# Patient Record
Sex: FEMALE | Race: WHITE | Employment: OTHER | URBAN - NONMETROPOLITAN AREA
[De-identification: names, ages, dates, MRNs, and addresses within clinical notes are randomized per-mention and may not be internally consistent; named-entity substitution may affect disease eponyms.]

---

## 2017-01-09 RX ORDER — METOPROLOL SUCCINATE 25 MG/1
TABLET, EXTENDED RELEASE ORAL
Qty: 90 TABLET | Refills: 3 | Status: SHIPPED | OUTPATIENT
Start: 2017-01-09

## 2017-01-13 ENCOUNTER — TELEPHONE (OUTPATIENT)
Dept: GASTROENTEROLOGY | Age: 77
End: 2017-01-13

## 2017-01-16 ENCOUNTER — OFFICE VISIT (OUTPATIENT)
Dept: FAMILY MEDICINE CLINIC | Facility: CLINIC | Age: 77
End: 2017-01-16

## 2017-01-16 VITALS
WEIGHT: 166 LBS | HEART RATE: 65 BPM | BODY MASS INDEX: 29.41 KG/M2 | HEIGHT: 63 IN | DIASTOLIC BLOOD PRESSURE: 74 MMHG | TEMPERATURE: 98 F | SYSTOLIC BLOOD PRESSURE: 120 MMHG | OXYGEN SATURATION: 98 %

## 2017-01-16 DIAGNOSIS — R53.83 FATIGUE, UNSPECIFIED TYPE: ICD-10-CM

## 2017-01-16 DIAGNOSIS — E55.9 UNSPECIFIED VITAMIN D DEFICIENCY: ICD-10-CM

## 2017-01-16 DIAGNOSIS — Z00.00 ANNUAL PHYSICAL EXAM: Primary | ICD-10-CM

## 2017-01-16 DIAGNOSIS — E78.2 MIXED HYPERLIPIDEMIA: ICD-10-CM

## 2017-01-16 LAB
BILIRUB BLD-MCNC: NEGATIVE MG/DL
CLARITY, POC: CLEAR
COLOR UR: YELLOW
GLUCOSE UR STRIP-MCNC: NEGATIVE MG/DL
KETONES UR QL: NEGATIVE
LEUKOCYTE EST, POC: NORMAL
NITRITE UR-MCNC: NEGATIVE MG/ML
PH UR: 6 [PH] (ref 5–8)
PROT UR STRIP-MCNC: NEGATIVE MG/DL
RBC # UR STRIP: NORMAL /UL
SP GR UR: 1.01 (ref 1–1.03)
UROBILINOGEN UR QL: NORMAL

## 2017-01-16 PROCEDURE — 81003 URINALYSIS AUTO W/O SCOPE: CPT | Performed by: FAMILY MEDICINE

## 2017-01-16 PROCEDURE — G0439 PPPS, SUBSEQ VISIT: HCPCS | Performed by: FAMILY MEDICINE

## 2017-01-16 NOTE — PATIENT INSTRUCTIONS
Exercising to Stay Healthy  Exercising regularly is important. It has many health benefits, such as:  · Improving your overall fitness, flexibility, and endurance.  · Increasing your bone density.  · Helping with weight control.  · Decreasing your body fat.  · Increasing your muscle strength.  · Reducing stress and tension.  · Improving your overall health.  In order to become healthy and stay healthy, it is recommended that you do moderate-intensity and vigorous-intensity exercise. You can tell that you are exercising at a moderate intensity if you have a higher heart rate and faster breathing, but you are still able to hold a conversation. You can tell that you are exercising at a vigorous intensity if you are breathing much harder and faster and cannot hold a conversation while exercising.  HOW OFTEN SHOULD I EXERCISE?  Choose an activity that you enjoy and set realistic goals. Your health care provider can help you to make an activity plan that works for you. Exercise regularly as directed by your health care provider. This may include:   · Doing resistance training twice each week, such as:    Push-ups.    Sit-ups.    Lifting weights.    Using resistance bands.  · Doing a given intensity of exercise for a given amount of time. Choose from these options:    150 minutes of moderate-intensity exercise every week.    75 minutes of vigorous-intensity exercise every week.    A mix of moderate-intensity and vigorous-intensity exercise every week.  Children, pregnant women, people who are out of shape, people who are overweight, and older adults may need to consult a health care provider for individual recommendations. If you have any sort of medical condition, be sure to consult your health care provider before starting a new exercise program.   WHAT ARE SOME EXERCISE IDEAS?  Some moderate-intensity exercise ideas include:   · Walking at a rate of 1 mile in 15  minutes.  · Biking.  · Hiking.  · Golfing.  · Dancing.  Some vigorous-intensity exercise ideas include:   · Walking at a rate of at least 4.5 miles per hour.  · Jogging or running at a rate of 5 miles per hour.  · Biking at a rate of at least 10 miles per hour.  · Lap swimming.  · Roller-skating or in-line skating.  · Cross-country skiing.  · Vigorous competitive sports, such as football, basketball, and soccer.  · Jumping rope.  · Aerobic dancing.  WHAT ARE SOME EVERYDAY ACTIVITIES THAT CAN HELP ME TO GET EXERCISE?  · Yard work, such as:    Pushing a .    Raking and bagging leaves.  · Washing and waxing your car.  · Pushing a stroller.  · Shoveling snow.  · Gardening.  · Washing windows or floors.  HOW CAN I BE MORE ACTIVE IN MY DAY-TO-DAY ACTIVITIES?  · Use the stairs instead of the elevator.  · Take a walk during your lunch break.  · If you drive, park your car farther away from work or school.  · If you take public transportation, get off one stop early and walk the rest of the way.  · Make all of your phone calls while standing up and walking around.  · Get up, stretch, and walk around every 30 minutes throughout the day.  WHAT GUIDELINES SHOULD I FOLLOW WHILE EXERCISING?  · Do not exercise so much that you hurt yourself, feel dizzy, or get very short of breath.  · Consult your health care provider before starting a new exercise program.  · Wear comfortable clothes and shoes with good support.  · Drink plenty of water while you exercise to prevent dehydration or heat stroke. Body water is lost during exercise and must be replaced.  · Work out until you breathe faster and your heart beats faster.     This information is not intended to replace advice given to you by your health care provider. Make sure you discuss any questions you have with your health care provider.     Document Released: 01/20/2012 Document Revised: 01/08/2016 Document Reviewed: 05/21/2015  [employer] Patient Education  ©2016 [employer]    Fall Prevention in the Home   Falls can cause injuries and can affect people from all age groups. There are many simple things that you can do to make your home safe and to help prevent falls.  WHAT CAN I DO ON THE OUTSIDE OF MY HOME?  · Regularly repair the edges of walkways and driveways and fix any cracks.  · Remove high doorway thresholds.  · Trim any shrubbery on the main path into your home.  · Use bright outdoor lighting.  · Clear walkways of debris and clutter, including tools and rocks.  · Regularly check that handrails are securely fastened and in good repair. Both sides of any steps should have handrails.  · Install guardrails along the edges of any raised decks or porches.  · Have leaves, snow, and ice cleared regularly.  · Use sand or salt on walkways during winter months.  · In the garage, clean up any spills right away, including grease or oil spills.  WHAT CAN I DO IN THE BATHROOM?  · Use night lights.  · Install grab bars by the toilet and in the tub and shower. Do not use towel bars as grab bars.  · Use non-skid mats or decals on the floor of the tub or shower.  · If you need to sit down while you are in the shower, use a plastic, non-slip stool..  · Keep the floor dry. Immediately clean up any water that spills on the floor.  · Remove soap buildup in the tub or shower on a regular basis.  · Attach bath mats securely with double-sided non-slip rug tape.  · Remove throw rugs and other tripping hazards from the floor.  WHAT CAN I DO IN THE BEDROOM?  · Use night lights.  · Make sure that a bedside light is easy to reach.  · Do not use oversized bedding that drapes onto the floor.  · Have a firm chair that has side arms to use for getting dressed.  · Remove throw rugs and other tripping hazards from the floor.  WHAT CAN I DO IN THE KITCHEN?   · Clean up any spills right away.  · Avoid walking on wet floors.  · Place frequently used items in easy-to-reach places.  · If you need to  reach for something above you, use a sturdy step stool that has a grab bar.  · Keep electrical cables out of the way.  · Do not use floor polish or wax that makes floors slippery. If you have to use wax, make sure that it is non-skid floor wax.  · Remove throw rugs and other tripping hazards from the floor.  WHAT CAN I DO IN THE STAIRWAYS?  · Do not leave any items on the stairs.  · Make sure that there are handrails on both sides of the stairs. Fix handrails that are broken or loose. Make sure that handrails are as long as the stairways.  · Check any carpeting to make sure that it is firmly attached to the stairs. Fix any carpet that is loose or worn.  · Avoid having throw rugs at the top or bottom of stairways, or secure the rugs with carpet tape to prevent them from moving.  · Make sure that you have a light switch at the top of the stairs and the bottom of the stairs. If you do not have them, have them installed.  WHAT ARE SOME OTHER FALL PREVENTION TIPS?  · Wear closed-toe shoes that fit well and support your feet. Wear shoes that have rubber soles or low heels.  · When you use a stepladder, make sure that it is completely opened and that the sides are firmly locked. Have someone hold the ladder while you are using it. Do not climb a closed stepladder.  · Add color or contrast paint or tape to grab bars and handrails in your home. Place contrasting color strips on the first and last steps.  · Use mobility aids as needed, such as canes, walkers, scooters, and crutches.  · Turn on lights if it is dark. Replace any light bulbs that burn out.  · Set up furniture so that there are clear paths. Keep the furniture in the same spot.  · Fix any uneven floor surfaces.  · Choose a carpet design that does not hide the edge of steps of a stairway.  · Be aware of any and all pets.  · Review your medicines with your healthcare provider. Some medicines can cause dizziness or changes in blood pressure, which increase your risk  of falling.  Talk with your health care provider about other ways that you can decrease your risk of falls. This may include working with a physical therapist or  to improve your strength, balance, and endurance.     This information is not intended to replace advice given to you by your health care provider. Make sure you discuss any questions you have with your health care provider.     Document Released: 2003 Document Revised: 2016 Document Reviewed: 2016  [employer] Patient Education © [employer]    Medicare Wellness  Personal Prevention Plan of Service     Date of Office Visit:  2017  Encounter Provider:  Bill Schilling MD  Place of Service:  [facility]  Patient Name: Ariana Gutierrez  :  1940    As part of the Medicare Wellness portion of your visit today, we are providing you with this personalized preventive plan of services (PPPS). This plan is based upon recommendations of the [place] Preventive Services Task Force (USPSTF) and the Advisory Committee on Immunization Practices (ACIP).    This lists the preventive care services that should be considered, and provides dates of when you are due. Items listed as completed are up-to-date and do not require any further intervention.    Health Maintenance   Topic Date Due   • TDAP/TD VACCINES (1 - Tdap) 1961   • PNEUMOCOCCAL VACCINES (65+ LOW/MEDIUM RISK) (1 of 2 - PCV13) 2007   • ZOSTER VACCINE  10/24/2016   • COLONOSCOPY  2016   • INFLUENZA VACCINE  2016   • MEDICARE ANNUAL WELLNESS  10/24/2016   • MAMMOGRAM  11/10/2018              Medicare Wellness  Personal Prevention Plan of Service     Date of Office Visit:  2017  Encounter Provider:  Bill Schilling MD  Place of Service:  [facility]  Patient Name: Ariana Gutierrez  :  1940    As part of the Medicare Wellness portion of your  visit today, we are providing you with this personalized preventive plan of services (PPPS). This plan is based upon recommendations of the [place] Preventive Services Task Force (USPSTF) and the Advisory Committee on Immunization Practices (ACIP).    This lists the preventive care services that should be considered, and provides dates of when you are due. Items listed as completed are up-to-date and do not require any further intervention.    Health Maintenance   Topic Date Due   • TDAP/TD VACCINES (1 - Tdap) 02/24/1961   • PNEUMOCOCCAL VACCINES (65+ LOW/MEDIUM RISK) (1 of 2 - PCV13) 02/24/2007   • ZOSTER VACCINE  10/24/2016   • COLONOSCOPY  12/12/2016   • LIPID PANEL  01/16/2017   • INFLUENZA VACCINE  08/01/2016   • MEDICARE ANNUAL WELLNESS  10/24/2016   • MAMMOGRAM  11/10/2018         Patient Self-Management and Personalized Health Advice  The patient has been provided with information about: diet and prevention of cardiac or vascular disease and preventive services including:   · Advanced directives: has an advanced directive - a copy HAS NOT been provided. Have asked the patient to send this to us to add to record.    Visit Diagnoses:  No diagnosis found.    No orders of the defined types were placed in this encounter.      Outpatient Encounter Prescriptions as of 1/16/2017   Medication Sig Dispense Refill   • aspirin 81 MG tablet Take 81 mg by mouth daily.     • benazepril (LOTENSIN) 20 MG tablet      • Calcium Carb-Cholecalciferol (CALCIUM + D3) 600-200 MG-UNIT tablet Take  by mouth Daily.     • gabapentin (NEURONTIN) 300 MG capsule TAKE 1 CAPSULE THREE TIMES DAILY     • hydrochlorothiazide (HYDRODIURIL) 25 MG tablet      • HYDROcodone-acetaminophen (NORCO)  MG per tablet Take 1 tablet by mouth Every 6 (Six) Hours As Needed for moderate pain (4-6). 90 tablet 0   • metoprolol succinate XL (TOPROL-XL) 25 MG 24 hr tablet      • omeprazole (PriLOSEC) 20 MG capsule      • polyethylene glycol (MIRALAX)  powder      • predniSONE (DELTASONE) 20 MG tablet Take 1 tablet by mouth Daily. 7 tablet 0     No facility-administered encounter medications on file as of 1/16/2017.        Reviewed use of high risk medication in the elderly: yes  Reviewed for potential of harmful drug interactions in the elderly: yes    Follow Up:  Return in 6 months (on 7/16/2017).     An After Visit Summary and PPPS with all of these plans were given to the patient.

## 2017-01-16 NOTE — PROGRESS NOTES
QUICK REFERENCE INFORMATION:  The ABCs of the Annual Wellness Visit    Subsequent Medicare Wellness Visit    HEALTH RISK ASSESSMENT    Recent Hospitalizations:  No recent hospitalization(s)..    Health Habits:  Current Diet: Well Balanced Diet  Dental Exam. up to date  Eye Exam. up to date  Exercise: 0 times/week.  Current exercise activities include: yoga    Current Medical Providers:  Patient Care Team:  Bill Schilling MD as PCP - General (Family Medicine)    The [facility] providers who are involved in the care of this patient are listed above. Additional providers and suppliers are listed below:          Smoking Status:  History   Smoking Status   • Never Smoker   Smokeless Tobacco   • Not on file       Alcohol Consumption:  History   Alcohol Use   • Yes     Comment: occ       Depression Screen:   PHQ-9 Depression Screening 1/16/2017   Little interest or pleasure in doing things 0   Feeling down, depressed, or hopeless 0   Trouble falling or staying asleep, or sleeping too much 0   Feeling tired or having little energy 0   Poor appetite or overeating 0   Feeling bad about yourself - or that you are a failure or have let yourself or your family down 0   Trouble concentrating on things, such as reading the newspaper or watching television 0   Moving or speaking so slowly that other people could have noticed. Or the opposite - being so fidgety or restless that you have been moving around a lot more than usual 0   Thoughts that you would be better off dead, or of hurting yourself in some way 0   PHQ-9 Total Score 0   If you checked off any problems, how difficult have these problems made it for you to do your work, take care of things at home, or get along with other people? Not difficult at all       Functional and Cognitive Screening:  Functional & Cognitive Status 1/16/2017   Do you have difficulty preparing food and eating? No   Do you have difficulty bathing yourself? No   Do you have difficulty  "getting dressed? No   Do you have difficulty using the toilet? No   Do you have difficulty moving around from place to place? No   In the past year have you fallen or experienced a near fall? No   Do you need help using the phone?  No   Are you deaf or do you have serious difficulty hearing?  No   Do you need help with transportation? No   Do you need help shopping? No   Do you need help preparing meals?  No   Do you need help with housework?  No   Do you need help with laundry? No   Do you need help taking your medications? No   Do you need help managing money? No   Do you have difficulty concentrating, remembering or making decisions? No   -- The Timed Up and Go (TUG) Test (CDC Version)                  - Testing directions adhered to:                                  1) Patients wears regular footwear and may use walking aid(s) if    needed.                                  2) Patient to sit back in standard arm chair and identify a line 10 feet    away from patient on floor.                                  3) Test time begins at \"Go\" and stops when patient reseated in arm    chair.                    - Instructions read aloud (and demonstrated as applicable) to patient:                                      When I say \"Go,\" I want you to:                                    1) Stand up from the chair.                                  2) Walk to the line on the floor (10 feet away) at your normal pace.                                  3) Turn.                                  4) Walk back to the chair at your normal pace.                                  5) Sit down again.                    - Result: 6                    - Observations during test:Normal gait          Falls Risk Assessment:       Does the patient have evidence of cognitive impairment? No    Recent Lab Results:  CMP:  Lab Results   Component Value Date    BUN 5 11/19/2015    CREATININE 0.77 11/19/2015     11/19/2015    K 4.2 11/19/2015    " CO2 27 11/19/2015    CALCIUM 9.2 11/19/2015    ALBUMIN 2.8 (L) 11/16/2015    BILITOT 0.8 11/16/2015    ALKPHOS 43 11/16/2015    AST 21 11/16/2015    ALT 24 11/16/2015     Lipid Panel:  No results found for: CHLPL, TRIG, HDL, VLDL, LDL  HbA1c:  No results found for: HGBA1C  Urine Microalbumin:  No results found for: MICROALBUR, POCMALB  Visual Acuity:  No exam data present    Age-appropriate Screening Schedule:  Refer to the list below for future screening recommendations based on patient's age, sex and/or medical conditions. Orders for these recommended tests are listed in the plan section. The patient has been provided with a written plan.    Health Maintenance   Topic Date Due   • TDAP/TD VACCINES (1 - Tdap) 02/24/1961   • PNEUMOCOCCAL VACCINES (65+ LOW/MEDIUM RISK) (1 of 2 - PCV13) 02/24/2007   • ZOSTER VACCINE  10/24/2016   • COLONOSCOPY  12/12/2016   • INFLUENZA VACCINE  08/01/2016   • MAMMOGRAM  11/10/2018        Subjective   History of Present Illness    Ariana Gutierrez is a 76 y.o. female who presents for an Subsequent Wellness Visit. In addition, we addressed the following health issues:    The following portions of the patient's history were reviewed and updated as appropriate: allergies, current medications, past family history, past medical history, past surgical history and problem list.    Outpatient Medications Prior to Visit   Medication Sig Dispense Refill   • aspirin 81 MG tablet Take 81 mg by mouth daily.     • benazepril (LOTENSIN) 20 MG tablet      • Calcium Carb-Cholecalciferol (CALCIUM + D3) 600-200 MG-UNIT tablet Take  by mouth Daily.     • gabapentin (NEURONTIN) 300 MG capsule TAKE 1 CAPSULE THREE TIMES DAILY     • hydrochlorothiazide (HYDRODIURIL) 25 MG tablet      • HYDROcodone-acetaminophen (NORCO)  MG per tablet Take 1 tablet by mouth Every 6 (Six) Hours As Needed for moderate pain (4-6). 90 tablet 0   • metoprolol succinate XL (TOPROL-XL) 25 MG 24 hr tablet      • omeprazole  (PriLOSEC) 20 MG capsule      • polyethylene glycol (MIRALAX) powder      • predniSONE (DELTASONE) 20 MG tablet Take 1 tablet by mouth Daily. 7 tablet 0     No facility-administered medications prior to visit.        Patient Active Problem List   Diagnosis   • Abdominal bloating   • Flatulence   • Diverticulosis of intestine   • Hypertension   • Irritable bowel syndrome   • Irritable bowel syndrome with constipation   • Small intestinal bacterial overgrowth   • Spinal stenosis       Identification of Risk Factors:  Risk factors include: weight  and inactivity.    Review of Systems   Constitutional: Negative.    HENT: Negative.    Eyes: Negative.    Respiratory: Negative.    Cardiovascular: Negative.    Gastrointestinal:        Chronic constipation-colonoscopy scheduled in March   Endocrine: Negative.    Genitourinary: Negative.    Musculoskeletal: Positive for back pain.        Spinal stenosis going to [place] to be reevaluated   Neurological: Negative.    Hematological: Negative.    Psychiatric/Behavioral: Negative.        Compared to one year ago, the patient feels his physical health is the same.  Compared to one year ago, the patient feels his mental health is the same.    Objective     Physical Exam   Constitutional: She is oriented to person, place, and time. She appears well-developed and well-nourished.   HENT:   Head: Normocephalic.   Right Ear: External ear normal.   Left Ear: External ear normal.   Mouth/Throat: Oropharynx is clear and moist.   Eyes: EOM are normal. Pupils are equal, round, and reactive to light.   Neck:   Good carotid pulses no neck masses   Cardiovascular: Normal rate and regular rhythm.    Pulmonary/Chest: Effort normal and breath sounds normal.   Abdominal: Soft. Bowel sounds are normal.   Pulsatile mass or organomegaly   Genitourinary:   Genitourinary Comments: Gynecologic exam elsewhere   Neurological: She is alert and oriented to person, place, and time.   Skin: Skin is warm and  "dry.   Psychiatric: She has a normal mood and affect. Her behavior is normal. Judgment and thought content normal.   Vitals reviewed.      Vitals:    01/16/17 0933   BP: 120/74   Pulse: 65   Temp: 98 °F (36.7 °C)   SpO2: 98%   Weight: 166 lb (75.3 kg)   Height: 63\" (160 cm)   PainSc:   4   PainLoc: Back       Body mass index is 29.41 kg/(m^2).  Discussed the patient's BMI with her. The BMI is above average; no BMI management plan is appropriate..    Assessment/Plan   Patient Self-Management and Personalized Health Advice  The patient has been provided with information about: diet and prevention of cardiac or vascular disease and preventive services including:   · Advanced directives: has an advanced directive - a copy HAS NOT been provided. Have asked the patient to send this to us to add to record.    Visit Diagnoses:  No diagnosis found.    No orders of the defined types were placed in this encounter.      Outpatient Encounter Prescriptions as of 1/16/2017   Medication Sig Dispense Refill   • aspirin 81 MG tablet Take 81 mg by mouth daily.     • benazepril (LOTENSIN) 20 MG tablet      • Calcium Carb-Cholecalciferol (CALCIUM + D3) 600-200 MG-UNIT tablet Take  by mouth Daily.     • gabapentin (NEURONTIN) 300 MG capsule TAKE 1 CAPSULE THREE TIMES DAILY     • hydrochlorothiazide (HYDRODIURIL) 25 MG tablet      • HYDROcodone-acetaminophen (NORCO)  MG per tablet Take 1 tablet by mouth Every 6 (Six) Hours As Needed for moderate pain (4-6). 90 tablet 0   • metoprolol succinate XL (TOPROL-XL) 25 MG 24 hr tablet      • omeprazole (PriLOSEC) 20 MG capsule      • polyethylene glycol (MIRALAX) powder      • predniSONE (DELTASONE) 20 MG tablet Take 1 tablet by mouth Daily. 7 tablet 0     No facility-administered encounter medications on file as of 1/16/2017.        Reviewed use of high risk medication in the elderly: yes  Reviewed for potential of harmful drug interactions in the elderly: yes    Follow Up:  Return in 6 " months (on 7/16/2017).     An After Visit Summary and PPPS with all of these plans were given to the patient.

## 2017-01-16 NOTE — MR AVS SNAPSHOT
Ariana Gutierrez   1/16/2017 10:00 AM   Office Visit    Dept Phone:  [phone]   Encounter #:  [id number]    Provider:  Bill Schilling MD   Department:  [facility]                Your Full Care Plan              Your Updated Medication List          This list is accurate as of: 1/16/17 10:38 AM.  Always use your most recent med list.                aspirin 81 MG tablet       benazepril 20 MG tablet   Commonly known as:  LOTENSIN       Calcium + D3 600-200 MG-UNIT tablet       gabapentin 300 MG capsule   Commonly known as:  NEURONTIN       hydrochlorothiazide 25 MG tablet   Commonly known as:  HYDRODIURIL       HYDROcodone-acetaminophen  MG per tablet   Commonly known as:  NORCO   Take 1 tablet by mouth Every 6 (Six) Hours As Needed for moderate pain (4-6).       metoprolol succinate XL 25 MG 24 hr tablet   Commonly known as:  TOPROL-XL       omeprazole 20 MG capsule   Commonly known as:  priLOSEC       polyethylene glycol powder   Commonly known as:  MIRALAX       predniSONE 20 MG tablet   Commonly known as:  DELTASONE   Take 1 tablet by mouth Daily.               We Performed the Following     CBC & Differential     Comprehensive Metabolic Panel     Folate     Lipid Panel     POC Urinalysis Dipstick, Automated     T4, free     TSH     Vitamin B12     Vitamin D 25 Hydroxy       You Were Diagnosed With        Codes Comments    Annual physical exam    -  Primary ICD-10-CM: Z00.00  ICD-9-CM: V70.0     Mixed hyperlipidemia     ICD-10-CM: E78.2  ICD-9-CM: 272.2     Fatigue, unspecified type     ICD-10-CM: R53.83  ICD-9-CM: 780.79     Unspecified vitamin D deficiency     ICD-10-CM: E55.9  ICD-9-CM: 268.9       Instructions        Exercising to Stay Healthy  Exercising regularly is important. It has many health benefits, such as:  · Improving your overall fitness, flexibility, and endurance.  · Increasing your bone density.  · Helping with weight  control.  · Decreasing your body fat.  · Increasing your muscle strength.  · Reducing stress and tension.  · Improving your overall health.  In order to become healthy and stay healthy, it is recommended that you do moderate-intensity and vigorous-intensity exercise. You can tell that you are exercising at a moderate intensity if you have a higher heart rate and faster breathing, but you are still able to hold a conversation. You can tell that you are exercising at a vigorous intensity if you are breathing much harder and faster and cannot hold a conversation while exercising.  HOW OFTEN SHOULD I EXERCISE?  Choose an activity that you enjoy and set realistic goals. Your health care provider can help you to make an activity plan that works for you. Exercise regularly as directed by your health care provider. This may include:   · Doing resistance training twice each week, such as:    Push-ups.    Sit-ups.    Lifting weights.    Using resistance bands.  · Doing a given intensity of exercise for a given amount of time. Choose from these options:    150 minutes of moderate-intensity exercise every week.    75 minutes of vigorous-intensity exercise every week.    A mix of moderate-intensity and vigorous-intensity exercise every week.  Children, pregnant women, people who are out of shape, people who are overweight, and older adults may need to consult a health care provider for individual recommendations. If you have any sort of medical condition, be sure to consult your health care provider before starting a new exercise program.   WHAT ARE SOME EXERCISE IDEAS?  Some moderate-intensity exercise ideas include:   · Walking at a rate of 1 mile in 15 minutes.  · Biking.  · Hiking.  · Golfing.  · Dancing.  Some vigorous-intensity exercise ideas include:   · Walking at a rate of at least 4.5 miles per hour.  · Jogging or running at a rate of 5 miles per hour.  · Biking at a rate of at least 10 miles per hour.  · Lap  swimming.  · Roller-skating or in-line skating.  · Cross-country skiing.  · Vigorous competitive sports, such as football, basketball, and soccer.  · Jumping rope.  · Aerobic dancing.  WHAT ARE SOME EVERYDAY ACTIVITIES THAT CAN HELP ME TO GET EXERCISE?  · Yard work, such as:    Pushing a .    Raking and bagging leaves.  · Washing and waxing your car.  · Pushing a stroller.  · Shoveling snow.  · Gardening.  · Washing windows or floors.  HOW CAN I BE MORE ACTIVE IN MY DAY-TO-DAY ACTIVITIES?  · Use the stairs instead of the elevator.  · Take a walk during your lunch break.  · If you drive, park your car farther away from work or school.  · If you take public transportation, get off one stop early and walk the rest of the way.  · Make all of your phone calls while standing up and walking around.  · Get up, stretch, and walk around every 30 minutes throughout the day.  WHAT GUIDELINES SHOULD I FOLLOW WHILE EXERCISING?  · Do not exercise so much that you hurt yourself, feel dizzy, or get very short of breath.  · Consult your health care provider before starting a new exercise program.  · Wear comfortable clothes and shoes with good support.  · Drink plenty of water while you exercise to prevent dehydration or heat stroke. Body water is lost during exercise and must be replaced.  · Work out until you breathe faster and your heart beats faster.     This information is not intended to replace advice given to you by your health care provider. Make sure you discuss any questions you have with your health care provider.     Document Released: 01/20/2012 Document Revised: 01/08/2016 Document Reviewed: 05/21/2015  Elsevier Interactive Patient Education ©2016 [employer]    Fall Prevention in the Home   Falls can cause injuries and can affect people from all age groups. There are many simple things that you can do to make your home safe and to help prevent falls.  WHAT CAN I DO ON THE OUTSIDE OF MY HOME?  · Regularly  repair the edges of walkways and driveways and fix any cracks.  · Remove high doorway thresholds.  · Trim any shrubbery on the main path into your home.  · Use bright outdoor lighting.  · Clear walkways of debris and clutter, including tools and rocks.  · Regularly check that handrails are securely fastened and in good repair. Both sides of any steps should have handrails.  · Install guardrails along the edges of any raised decks or porches.  · Have leaves, snow, and ice cleared regularly.  · Use sand or salt on walkways during winter months.  · In the garage, clean up any spills right away, including grease or oil spills.  WHAT CAN I DO IN THE BATHROOM?  · Use night lights.  · Install grab bars by the toilet and in the tub and shower. Do not use towel bars as grab bars.  · Use non-skid mats or decals on the floor of the tub or shower.  · If you need to sit down while you are in the shower, use a plastic, non-slip stool..  · Keep the floor dry. Immediately clean up any water that spills on the floor.  · Remove soap buildup in the tub or shower on a regular basis.  · Attach bath mats securely with double-sided non-slip rug tape.  · Remove throw rugs and other tripping hazards from the floor.  WHAT CAN I DO IN THE BEDROOM?  · Use night lights.  · Make sure that a bedside light is easy to reach.  · Do not use oversized bedding that drapes onto the floor.  · Have a firm chair that has side arms to use for getting dressed.  · Remove throw rugs and other tripping hazards from the floor.  WHAT CAN I DO IN THE KITCHEN?   · Clean up any spills right away.  · Avoid walking on wet floors.  · Place frequently used items in easy-to-reach places.  · If you need to reach for something above you, use a sturdy step stool that has a grab bar.  · Keep electrical cables out of the way.  · Do not use floor polish or wax that makes floors slippery. If you have to use wax, make sure that it is non-skid floor wax.  · Remove throw rugs and  other tripping hazards from the floor.  WHAT CAN I DO IN THE STAIRWAYS?  · Do not leave any items on the stairs.  · Make sure that there are handrails on both sides of the stairs. Fix handrails that are broken or loose. Make sure that handrails are as long as the stairways.  · Check any carpeting to make sure that it is firmly attached to the stairs. Fix any carpet that is loose or worn.  · Avoid having throw rugs at the top or bottom of stairways, or secure the rugs with carpet tape to prevent them from moving.  · Make sure that you have a light switch at the top of the stairs and the bottom of the stairs. If you do not have them, have them installed.  WHAT ARE SOME OTHER FALL PREVENTION TIPS?  · Wear closed-toe shoes that fit well and support your feet. Wear shoes that have rubber soles or low heels.  · When you use a stepladder, make sure that it is completely opened and that the sides are firmly locked. Have someone hold the ladder while you are using it. Do not climb a closed stepladder.  · Add color or contrast paint or tape to grab bars and handrails in your home. Place contrasting color strips on the first and last steps.  · Use mobility aids as needed, such as canes, walkers, scooters, and crutches.  · Turn on lights if it is dark. Replace any light bulbs that burn out.  · Set up furniture so that there are clear paths. Keep the furniture in the same spot.  · Fix any uneven floor surfaces.  · Choose a carpet design that does not hide the edge of steps of a stairway.  · Be aware of any and all pets.  · Review your medicines with your healthcare provider. Some medicines can cause dizziness or changes in blood pressure, which increase your risk of falling.  Talk with your health care provider about other ways that you can decrease your risk of falls. This may include working with a physical therapist or  to improve your strength, balance, and endurance.     This information is not intended to replace  advice given to you by your health care provider. Make sure you discuss any questions you have with your health care provider.     Document Released: 2003 Document Revised: 2016 Document Reviewed: 2016  [employer] Interactive Patient Education © [employer]    Medicare Wellness  Personal Prevention Plan of Service     Date of Office Visit:  2017  Encounter Provider:  Bill Schilling MD  Place of Service:  [facility]  Patient Name: Ariana Gutierrez  :  1940    As part of the Medicare Wellness portion of your visit today, we are providing you with this personalized preventive plan of services (PPPS). This plan is based upon recommendations of the [place] Preventive Services Task Force (USPSTF) and the Advisory Committee on Immunization Practices (ACIP).    This lists the preventive care services that should be considered, and provides dates of when you are due. Items listed as completed are up-to-date and do not require any further intervention.    Health Maintenance   Topic Date Due   • TDAP/TD VACCINES (1 - Tdap) 1961   • PNEUMOCOCCAL VACCINES (65+ LOW/MEDIUM RISK) (1 of 2 - PCV13) 2007   • ZOSTER VACCINE  10/24/2016   • COLONOSCOPY  2016   • INFLUENZA VACCINE  2016   • MEDICARE ANNUAL WELLNESS  10/24/2016   • MAMMOGRAM  11/10/2018              Medicare Wellness  Personal Prevention Plan of Service     Date of Office Visit:  2017  Encounter Provider:  Bill Schilling MD  Place of Service:  [facility]  Patient Name: Ariana Gutierrez  :  1940    As part of the Medicare Wellness portion of your visit today, we are providing you with this personalized preventive plan of services (PPPS). This plan is based upon recommendations of the [place] Preventive Services Task Force (USPSTF) and the Advisory Committee on Immunization Practices (ACIP).    This lists  the preventive care services that should be considered, and provides dates of when you are due. Items listed as completed are up-to-date and do not require any further intervention.    Health Maintenance   Topic Date Due   • TDAP/TD VACCINES (1 - Tdap) 02/24/1961   • PNEUMOCOCCAL VACCINES (65+ LOW/MEDIUM RISK) (1 of 2 - PCV13) 02/24/2007   • ZOSTER VACCINE  10/24/2016   • COLONOSCOPY  12/12/2016   • LIPID PANEL  01/16/2017   • INFLUENZA VACCINE  08/01/2016   • MEDICARE ANNUAL WELLNESS  10/24/2016   • MAMMOGRAM  11/10/2018         Patient Self-Management and Personalized Health Advice  The patient has been provided with information about: diet and prevention of cardiac or vascular disease and preventive services including:   · Advanced directives: has an advanced directive - a copy HAS NOT been provided. Have asked the patient to send this to us to add to record.    Visit Diagnoses:  No diagnosis found.    No orders of the defined types were placed in this encounter.      Outpatient Encounter Prescriptions as of 1/16/2017   Medication Sig Dispense Refill   • aspirin 81 MG tablet Take 81 mg by mouth daily.     • benazepril (LOTENSIN) 20 MG tablet      • Calcium Carb-Cholecalciferol (CALCIUM + D3) 600-200 MG-UNIT tablet Take  by mouth Daily.     • gabapentin (NEURONTIN) 300 MG capsule TAKE 1 CAPSULE THREE TIMES DAILY     • hydrochlorothiazide (HYDRODIURIL) 25 MG tablet      • HYDROcodone-acetaminophen (NORCO)  MG per tablet Take 1 tablet by mouth Every 6 (Six) Hours As Needed for moderate pain (4-6). 90 tablet 0   • metoprolol succinate XL (TOPROL-XL) 25 MG 24 hr tablet      • omeprazole (PriLOSEC) 20 MG capsule      • polyethylene glycol (MIRALAX) powder      • predniSONE (DELTASONE) 20 MG tablet Take 1 tablet by mouth Daily. 7 tablet 0     No facility-administered encounter medications on file as of 1/16/2017.        Reviewed use of high risk medication in the elderly: yes  Reviewed for potential of harmful  "drug interactions in the elderly: yes    Follow Up:  Return in 6 months (on 7/16/2017).     An After Visit Summary and PPPS with all of these plans were given to the patient.              Patient Instructions History      Upcoming Appointments     Visit Type Date Time Department    SUBSEQUENT MEDICARE WELLNESS 1/16/2017 10:00 AM [facility] Signup     Our records indicate that you have declined [employer] signup. If you would like to sign up for [employer], please email [email or web address] or call [phone] to obtain an activation code.             Other Info from Your Visit           Allergies     No Known Allergies      Reason for Visit     Annual Exam Medicare Wellness      Vital Signs     Blood Pressure Pulse Temperature Height Weight Last Menstrual Period    120/74 65 98 °F (36.7 °C) 63\" (160 cm) 166 lb (75.3 kg) (LMP Unknown)    Oxygen Saturation Body Mass Index Smoking Status             98% 29.41 kg/m2 Never Smoker         Problems and Diagnoses Noted     Abdominal bloating    Diverticulosis    Flatulence    High blood pressure    Spasmodic colon    Irritable bowel syndrome with constipation    Small intestinal bacterial overgrowth    Narrowing of spinal canal    Annual physical exam    -  Primary    Mixed hyperlipidemia        Tiredness        Vitamin D deficiency          Results     POC Urinalysis Dipstick, Automated      Component Value Standard Range & Units    Color Yellow Yellow, Straw, Dark Yellow, Ronit    Clarity, UA Clear Clear    Glucose, UA Negative Negative, 1000 mg/dL (3+) mg/dL    Bilirubin Negative Negative    Ketones, UA Negative Negative    Specific Gravity  1.015 1.005 - 1.030    Blood, UA Trace Negative    pH, Urine 6.0 5.0 - 8.0    Protein, POC Negative Negative mg/dL    Urobilinogen, UA Normal Normal    Leukocytes Trace Negative    Nitrite, UA Negative Negative                    "

## 2017-01-17 LAB
25(OH)D3+25(OH)D2 SERPL-MCNC: 39.6 NG/ML (ref 30–100)
ALBUMIN SERPL-MCNC: 4.3 G/DL (ref 3.5–5)
ALBUMIN/GLOB SERPL: 1.3 G/DL (ref 1.1–2.5)
ALP SERPL-CCNC: 85 U/L (ref 24–120)
ALT SERPL-CCNC: 38 U/L (ref 0–54)
AST SERPL-CCNC: 31 U/L (ref 7–45)
BASOPHILS # BLD AUTO: 0.04 10*3/MM3 (ref 0–0.2)
BASOPHILS NFR BLD AUTO: 0.4 % (ref 0–2)
BILIRUB SERPL-MCNC: 0.7 MG/DL (ref 0.1–1)
BUN SERPL-MCNC: 25 MG/DL (ref 5–21)
BUN/CREAT SERPL: 21.9 (ref 7–25)
CALCIUM SERPL-MCNC: 10.1 MG/DL (ref 8.4–10.4)
CHLORIDE SERPL-SCNC: 96 MMOL/L (ref 98–110)
CHOLEST SERPL-MCNC: 211 MG/DL (ref 130–200)
CO2 SERPL-SCNC: 31 MMOL/L (ref 24–31)
CREAT SERPL-MCNC: 1.14 MG/DL (ref 0.5–1.4)
EOSINOPHIL # BLD AUTO: 0.12 10*3/MM3 (ref 0–0.7)
EOSINOPHIL NFR BLD AUTO: 1.2 % (ref 0–4)
ERYTHROCYTE [DISTWIDTH] IN BLOOD BY AUTOMATED COUNT: 14.4 % (ref 12–15)
FOLATE SERPL-MCNC: >20 NG/ML
GLOBULIN SER CALC-MCNC: 3.4 GM/DL
GLUCOSE SERPL-MCNC: 93 MG/DL (ref 70–100)
HCT VFR BLD AUTO: 44.8 % (ref 37–47)
HDLC SERPL-MCNC: 55 MG/DL
HGB BLD-MCNC: 13.8 G/DL (ref 12–16)
IMM GRANULOCYTES # BLD: 0.02 10*3/MM3 (ref 0–0.03)
IMM GRANULOCYTES NFR BLD: 0.2 % (ref 0–5)
LDLC SERPL CALC-MCNC: 132 MG/DL (ref 0–99)
LYMPHOCYTES # BLD AUTO: 1.08 10*3/MM3 (ref 0.72–4.86)
LYMPHOCYTES NFR BLD AUTO: 11 % (ref 15–45)
MCH RBC QN AUTO: 29.2 PG (ref 28–32)
MCHC RBC AUTO-ENTMCNC: 30.8 G/DL (ref 33–36)
MCV RBC AUTO: 94.7 FL (ref 82–98)
MONOCYTES # BLD AUTO: 0.58 10*3/MM3 (ref 0.19–1.3)
MONOCYTES NFR BLD AUTO: 5.9 % (ref 4–12)
NEUTROPHILS # BLD AUTO: 8 10*3/MM3 (ref 1.87–8.4)
NEUTROPHILS NFR BLD AUTO: 81.3 % (ref 39–78)
PLATELET # BLD AUTO: 280 10*3/MM3 (ref 130–400)
POTASSIUM SERPL-SCNC: 4.6 MMOL/L (ref 3.5–5.3)
PROT SERPL-MCNC: 7.7 G/DL (ref 6.3–8.7)
RBC # BLD AUTO: 4.73 10*6/MM3 (ref 4.2–5.4)
SODIUM SERPL-SCNC: 141 MMOL/L (ref 135–145)
T4 FREE SERPL-MCNC: 1.02 NG/DL (ref 0.78–2.19)
TRIGL SERPL-MCNC: 119 MG/DL (ref 0–149)
TSH SERPL DL<=0.005 MIU/L-ACNC: 1.78 MIU/ML (ref 0.47–4.68)
VIT B12 SERPL-MCNC: 925 PG/ML (ref 239–931)
VLDLC SERPL CALC-MCNC: 23.8 MG/DL
WBC # BLD AUTO: 9.84 10*3/MM3 (ref 4.8–10.8)

## 2017-01-24 ENCOUNTER — OFFICE VISIT (OUTPATIENT)
Dept: GASTROENTEROLOGY | Age: 77
End: 2017-01-24
Payer: MEDICARE

## 2017-01-24 VITALS
DIASTOLIC BLOOD PRESSURE: 67 MMHG | BODY MASS INDEX: 28.68 KG/M2 | HEIGHT: 64 IN | WEIGHT: 168 LBS | RESPIRATION RATE: 20 BRPM | SYSTOLIC BLOOD PRESSURE: 138 MMHG

## 2017-01-24 DIAGNOSIS — K58.1 IRRITABLE BOWEL SYNDROME WITH CONSTIPATION: Primary | ICD-10-CM

## 2017-01-24 DIAGNOSIS — K21.9 GASTROESOPHAGEAL REFLUX DISEASE, ESOPHAGITIS PRESENCE NOT SPECIFIED: ICD-10-CM

## 2017-01-24 DIAGNOSIS — K63.89 SMALL INTESTINAL BACTERIAL OVERGROWTH: ICD-10-CM

## 2017-01-24 DIAGNOSIS — Z87.19 HISTORY OF DIVERTICULOSIS: ICD-10-CM

## 2017-01-24 PROCEDURE — 99214 OFFICE O/P EST MOD 30 MIN: CPT | Performed by: INTERNAL MEDICINE

## 2017-01-24 PROCEDURE — 3017F COLORECTAL CA SCREEN DOC REV: CPT | Performed by: INTERNAL MEDICINE

## 2017-01-24 PROCEDURE — G8420 CALC BMI NORM PARAMETERS: HCPCS | Performed by: INTERNAL MEDICINE

## 2017-01-24 PROCEDURE — G8400 PT W/DXA NO RESULTS DOC: HCPCS | Performed by: INTERNAL MEDICINE

## 2017-01-24 PROCEDURE — 3014F SCREEN MAMMO DOC REV: CPT | Performed by: INTERNAL MEDICINE

## 2017-01-24 PROCEDURE — 4040F PNEUMOC VAC/ADMIN/RCVD: CPT | Performed by: INTERNAL MEDICINE

## 2017-01-24 PROCEDURE — 1036F TOBACCO NON-USER: CPT | Performed by: INTERNAL MEDICINE

## 2017-01-24 PROCEDURE — 1090F PRES/ABSN URINE INCON ASSESS: CPT | Performed by: INTERNAL MEDICINE

## 2017-01-24 PROCEDURE — 1123F ACP DISCUSS/DSCN MKR DOCD: CPT | Performed by: INTERNAL MEDICINE

## 2017-01-24 PROCEDURE — G8484 FLU IMMUNIZE NO ADMIN: HCPCS | Performed by: INTERNAL MEDICINE

## 2017-01-24 PROCEDURE — G8427 DOCREV CUR MEDS BY ELIG CLIN: HCPCS | Performed by: INTERNAL MEDICINE

## 2017-01-24 ASSESSMENT — ENCOUNTER SYMPTOMS
RESPIRATORY NEGATIVE: 1
COUGH: 0
ALLERGIC/IMMUNOLOGIC NEGATIVE: 1
VOMITING: 0
ABDOMINAL PAIN: 0
CONSTIPATION: 1
RECTAL PAIN: 0
DIARRHEA: 0
NAUSEA: 0
ANAL BLEEDING: 0
BLOOD IN STOOL: 0
ABDOMINAL DISTENTION: 0

## 2017-03-14 ENCOUNTER — OFFICE VISIT (OUTPATIENT)
Dept: FAMILY MEDICINE CLINIC | Facility: CLINIC | Age: 77
End: 2017-03-14

## 2017-03-14 VITALS
WEIGHT: 167 LBS | SYSTOLIC BLOOD PRESSURE: 122 MMHG | DIASTOLIC BLOOD PRESSURE: 70 MMHG | BODY MASS INDEX: 29.59 KG/M2 | HEIGHT: 63 IN

## 2017-03-14 DIAGNOSIS — M54.50 CHRONIC BILATERAL LOW BACK PAIN WITHOUT SCIATICA: Primary | ICD-10-CM

## 2017-03-14 DIAGNOSIS — G89.29 CHRONIC BILATERAL LOW BACK PAIN WITHOUT SCIATICA: Primary | ICD-10-CM

## 2017-03-14 PROCEDURE — 99211 OFF/OP EST MAY X REQ PHY/QHP: CPT | Performed by: FAMILY MEDICINE

## 2017-03-14 RX ORDER — HYDROCODONE BITARTRATE AND ACETAMINOPHEN 10; 325 MG/1; MG/1
1 TABLET ORAL EVERY 6 HOURS PRN
Qty: 90 TABLET | Refills: 0 | Status: SHIPPED | OUTPATIENT
Start: 2017-03-14 | End: 2017-10-11

## 2017-04-21 ENCOUNTER — HOSPITAL ENCOUNTER (OUTPATIENT)
Dept: NUCLEAR MEDICINE | Age: 77
Discharge: HOME | End: 2017-04-21
Payer: MEDICARE

## 2017-04-21 ENCOUNTER — HOSPITAL ENCOUNTER (OUTPATIENT)
Dept: LAB | Age: 77
Discharge: HOME | End: 2017-04-21
Payer: MEDICARE

## 2017-04-21 DIAGNOSIS — M25.561 RIGHT KNEE PAIN, UNSPECIFIED CHRONICITY: ICD-10-CM

## 2017-04-21 LAB
BASOPHILS ABSOLUTE: 0.1 K/UL (ref 0–0.2)
BASOPHILS RELATIVE PERCENT: 0.6 % (ref 0–1)
C-REACTIVE PROTEIN: <0.3 MG/L (ref 0–5)
EOSINOPHILS ABSOLUTE: 0.1 K/UL (ref 0–0.6)
EOSINOPHILS RELATIVE PERCENT: 0.7 % (ref 0–5)
HCT VFR BLD CALC: 39.8 % (ref 37–47)
HEMOGLOBIN: 12.8 G/DL (ref 12–16)
LYMPHOCYTES ABSOLUTE: 2 K/UL (ref 1.1–4.5)
LYMPHOCYTES RELATIVE PERCENT: 18 % (ref 20–40)
MCH RBC QN AUTO: 29.5 PG (ref 27–31)
MCHC RBC AUTO-ENTMCNC: 32.2 G/DL (ref 33–37)
MCV RBC AUTO: 91.7 FL (ref 81–99)
MONOCYTES ABSOLUTE: 0.7 K/UL (ref 0–0.9)
MONOCYTES RELATIVE PERCENT: 6.1 % (ref 0–10)
NEUTROPHILS ABSOLUTE: 8 K/UL (ref 1.5–7.5)
NEUTROPHILS RELATIVE PERCENT: 74 % (ref 50–65)
PDW BLD-RTO: 13.9 % (ref 11.5–14.5)
PLATELET # BLD: 262 K/UL (ref 130–400)
PMV BLD AUTO: 10.9 FL (ref 7.4–10.4)
RBC # BLD: 4.34 M/UL (ref 4.2–5.4)
SEDIMENTATION RATE, ERYTHROCYTE: 48 MM/HR (ref 0–25)
WBC # BLD: 10.8 K/UL (ref 4.8–10.8)

## 2017-04-21 PROCEDURE — 78306 BONE IMAGING WHOLE BODY: CPT

## 2017-04-21 PROCEDURE — 3430000000 HC RX DIAGNOSTIC RADIOPHARMACEUTICAL: Performed by: ORTHOPAEDIC SURGERY

## 2017-04-21 PROCEDURE — 85025 COMPLETE CBC W/AUTO DIFF WBC: CPT

## 2017-04-21 PROCEDURE — 36415 COLL VENOUS BLD VENIPUNCTURE: CPT

## 2017-04-21 PROCEDURE — 86140 C-REACTIVE PROTEIN: CPT

## 2017-04-21 PROCEDURE — A9561 TC99M OXIDRONATE: HCPCS | Performed by: ORTHOPAEDIC SURGERY

## 2017-04-21 PROCEDURE — 85652 RBC SED RATE AUTOMATED: CPT

## 2017-04-21 RX ADMIN — TECHNETIUM TC 99M OXIDRONATE 20 MILLICURIE: 3.15 INJECTION, POWDER, LYOPHILIZED, FOR SOLUTION INTRAVENOUS at 13:25

## 2017-05-22 ENCOUNTER — HOSPITAL ENCOUNTER (OUTPATIENT)
Dept: PREADMISSION TESTING | Age: 77
Discharge: HOME | End: 2017-05-22
Payer: MEDICARE

## 2017-05-22 VITALS — BODY MASS INDEX: 27.9 KG/M2 | WEIGHT: 160 LBS

## 2017-05-22 LAB
ANION GAP SERPL CALCULATED.3IONS-SCNC: 13 MMOL/L (ref 7–19)
APTT: 27.7 SEC (ref 26–36.2)
BUN BLDV-MCNC: 19 MG/DL (ref 8–23)
CALCIUM SERPL-MCNC: 10 MG/DL (ref 8.8–10.2)
CHLORIDE BLD-SCNC: 95 MMOL/L (ref 98–111)
CO2: 29 MMOL/L (ref 22–29)
CREAT SERPL-MCNC: 1 MG/DL (ref 0.5–0.9)
GFR NON-AFRICAN AMERICAN: 54
GLUCOSE BLD-MCNC: 90 MG/DL (ref 74–109)
INR BLD: 1.04 (ref 0.88–1.18)
POTASSIUM SERPL-SCNC: 4 MMOL/L (ref 3.5–5)
PROTHROMBIN TIME: 13.5 SEC (ref 12–14.6)
SODIUM BLD-SCNC: 137 MMOL/L (ref 136–145)

## 2017-05-22 PROCEDURE — 87070 CULTURE OTHR SPECIMN AEROBIC: CPT

## 2017-05-22 PROCEDURE — 93005 ELECTROCARDIOGRAM TRACING: CPT

## 2017-05-22 PROCEDURE — 80048 BASIC METABOLIC PNL TOTAL CA: CPT

## 2017-05-22 PROCEDURE — 85610 PROTHROMBIN TIME: CPT

## 2017-05-22 PROCEDURE — 85730 THROMBOPLASTIN TIME PARTIAL: CPT

## 2017-05-22 RX ORDER — HYDROCODONE BITARTRATE AND ACETAMINOPHEN 10; 325 MG/1; MG/1
1 TABLET ORAL EVERY 6 HOURS PRN
Status: ON HOLD | COMMUNITY
End: 2017-06-05 | Stop reason: HOSPADM

## 2017-05-23 ENCOUNTER — OFFICE VISIT (OUTPATIENT)
Dept: FAMILY MEDICINE CLINIC | Facility: CLINIC | Age: 77
End: 2017-05-23

## 2017-05-23 VITALS
OXYGEN SATURATION: 95 % | TEMPERATURE: 98 F | BODY MASS INDEX: 28.53 KG/M2 | HEIGHT: 63 IN | SYSTOLIC BLOOD PRESSURE: 130 MMHG | WEIGHT: 161 LBS | DIASTOLIC BLOOD PRESSURE: 72 MMHG | HEART RATE: 81 BPM

## 2017-05-23 DIAGNOSIS — T84.50XD INFECTION OF TOTAL JOINT PROSTHESIS, SUBSEQUENT ENCOUNTER: Primary | ICD-10-CM

## 2017-05-23 LAB
EKG P AXIS: 51 DEGREES
EKG P-R INTERVAL: 166 MS
EKG Q-T INTERVAL: 384 MS
EKG QRS DURATION: 80 MS
EKG QTC CALCULATION (BAZETT): 387 MS
EKG T AXIS: 12 DEGREES
MRSA CULTURE ONLY: NORMAL

## 2017-05-23 PROCEDURE — 99213 OFFICE O/P EST LOW 20 MIN: CPT | Performed by: FAMILY MEDICINE

## 2017-05-26 ENCOUNTER — OFFICE VISIT (OUTPATIENT)
Dept: CARDIOLOGY | Facility: CLINIC | Age: 77
End: 2017-05-26

## 2017-05-26 ENCOUNTER — HOSPITAL ENCOUNTER (OUTPATIENT)
Dept: CARDIOLOGY | Facility: HOSPITAL | Age: 77
Discharge: HOME | End: 2017-05-26
Attending: INTERNAL MEDICINE

## 2017-05-26 ENCOUNTER — HOSPITAL ENCOUNTER (OUTPATIENT)
Dept: CARDIOLOGY | Facility: HOSPITAL | Age: 77
Discharge: HOME | End: 2017-05-26
Attending: INTERNAL MEDICINE | Admitting: INTERNAL MEDICINE

## 2017-05-26 VITALS
WEIGHT: 160 LBS | BODY MASS INDEX: 27.46 KG/M2 | SYSTOLIC BLOOD PRESSURE: 131 MMHG | DIASTOLIC BLOOD PRESSURE: 56 MMHG | HEART RATE: 81 BPM

## 2017-05-26 VITALS
SYSTOLIC BLOOD PRESSURE: 110 MMHG | WEIGHT: 188 LBS | DIASTOLIC BLOOD PRESSURE: 70 MMHG | BODY MASS INDEX: 32.1 KG/M2 | HEIGHT: 64 IN

## 2017-05-26 DIAGNOSIS — I10 ESSENTIAL HYPERTENSION: ICD-10-CM

## 2017-05-26 DIAGNOSIS — Z01.810 PREOP CARDIOVASCULAR EXAM: ICD-10-CM

## 2017-05-26 DIAGNOSIS — R94.31 ABNORMAL ECG: ICD-10-CM

## 2017-05-26 DIAGNOSIS — R06.09 DOE (DYSPNEA ON EXERTION): ICD-10-CM

## 2017-05-26 DIAGNOSIS — K21.9 GASTROESOPHAGEAL REFLUX DISEASE WITHOUT ESOPHAGITIS: ICD-10-CM

## 2017-05-26 DIAGNOSIS — Z01.810 PREOP CARDIOVASCULAR EXAM: Primary | ICD-10-CM

## 2017-05-26 LAB
BH CV STRESS BP STAGE 1: NORMAL
BH CV STRESS BP STAGE 2: NORMAL
BH CV STRESS BP STAGE 3: NORMAL
BH CV STRESS DOSE DOBUTAMINE STAGE 1: 10
BH CV STRESS DOSE DOBUTAMINE STAGE 2: 20
BH CV STRESS DOSE DOBUTAMINE STAGE 3: 30
BH CV STRESS DURATION MIN STAGE 1: 3
BH CV STRESS DURATION MIN STAGE 2: 3
BH CV STRESS DURATION MIN STAGE 3: 2
BH CV STRESS DURATION SEC STAGE 1: 0
BH CV STRESS DURATION SEC STAGE 2: 0
BH CV STRESS DURATION SEC STAGE 3: 44
BH CV STRESS ECHO POST STRESS EJECTION FRACTION EF: 65 %
BH CV STRESS HR STAGE 1: 81
BH CV STRESS HR STAGE 2: 115
BH CV STRESS HR STAGE 3: 142
BH CV STRESS PROTOCOL 1: NORMAL
BH CV STRESS RECOVERY BP: NORMAL MMHG
BH CV STRESS RECOVERY HR: 90 BPM
BH CV STRESS STAGE 1: 1
BH CV STRESS STAGE 2: 2
BH CV STRESS STAGE 3: 3
LV EF 2D ECHO EST: 55 %
MAXIMAL PREDICTED HEART RATE: 145 BPM
PERCENT MAX PREDICTED HR: 97.93 %
STRESS BASELINE BP: NORMAL MMHG
STRESS BASELINE HR: 76 BPM
STRESS PERCENT HR: 115 %
STRESS POST EXERCISE DUR MIN: 8 MIN
STRESS POST EXERCISE DUR SEC: 44 SEC
STRESS POST PEAK BP: NORMAL MMHG
STRESS POST PEAK HR: 142 BPM
STRESS TARGET HR: 123 BPM

## 2017-05-26 PROCEDURE — C8928 TTE W OR W/O FOL W/CON,STRES: HCPCS

## 2017-05-26 PROCEDURE — 93306 TTE W/DOPPLER COMPLETE: CPT | Performed by: INTERNAL MEDICINE

## 2017-05-26 PROCEDURE — 25010000003 DOBUTAMINE PER 250 MG: Performed by: INTERNAL MEDICINE

## 2017-05-26 PROCEDURE — 99214 OFFICE O/P EST MOD 30 MIN: CPT | Performed by: INTERNAL MEDICINE

## 2017-05-26 PROCEDURE — 93352 ADMIN ECG CONTRAST AGENT: CPT | Performed by: INTERNAL MEDICINE

## 2017-05-26 PROCEDURE — 93018 CV STRESS TEST I&R ONLY: CPT | Performed by: INTERNAL MEDICINE

## 2017-05-26 PROCEDURE — 93017 CV STRESS TEST TRACING ONLY: CPT

## 2017-05-26 PROCEDURE — 25010000002 PERFLUTREN (DEFINITY) 8.476 MG IN SODIUM CHLORIDE 10 ML INJECTION: Performed by: INTERNAL MEDICINE

## 2017-05-26 PROCEDURE — 93306 TTE W/DOPPLER COMPLETE: CPT

## 2017-05-26 PROCEDURE — 93350 STRESS TTE ONLY: CPT | Performed by: INTERNAL MEDICINE

## 2017-05-26 RX ORDER — DOBUTAMINE HYDROCHLORIDE 100 MG/100ML
10-50 INJECTION INTRAVENOUS
Status: DISCONTINUED | OUTPATIENT
Start: 2017-05-26 | End: 2017-05-27 | Stop reason: HOSPADM

## 2017-05-26 RX ADMIN — SODIUM CHLORIDE 10 ML: 9 INJECTION INTRAMUSCULAR; INTRAVENOUS; SUBCUTANEOUS at 14:12

## 2017-05-26 RX ADMIN — DOBUTAMINE HYDROCHLORIDE 30 MCG/KG/MIN: 100 INJECTION INTRAVENOUS at 14:15

## 2017-05-30 ENCOUNTER — APPOINTMENT (OUTPATIENT)
Dept: GENERAL RADIOLOGY | Age: 77
DRG: 464 | End: 2017-05-30
Attending: ORTHOPAEDIC SURGERY
Payer: MEDICARE

## 2017-05-30 ENCOUNTER — HOSPITAL ENCOUNTER (INPATIENT)
Age: 77
LOS: 6 days | Discharge: HOME HEALTH | DRG: 464 | End: 2017-06-05
Attending: ORTHOPAEDIC SURGERY | Admitting: ORTHOPAEDIC SURGERY
Payer: MEDICARE

## 2017-05-30 ENCOUNTER — DOCUMENTATION (OUTPATIENT)
Dept: CARDIOLOGY | Facility: CLINIC | Age: 77
End: 2017-05-30

## 2017-05-30 ENCOUNTER — APPOINTMENT (OUTPATIENT)
Dept: INTERVENTIONAL RADIOLOGY/VASCULAR | Age: 77
DRG: 464 | End: 2017-05-30
Attending: ORTHOPAEDIC SURGERY
Payer: MEDICARE

## 2017-05-30 ENCOUNTER — ANESTHESIA EVENT (OUTPATIENT)
Dept: OPERATING ROOM | Age: 77
DRG: 464 | End: 2017-05-30
Payer: MEDICARE

## 2017-05-30 ENCOUNTER — ANESTHESIA (OUTPATIENT)
Dept: OPERATING ROOM | Age: 77
DRG: 464 | End: 2017-05-30
Payer: MEDICARE

## 2017-05-30 LAB
ABO/RH: NORMAL
ANTIBODY SCREEN: NORMAL
BASE EXCESS ARTERIAL: -1 (ref -3–3)
BH CV ECHO MEAS - AO MAX PG (FULL): 3.8 MMHG
BH CV ECHO MEAS - AO MAX PG: 10.1 MMHG
BH CV ECHO MEAS - AO MEAN PG (FULL): 2 MMHG
BH CV ECHO MEAS - AO MEAN PG: 5 MMHG
BH CV ECHO MEAS - AO ROOT AREA (BSA CORRECTED): 1.5
BH CV ECHO MEAS - AO ROOT AREA: 6.2 CM^2
BH CV ECHO MEAS - AO ROOT DIAM: 2.8 CM
BH CV ECHO MEAS - AO V2 MAX: 159 CM/SEC
BH CV ECHO MEAS - AO V2 MEAN: 98.4 CM/SEC
BH CV ECHO MEAS - AO V2 VTI: 29.4 CM
BH CV ECHO MEAS - AVA(I,A): 2.4 CM^2
BH CV ECHO MEAS - AVA(I,D): 2.4 CM^2
BH CV ECHO MEAS - AVA(V,A): 2.5 CM^2
BH CV ECHO MEAS - AVA(V,D): 2.5 CM^2
BH CV ECHO MEAS - BSA(HAYCOCK): 2 M^2
BH CV ECHO MEAS - BSA: 1.9 M^2
BH CV ECHO MEAS - BZI_BMI: 32.3 KILOGRAMS/M^2
BH CV ECHO MEAS - BZI_METRIC_HEIGHT: 162.6 CM
BH CV ECHO MEAS - BZI_METRIC_WEIGHT: 85.3 KG
BH CV ECHO MEAS - EDV(CUBED): 27 ML
BH CV ECHO MEAS - EDV(TEICH): 35 ML
BH CV ECHO MEAS - EF(CUBED): 84.8 %
BH CV ECHO MEAS - EF(TEICH): 79.5 %
BH CV ECHO MEAS - ESV(CUBED): 4.1 ML
BH CV ECHO MEAS - ESV(TEICH): 7.2 ML
BH CV ECHO MEAS - FS: 46.7 %
BH CV ECHO MEAS - IVS/LVPW: 1.2
BH CV ECHO MEAS - IVSD: 1.1 CM
BH CV ECHO MEAS - LA DIMENSION: 3.1 CM
BH CV ECHO MEAS - LA/AO: 1.1
BH CV ECHO MEAS - LAT PEAK E' VEL: 7.9 CM/SEC
BH CV ECHO MEAS - LV MASS(C)D: 82.1 GRAMS
BH CV ECHO MEAS - LV MASS(C)DI: 43.1 GRAMS/M^2
BH CV ECHO MEAS - LV MAX PG: 6.4 MMHG
BH CV ECHO MEAS - LV MEAN PG: 3 MMHG
BH CV ECHO MEAS - LV V1 MAX: 126 CM/SEC
BH CV ECHO MEAS - LV V1 MEAN: 72.3 CM/SEC
BH CV ECHO MEAS - LV V1 VTI: 22.8 CM
BH CV ECHO MEAS - LVIDD: 3 CM
BH CV ECHO MEAS - LVIDS: 1.6 CM
BH CV ECHO MEAS - LVOT AREA (M): 3.1 CM^2
BH CV ECHO MEAS - LVOT AREA: 3.1 CM^2
BH CV ECHO MEAS - LVOT DIAM: 2 CM
BH CV ECHO MEAS - LVPWD: 0.9 CM
BH CV ECHO MEAS - MED PEAK E' VEL: 4.57 CM/SEC
BH CV ECHO MEAS - MV A MAX VEL: 120 CM/SEC
BH CV ECHO MEAS - MV DEC TIME: 0.19 SEC
BH CV ECHO MEAS - MV E MAX VEL: 91.9 CM/SEC
BH CV ECHO MEAS - MV E/A: 0.77
BH CV ECHO MEAS - RAP SYSTOLE: 5 MMHG
BH CV ECHO MEAS - RVSP: 31.4 MMHG
BH CV ECHO MEAS - SI(AO): 95 ML/M^2
BH CV ECHO MEAS - SI(CUBED): 12 ML/M^2
BH CV ECHO MEAS - SI(LVOT): 37.6 ML/M^2
BH CV ECHO MEAS - SI(TEICH): 14.6 ML/M^2
BH CV ECHO MEAS - SV(AO): 181 ML
BH CV ECHO MEAS - SV(CUBED): 22.9 ML
BH CV ECHO MEAS - SV(LVOT): 71.6 ML
BH CV ECHO MEAS - SV(TEICH): 27.8 ML
BH CV ECHO MEAS - TR MAX VEL: 257 CM/SEC
CALCIUM IONIZED: 0.95 MMOL/L (ref 1.1–1.3)
CO2: 24 MEQ/L (ref 21–32)
E/E' RATIO: 20.1
GFR NON-AFRICAN AMERICAN: >60
GLUCOSE BLD-MCNC: 239 MG/DL (ref 70–99)
HEMOGLOBIN: 7.1 GM/DL (ref 12–18)
LV EF 2D ECHO EST: 60 %
O2 SAT, ARTERIAL: 100 % (ref 93–100)
PCO2 ARTERIAL: 40 MM HG (ref 35–48)
PERFORMED ON: ABNORMAL
PH ARTERIAL: 7.38 (ref 7.3–7.5)
PO2 ARTERIAL: 291 MM HG (ref 83–108)
POC ANION GAP: 8
POC CHLORIDE: 104 MEQ/L (ref 99–110)
POC CREATININE: 0.9 MG/DL (ref 0.3–1.3)
POC HEMATOCRIT: 21 % (ref 37–52)
POC PATIENT TEMP: 35
POC POTASSIUM: 3.9 MEQ/L (ref 3.5–5.1)
POC SAMPLE TYPE: ABNORMAL
POC SODIUM: 136 MEQ/L (ref 136–145)
TCO2 ARTERIAL: 26 MMOL/L

## 2017-05-30 PROCEDURE — 2720000000 HC MISC SURG SUPPLY STERILE $0-50: Performed by: ORTHOPAEDIC SURGERY

## 2017-05-30 PROCEDURE — C1769 GUIDE WIRE: HCPCS | Performed by: SURGERY

## 2017-05-30 PROCEDURE — 86850 RBC ANTIBODY SCREEN: CPT

## 2017-05-30 PROCEDURE — 041M09S BYPASS RIGHT POPLITEAL ARTERY TO LOWER EXTREMITY VEIN WITH AUTOLOGOUS VENOUS TISSUE, OPEN APPROACH: ICD-10-PCS | Performed by: SURGERY

## 2017-05-30 PROCEDURE — 75710 ARTERY X-RAYS ARM/LEG: CPT | Performed by: SURGERY

## 2017-05-30 PROCEDURE — 2500000003 HC RX 250 WO HCPCS: Performed by: NURSE ANESTHETIST, CERTIFIED REGISTERED

## 2017-05-30 PROCEDURE — 86901 BLOOD TYPING SEROLOGIC RH(D): CPT

## 2017-05-30 PROCEDURE — C1776 JOINT DEVICE (IMPLANTABLE): HCPCS | Performed by: ORTHOPAEDIC SURGERY

## 2017-05-30 PROCEDURE — 82374 ASSAY BLOOD CARBON DIOXIDE: CPT

## 2017-05-30 PROCEDURE — 2720000002 HC MISC SURG SUPPLY STERILE >$500: Performed by: ORTHOPAEDIC SURGERY

## 2017-05-30 PROCEDURE — 82435 ASSAY OF BLOOD CHLORIDE: CPT

## 2017-05-30 PROCEDURE — 84132 ASSAY OF SERUM POTASSIUM: CPT

## 2017-05-30 PROCEDURE — 6370000000 HC RX 637 (ALT 250 FOR IP): Performed by: ORTHOPAEDIC SURGERY

## 2017-05-30 PROCEDURE — 2000000000 HC ICU R&B

## 2017-05-30 PROCEDURE — C1757 CATH, THROMBECTOMY/EMBOLECT: HCPCS | Performed by: SURGERY

## 2017-05-30 PROCEDURE — 0SPC0JZ REMOVAL OF SYNTHETIC SUBSTITUTE FROM RIGHT KNEE JOINT, OPEN APPROACH: ICD-10-PCS | Performed by: ORTHOPAEDIC SURGERY

## 2017-05-30 PROCEDURE — 2580000003 HC RX 258: Performed by: SURGERY

## 2017-05-30 PROCEDURE — 3700000001 HC ADD 15 MINUTES (ANESTHESIA): Performed by: ORTHOPAEDIC SURGERY

## 2017-05-30 PROCEDURE — P9045 ALBUMIN (HUMAN), 5%, 250 ML: HCPCS | Performed by: NURSE ANESTHETIST, CERTIFIED REGISTERED

## 2017-05-30 PROCEDURE — 3700000000 HC ANESTHESIA ATTENDED CARE: Performed by: ORTHOPAEDIC SURGERY

## 2017-05-30 PROCEDURE — 87205 SMEAR GRAM STAIN: CPT

## 2017-05-30 PROCEDURE — 3600000004 HC SURGERY LEVEL 4 BASE: Performed by: ORTHOPAEDIC SURGERY

## 2017-05-30 PROCEDURE — 86900 BLOOD TYPING SEROLOGIC ABO: CPT

## 2017-05-30 PROCEDURE — 3209999900 FLUORO FOR SURGICAL PROCEDURES

## 2017-05-30 PROCEDURE — 2720000001 HC MISC SURG SUPPLY STERILE $51-500: Performed by: ORTHOPAEDIC SURGERY

## 2017-05-30 PROCEDURE — 82800 BLOOD PH: CPT

## 2017-05-30 PROCEDURE — 3600000015 HC SURGERY LEVEL 5 ADDTL 15MIN: Performed by: SURGERY

## 2017-05-30 PROCEDURE — 3700000000 HC ANESTHESIA ATTENDED CARE: Performed by: SURGERY

## 2017-05-30 PROCEDURE — 2720000001 HC MISC SURG SUPPLY STERILE $51-500: Performed by: SURGERY

## 2017-05-30 PROCEDURE — C1713 ANCHOR/SCREW BN/BN,TIS/BN: HCPCS | Performed by: ORTHOPAEDIC SURGERY

## 2017-05-30 PROCEDURE — 82565 ASSAY OF CREATININE: CPT

## 2017-05-30 PROCEDURE — 6360000002 HC RX W HCPCS: Performed by: ORTHOPAEDIC SURGERY

## 2017-05-30 PROCEDURE — 99024 POSTOP FOLLOW-UP VISIT: CPT | Performed by: SURGERY

## 2017-05-30 PROCEDURE — 87077 CULTURE AEROBIC IDENTIFY: CPT

## 2017-05-30 PROCEDURE — 3600000005 HC SURGERY LEVEL 5 BASE: Performed by: SURGERY

## 2017-05-30 PROCEDURE — 2580000003 HC RX 258: Performed by: NURSE ANESTHETIST, CERTIFIED REGISTERED

## 2017-05-30 PROCEDURE — 84295 ASSAY OF SERUM SODIUM: CPT

## 2017-05-30 PROCEDURE — 0SHC08Z INSERTION OF SPACER INTO RIGHT KNEE JOINT, OPEN APPROACH: ICD-10-PCS | Performed by: ORTHOPAEDIC SURGERY

## 2017-05-30 PROCEDURE — 82810 BLOOD GASES O2 SAT ONLY: CPT

## 2017-05-30 PROCEDURE — 36415 COLL VENOUS BLD VENIPUNCTURE: CPT

## 2017-05-30 PROCEDURE — 3600000014 HC SURGERY LEVEL 4 ADDTL 15MIN: Performed by: ORTHOPAEDIC SURGERY

## 2017-05-30 PROCEDURE — 6360000002 HC RX W HCPCS: Performed by: NURSE ANESTHETIST, CERTIFIED REGISTERED

## 2017-05-30 PROCEDURE — 06UY07Z SUPPLEMENT LOWER VEIN WITH AUTOLOGOUS TISSUE SUBSTITUTE, OPEN APPROACH: ICD-10-PCS | Performed by: SURGERY

## 2017-05-30 PROCEDURE — 06BQ0ZZ EXCISION OF LEFT SAPHENOUS VEIN, OPEN APPROACH: ICD-10-PCS | Performed by: SURGERY

## 2017-05-30 PROCEDURE — 2580000003 HC RX 258: Performed by: ORTHOPAEDIC SURGERY

## 2017-05-30 PROCEDURE — C1887 CATHETER, GUIDING: HCPCS | Performed by: SURGERY

## 2017-05-30 PROCEDURE — 87070 CULTURE OTHR SPECIMN AEROBIC: CPT

## 2017-05-30 PROCEDURE — 82948 REAGENT STRIP/BLOOD GLUCOSE: CPT

## 2017-05-30 PROCEDURE — 6360000002 HC RX W HCPCS: Performed by: SURGERY

## 2017-05-30 PROCEDURE — 82330 ASSAY OF CALCIUM: CPT

## 2017-05-30 PROCEDURE — C1729 CATH, DRAINAGE: HCPCS | Performed by: ORTHOPAEDIC SURGERY

## 2017-05-30 PROCEDURE — 85014 HEMATOCRIT: CPT

## 2017-05-30 PROCEDURE — 35556 ART BYP GRFT FEM-POPLITEAL: CPT | Performed by: SURGERY

## 2017-05-30 PROCEDURE — C1894 INTRO/SHEATH, NON-LASER: HCPCS | Performed by: SURGERY

## 2017-05-30 PROCEDURE — 3700000001 HC ADD 15 MINUTES (ANESTHESIA): Performed by: SURGERY

## 2017-05-30 PROCEDURE — 2720000000 HC MISC SURG SUPPLY STERILE $0-50: Performed by: SURGERY

## 2017-05-30 PROCEDURE — 87185 SC STD ENZYME DETCJ PER NZM: CPT

## 2017-05-30 DEVICE — STIMULAN® RAPID CURE PROVIDED STERILE FOR SINGLE PATIENT USE. STIMULAN® RAPID CURE CONTAINS CALCIUM SULFATE POWDER AND MIXING SOLUTION IN PRE-MEASURED QUANTITIES SO THAT WHEN MIXED TOGETHER IN A STERILE MIXING BOWL, THE RESULTANT PASTE IS TO BE DIGITALLY PACKED INTO OPEN BONE VOID/GAP TO SET INSITU OR PLACED INTO THE MOULD PROVIDED, THE MIXTURE SETS TO FORM BEADS. THE BIODEGRADABLE, RADIOPAQUE BEADS ARE RESORBED IN APPROXIMATELY 30 – 60 DAYS WHEN USED IN ACCORDANCE WITH THE DEVICE LABELLING. STIMULAN® RAPID CURE IS MANUFACTURED FROM SYNTHETIC IMPLANT GRADE CALCIUM SULFATE DIHYDRATE(CASO4.2H2O) THAT RESORBS AND IS REPLACED WITH BONE DURING THE HEALING PROCESS. ALSO, AS THE BONE VOID FILLER BEADS ARE BIODEGRADABLE AND BIOCOMPATIBLE, THEY MAY BE USED AT AN INFECTED SITE.
Type: IMPLANTABLE DEVICE | Site: KNEE | Status: FUNCTIONAL
Brand: STIMULAN® RAPID CURE

## 2017-05-30 DEVICE — IMPLANTABLE DEVICE: Type: IMPLANTABLE DEVICE | Site: KNEE | Status: FUNCTIONAL

## 2017-05-30 DEVICE — DISCONTINUED USE 416978 CEMENT PALACOS R SING DOSE 40GR: Type: IMPLANTABLE DEVICE | Site: KNEE | Status: FUNCTIONAL

## 2017-05-30 RX ORDER — TRANEXAMIC ACID 650 1/1
1950 TABLET ORAL ONCE
Status: COMPLETED | OUTPATIENT
Start: 2017-05-30 | End: 2017-05-30

## 2017-05-30 RX ORDER — MORPHINE SULFATE 4 MG/ML
4 INJECTION, SOLUTION INTRAMUSCULAR; INTRAVENOUS EVERY 5 MIN PRN
Status: DISCONTINUED | OUTPATIENT
Start: 2017-05-30 | End: 2017-05-31 | Stop reason: HOSPADM

## 2017-05-30 RX ORDER — ALBUMIN, HUMAN INJ 5% 5 %
SOLUTION INTRAVENOUS PRN
Status: DISCONTINUED | OUTPATIENT
Start: 2017-05-30 | End: 2017-05-31 | Stop reason: SDUPTHER

## 2017-05-30 RX ORDER — DIPHENHYDRAMINE HYDROCHLORIDE 50 MG/ML
12.5 INJECTION INTRAMUSCULAR; INTRAVENOUS
Status: ACTIVE | OUTPATIENT
Start: 2017-05-30 | End: 2017-05-30

## 2017-05-30 RX ORDER — ONDANSETRON 2 MG/ML
INJECTION INTRAMUSCULAR; INTRAVENOUS PRN
Status: DISCONTINUED | OUTPATIENT
Start: 2017-05-30 | End: 2017-05-31 | Stop reason: SDUPTHER

## 2017-05-30 RX ORDER — SODIUM CHLORIDE, SODIUM LACTATE, POTASSIUM CHLORIDE, CALCIUM CHLORIDE 600; 310; 30; 20 MG/100ML; MG/100ML; MG/100ML; MG/100ML
INJECTION, SOLUTION INTRAVENOUS CONTINUOUS
Status: DISCONTINUED | OUTPATIENT
Start: 2017-05-30 | End: 2017-05-31

## 2017-05-30 RX ORDER — DEXAMETHASONE SODIUM PHOSPHATE 10 MG/ML
10 INJECTION INTRAMUSCULAR; INTRAVENOUS ONCE
Status: COMPLETED | OUTPATIENT
Start: 2017-05-31 | End: 2017-05-30

## 2017-05-30 RX ORDER — CELECOXIB 200 MG/1
200 CAPSULE ORAL ONCE
Status: COMPLETED | OUTPATIENT
Start: 2017-05-30 | End: 2017-05-30

## 2017-05-30 RX ORDER — HYDRALAZINE HYDROCHLORIDE 20 MG/ML
5 INJECTION INTRAMUSCULAR; INTRAVENOUS EVERY 10 MIN PRN
Status: DISCONTINUED | OUTPATIENT
Start: 2017-05-30 | End: 2017-05-31 | Stop reason: HOSPADM

## 2017-05-30 RX ORDER — LIDOCAINE HYDROCHLORIDE 10 MG/ML
INJECTION, SOLUTION INFILTRATION; PERINEURAL PRN
Status: DISCONTINUED | OUTPATIENT
Start: 2017-05-30 | End: 2017-05-31 | Stop reason: SDUPTHER

## 2017-05-30 RX ORDER — ROCURONIUM BROMIDE 10 MG/ML
INJECTION, SOLUTION INTRAVENOUS PRN
Status: DISCONTINUED | OUTPATIENT
Start: 2017-05-30 | End: 2017-05-31 | Stop reason: SDUPTHER

## 2017-05-30 RX ORDER — ENALAPRILAT 2.5 MG/2ML
1.25 INJECTION INTRAVENOUS
Status: ACTIVE | OUTPATIENT
Start: 2017-05-30 | End: 2017-05-30

## 2017-05-30 RX ORDER — LABETALOL HYDROCHLORIDE 5 MG/ML
5 INJECTION, SOLUTION INTRAVENOUS EVERY 10 MIN PRN
Status: DISCONTINUED | OUTPATIENT
Start: 2017-05-30 | End: 2017-05-31 | Stop reason: HOSPADM

## 2017-05-30 RX ORDER — PROPOFOL 10 MG/ML
INJECTION, EMULSION INTRAVENOUS PRN
Status: DISCONTINUED | OUTPATIENT
Start: 2017-05-30 | End: 2017-05-31 | Stop reason: SDUPTHER

## 2017-05-30 RX ORDER — SODIUM CHLORIDE 9 MG/ML
INJECTION, SOLUTION INTRAVENOUS CONTINUOUS PRN
Status: DISCONTINUED | OUTPATIENT
Start: 2017-05-30 | End: 2017-05-31 | Stop reason: SDUPTHER

## 2017-05-30 RX ORDER — 0.9 % SODIUM CHLORIDE 0.9 %
250 INTRAVENOUS SOLUTION INTRAVENOUS ONCE
Status: DISCONTINUED | OUTPATIENT
Start: 2017-05-30 | End: 2017-06-05 | Stop reason: HOSPADM

## 2017-05-30 RX ORDER — ACETAMINOPHEN 500 MG
1000 TABLET ORAL ONCE
Status: COMPLETED | OUTPATIENT
Start: 2017-05-30 | End: 2017-05-30

## 2017-05-30 RX ORDER — OXYCODONE HCL 10 MG/1
10 TABLET, FILM COATED, EXTENDED RELEASE ORAL ONCE
Status: COMPLETED | OUTPATIENT
Start: 2017-05-30 | End: 2017-05-30

## 2017-05-30 RX ORDER — MORPHINE SULFATE 4 MG/ML
2 INJECTION, SOLUTION INTRAMUSCULAR; INTRAVENOUS EVERY 5 MIN PRN
Status: DISCONTINUED | OUTPATIENT
Start: 2017-05-30 | End: 2017-05-31 | Stop reason: HOSPADM

## 2017-05-30 RX ORDER — GENTAMICIN SULFATE 40 MG/ML
INJECTION, SOLUTION INTRAMUSCULAR; INTRAVENOUS PRN
Status: DISCONTINUED | OUTPATIENT
Start: 2017-05-30 | End: 2017-05-31 | Stop reason: HOSPADM

## 2017-05-30 RX ORDER — TOBRAMYCIN 1.2 G/30ML
INJECTION, POWDER, LYOPHILIZED, FOR SOLUTION INTRAVENOUS PRN
Status: DISCONTINUED | OUTPATIENT
Start: 2017-05-30 | End: 2017-05-31 | Stop reason: HOSPADM

## 2017-05-30 RX ORDER — EPHEDRINE SULFATE 50 MG/ML
INJECTION, SOLUTION INTRAVENOUS PRN
Status: DISCONTINUED | OUTPATIENT
Start: 2017-05-30 | End: 2017-05-31 | Stop reason: SDUPTHER

## 2017-05-30 RX ORDER — ATRACURIUM BESYLATE 10 MG/ML
INJECTION, SOLUTION INTRAVENOUS PRN
Status: DISCONTINUED | OUTPATIENT
Start: 2017-05-30 | End: 2017-05-31 | Stop reason: SDUPTHER

## 2017-05-30 RX ORDER — MEPERIDINE HYDROCHLORIDE 50 MG/ML
12.5 INJECTION INTRAMUSCULAR; INTRAVENOUS; SUBCUTANEOUS EVERY 5 MIN PRN
Status: DISCONTINUED | OUTPATIENT
Start: 2017-05-30 | End: 2017-05-31 | Stop reason: HOSPADM

## 2017-05-30 RX ORDER — LABETALOL HYDROCHLORIDE 5 MG/ML
INJECTION, SOLUTION INTRAVENOUS PRN
Status: DISCONTINUED | OUTPATIENT
Start: 2017-05-30 | End: 2017-05-31 | Stop reason: SDUPTHER

## 2017-05-30 RX ORDER — HEPARIN SODIUM 1000 [USP'U]/ML
INJECTION, SOLUTION INTRAVENOUS; SUBCUTANEOUS PRN
Status: DISCONTINUED | OUTPATIENT
Start: 2017-05-30 | End: 2017-05-31 | Stop reason: SDUPTHER

## 2017-05-30 RX ORDER — PROMETHAZINE HYDROCHLORIDE 25 MG/ML
6.25 INJECTION, SOLUTION INTRAMUSCULAR; INTRAVENOUS
Status: ACTIVE | OUTPATIENT
Start: 2017-05-30 | End: 2017-05-30

## 2017-05-30 RX ORDER — CEFAZOLIN SODIUM 1 G/3ML
INJECTION, POWDER, FOR SOLUTION INTRAMUSCULAR; INTRAVENOUS PRN
Status: DISCONTINUED | OUTPATIENT
Start: 2017-05-30 | End: 2017-05-31 | Stop reason: HOSPADM

## 2017-05-30 RX ORDER — METOCLOPRAMIDE HYDROCHLORIDE 5 MG/ML
10 INJECTION INTRAMUSCULAR; INTRAVENOUS
Status: ACTIVE | OUTPATIENT
Start: 2017-05-30 | End: 2017-05-30

## 2017-05-30 RX ORDER — FENTANYL CITRATE 50 UG/ML
INJECTION, SOLUTION INTRAMUSCULAR; INTRAVENOUS PRN
Status: DISCONTINUED | OUTPATIENT
Start: 2017-05-30 | End: 2017-05-31 | Stop reason: SDUPTHER

## 2017-05-30 RX ADMIN — FENTANYL CITRATE 100 MCG: 50 INJECTION INTRAMUSCULAR; INTRAVENOUS at 18:48

## 2017-05-30 RX ADMIN — FENTANYL CITRATE 100 MCG: 50 INJECTION INTRAMUSCULAR; INTRAVENOUS at 20:25

## 2017-05-30 RX ADMIN — FENTANYL CITRATE 50 MCG: 50 INJECTION INTRAMUSCULAR; INTRAVENOUS at 16:04

## 2017-05-30 RX ADMIN — EPHEDRINE SULFATE 10 MG: 50 INJECTION, SOLUTION INTRAMUSCULAR; INTRAVENOUS; SUBCUTANEOUS at 19:14

## 2017-05-30 RX ADMIN — LABETALOL HYDROCHLORIDE 2.5 MG: 5 INJECTION, SOLUTION INTRAVENOUS at 17:24

## 2017-05-30 RX ADMIN — DEXAMETHASONE SODIUM PHOSPHATE 10 MG: 10 INJECTION INTRAMUSCULAR; INTRAVENOUS at 16:10

## 2017-05-30 RX ADMIN — EPHEDRINE SULFATE 10 MG: 50 INJECTION, SOLUTION INTRAMUSCULAR; INTRAVENOUS; SUBCUTANEOUS at 19:28

## 2017-05-30 RX ADMIN — FENTANYL CITRATE 100 MCG: 50 INJECTION INTRAMUSCULAR; INTRAVENOUS at 19:57

## 2017-05-30 RX ADMIN — SUGAMMADEX 200 MG: 100 INJECTION, SOLUTION INTRAVENOUS at 19:35

## 2017-05-30 RX ADMIN — EPHEDRINE SULFATE 10 MG: 50 INJECTION, SOLUTION INTRAMUSCULAR; INTRAVENOUS; SUBCUTANEOUS at 21:40

## 2017-05-30 RX ADMIN — LABETALOL HYDROCHLORIDE 5 MG: 5 INJECTION, SOLUTION INTRAVENOUS at 16:57

## 2017-05-30 RX ADMIN — PHENYLEPHRINE HYDROCHLORIDE 100 MCG: 10 INJECTION INTRAVENOUS at 17:50

## 2017-05-30 RX ADMIN — EPHEDRINE SULFATE 5 MG: 50 INJECTION, SOLUTION INTRAMUSCULAR; INTRAVENOUS; SUBCUTANEOUS at 17:59

## 2017-05-30 RX ADMIN — ACETAMINOPHEN 1000 MG: 500 TABLET ORAL at 12:43

## 2017-05-30 RX ADMIN — VANCOMYCIN HYDROCHLORIDE 1300 MG: 1 INJECTION, POWDER, LYOPHILIZED, FOR SOLUTION INTRAVENOUS at 17:13

## 2017-05-30 RX ADMIN — PROPOFOL 100 MG: 10 INJECTION, EMULSION INTRAVENOUS at 20:25

## 2017-05-30 RX ADMIN — LIDOCAINE HYDROCHLORIDE 50 MG: 10 INJECTION, SOLUTION INFILTRATION; PERINEURAL at 20:25

## 2017-05-30 RX ADMIN — OXYCODONE HYDROCHLORIDE 10 MG: 10 TABLET, FILM COATED, EXTENDED RELEASE ORAL at 12:42

## 2017-05-30 RX ADMIN — LIDOCAINE HYDROCHLORIDE 50 MG: 10 INJECTION, SOLUTION INFILTRATION; PERINEURAL at 16:04

## 2017-05-30 RX ADMIN — SODIUM CHLORIDE, SODIUM LACTATE, POTASSIUM CHLORIDE, AND CALCIUM CHLORIDE: 600; 310; 30; 20 INJECTION, SOLUTION INTRAVENOUS at 12:35

## 2017-05-30 RX ADMIN — SODIUM CHLORIDE, SODIUM LACTATE, POTASSIUM CHLORIDE, AND CALCIUM CHLORIDE: 600; 310; 30; 20 INJECTION, SOLUTION INTRAVENOUS at 16:00

## 2017-05-30 RX ADMIN — EPHEDRINE SULFATE 10 MG: 50 INJECTION, SOLUTION INTRAMUSCULAR; INTRAVENOUS; SUBCUTANEOUS at 22:02

## 2017-05-30 RX ADMIN — SODIUM CHLORIDE: 9 INJECTION, SOLUTION INTRAVENOUS at 17:59

## 2017-05-30 RX ADMIN — HEPARIN SODIUM 5000 UNITS: 1000 INJECTION, SOLUTION INTRAVENOUS; SUBCUTANEOUS at 20:30

## 2017-05-30 RX ADMIN — PHENYLEPHRINE HYDROCHLORIDE 200 MCG: 10 INJECTION INTRAVENOUS at 20:47

## 2017-05-30 RX ADMIN — ROCURONIUM BROMIDE 10 MG: 10 INJECTION INTRAVENOUS at 16:37

## 2017-05-30 RX ADMIN — HEPARIN SODIUM 1000 UNITS: 1000 INJECTION, SOLUTION INTRAVENOUS; SUBCUTANEOUS at 21:25

## 2017-05-30 RX ADMIN — FENTANYL CITRATE 50 MCG: 50 INJECTION INTRAMUSCULAR; INTRAVENOUS at 21:33

## 2017-05-30 RX ADMIN — SODIUM CHLORIDE, SODIUM LACTATE, POTASSIUM CHLORIDE, AND CALCIUM CHLORIDE: 600; 310; 30; 20 INJECTION, SOLUTION INTRAVENOUS at 18:42

## 2017-05-30 RX ADMIN — EPHEDRINE SULFATE 5 MG: 50 INJECTION, SOLUTION INTRAMUSCULAR; INTRAVENOUS; SUBCUTANEOUS at 18:20

## 2017-05-30 RX ADMIN — HEPARIN SODIUM 1000 UNITS: 1000 INJECTION, SOLUTION INTRAVENOUS; SUBCUTANEOUS at 22:27

## 2017-05-30 RX ADMIN — PROPOFOL 140 MG: 10 INJECTION, EMULSION INTRAVENOUS at 16:04

## 2017-05-30 RX ADMIN — CELECOXIB 200 MG: 200 CAPSULE ORAL at 12:38

## 2017-05-30 RX ADMIN — ATRACURIUM BESYLATE 30 MG: 100 INJECTION, SOLUTION INTRAVENOUS at 20:30

## 2017-05-30 RX ADMIN — PHENYLEPHRINE HYDROCHLORIDE 100 MCG: 10 INJECTION INTRAVENOUS at 17:54

## 2017-05-30 RX ADMIN — LABETALOL HYDROCHLORIDE 2.5 MG: 5 INJECTION, SOLUTION INTRAVENOUS at 17:08

## 2017-05-30 RX ADMIN — SODIUM CHLORIDE, SODIUM LACTATE, POTASSIUM CHLORIDE, AND CALCIUM CHLORIDE: 600; 310; 30; 20 INJECTION, SOLUTION INTRAVENOUS at 22:46

## 2017-05-30 RX ADMIN — PHENYLEPHRINE HYDROCHLORIDE 100 MCG: 10 INJECTION INTRAVENOUS at 18:31

## 2017-05-30 RX ADMIN — EPHEDRINE SULFATE 30 MG: 50 INJECTION, SOLUTION INTRAMUSCULAR; INTRAVENOUS; SUBCUTANEOUS at 21:45

## 2017-05-30 RX ADMIN — PHENYLEPHRINE HYDROCHLORIDE 200 MCG: 10 INJECTION INTRAVENOUS at 21:49

## 2017-05-30 RX ADMIN — ROCURONIUM BROMIDE 40 MG: 10 INJECTION INTRAVENOUS at 18:30

## 2017-05-30 RX ADMIN — PHENYLEPHRINE HYDROCHLORIDE 100 MCG/MIN: 10 INJECTION INTRAVENOUS at 21:49

## 2017-05-30 RX ADMIN — PHENYLEPHRINE HYDROCHLORIDE 100 MCG: 10 INJECTION INTRAVENOUS at 18:29

## 2017-05-30 RX ADMIN — EPHEDRINE SULFATE 20 MG: 50 INJECTION, SOLUTION INTRAMUSCULAR; INTRAVENOUS; SUBCUTANEOUS at 20:47

## 2017-05-30 RX ADMIN — SODIUM CHLORIDE, SODIUM LACTATE, POTASSIUM CHLORIDE, AND CALCIUM CHLORIDE: 600; 310; 30; 20 INJECTION, SOLUTION INTRAVENOUS at 16:45

## 2017-05-30 RX ADMIN — PHENYLEPHRINE HYDROCHLORIDE 200 MCG: 10 INJECTION INTRAVENOUS at 21:44

## 2017-05-30 RX ADMIN — FENTANYL CITRATE 100 MCG: 50 INJECTION INTRAMUSCULAR; INTRAVENOUS at 16:52

## 2017-05-30 RX ADMIN — ONDANSETRON HYDROCHLORIDE 4 MG: 2 INJECTION, SOLUTION INTRAVENOUS at 19:31

## 2017-05-30 RX ADMIN — ROCURONIUM BROMIDE 40 MG: 10 INJECTION INTRAVENOUS at 16:04

## 2017-05-30 RX ADMIN — ALBUMIN (HUMAN) 250 ML: 2.5 SOLUTION INTRAVENOUS at 17:40

## 2017-05-30 RX ADMIN — PHENYLEPHRINE HYDROCHLORIDE 200 MCG: 10 INJECTION INTRAVENOUS at 21:47

## 2017-05-30 RX ADMIN — FENTANYL CITRATE 50 MCG: 50 INJECTION INTRAMUSCULAR; INTRAVENOUS at 16:42

## 2017-05-30 RX ADMIN — EPHEDRINE SULFATE 10 MG: 50 INJECTION, SOLUTION INTRAMUSCULAR; INTRAVENOUS; SUBCUTANEOUS at 18:59

## 2017-05-30 RX ADMIN — TRANEXAMIC ACID 1950 MG: 650 TABLET ORAL at 12:42

## 2017-05-30 RX ADMIN — FENTANYL CITRATE 50 MCG: 50 INJECTION INTRAMUSCULAR; INTRAVENOUS at 18:14

## 2017-05-30 RX ADMIN — PHENYLEPHRINE HYDROCHLORIDE 100 MCG: 10 INJECTION INTRAVENOUS at 18:22

## 2017-05-30 RX ADMIN — PHENYLEPHRINE HYDROCHLORIDE 100 MCG: 10 INJECTION INTRAVENOUS at 17:58

## 2017-05-30 ASSESSMENT — PAIN SCALES - GENERAL
PAINLEVEL_OUTOF10: 0

## 2017-05-31 VITALS
RESPIRATION RATE: 9 BRPM | DIASTOLIC BLOOD PRESSURE: 53 MMHG | SYSTOLIC BLOOD PRESSURE: 86 MMHG | OXYGEN SATURATION: 100 % | TEMPERATURE: 96.3 F

## 2017-05-31 PROBLEM — T40.2X5A CONSTIPATION DUE TO OPIOID THERAPY: Status: ACTIVE | Noted: 2017-05-31

## 2017-05-31 PROBLEM — K59.03 CONSTIPATION DUE TO OPIOID THERAPY: Status: ACTIVE | Noted: 2017-05-31

## 2017-05-31 PROBLEM — K21.9 GASTROESOPHAGEAL REFLUX DISEASE WITHOUT ESOPHAGITIS: Status: ACTIVE | Noted: 2017-05-31

## 2017-05-31 LAB
ANION GAP SERPL CALCULATED.3IONS-SCNC: 16 MMOL/L (ref 7–19)
ANION GAP SERPL CALCULATED.3IONS-SCNC: 16 MMOL/L (ref 7–19)
BUN BLDV-MCNC: 23 MG/DL (ref 8–23)
BUN BLDV-MCNC: 25 MG/DL (ref 8–23)
CALCIUM SERPL-MCNC: 7.7 MG/DL (ref 8.8–10.2)
CALCIUM SERPL-MCNC: 7.8 MG/DL (ref 8.8–10.2)
CHLORIDE BLD-SCNC: 103 MMOL/L (ref 98–111)
CHLORIDE BLD-SCNC: 103 MMOL/L (ref 98–111)
CO2: 18 MMOL/L (ref 22–29)
CO2: 19 MMOL/L (ref 22–29)
CREAT SERPL-MCNC: 0.9 MG/DL (ref 0.5–0.9)
CREAT SERPL-MCNC: 1.2 MG/DL (ref 0.5–0.9)
FOLATE: 16.7 NG/ML (ref 4.8–37.3)
GFR NON-AFRICAN AMERICAN: 44
GFR NON-AFRICAN AMERICAN: >60
GLUCOSE BLD-MCNC: 205 MG/DL (ref 74–109)
GLUCOSE BLD-MCNC: 243 MG/DL (ref 74–109)
HBA1C MFR BLD: 5.5 %
HCT VFR BLD CALC: 28.4 % (ref 37–47)
HCT VFR BLD CALC: 29.7 % (ref 37–47)
HEMOGLOBIN: 9.4 G/DL (ref 12–16)
HEMOGLOBIN: 9.6 G/DL (ref 12–16)
IRON SATURATION: 64 % (ref 14–50)
IRON: 125 UG/DL (ref 37–145)
MCH RBC QN AUTO: 30.6 PG (ref 27–31)
MCHC RBC AUTO-ENTMCNC: 33.1 G/DL (ref 33–37)
MCV RBC AUTO: 92.5 FL (ref 81–99)
PDW BLD-RTO: 13.6 % (ref 11.5–14.5)
PLATELET # BLD: 220 K/UL (ref 130–400)
PMV BLD AUTO: 10.8 FL (ref 7.4–10.4)
POTASSIUM SERPL-SCNC: 4.1 MMOL/L (ref 3.5–5)
POTASSIUM SERPL-SCNC: 4.4 MMOL/L (ref 3.5–5)
RBC # BLD: 3.07 M/UL (ref 4.2–5.4)
SODIUM BLD-SCNC: 137 MMOL/L (ref 136–145)
SODIUM BLD-SCNC: 138 MMOL/L (ref 136–145)
TOTAL IRON BINDING CAPACITY: 195 UG/DL (ref 250–400)
VITAMIN B-12: 578 PG/ML (ref 211–946)
WBC # BLD: 17.5 K/UL (ref 4.8–10.8)

## 2017-05-31 PROCEDURE — 80048 BASIC METABOLIC PNL TOTAL CA: CPT

## 2017-05-31 PROCEDURE — 85027 COMPLETE CBC AUTOMATED: CPT

## 2017-05-31 PROCEDURE — 83036 HEMOGLOBIN GLYCOSYLATED A1C: CPT

## 2017-05-31 PROCEDURE — B41FYZZ FLUOROSCOPY OF RIGHT LOWER EXTREMITY ARTERIES USING OTHER CONTRAST: ICD-10-PCS | Performed by: ORTHOPAEDIC SURGERY

## 2017-05-31 PROCEDURE — 6360000002 HC RX W HCPCS: Performed by: SURGERY

## 2017-05-31 PROCEDURE — 87070 CULTURE OTHR SPECIMN AEROBIC: CPT

## 2017-05-31 PROCEDURE — 99024 POSTOP FOLLOW-UP VISIT: CPT | Performed by: SURGERY

## 2017-05-31 PROCEDURE — 94664 DEMO&/EVAL PT USE INHALER: CPT

## 2017-05-31 PROCEDURE — 83540 ASSAY OF IRON: CPT

## 2017-05-31 PROCEDURE — 2000000000 HC ICU R&B

## 2017-05-31 PROCEDURE — 6370000000 HC RX 637 (ALT 250 FOR IP): Performed by: FAMILY MEDICINE

## 2017-05-31 PROCEDURE — 83550 IRON BINDING TEST: CPT

## 2017-05-31 PROCEDURE — 6360000002 HC RX W HCPCS: Performed by: INTERNAL MEDICINE

## 2017-05-31 PROCEDURE — 36415 COLL VENOUS BLD VENIPUNCTURE: CPT

## 2017-05-31 PROCEDURE — 6360000002 HC RX W HCPCS: Performed by: FAMILY MEDICINE

## 2017-05-31 PROCEDURE — 2580000003 HC RX 258: Performed by: ORTHOPAEDIC SURGERY

## 2017-05-31 PROCEDURE — 82607 VITAMIN B-12: CPT

## 2017-05-31 PROCEDURE — 85014 HEMATOCRIT: CPT

## 2017-05-31 PROCEDURE — 2580000003 HC RX 258: Performed by: INTERNAL MEDICINE

## 2017-05-31 PROCEDURE — 6360000002 HC RX W HCPCS: Performed by: ORTHOPAEDIC SURGERY

## 2017-05-31 PROCEDURE — P9016 RBC LEUKOCYTES REDUCED: HCPCS

## 2017-05-31 PROCEDURE — 6370000000 HC RX 637 (ALT 250 FOR IP): Performed by: ORTHOPAEDIC SURGERY

## 2017-05-31 PROCEDURE — 2580000003 HC RX 258: Performed by: SURGERY

## 2017-05-31 PROCEDURE — 85018 HEMOGLOBIN: CPT

## 2017-05-31 PROCEDURE — 36430 TRANSFUSION BLD/BLD COMPNT: CPT

## 2017-05-31 PROCEDURE — 82746 ASSAY OF FOLIC ACID SERUM: CPT

## 2017-05-31 RX ORDER — OXYCODONE HYDROCHLORIDE 5 MG/1
20 TABLET ORAL EVERY 4 HOURS PRN
Status: DISCONTINUED | OUTPATIENT
Start: 2017-05-31 | End: 2017-06-01

## 2017-05-31 RX ORDER — POLYETHYLENE GLYCOL 3350 17 G/17G
17 POWDER, FOR SOLUTION ORAL ONCE
Status: DISCONTINUED | OUTPATIENT
Start: 2017-05-31 | End: 2017-05-31

## 2017-05-31 RX ORDER — VANCOMYCIN HYDROCHLORIDE 1 G/200ML
1000 INJECTION, SOLUTION INTRAVENOUS EVERY 24 HOURS
Status: DISCONTINUED | OUTPATIENT
Start: 2017-05-31 | End: 2017-06-02 | Stop reason: DRUGHIGH

## 2017-05-31 RX ORDER — POLYETHYLENE GLYCOL 3350 17 G/17G
17 POWDER, FOR SOLUTION ORAL 2 TIMES DAILY
Status: DISCONTINUED | OUTPATIENT
Start: 2017-05-31 | End: 2017-06-05 | Stop reason: HOSPADM

## 2017-05-31 RX ORDER — MORPHINE SULFATE 4 MG/ML
2 INJECTION, SOLUTION INTRAMUSCULAR; INTRAVENOUS
Status: DISCONTINUED | OUTPATIENT
Start: 2017-05-31 | End: 2017-06-01

## 2017-05-31 RX ORDER — 0.9 % SODIUM CHLORIDE 0.9 %
500 INTRAVENOUS SOLUTION INTRAVENOUS PRN
Status: DISCONTINUED | OUTPATIENT
Start: 2017-05-31 | End: 2017-06-05 | Stop reason: HOSPADM

## 2017-05-31 RX ORDER — ACETAMINOPHEN 325 MG/1
650 TABLET ORAL EVERY 4 HOURS PRN
Status: DISCONTINUED | OUTPATIENT
Start: 2017-05-31 | End: 2017-06-05 | Stop reason: HOSPADM

## 2017-05-31 RX ORDER — OXYCODONE HYDROCHLORIDE 5 MG/1
10 TABLET ORAL EVERY 4 HOURS PRN
Status: DISCONTINUED | OUTPATIENT
Start: 2017-05-31 | End: 2017-06-01

## 2017-05-31 RX ORDER — ACETAMINOPHEN 325 MG/1
650 TABLET ORAL EVERY 4 HOURS PRN
Status: DISCONTINUED | OUTPATIENT
Start: 2017-05-31 | End: 2017-05-31 | Stop reason: SDUPTHER

## 2017-05-31 RX ORDER — LANOLIN ALCOHOL/MO/W.PET/CERES
3 CREAM (GRAM) TOPICAL NIGHTLY
Status: DISCONTINUED | OUTPATIENT
Start: 2017-05-31 | End: 2017-06-05 | Stop reason: HOSPADM

## 2017-05-31 RX ORDER — CLONIDINE HYDROCHLORIDE 0.1 MG/1
0.1 TABLET ORAL
Status: DISCONTINUED | OUTPATIENT
Start: 2017-05-31 | End: 2017-06-05 | Stop reason: HOSPADM

## 2017-05-31 RX ORDER — ASPIRIN 81 MG/1
81 TABLET ORAL DAILY
Status: DISCONTINUED | OUTPATIENT
Start: 2017-05-31 | End: 2017-05-31 | Stop reason: SDUPTHER

## 2017-05-31 RX ORDER — SODIUM CHLORIDE 0.9 % (FLUSH) 0.9 %
10 SYRINGE (ML) INJECTION PRN
Status: DISCONTINUED | OUTPATIENT
Start: 2017-05-31 | End: 2017-06-05 | Stop reason: HOSPADM

## 2017-05-31 RX ORDER — SODIUM CHLORIDE 0.9 % (FLUSH) 0.9 %
10 SYRINGE (ML) INJECTION PRN
Status: DISCONTINUED | OUTPATIENT
Start: 2017-05-31 | End: 2017-06-01

## 2017-05-31 RX ORDER — ASPIRIN 81 MG/1
81 TABLET ORAL 2 TIMES DAILY
Status: DISCONTINUED | OUTPATIENT
Start: 2017-05-31 | End: 2017-05-31 | Stop reason: ALTCHOICE

## 2017-05-31 RX ORDER — TRAMADOL HYDROCHLORIDE 50 MG/1
50 TABLET ORAL EVERY 6 HOURS PRN
Status: DISCONTINUED | OUTPATIENT
Start: 2017-05-31 | End: 2017-06-05 | Stop reason: HOSPADM

## 2017-05-31 RX ORDER — HYDROCODONE BITARTRATE AND ACETAMINOPHEN 5; 325 MG/1; MG/1
1 TABLET ORAL EVERY 4 HOURS PRN
Status: DISCONTINUED | OUTPATIENT
Start: 2017-05-31 | End: 2017-06-01

## 2017-05-31 RX ORDER — HYDROCODONE BITARTRATE AND ACETAMINOPHEN 5; 325 MG/1; MG/1
2 TABLET ORAL EVERY 4 HOURS PRN
Status: DISCONTINUED | OUTPATIENT
Start: 2017-05-31 | End: 2017-06-01

## 2017-05-31 RX ORDER — SODIUM CHLORIDE 0.9 % (FLUSH) 0.9 %
10 SYRINGE (ML) INJECTION EVERY 12 HOURS SCHEDULED
Status: DISCONTINUED | OUTPATIENT
Start: 2017-05-31 | End: 2017-06-01

## 2017-05-31 RX ORDER — PROMETHAZINE HYDROCHLORIDE 25 MG/ML
6.25 INJECTION, SOLUTION INTRAMUSCULAR; INTRAVENOUS EVERY 6 HOURS PRN
Status: DISCONTINUED | OUTPATIENT
Start: 2017-05-31 | End: 2017-06-05 | Stop reason: HOSPADM

## 2017-05-31 RX ORDER — ONDANSETRON 2 MG/ML
4 INJECTION INTRAMUSCULAR; INTRAVENOUS EVERY 6 HOURS PRN
Status: DISCONTINUED | OUTPATIENT
Start: 2017-05-31 | End: 2017-05-31 | Stop reason: SDUPTHER

## 2017-05-31 RX ORDER — PANTOPRAZOLE SODIUM 40 MG/1
40 TABLET, DELAYED RELEASE ORAL
Status: DISCONTINUED | OUTPATIENT
Start: 2017-05-31 | End: 2017-06-05 | Stop reason: HOSPADM

## 2017-05-31 RX ORDER — ONDANSETRON 2 MG/ML
4 INJECTION INTRAMUSCULAR; INTRAVENOUS EVERY 6 HOURS PRN
Status: DISCONTINUED | OUTPATIENT
Start: 2017-05-31 | End: 2017-06-05 | Stop reason: HOSPADM

## 2017-05-31 RX ORDER — MORPHINE SULFATE 4 MG/ML
4 INJECTION, SOLUTION INTRAMUSCULAR; INTRAVENOUS
Status: DISCONTINUED | OUTPATIENT
Start: 2017-05-31 | End: 2017-06-01

## 2017-05-31 RX ORDER — SODIUM CHLORIDE 9 MG/ML
INJECTION, SOLUTION INTRAVENOUS CONTINUOUS
Status: DISCONTINUED | OUTPATIENT
Start: 2017-05-31 | End: 2017-06-05 | Stop reason: HOSPADM

## 2017-05-31 RX ORDER — DOCUSATE SODIUM 100 MG/1
100 CAPSULE, LIQUID FILLED ORAL 2 TIMES DAILY
Status: DISCONTINUED | OUTPATIENT
Start: 2017-05-31 | End: 2017-06-05 | Stop reason: HOSPADM

## 2017-05-31 RX ORDER — LIDOCAINE HYDROCHLORIDE 10 MG/ML
5 INJECTION, SOLUTION EPIDURAL; INFILTRATION; INTRACAUDAL; PERINEURAL ONCE
Status: COMPLETED | OUTPATIENT
Start: 2017-05-31 | End: 2017-06-01

## 2017-05-31 RX ORDER — LUBIPROSTONE 8 UG/1
8 CAPSULE, GELATIN COATED ORAL 2 TIMES DAILY WITH MEALS
Status: DISCONTINUED | OUTPATIENT
Start: 2017-05-31 | End: 2017-06-05 | Stop reason: HOSPADM

## 2017-05-31 RX ORDER — HYDROCODONE BITARTRATE AND ACETAMINOPHEN 10; 325 MG/1; MG/1
1 TABLET ORAL EVERY 6 HOURS PRN
Status: DISCONTINUED | OUTPATIENT
Start: 2017-05-31 | End: 2017-06-01

## 2017-05-31 RX ORDER — DIPHENHYDRAMINE HCL 25 MG
25 TABLET ORAL EVERY 6 HOURS PRN
Status: DISCONTINUED | OUTPATIENT
Start: 2017-05-31 | End: 2017-06-05 | Stop reason: HOSPADM

## 2017-05-31 RX ORDER — CELECOXIB 200 MG/1
200 CAPSULE ORAL DAILY
Status: DISCONTINUED | OUTPATIENT
Start: 2017-05-31 | End: 2017-06-05 | Stop reason: HOSPADM

## 2017-05-31 RX ORDER — SODIUM CHLORIDE 9 MG/ML
INJECTION, SOLUTION INTRAVENOUS CONTINUOUS
Status: DISCONTINUED | OUTPATIENT
Start: 2017-05-31 | End: 2017-06-02

## 2017-05-31 RX ORDER — METOPROLOL SUCCINATE 25 MG/1
25 TABLET, EXTENDED RELEASE ORAL DAILY
Status: DISCONTINUED | OUTPATIENT
Start: 2017-05-31 | End: 2017-06-05 | Stop reason: HOSPADM

## 2017-05-31 RX ADMIN — SODIUM CHLORIDE: 9 INJECTION, SOLUTION INTRAVENOUS at 21:32

## 2017-05-31 RX ADMIN — ENOXAPARIN SODIUM 40 MG: 40 INJECTION SUBCUTANEOUS at 09:42

## 2017-05-31 RX ADMIN — LUBIPROSTONE 8 MCG: 8 CAPSULE, GELATIN COATED ORAL at 17:00

## 2017-05-31 RX ADMIN — CELECOXIB 200 MG: 200 CAPSULE ORAL at 09:41

## 2017-05-31 RX ADMIN — SODIUM CHLORIDE: 9 INJECTION, SOLUTION INTRAVENOUS at 01:35

## 2017-05-31 RX ADMIN — Medication 10 ML: at 11:11

## 2017-05-31 RX ADMIN — HYDROCODONE BITARTRATE AND ACETAMINOPHEN 1 TABLET: 10; 325 TABLET ORAL at 11:04

## 2017-05-31 RX ADMIN — MELATONIN 3 MG ORAL TABLET 3 MG: 3 TABLET ORAL at 20:32

## 2017-05-31 RX ADMIN — VANCOMYCIN HYDROCHLORIDE 1000 MG: 1 INJECTION, SOLUTION INTRAVENOUS at 17:23

## 2017-05-31 RX ADMIN — Medication 2 G: at 03:33

## 2017-05-31 RX ADMIN — SODIUM CHLORIDE: 9 INJECTION, SOLUTION INTRAVENOUS at 05:14

## 2017-05-31 RX ADMIN — Medication 10 ML: at 20:33

## 2017-05-31 RX ADMIN — HYDROMORPHONE HYDROCHLORIDE 0.5 MG: 1 INJECTION, SOLUTION INTRAMUSCULAR; INTRAVENOUS; SUBCUTANEOUS at 21:04

## 2017-05-31 RX ADMIN — POLYETHYLENE GLYCOL 3350 17 G: 17 POWDER, FOR SOLUTION ORAL at 20:32

## 2017-05-31 RX ADMIN — Medication 10 ML: at 20:34

## 2017-05-31 RX ADMIN — ONDANSETRON 4 MG: 2 INJECTION INTRAMUSCULAR; INTRAVENOUS at 01:26

## 2017-05-31 RX ADMIN — SODIUM CHLORIDE: 9 INJECTION, SOLUTION INTRAVENOUS at 14:58

## 2017-05-31 RX ADMIN — HYDROCODONE BITARTRATE AND ACETAMINOPHEN 1 TABLET: 10; 325 TABLET ORAL at 14:57

## 2017-05-31 RX ADMIN — ENOXAPARIN SODIUM 40 MG: 40 INJECTION SUBCUTANEOUS at 20:32

## 2017-05-31 RX ADMIN — DOCUSATE SODIUM 100 MG: 100 CAPSULE, LIQUID FILLED ORAL at 09:42

## 2017-05-31 RX ADMIN — CEFTRIAXONE SODIUM 1 G: 1 INJECTION, POWDER, FOR SOLUTION INTRAMUSCULAR; INTRAVENOUS at 15:36

## 2017-05-31 RX ADMIN — POLYETHYLENE GLYCOL 3350 17 G: 17 POWDER, FOR SOLUTION ORAL at 12:36

## 2017-05-31 RX ADMIN — METHYLNALTREXONE BROMIDE 12 MG: 12 INJECTION, SOLUTION SUBCUTANEOUS at 12:37

## 2017-05-31 RX ADMIN — DOCUSATE SODIUM 100 MG: 100 CAPSULE, LIQUID FILLED ORAL at 20:32

## 2017-05-31 ASSESSMENT — PAIN DESCRIPTION - DESCRIPTORS: DESCRIPTORS: ACHING;DISCOMFORT

## 2017-05-31 ASSESSMENT — PAIN SCALES - GENERAL
PAINLEVEL_OUTOF10: 0
PAINLEVEL_OUTOF10: 6
PAINLEVEL_OUTOF10: 8
PAINLEVEL_OUTOF10: 6
PAINLEVEL_OUTOF10: 6
PAINLEVEL_OUTOF10: 4
PAINLEVEL_OUTOF10: 8

## 2017-05-31 ASSESSMENT — PAIN DESCRIPTION - ORIENTATION: ORIENTATION: RIGHT

## 2017-05-31 ASSESSMENT — PAIN DESCRIPTION - LOCATION: LOCATION: LEG

## 2017-05-31 ASSESSMENT — PAIN DESCRIPTION - PAIN TYPE: TYPE: ACUTE PAIN

## 2017-06-01 ENCOUNTER — APPOINTMENT (OUTPATIENT)
Dept: GENERAL RADIOLOGY | Age: 77
DRG: 464 | End: 2017-06-01
Attending: ORTHOPAEDIC SURGERY
Payer: MEDICARE

## 2017-06-01 PROBLEM — D50.9 IRON DEFICIENCY ANEMIA: Status: ACTIVE | Noted: 2017-06-01

## 2017-06-01 LAB
ANION GAP SERPL CALCULATED.3IONS-SCNC: 11 MMOL/L (ref 7–19)
BLOOD BANK DISPENSE STATUS: NORMAL
BLOOD BANK PRODUCT CODE: NORMAL
BPU ID: NORMAL
BUN BLDV-MCNC: 31 MG/DL (ref 8–23)
CALCIUM SERPL-MCNC: 7.5 MG/DL (ref 8.8–10.2)
CHLORIDE BLD-SCNC: 102 MMOL/L (ref 98–111)
CO2: 23 MMOL/L (ref 22–29)
CREAT SERPL-MCNC: 1 MG/DL (ref 0.5–0.9)
DESCRIPTION BLOOD BANK: NORMAL
GFR NON-AFRICAN AMERICAN: 54
GLUCOSE BLD-MCNC: 128 MG/DL (ref 74–109)
HCT VFR BLD CALC: 20.7 % (ref 37–47)
HEMOGLOBIN: 6.8 G/DL (ref 12–16)
MCH RBC QN AUTO: 31.1 PG (ref 27–31)
MCHC RBC AUTO-ENTMCNC: 32.9 G/DL (ref 33–37)
MCV RBC AUTO: 94.5 FL (ref 81–99)
MRSA CULTURE ONLY: NORMAL
PDW BLD-RTO: 14.6 % (ref 11.5–14.5)
PLATELET # BLD: 146 K/UL (ref 130–400)
PMV BLD AUTO: 10.1 FL (ref 7.4–10.4)
POTASSIUM SERPL-SCNC: 3.7 MMOL/L (ref 3.5–5)
RBC # BLD: 2.19 M/UL (ref 4.2–5.4)
SODIUM BLD-SCNC: 136 MMOL/L (ref 136–145)
WBC # BLD: 17.1 K/UL (ref 4.8–10.8)

## 2017-06-01 PROCEDURE — 6370000000 HC RX 637 (ALT 250 FOR IP): Performed by: ORTHOPAEDIC SURGERY

## 2017-06-01 PROCEDURE — 2580000003 HC RX 258: Performed by: ORTHOPAEDIC SURGERY

## 2017-06-01 PROCEDURE — G8979 MOBILITY GOAL STATUS: HCPCS

## 2017-06-01 PROCEDURE — 2500000003 HC RX 250 WO HCPCS: Performed by: ORTHOPAEDIC SURGERY

## 2017-06-01 PROCEDURE — 76937 US GUIDE VASCULAR ACCESS: CPT

## 2017-06-01 PROCEDURE — 99024 POSTOP FOLLOW-UP VISIT: CPT | Performed by: SURGERY

## 2017-06-01 PROCEDURE — 2580000003 HC RX 258: Performed by: INTERNAL MEDICINE

## 2017-06-01 PROCEDURE — 73562 X-RAY EXAM OF KNEE 3: CPT

## 2017-06-01 PROCEDURE — 36430 TRANSFUSION BLD/BLD COMPNT: CPT

## 2017-06-01 PROCEDURE — 6360000002 HC RX W HCPCS: Performed by: SURGERY

## 2017-06-01 PROCEDURE — 36415 COLL VENOUS BLD VENIPUNCTURE: CPT

## 2017-06-01 PROCEDURE — 6360000002 HC RX W HCPCS: Performed by: ORTHOPAEDIC SURGERY

## 2017-06-01 PROCEDURE — 2580000003 HC RX 258: Performed by: PHYSICIAN ASSISTANT

## 2017-06-01 PROCEDURE — 36569 INSJ PICC 5 YR+ W/O IMAGING: CPT

## 2017-06-01 PROCEDURE — G8978 MOBILITY CURRENT STATUS: HCPCS

## 2017-06-01 PROCEDURE — 6370000000 HC RX 637 (ALT 250 FOR IP): Performed by: FAMILY MEDICINE

## 2017-06-01 PROCEDURE — C1751 CATH, INF, PER/CENT/MIDLINE: HCPCS

## 2017-06-01 PROCEDURE — 80048 BASIC METABOLIC PNL TOTAL CA: CPT

## 2017-06-01 PROCEDURE — 6360000002 HC RX W HCPCS: Performed by: INTERNAL MEDICINE

## 2017-06-01 PROCEDURE — P9016 RBC LEUKOCYTES REDUCED: HCPCS

## 2017-06-01 PROCEDURE — 4A02X4A MEASUREMENT OF CARDIAC ELECTRICAL ACTIVITY, GUIDANCE, EXTERNAL APPROACH: ICD-10-PCS | Performed by: ORTHOPAEDIC SURGERY

## 2017-06-01 PROCEDURE — 97161 PT EVAL LOW COMPLEX 20 MIN: CPT

## 2017-06-01 PROCEDURE — 97530 THERAPEUTIC ACTIVITIES: CPT

## 2017-06-01 PROCEDURE — 6360000002 HC RX W HCPCS: Performed by: FAMILY MEDICINE

## 2017-06-01 PROCEDURE — 02HV33Z INSERTION OF INFUSION DEVICE INTO SUPERIOR VENA CAVA, PERCUTANEOUS APPROACH: ICD-10-PCS | Performed by: ORTHOPAEDIC SURGERY

## 2017-06-01 PROCEDURE — 1210000000 HC MED SURG R&B

## 2017-06-01 PROCEDURE — 85027 COMPLETE CBC AUTOMATED: CPT

## 2017-06-01 RX ORDER — FUROSEMIDE 10 MG/ML
20 INJECTION INTRAMUSCULAR; INTRAVENOUS ONCE
Status: COMPLETED | OUTPATIENT
Start: 2017-06-01 | End: 2017-06-01

## 2017-06-01 RX ORDER — HYDROCODONE BITARTRATE AND ACETAMINOPHEN 10; 325 MG/1; MG/1
1 TABLET ORAL EVERY 6 HOURS PRN
Status: DISCONTINUED | OUTPATIENT
Start: 2017-06-01 | End: 2017-06-05 | Stop reason: HOSPADM

## 2017-06-01 RX ORDER — 0.9 % SODIUM CHLORIDE 0.9 %
250 INTRAVENOUS SOLUTION INTRAVENOUS ONCE
Status: COMPLETED | OUTPATIENT
Start: 2017-06-01 | End: 2017-06-02

## 2017-06-01 RX ORDER — HYDROCODONE BITARTRATE AND ACETAMINOPHEN 10; 325 MG/1; MG/1
2 TABLET ORAL EVERY 6 HOURS PRN
Status: DISCONTINUED | OUTPATIENT
Start: 2017-06-01 | End: 2017-06-05 | Stop reason: HOSPADM

## 2017-06-01 RX ORDER — ASPIRIN 81 MG/1
81 TABLET ORAL ONCE
Status: CANCELLED | OUTPATIENT
Start: 2017-06-01 | End: 2017-06-01

## 2017-06-01 RX ADMIN — PANTOPRAZOLE SODIUM 40 MG: 40 TABLET, DELAYED RELEASE ORAL at 08:12

## 2017-06-01 RX ADMIN — SODIUM CHLORIDE: 9 INJECTION, SOLUTION INTRAVENOUS at 05:59

## 2017-06-01 RX ADMIN — Medication 10 ML: at 08:11

## 2017-06-01 RX ADMIN — METOPROLOL SUCCINATE 25 MG: 25 TABLET, EXTENDED RELEASE ORAL at 20:59

## 2017-06-01 RX ADMIN — MELATONIN 3 MG ORAL TABLET 3 MG: 3 TABLET ORAL at 20:59

## 2017-06-01 RX ADMIN — DOCUSATE SODIUM 100 MG: 100 CAPSULE, LIQUID FILLED ORAL at 20:59

## 2017-06-01 RX ADMIN — HYDROMORPHONE HYDROCHLORIDE 0.5 MG: 1 INJECTION, SOLUTION INTRAMUSCULAR; INTRAVENOUS; SUBCUTANEOUS at 12:27

## 2017-06-01 RX ADMIN — POLYETHYLENE GLYCOL 3350 17 G: 17 POWDER, FOR SOLUTION ORAL at 08:11

## 2017-06-01 RX ADMIN — DOCUSATE SODIUM 100 MG: 100 CAPSULE, LIQUID FILLED ORAL at 08:11

## 2017-06-01 RX ADMIN — HYDROCODONE BITARTRATE AND ACETAMINOPHEN 1 TABLET: 10; 325 TABLET ORAL at 13:56

## 2017-06-01 RX ADMIN — LUBIPROSTONE 8 MCG: 8 CAPSULE, GELATIN COATED ORAL at 08:12

## 2017-06-01 RX ADMIN — HYDROMORPHONE HYDROCHLORIDE 0.5 MG: 1 INJECTION, SOLUTION INTRAMUSCULAR; INTRAVENOUS; SUBCUTANEOUS at 02:06

## 2017-06-01 RX ADMIN — CELECOXIB 200 MG: 200 CAPSULE ORAL at 08:12

## 2017-06-01 RX ADMIN — HYDROCODONE BITARTRATE AND ACETAMINOPHEN 1 TABLET: 10; 325 TABLET ORAL at 20:59

## 2017-06-01 RX ADMIN — VANCOMYCIN HYDROCHLORIDE 1000 MG: 1 INJECTION, SOLUTION INTRAVENOUS at 18:20

## 2017-06-01 RX ADMIN — LUBIPROSTONE 8 MCG: 8 CAPSULE, GELATIN COATED ORAL at 18:19

## 2017-06-01 RX ADMIN — FUROSEMIDE 20 MG: 10 INJECTION, SOLUTION INTRAMUSCULAR; INTRAVENOUS at 14:26

## 2017-06-01 RX ADMIN — CEFTRIAXONE SODIUM 1 G: 1 INJECTION, POWDER, FOR SOLUTION INTRAMUSCULAR; INTRAVENOUS at 14:26

## 2017-06-01 RX ADMIN — METOPROLOL SUCCINATE 25 MG: 25 TABLET, EXTENDED RELEASE ORAL at 02:49

## 2017-06-01 RX ADMIN — POLYETHYLENE GLYCOL 3350 17 G: 17 POWDER, FOR SOLUTION ORAL at 20:58

## 2017-06-01 RX ADMIN — LIDOCAINE HYDROCHLORIDE 5 ML: 10 INJECTION, SOLUTION EPIDURAL; INFILTRATION; INTRACAUDAL; PERINEURAL at 09:16

## 2017-06-01 RX ADMIN — SODIUM CHLORIDE 250 ML: 9 INJECTION, SOLUTION INTRAVENOUS at 14:23

## 2017-06-01 RX ADMIN — ENOXAPARIN SODIUM 30 MG: 30 INJECTION SUBCUTANEOUS at 20:58

## 2017-06-01 RX ADMIN — HYDROMORPHONE HYDROCHLORIDE 0.5 MG: 1 INJECTION, SOLUTION INTRAMUSCULAR; INTRAVENOUS; SUBCUTANEOUS at 06:50

## 2017-06-01 RX ADMIN — HYDROCODONE BITARTRATE AND ACETAMINOPHEN 1 TABLET: 10; 325 TABLET ORAL at 08:12

## 2017-06-01 ASSESSMENT — PAIN SCALES - GENERAL
PAINLEVEL_OUTOF10: 5
PAINLEVEL_OUTOF10: 8
PAINLEVEL_OUTOF10: 7
PAINLEVEL_OUTOF10: 6
PAINLEVEL_OUTOF10: 0
PAINLEVEL_OUTOF10: 0
PAINLEVEL_OUTOF10: 5
PAINLEVEL_OUTOF10: 5
PAINLEVEL_OUTOF10: 0

## 2017-06-02 LAB
HCT VFR BLD CALC: 26.7 % (ref 37–47)
HEMOGLOBIN: 8.8 G/DL (ref 12–16)
MCH RBC QN AUTO: 30.2 PG (ref 27–31)
MCHC RBC AUTO-ENTMCNC: 33 G/DL (ref 33–37)
MCV RBC AUTO: 91.8 FL (ref 81–99)
PDW BLD-RTO: 16.5 % (ref 11.5–14.5)
PLATELET # BLD: 145 K/UL (ref 130–400)
PMV BLD AUTO: 11 FL (ref 9.4–12.3)
RBC # BLD: 2.91 M/UL (ref 4.2–5.4)
VANCOMYCIN TROUGH: 9.4 UG/ML (ref 10–20)
WBC # BLD: 15.3 K/UL (ref 4.8–10.8)

## 2017-06-02 PROCEDURE — 2580000003 HC RX 258: Performed by: INTERNAL MEDICINE

## 2017-06-02 PROCEDURE — 99024 POSTOP FOLLOW-UP VISIT: CPT | Performed by: SURGERY

## 2017-06-02 PROCEDURE — 6370000000 HC RX 637 (ALT 250 FOR IP): Performed by: ORTHOPAEDIC SURGERY

## 2017-06-02 PROCEDURE — 80202 ASSAY OF VANCOMYCIN: CPT

## 2017-06-02 PROCEDURE — 97530 THERAPEUTIC ACTIVITIES: CPT

## 2017-06-02 PROCEDURE — 6360000002 HC RX W HCPCS: Performed by: INTERNAL MEDICINE

## 2017-06-02 PROCEDURE — 97116 GAIT TRAINING THERAPY: CPT

## 2017-06-02 PROCEDURE — 6360000002 HC RX W HCPCS: Performed by: SURGERY

## 2017-06-02 PROCEDURE — 85027 COMPLETE CBC AUTOMATED: CPT

## 2017-06-02 PROCEDURE — 2580000003 HC RX 258: Performed by: ORTHOPAEDIC SURGERY

## 2017-06-02 PROCEDURE — 1210000000 HC MED SURG R&B

## 2017-06-02 PROCEDURE — 36415 COLL VENOUS BLD VENIPUNCTURE: CPT

## 2017-06-02 PROCEDURE — 6360000002 HC RX W HCPCS: Performed by: ORTHOPAEDIC SURGERY

## 2017-06-02 PROCEDURE — 6370000000 HC RX 637 (ALT 250 FOR IP): Performed by: FAMILY MEDICINE

## 2017-06-02 RX ADMIN — HYDROCODONE BITARTRATE AND ACETAMINOPHEN 2 TABLET: 10; 325 TABLET ORAL at 14:05

## 2017-06-02 RX ADMIN — Medication 10 ML: at 20:29

## 2017-06-02 RX ADMIN — DOCUSATE SODIUM 100 MG: 100 CAPSULE, LIQUID FILLED ORAL at 20:29

## 2017-06-02 RX ADMIN — LUBIPROSTONE 8 MCG: 8 CAPSULE, GELATIN COATED ORAL at 17:40

## 2017-06-02 RX ADMIN — POLYETHYLENE GLYCOL 3350 17 G: 17 POWDER, FOR SOLUTION ORAL at 08:10

## 2017-06-02 RX ADMIN — DOCUSATE SODIUM 100 MG: 100 CAPSULE, LIQUID FILLED ORAL at 08:11

## 2017-06-02 RX ADMIN — PANTOPRAZOLE SODIUM 40 MG: 40 TABLET, DELAYED RELEASE ORAL at 08:11

## 2017-06-02 RX ADMIN — HYDROCODONE BITARTRATE AND ACETAMINOPHEN 1 TABLET: 10; 325 TABLET ORAL at 03:50

## 2017-06-02 RX ADMIN — DIPHENHYDRAMINE HCL 25 MG: 25 TABLET ORAL at 03:50

## 2017-06-02 RX ADMIN — SODIUM CHLORIDE: 9 INJECTION, SOLUTION INTRAVENOUS at 03:53

## 2017-06-02 RX ADMIN — ENOXAPARIN SODIUM 30 MG: 30 INJECTION SUBCUTANEOUS at 20:28

## 2017-06-02 RX ADMIN — MELATONIN 3 MG ORAL TABLET 3 MG: 3 TABLET ORAL at 20:28

## 2017-06-02 RX ADMIN — CEFTRIAXONE SODIUM 1 G: 1 INJECTION, POWDER, FOR SOLUTION INTRAMUSCULAR; INTRAVENOUS at 14:09

## 2017-06-02 RX ADMIN — VANCOMYCIN HYDROCHLORIDE 1000 MG: 1 INJECTION, SOLUTION INTRAVENOUS at 17:40

## 2017-06-02 RX ADMIN — METOPROLOL SUCCINATE 25 MG: 25 TABLET, EXTENDED RELEASE ORAL at 20:28

## 2017-06-02 RX ADMIN — HYDROCODONE BITARTRATE AND ACETAMINOPHEN 2 TABLET: 10; 325 TABLET ORAL at 21:44

## 2017-06-02 RX ADMIN — CELECOXIB 200 MG: 200 CAPSULE ORAL at 08:11

## 2017-06-02 RX ADMIN — LUBIPROSTONE 8 MCG: 8 CAPSULE, GELATIN COATED ORAL at 08:12

## 2017-06-02 ASSESSMENT — PAIN SCALES - GENERAL
PAINLEVEL_OUTOF10: 8
PAINLEVEL_OUTOF10: 8
PAINLEVEL_OUTOF10: 0
PAINLEVEL_OUTOF10: 2
PAINLEVEL_OUTOF10: 5
PAINLEVEL_OUTOF10: 0

## 2017-06-03 LAB
HCT VFR BLD CALC: 25.1 % (ref 37–47)
HEMOGLOBIN: 8.2 G/DL (ref 12–16)
MCH RBC QN AUTO: 30.6 PG (ref 27–31)
MCHC RBC AUTO-ENTMCNC: 32.7 G/DL (ref 33–37)
MCV RBC AUTO: 93.7 FL (ref 81–99)
PDW BLD-RTO: 16.4 % (ref 11.5–14.5)
PLATELET # BLD: 140 K/UL (ref 130–400)
PMV BLD AUTO: 10.7 FL (ref 9.4–12.3)
RBC # BLD: 2.68 M/UL (ref 4.2–5.4)
WBC # BLD: 12.1 K/UL (ref 4.8–10.8)

## 2017-06-03 PROCEDURE — 6370000000 HC RX 637 (ALT 250 FOR IP): Performed by: SURGERY

## 2017-06-03 PROCEDURE — 6360000002 HC RX W HCPCS: Performed by: SURGERY

## 2017-06-03 PROCEDURE — 6370000000 HC RX 637 (ALT 250 FOR IP): Performed by: FAMILY MEDICINE

## 2017-06-03 PROCEDURE — 97530 THERAPEUTIC ACTIVITIES: CPT

## 2017-06-03 PROCEDURE — 99024 POSTOP FOLLOW-UP VISIT: CPT | Performed by: SURGERY

## 2017-06-03 PROCEDURE — 6370000000 HC RX 637 (ALT 250 FOR IP): Performed by: ORTHOPAEDIC SURGERY

## 2017-06-03 PROCEDURE — 6360000002 HC RX W HCPCS: Performed by: INTERNAL MEDICINE

## 2017-06-03 PROCEDURE — 6360000002 HC RX W HCPCS: Performed by: ORTHOPAEDIC SURGERY

## 2017-06-03 PROCEDURE — 85027 COMPLETE CBC AUTOMATED: CPT

## 2017-06-03 PROCEDURE — 1210000000 HC MED SURG R&B

## 2017-06-03 PROCEDURE — 2580000003 HC RX 258: Performed by: ORTHOPAEDIC SURGERY

## 2017-06-03 PROCEDURE — 2580000003 HC RX 258: Performed by: INTERNAL MEDICINE

## 2017-06-03 PROCEDURE — 97116 GAIT TRAINING THERAPY: CPT

## 2017-06-03 RX ORDER — ASPIRIN 81 MG/1
81 TABLET ORAL DAILY
Status: DISCONTINUED | OUTPATIENT
Start: 2017-06-03 | End: 2017-06-05 | Stop reason: HOSPADM

## 2017-06-03 RX ORDER — LISINOPRIL 20 MG/1
20 TABLET ORAL DAILY
Status: DISCONTINUED | OUTPATIENT
Start: 2017-06-03 | End: 2017-06-05 | Stop reason: HOSPADM

## 2017-06-03 RX ORDER — HYDROCHLOROTHIAZIDE 12.5 MG/1
25 CAPSULE, GELATIN COATED ORAL DAILY
Status: DISCONTINUED | OUTPATIENT
Start: 2017-06-03 | End: 2017-06-05 | Stop reason: HOSPADM

## 2017-06-03 RX ADMIN — ASPIRIN 81 MG: 81 TABLET, COATED ORAL at 11:40

## 2017-06-03 RX ADMIN — HYDROCODONE BITARTRATE AND ACETAMINOPHEN 2 TABLET: 10; 325 TABLET ORAL at 11:38

## 2017-06-03 RX ADMIN — METOPROLOL SUCCINATE 25 MG: 25 TABLET, EXTENDED RELEASE ORAL at 20:51

## 2017-06-03 RX ADMIN — LUBIPROSTONE 8 MCG: 8 CAPSULE, GELATIN COATED ORAL at 17:36

## 2017-06-03 RX ADMIN — PANTOPRAZOLE SODIUM 40 MG: 40 TABLET, DELAYED RELEASE ORAL at 05:29

## 2017-06-03 RX ADMIN — MELATONIN 3 MG ORAL TABLET 3 MG: 3 TABLET ORAL at 20:51

## 2017-06-03 RX ADMIN — HYDROCODONE BITARTRATE AND ACETAMINOPHEN 2 TABLET: 10; 325 TABLET ORAL at 05:31

## 2017-06-03 RX ADMIN — CEFTRIAXONE SODIUM 1 G: 1 INJECTION, POWDER, FOR SOLUTION INTRAMUSCULAR; INTRAVENOUS at 15:03

## 2017-06-03 RX ADMIN — LISINOPRIL 20 MG: 20 TABLET ORAL at 11:41

## 2017-06-03 RX ADMIN — POLYETHYLENE GLYCOL 3350 17 G: 17 POWDER, FOR SOLUTION ORAL at 20:52

## 2017-06-03 RX ADMIN — DOCUSATE SODIUM 100 MG: 100 CAPSULE, LIQUID FILLED ORAL at 20:51

## 2017-06-03 RX ADMIN — CELECOXIB 200 MG: 200 CAPSULE ORAL at 11:41

## 2017-06-03 RX ADMIN — HYDROCHLOROTHIAZIDE 25 MG: 12.5 CAPSULE ORAL at 11:40

## 2017-06-03 RX ADMIN — VANCOMYCIN HYDROCHLORIDE 1500 MG: 1 INJECTION, POWDER, LYOPHILIZED, FOR SOLUTION INTRAVENOUS at 11:38

## 2017-06-03 RX ADMIN — HYDROCODONE BITARTRATE AND ACETAMINOPHEN 2 TABLET: 10; 325 TABLET ORAL at 17:37

## 2017-06-03 RX ADMIN — POLYETHYLENE GLYCOL 3350 17 G: 17 POWDER, FOR SOLUTION ORAL at 11:39

## 2017-06-03 RX ADMIN — LUBIPROSTONE 8 MCG: 8 CAPSULE, GELATIN COATED ORAL at 11:41

## 2017-06-03 RX ADMIN — Medication 10 ML: at 11:46

## 2017-06-03 RX ADMIN — ENOXAPARIN SODIUM 30 MG: 30 INJECTION SUBCUTANEOUS at 20:52

## 2017-06-03 ASSESSMENT — PAIN SCALES - GENERAL
PAINLEVEL_OUTOF10: 6
PAINLEVEL_OUTOF10: 6
PAINLEVEL_OUTOF10: 7

## 2017-06-04 PROBLEM — I70.90 ATHEROMATOUS PLAQUE: Status: ACTIVE | Noted: 2017-06-04

## 2017-06-04 PROCEDURE — 97116 GAIT TRAINING THERAPY: CPT

## 2017-06-04 PROCEDURE — 2580000003 HC RX 258: Performed by: INTERNAL MEDICINE

## 2017-06-04 PROCEDURE — 6370000000 HC RX 637 (ALT 250 FOR IP): Performed by: FAMILY MEDICINE

## 2017-06-04 PROCEDURE — 97530 THERAPEUTIC ACTIVITIES: CPT

## 2017-06-04 PROCEDURE — 2580000003 HC RX 258: Performed by: ORTHOPAEDIC SURGERY

## 2017-06-04 PROCEDURE — 6360000002 HC RX W HCPCS: Performed by: ORTHOPAEDIC SURGERY

## 2017-06-04 PROCEDURE — 6360000002 HC RX W HCPCS: Performed by: SURGERY

## 2017-06-04 PROCEDURE — 1210000000 HC MED SURG R&B

## 2017-06-04 PROCEDURE — 6370000000 HC RX 637 (ALT 250 FOR IP): Performed by: ORTHOPAEDIC SURGERY

## 2017-06-04 PROCEDURE — 6370000000 HC RX 637 (ALT 250 FOR IP): Performed by: SURGERY

## 2017-06-04 PROCEDURE — 99024 POSTOP FOLLOW-UP VISIT: CPT | Performed by: SURGERY

## 2017-06-04 PROCEDURE — 6360000002 HC RX W HCPCS: Performed by: INTERNAL MEDICINE

## 2017-06-04 RX ADMIN — HYDROCODONE BITARTRATE AND ACETAMINOPHEN 2 TABLET: 10; 325 TABLET ORAL at 03:13

## 2017-06-04 RX ADMIN — HYDROCODONE BITARTRATE AND ACETAMINOPHEN 2 TABLET: 10; 325 TABLET ORAL at 12:17

## 2017-06-04 RX ADMIN — DOCUSATE SODIUM 100 MG: 100 CAPSULE, LIQUID FILLED ORAL at 20:39

## 2017-06-04 RX ADMIN — MELATONIN 3 MG ORAL TABLET 3 MG: 3 TABLET ORAL at 20:39

## 2017-06-04 RX ADMIN — Medication 10 ML: at 20:40

## 2017-06-04 RX ADMIN — HYDROCODONE BITARTRATE AND ACETAMINOPHEN 2 TABLET: 10; 325 TABLET ORAL at 18:26

## 2017-06-04 RX ADMIN — CELECOXIB 200 MG: 200 CAPSULE ORAL at 10:34

## 2017-06-04 RX ADMIN — ASPIRIN 81 MG: 81 TABLET, COATED ORAL at 10:34

## 2017-06-04 RX ADMIN — LISINOPRIL 20 MG: 20 TABLET ORAL at 10:34

## 2017-06-04 RX ADMIN — LUBIPROSTONE 8 MCG: 8 CAPSULE, GELATIN COATED ORAL at 10:35

## 2017-06-04 RX ADMIN — POLYETHYLENE GLYCOL 3350 17 G: 17 POWDER, FOR SOLUTION ORAL at 10:35

## 2017-06-04 RX ADMIN — DOCUSATE SODIUM 100 MG: 100 CAPSULE, LIQUID FILLED ORAL at 10:35

## 2017-06-04 RX ADMIN — HYDROCHLOROTHIAZIDE 25 MG: 12.5 CAPSULE ORAL at 10:34

## 2017-06-04 RX ADMIN — CEFTRIAXONE SODIUM 1 G: 1 INJECTION, POWDER, FOR SOLUTION INTRAMUSCULAR; INTRAVENOUS at 13:33

## 2017-06-04 RX ADMIN — PANTOPRAZOLE SODIUM 40 MG: 40 TABLET, DELAYED RELEASE ORAL at 10:35

## 2017-06-04 RX ADMIN — ENOXAPARIN SODIUM 30 MG: 30 INJECTION SUBCUTANEOUS at 20:39

## 2017-06-04 RX ADMIN — METOPROLOL SUCCINATE 25 MG: 25 TABLET, EXTENDED RELEASE ORAL at 20:40

## 2017-06-04 RX ADMIN — VANCOMYCIN HYDROCHLORIDE 1500 MG: 1 INJECTION, POWDER, LYOPHILIZED, FOR SOLUTION INTRAVENOUS at 10:34

## 2017-06-04 ASSESSMENT — PAIN SCALES - GENERAL
PAINLEVEL_OUTOF10: 5
PAINLEVEL_OUTOF10: 0
PAINLEVEL_OUTOF10: 6
PAINLEVEL_OUTOF10: 3
PAINLEVEL_OUTOF10: 6

## 2017-06-05 VITALS
RESPIRATION RATE: 14 BRPM | DIASTOLIC BLOOD PRESSURE: 71 MMHG | BODY MASS INDEX: 29.08 KG/M2 | OXYGEN SATURATION: 96 % | SYSTOLIC BLOOD PRESSURE: 142 MMHG | HEIGHT: 64 IN | HEART RATE: 94 BPM | WEIGHT: 170.3 LBS | TEMPERATURE: 98 F

## 2017-06-05 LAB
ANION GAP SERPL CALCULATED.3IONS-SCNC: 11 MMOL/L (ref 7–19)
BUN BLDV-MCNC: 21 MG/DL (ref 8–23)
CALCIUM SERPL-MCNC: 8.8 MG/DL (ref 8.8–10.2)
CHLORIDE BLD-SCNC: 102 MMOL/L (ref 98–111)
CHOLESTEROL, TOTAL: 127 MG/DL (ref 160–199)
CO2: 30 MMOL/L (ref 22–29)
CREAT SERPL-MCNC: 0.8 MG/DL (ref 0.5–0.9)
GFR NON-AFRICAN AMERICAN: >60
GLUCOSE BLD-MCNC: 105 MG/DL (ref 74–109)
HCT VFR BLD CALC: 26.7 % (ref 37–47)
HDLC SERPL-MCNC: 40 MG/DL (ref 65–121)
HEMOGLOBIN: 8.5 G/DL (ref 12–16)
LDL CHOLESTEROL CALCULATED: 63 MG/DL
POTASSIUM SERPL-SCNC: 4.3 MMOL/L (ref 3.5–5)
SODIUM BLD-SCNC: 143 MMOL/L (ref 136–145)
TRIGL SERPL-MCNC: 119 MG/DL (ref 150–199)

## 2017-06-05 PROCEDURE — G8988 SELF CARE GOAL STATUS: HCPCS

## 2017-06-05 PROCEDURE — 6370000000 HC RX 637 (ALT 250 FOR IP): Performed by: FAMILY MEDICINE

## 2017-06-05 PROCEDURE — 6370000000 HC RX 637 (ALT 250 FOR IP): Performed by: ORTHOPAEDIC SURGERY

## 2017-06-05 PROCEDURE — 80061 LIPID PANEL: CPT

## 2017-06-05 PROCEDURE — 97166 OT EVAL MOD COMPLEX 45 MIN: CPT

## 2017-06-05 PROCEDURE — 97530 THERAPEUTIC ACTIVITIES: CPT

## 2017-06-05 PROCEDURE — 85014 HEMATOCRIT: CPT

## 2017-06-05 PROCEDURE — 2580000003 HC RX 258: Performed by: ORTHOPAEDIC SURGERY

## 2017-06-05 PROCEDURE — 80048 BASIC METABOLIC PNL TOTAL CA: CPT

## 2017-06-05 PROCEDURE — 97116 GAIT TRAINING THERAPY: CPT

## 2017-06-05 PROCEDURE — 6360000002 HC RX W HCPCS: Performed by: INTERNAL MEDICINE

## 2017-06-05 PROCEDURE — 2580000003 HC RX 258: Performed by: INTERNAL MEDICINE

## 2017-06-05 PROCEDURE — G8987 SELF CARE CURRENT STATUS: HCPCS

## 2017-06-05 PROCEDURE — 85018 HEMOGLOBIN: CPT

## 2017-06-05 PROCEDURE — 97535 SELF CARE MNGMENT TRAINING: CPT

## 2017-06-05 PROCEDURE — 6370000000 HC RX 637 (ALT 250 FOR IP): Performed by: SURGERY

## 2017-06-05 PROCEDURE — 99024 POSTOP FOLLOW-UP VISIT: CPT | Performed by: SURGERY

## 2017-06-05 RX ORDER — OXYCODONE AND ACETAMINOPHEN 10; 325 MG/1; MG/1
1 TABLET ORAL EVERY 6 HOURS PRN
Qty: 50 TABLET | Refills: 0 | Status: SHIPPED | OUTPATIENT
Start: 2017-06-05 | End: 2017-06-12

## 2017-06-05 RX ADMIN — DOCUSATE SODIUM 100 MG: 100 CAPSULE, LIQUID FILLED ORAL at 10:15

## 2017-06-05 RX ADMIN — CELECOXIB 200 MG: 200 CAPSULE ORAL at 10:15

## 2017-06-05 RX ADMIN — PANTOPRAZOLE SODIUM 40 MG: 40 TABLET, DELAYED RELEASE ORAL at 06:31

## 2017-06-05 RX ADMIN — HYDROCODONE BITARTRATE AND ACETAMINOPHEN 2 TABLET: 10; 325 TABLET ORAL at 16:43

## 2017-06-05 RX ADMIN — LISINOPRIL 20 MG: 20 TABLET ORAL at 10:16

## 2017-06-05 RX ADMIN — CEFTRIAXONE SODIUM 2 G: 1 INJECTION, POWDER, FOR SOLUTION INTRAMUSCULAR; INTRAVENOUS at 14:31

## 2017-06-05 RX ADMIN — LUBIPROSTONE 8 MCG: 8 CAPSULE, GELATIN COATED ORAL at 10:16

## 2017-06-05 RX ADMIN — ASPIRIN 81 MG: 81 TABLET, COATED ORAL at 10:15

## 2017-06-05 RX ADMIN — Medication 10 ML: at 10:16

## 2017-06-05 RX ADMIN — HYDROCHLOROTHIAZIDE 25 MG: 12.5 CAPSULE ORAL at 10:15

## 2017-06-05 RX ADMIN — HYDROCODONE BITARTRATE AND ACETAMINOPHEN 2 TABLET: 10; 325 TABLET ORAL at 10:16

## 2017-06-05 RX ADMIN — HYDROCODONE BITARTRATE AND ACETAMINOPHEN 2 TABLET: 10; 325 TABLET ORAL at 01:17

## 2017-06-05 ASSESSMENT — PAIN SCALES - GENERAL
PAINLEVEL_OUTOF10: 6
PAINLEVEL_OUTOF10: 8
PAINLEVEL_OUTOF10: 6

## 2017-06-05 ASSESSMENT — PAIN DESCRIPTION - PAIN TYPE: TYPE: SURGICAL PAIN

## 2017-06-05 ASSESSMENT — PAIN DESCRIPTION - LOCATION
LOCATION: KNEE
LOCATION: KNEE

## 2017-06-05 ASSESSMENT — PAIN DESCRIPTION - ORIENTATION
ORIENTATION: RIGHT
ORIENTATION: RIGHT

## 2017-06-07 RX ORDER — ATORVASTATIN CALCIUM 20 MG/1
20 TABLET, FILM COATED ORAL
Qty: 90 TABLET | Refills: 3 | Status: SHIPPED | OUTPATIENT
Start: 2017-06-07 | End: 2017-09-15

## 2017-06-13 LAB
ANAEROBIC CULTURE: NORMAL
CULTURE SURGICAL: NORMAL
GRAM STAIN RESULT: NORMAL

## 2017-06-14 ENCOUNTER — OFFICE VISIT (OUTPATIENT)
Dept: FAMILY MEDICINE CLINIC | Facility: CLINIC | Age: 77
End: 2017-06-14

## 2017-06-14 VITALS
HEART RATE: 105 BPM | OXYGEN SATURATION: 94 % | TEMPERATURE: 99.3 F | DIASTOLIC BLOOD PRESSURE: 68 MMHG | SYSTOLIC BLOOD PRESSURE: 124 MMHG | BODY MASS INDEX: 27.52 KG/M2 | HEIGHT: 64 IN | WEIGHT: 161.2 LBS

## 2017-06-14 DIAGNOSIS — R00.0 TACHYCARDIA: Primary | ICD-10-CM

## 2017-06-14 LAB
ALBUMIN SERPL-MCNC: 3.5 G/DL (ref 3.5–5)
ALBUMIN/GLOB SERPL: 1.1 G/DL (ref 1.1–2.5)
ALP SERPL-CCNC: 96 U/L (ref 24–120)
ALT SERPL-CCNC: 38 U/L (ref 0–54)
ANAEROBIC CULTURE: ABNORMAL
AST SERPL-CCNC: 30 U/L (ref 7–45)
BASOPHILS # BLD AUTO: 0.08 10*3/MM3 (ref 0–0.2)
BASOPHILS NFR BLD AUTO: 0.7 % (ref 0–2)
BILIRUB SERPL-MCNC: 0.6 MG/DL (ref 0.1–1)
BUN SERPL-MCNC: 25 MG/DL (ref 5–21)
BUN/CREAT SERPL: 26.9 (ref 7–25)
CALCIUM SERPL-MCNC: 9.4 MG/DL (ref 8.4–10.4)
CHLORIDE SERPL-SCNC: 92 MMOL/L (ref 98–110)
CO2 SERPL-SCNC: 28 MMOL/L (ref 24–31)
CREAT SERPL-MCNC: 0.93 MG/DL (ref 0.5–1.4)
CULTURE SURGICAL: ABNORMAL
DIFFERENTIAL COMMENT: NORMAL
EOSINOPHIL # BLD AUTO: 0.27 10*3/MM3 (ref 0–0.7)
EOSINOPHIL NFR BLD AUTO: 2.4 % (ref 0–4)
ERYTHROCYTE [DISTWIDTH] IN BLOOD BY AUTOMATED COUNT: 15.9 % (ref 12–15)
GLOBULIN SER CALC-MCNC: 3.1 GM/DL
GLUCOSE SERPL-MCNC: 103 MG/DL (ref 70–100)
GRAM STAIN RESULT: ABNORMAL
HCT VFR BLD AUTO: 35.3 % (ref 37–47)
HGB BLD-MCNC: 10.9 G/DL (ref 12–16)
IMM GRANULOCYTES # BLD: 0.08 10*3/MM3 (ref 0–0.03)
IMM GRANULOCYTES NFR BLD: 0.7 % (ref 0–5)
LYMPHOCYTES # BLD AUTO: 1.03 10*3/MM3 (ref 0.72–4.86)
LYMPHOCYTES NFR BLD AUTO: 9.2 % (ref 15–45)
MCH RBC QN AUTO: 29.3 PG (ref 28–32)
MCHC RBC AUTO-ENTMCNC: 30.9 G/DL (ref 33–36)
MCV RBC AUTO: 94.9 FL (ref 82–98)
MONOCYTES # BLD AUTO: 0.99 10*3/MM3 (ref 0.19–1.3)
MONOCYTES NFR BLD AUTO: 8.8 % (ref 4–12)
NEUTROPHILS # BLD AUTO: 8.8 10*3/MM3 (ref 1.87–8.4)
NEUTROPHILS NFR BLD AUTO: 78.2 % (ref 39–78)
NRBC BLD AUTO-RTO: 0 /100 WBC (ref 0–0)
ORGANISM: ABNORMAL
ORGANISM: ABNORMAL
PLATELET # BLD AUTO: 357 10*3/MM3 (ref 130–400)
PLATELET BLD QL SMEAR: NORMAL
POTASSIUM SERPL-SCNC: 4.5 MMOL/L (ref 3.5–5.3)
PROT SERPL-MCNC: 6.6 G/DL (ref 6.3–8.7)
RBC # BLD AUTO: 3.72 10*6/MM3 (ref 4.2–5.4)
RBC MORPH BLD: NORMAL
SODIUM SERPL-SCNC: 133 MMOL/L (ref 135–145)
WBC # BLD AUTO: 11.25 10*3/MM3 (ref 4.8–10.8)

## 2017-06-14 PROCEDURE — 93000 ELECTROCARDIOGRAM COMPLETE: CPT | Performed by: FAMILY MEDICINE

## 2017-06-14 PROCEDURE — 99214 OFFICE O/P EST MOD 30 MIN: CPT | Performed by: FAMILY MEDICINE

## 2017-06-14 NOTE — PROGRESS NOTES
"Ariana Gutierrez    1940    Chief Complaint   Patient presents with   • Rapid Heart Rate       Vitals:    06/14/17 1013   BP: 124/68   BP Location: Left arm   Patient Position: Sitting   Cuff Size: Adult   Pulse: 105   Temp: 99.3 °F (37.4 °C)   TempSrc: Oral   SpO2: 94%   Weight: 161 lb 3.2 oz (73.1 kg)   Height: 64\" (162.6 cm)       Shortness of Breath   This is a recurrent problem. The current episode started 1 to 4 weeks ago. The problem occurs intermittently. The problem has been unchanged. Pertinent negatives include no chest pain, ear pain or leg swelling. The symptoms are aggravated by any activity. Risk factors: Recent surgery. She has tried nothing for the symptoms. The treatment provided no relief.       Review of Systems   HENT: Negative for ear pain.    Respiratory: Positive for shortness of breath.    Cardiovascular: Negative for chest pain and leg swelling.   Musculoskeletal:        Recent knee surgery on the right for septic joint, also vein and arterial repair - popliteal on the right       Past Medical History:   Diagnosis Date   • Chest pain    • Chronic back pain    • Diverticulosis of intestine 8/16/2016   • Gastroesophageal reflux disease without esophagitis 5/26/2017   • Hypertension    • Irritable bowel syndrome 11/2/2011   • Low back pain    • Palpitations    • Paresthesia     toes   • Perforated bowel    • Scoliosis    • Spinal stenosis          Current Outpatient Prescriptions:   •  aspirin 81 MG tablet, Take 81 mg by mouth daily., Disp: , Rfl:   •  atorvastatin (LIPITOR) 20 MG tablet, Take 1 tablet by mouth Daily With Dinner., Disp: 90 tablet, Rfl: 3  •  benazepril (LOTENSIN) 20 MG tablet, , Disp: , Rfl:   •  calcium carb-cholecalciferol 600-800 MG-UNIT tablet, Take  by mouth Daily., Disp: , Rfl:   •  gabapentin (NEURONTIN) 300 MG capsule, TAKE 1 CAPSULE THREE TIMES DAILY, Disp: , Rfl:   •  hydrochlorothiazide (HYDRODIURIL) 25 MG tablet, , Disp: , Rfl:   •  HYDROcodone-acetaminophen " (NORCO)  MG per tablet, Take 1 tablet by mouth Every 6 (Six) Hours As Needed for Moderate Pain (4-6)., Disp: 90 tablet, Rfl: 0  •  metoprolol succinate XL (TOPROL-XL) 25 MG 24 hr tablet, , Disp: , Rfl:   •  omeprazole (PriLOSEC) 20 MG capsule, , Disp: , Rfl:   •  polyethylene glycol (MIRALAX) powder, , Disp: , Rfl:     No Known Allergies    Patient Active Problem List   Diagnosis   • Abdominal bloating   • Flatulence   • Diverticulosis of intestine   • Hypertension   • Irritable bowel syndrome   • Irritable bowel syndrome with constipation   • Small intestinal bacterial overgrowth   • Spinal stenosis   • Preop cardiovascular exam right knee explant Dr Liu   • Essential hypertension   • Gastroesophageal reflux disease without esophagitis   • BERGERON (dyspnea on exertion)   • Abnormal ECG       Social History     Social History   • Marital status:      Spouse name: N/A   • Number of children: N/A   • Years of education: N/A     Social History Main Topics   • Smoking status: Never Smoker   • Smokeless tobacco: None   • Alcohol use Yes      Comment: occ   • Drug use: No   • Sexual activity: Defer     Other Topics Concern   • None     Social History Narrative       Past Surgical History:   Procedure Laterality Date   • BACK SURGERY     • BREAST BIOPSY Right 25 yrs ago   • CATARACT EXTRACTION Bilateral    • COLON RESECTION SMALL BOWEL  2016    with colostomy for 6 months, already revised.   • COLON SURGERY     • COLONOSCOPY     • LUMBAR LAMINECTOMY     • RENAL ARTERY STENT Right    • TOTAL KNEE ARTHROPLASTY Right        Physical Exam   Constitutional: She is oriented to person, place, and time. She appears well-developed and well-nourished.   Cardiovascular: Normal rate and regular rhythm.    Pulmonary/Chest: Effort normal and breath sounds normal. No respiratory distress. She has no wheezes.   Neurological: She is alert and oriented to person, place, and time.   Skin: Skin is warm and dry.   Psychiatric: She  "has a normal mood and affect. Her behavior is normal.   Vitals reviewed.      Vitals:    06/14/17 1013   BP: 124/68   BP Location: Left arm   Patient Position: Sitting   Cuff Size: Adult   Pulse: 105   Temp: 99.3 °F (37.4 °C)   TempSrc: Oral   SpO2: 94%   Weight: 161 lb 3.2 oz (73.1 kg)   Height: 64\" (162.6 cm)       Ariana was seen today for rapid heart rate.    Diagnoses and all orders for this visit:    Tachycardia  -     CBC & Differential  -     Comprehensive Metabolic Panel  -     ECG 12 Lead        Problem List Items Addressed This Visit     None      Visit Diagnoses     Tachycardia    -  Primary    Relevant Orders    CBC & Differential    Comprehensive Metabolic Panel    ECG 12 Lead          Plan above lab plus may need to do Holter monitor or lung scan                            Bill Schilling MD  6/14/2017  "

## 2017-06-15 ENCOUNTER — HOSPITAL ENCOUNTER (OUTPATIENT)
Dept: VASCULAR LAB | Age: 77
Discharge: HOME | End: 2017-06-15
Payer: MEDICARE

## 2017-06-15 ENCOUNTER — HOSPITAL ENCOUNTER (OUTPATIENT)
Dept: CT IMAGING | Age: 77
Discharge: HOME | End: 2017-06-15
Payer: MEDICARE

## 2017-06-15 ENCOUNTER — OFFICE VISIT (OUTPATIENT)
Dept: VASCULAR SURGERY | Age: 77
End: 2017-06-15

## 2017-06-15 ENCOUNTER — TELEPHONE (OUTPATIENT)
Dept: VASCULAR SURGERY | Age: 77
End: 2017-06-15

## 2017-06-15 VITALS
HEIGHT: 64 IN | TEMPERATURE: 98.8 F | HEART RATE: 93 BPM | BODY MASS INDEX: 27.31 KG/M2 | WEIGHT: 160 LBS | DIASTOLIC BLOOD PRESSURE: 72 MMHG | SYSTOLIC BLOOD PRESSURE: 116 MMHG

## 2017-06-15 DIAGNOSIS — M79.89 LEG SWELLING: ICD-10-CM

## 2017-06-15 DIAGNOSIS — R06.02 SHORTNESS OF BREATH: ICD-10-CM

## 2017-06-15 DIAGNOSIS — I74.3 POPLITEAL ARTERY THROMBOSIS, RIGHT (HCC): ICD-10-CM

## 2017-06-15 DIAGNOSIS — R06.02 SOB (SHORTNESS OF BREATH): ICD-10-CM

## 2017-06-15 DIAGNOSIS — R06.02 SHORTNESS OF BREATH: Primary | ICD-10-CM

## 2017-06-15 DIAGNOSIS — R00.0 TACHYCARDIA: Primary | ICD-10-CM

## 2017-06-15 DIAGNOSIS — M79.604 LEG PAIN, RIGHT: ICD-10-CM

## 2017-06-15 LAB
BNP SERPL-MCNC: 34.9 PG/ML (ref 0–100)
D DIMER PPP FEU-MCNC: >20 MG/L (FEU) (ref 0–0.5)
GFR NON-AFRICAN AMERICAN: 48
PERFORMED ON: ABNORMAL
POC CREATININE: 1.1 MG/DL (ref 0.3–1.3)
POC SAMPLE TYPE: ABNORMAL

## 2017-06-15 PROCEDURE — 71275 CT ANGIOGRAPHY CHEST: CPT

## 2017-06-15 PROCEDURE — 99024 POSTOP FOLLOW-UP VISIT: CPT | Performed by: NURSE PRACTITIONER

## 2017-06-15 PROCEDURE — 6360000004 HC RX CONTRAST MEDICATION: Performed by: NURSE PRACTITIONER

## 2017-06-15 PROCEDURE — 93971 EXTREMITY STUDY: CPT

## 2017-06-15 PROCEDURE — 82565 ASSAY OF CREATININE: CPT

## 2017-06-15 RX ORDER — NICOTINE 14MG/24HR
PATCH, TRANSDERMAL 24 HOURS TRANSDERMAL DAILY
COMMUNITY

## 2017-06-15 RX ORDER — ATORVASTATIN CALCIUM 20 MG/1
20 TABLET, FILM COATED ORAL DAILY
COMMUNITY
End: 2018-02-13

## 2017-06-15 RX ORDER — OXYCODONE AND ACETAMINOPHEN 10; 325 MG/1; MG/1
1 TABLET ORAL EVERY 6 HOURS PRN
COMMUNITY
End: 2018-02-13

## 2017-06-15 RX ADMIN — IOVERSOL 90 ML: 741 INJECTION INTRA-ARTERIAL; INTRAVENOUS at 10:46

## 2017-06-19 ENCOUNTER — OFFICE VISIT (OUTPATIENT)
Dept: FAMILY MEDICINE CLINIC | Facility: CLINIC | Age: 77
End: 2017-06-19

## 2017-06-19 VITALS
SYSTOLIC BLOOD PRESSURE: 115 MMHG | BODY MASS INDEX: 27.14 KG/M2 | HEIGHT: 64 IN | HEART RATE: 83 BPM | OXYGEN SATURATION: 96 % | TEMPERATURE: 99.4 F | WEIGHT: 159 LBS | DIASTOLIC BLOOD PRESSURE: 69 MMHG

## 2017-06-19 DIAGNOSIS — I82.431 ACUTE DEEP VEIN THROMBOSIS (DVT) OF POPLITEAL VEIN OF RIGHT LOWER EXTREMITY (HCC): ICD-10-CM

## 2017-06-19 DIAGNOSIS — E04.9 GOITER: ICD-10-CM

## 2017-06-19 DIAGNOSIS — K80.20 GALLSTONES: Primary | ICD-10-CM

## 2017-06-19 PROCEDURE — 99213 OFFICE O/P EST LOW 20 MIN: CPT | Performed by: FAMILY MEDICINE

## 2017-06-19 RX ORDER — BENAZEPRIL HYDROCHLORIDE 20 MG/1
20 TABLET ORAL DAILY
Qty: 90 TABLET | Refills: 2 | Status: SHIPPED | OUTPATIENT
Start: 2017-06-19 | End: 2018-01-09 | Stop reason: SDUPTHER

## 2017-06-19 NOTE — PROGRESS NOTES
"Ariana Gutierrez    1940    Chief Complaint   Patient presents with   • Hypertension       Vitals:    06/19/17 1512   BP: 115/69   BP Location: Left arm   Patient Position: Sitting   Cuff Size: Adult   Pulse: 83   Temp: 99.4 °F (37.4 °C)   TempSrc: Oral   SpO2: 96%   Weight: 159 lb (72.1 kg)   Height: 64\" (162.6 cm)       Hypertension   This is a chronic problem. The current episode started more than 1 year ago. The problem is controlled. Pertinent negatives include no chest pain. There are no associated agents to hypertension. Risk factors for coronary artery disease include family history, obesity, sedentary lifestyle and post-menopausal state. Past treatments include ACE inhibitors and beta blockers. The current treatment provides moderate improvement. There are no compliance problems.  Hypertensive end-organ damage includes PVD.       Review of Systems   Cardiovascular: Positive for leg swelling. Negative for chest pain.        DVT) popliteal artery and vein-recent surgery       Past Medical History:   Diagnosis Date   • Chest pain    • Chronic back pain    • Diverticulosis of intestine 8/16/2016   • Gastroesophageal reflux disease without esophagitis 5/26/2017   • Hypertension    • Irritable bowel syndrome 11/2/2011   • Low back pain    • Palpitations    • Paresthesia     toes   • Perforated bowel    • Scoliosis    • Spinal stenosis          Current Outpatient Prescriptions:   •  apixaban (ELIQUIS) 5 MG tablet tablet, Take 1 tablet by mouth 2 (Two) Times a Day., Disp: 60 tablet, Rfl: 2  •  atorvastatin (LIPITOR) 20 MG tablet, Take 1 tablet by mouth Daily With Dinner., Disp: 90 tablet, Rfl: 3  •  benazepril (LOTENSIN) 20 MG tablet, Take 1 tablet by mouth Daily., Disp: 90 tablet, Rfl: 2  •  gabapentin (NEURONTIN) 300 MG capsule, TAKE 1 CAPSULE THREE TIMES DAILY, Disp: , Rfl:   •  hydrochlorothiazide (HYDRODIURIL) 25 MG tablet, , Disp: , Rfl:   •  HYDROcodone-acetaminophen (NORCO)  MG per tablet, Take " 1 tablet by mouth Every 6 (Six) Hours As Needed for Moderate Pain (4-6)., Disp: 90 tablet, Rfl: 0  •  metoprolol succinate XL (TOPROL-XL) 25 MG 24 hr tablet, Take 25 mg by mouth 2 (Two) Times a Day., Disp: , Rfl:   •  omeprazole (PriLOSEC) 20 MG capsule, , Disp: , Rfl:   •  polyethylene glycol (MIRALAX) powder, , Disp: , Rfl:   •  Probiotic Product (PROBIOTIC DAILY PO), Take  by mouth., Disp: , Rfl:   •  aspirin 81 MG tablet, Take 81 mg by mouth daily., Disp: , Rfl:   •  calcium carb-cholecalciferol 600-800 MG-UNIT tablet, Take  by mouth Daily., Disp: , Rfl:     No Known Allergies    Patient Active Problem List   Diagnosis   • Abdominal bloating   • Flatulence   • Diverticulosis of intestine   • Hypertension   • Irritable bowel syndrome   • Irritable bowel syndrome with constipation   • Small intestinal bacterial overgrowth   • Spinal stenosis   • Preop cardiovascular exam right knee explant Dr Liu   • Essential hypertension   • Gastroesophageal reflux disease without esophagitis   • BERGERON (dyspnea on exertion)   • Abnormal ECG       Social History     Social History   • Marital status:      Spouse name: N/A   • Number of children: N/A   • Years of education: N/A     Social History Main Topics   • Smoking status: Never Smoker   • Smokeless tobacco: Never Used   • Alcohol use Yes      Comment: occ   • Drug use: No   • Sexual activity: Defer     Other Topics Concern   • None     Social History Narrative       Past Surgical History:   Procedure Laterality Date   • BACK SURGERY     • BREAST BIOPSY Right 25 yrs ago   • CATARACT EXTRACTION Bilateral    • COLON RESECTION SMALL BOWEL  2016    with colostomy for 6 months, already revised.   • COLON SURGERY     • COLONOSCOPY     • LUMBAR LAMINECTOMY     • RENAL ARTERY STENT Right    • TOTAL KNEE ARTHROPLASTY Right        Physical Exam   Constitutional: She is oriented to person, place, and time. She appears well-developed and well-nourished.   Cardiovascular: Normal  "rate and regular rhythm.    Pulmonary/Chest: Effort normal and breath sounds normal.   Musculoskeletal:   Right leg minimally swelled-negative for Homans sign   Neurological: She is alert and oriented to person, place, and time.   Psychiatric: She has a normal mood and affect. Her behavior is normal.   Vitals reviewed.      Vitals:    06/19/17 1512   BP: 115/69   BP Location: Left arm   Patient Position: Sitting   Cuff Size: Adult   Pulse: 83   Temp: 99.4 °F (37.4 °C)   TempSrc: Oral   SpO2: 96%   Weight: 159 lb (72.1 kg)   Height: 64\" (162.6 cm)       Ariana was seen today for hypertension.    Diagnoses and all orders for this visit:    Gallstones  -     US Abdomen Complete; Future    Goiter  -     US Thyroid; Future    Acute deep vein thrombosis (DVT) of popliteal vein of right lower extremity    Other orders  -     benazepril (LOTENSIN) 20 MG tablet; Take 1 tablet by mouth Daily.        Problem List Items Addressed This Visit     None      Visit Diagnoses     Gallstones    -  Primary    Goiter        Acute deep vein thrombosis (DVT) of popliteal vein of right lower extremity              Plan ultrasound of thyroid and gallbladder-return 1 month-continued care with specialist                        Bill Schilling MD  6/19/2017  "

## 2017-06-22 ENCOUNTER — OFFICE VISIT (OUTPATIENT)
Dept: VASCULAR SURGERY | Age: 77
End: 2017-06-22

## 2017-06-22 VITALS
TEMPERATURE: 98.6 F | RESPIRATION RATE: 18 BRPM | DIASTOLIC BLOOD PRESSURE: 72 MMHG | HEART RATE: 77 BPM | SYSTOLIC BLOOD PRESSURE: 128 MMHG | OXYGEN SATURATION: 96 %

## 2017-06-22 DIAGNOSIS — M25.571 PAIN AND SWELLING OF ANKLE, RIGHT: ICD-10-CM

## 2017-06-22 DIAGNOSIS — I82.441 DEEP VEIN THROMBOSIS (DVT) OF TIBIAL VEIN OF RIGHT LOWER EXTREMITY, UNSPECIFIED CHRONICITY (HCC): ICD-10-CM

## 2017-06-22 DIAGNOSIS — M25.471 PAIN AND SWELLING OF ANKLE, RIGHT: ICD-10-CM

## 2017-06-22 DIAGNOSIS — I73.9 PVD (PERIPHERAL VASCULAR DISEASE) (HCC): Primary | ICD-10-CM

## 2017-06-22 PROCEDURE — 99024 POSTOP FOLLOW-UP VISIT: CPT | Performed by: PHYSICIAN ASSISTANT

## 2017-06-23 ENCOUNTER — HOSPITAL ENCOUNTER (OUTPATIENT)
Dept: ULTRASOUND IMAGING | Age: 77
Discharge: HOME | End: 2017-06-23
Payer: MEDICARE

## 2017-06-23 DIAGNOSIS — K80.21 CALCULUS OF GALLBLADDER WITH BILIARY OBSTRUCTION BUT WITHOUT CHOLECYSTITIS: ICD-10-CM

## 2017-06-23 DIAGNOSIS — E04.1 THYROID NODULE: ICD-10-CM

## 2017-06-23 DIAGNOSIS — I73.9 PVD (PERIPHERAL VASCULAR DISEASE) (HCC): ICD-10-CM

## 2017-06-23 PROCEDURE — 76705 ECHO EXAM OF ABDOMEN: CPT

## 2017-06-23 PROCEDURE — 76536 US EXAM OF HEAD AND NECK: CPT

## 2017-06-29 ENCOUNTER — TELEPHONE (OUTPATIENT)
Dept: FAMILY MEDICINE CLINIC | Facility: CLINIC | Age: 77
End: 2017-06-29

## 2017-06-29 NOTE — TELEPHONE ENCOUNTER
"Patient contacted office and stated that she has not heard from her us of thyroid and us of abdomen that was completed on 6/19/17 and [facility].  MA informed patient that our office has not received the results.  Patient asked MA if she would contact [facility] to see about getting test results.  MA informed patient she would and then call her back.            MA contacted patient and informed patient that MA was not able to speak to anyone in the Ultrasound dept.  However MA was able to leave a message asking them to contact our office and fax results.  Patient stated \"ok\" and thanked MA.  "

## 2017-06-30 DIAGNOSIS — E04.9 GOITER: ICD-10-CM

## 2017-06-30 DIAGNOSIS — K80.20 GALLSTONES: ICD-10-CM

## 2017-07-12 ENCOUNTER — OFFICE VISIT (OUTPATIENT)
Dept: VASCULAR SURGERY | Age: 77
End: 2017-07-12

## 2017-07-12 VITALS
SYSTOLIC BLOOD PRESSURE: 126 MMHG | RESPIRATION RATE: 18 BRPM | OXYGEN SATURATION: 95 % | DIASTOLIC BLOOD PRESSURE: 60 MMHG | HEART RATE: 85 BPM | TEMPERATURE: 98.8 F

## 2017-07-12 DIAGNOSIS — I74.3 POPLITEAL ARTERY THROMBOSIS, RIGHT (HCC): Primary | ICD-10-CM

## 2017-07-12 PROCEDURE — 99024 POSTOP FOLLOW-UP VISIT: CPT | Performed by: NURSE PRACTITIONER

## 2017-07-18 ENCOUNTER — OFFICE VISIT (OUTPATIENT)
Dept: FAMILY MEDICINE CLINIC | Facility: CLINIC | Age: 77
End: 2017-07-18

## 2017-07-18 VITALS
TEMPERATURE: 98.6 F | SYSTOLIC BLOOD PRESSURE: 124 MMHG | OXYGEN SATURATION: 98 % | DIASTOLIC BLOOD PRESSURE: 70 MMHG | BODY MASS INDEX: 26.63 KG/M2 | HEART RATE: 78 BPM | HEIGHT: 64 IN | WEIGHT: 156 LBS

## 2017-07-18 DIAGNOSIS — I10 ESSENTIAL HYPERTENSION: Primary | ICD-10-CM

## 2017-07-18 PROCEDURE — 99213 OFFICE O/P EST LOW 20 MIN: CPT | Performed by: FAMILY MEDICINE

## 2017-07-18 RX ORDER — METOPROLOL SUCCINATE 25 MG/1
25 TABLET, EXTENDED RELEASE ORAL DAILY
COMMUNITY
End: 2018-02-02 | Stop reason: SDUPTHER

## 2017-07-18 RX ORDER — AMOXICILLIN 500 MG/1
1000 CAPSULE ORAL EVERY 8 HOURS
COMMUNITY
Start: 2017-07-12 | End: 2017-08-16

## 2017-07-18 RX ORDER — OXYCODONE AND ACETAMINOPHEN 10; 325 MG/1; MG/1
TABLET ORAL
COMMUNITY
Start: 2017-06-27 | End: 2017-11-21 | Stop reason: ALTCHOICE

## 2017-07-18 NOTE — PROGRESS NOTES
"Ariana Gutierrez    1940    Chief Complaint   Patient presents with   • Follow-up     1 month follow up       Vitals:    07/18/17 1238   BP: 124/70   BP Location: Left arm   Patient Position: Sitting   Cuff Size: Adult   Pulse: 78   Temp: 98.6 °F (37 °C)   TempSrc: Oral   SpO2: 98%   Weight: 156 lb (70.8 kg)   Height: 64\" (162.6 cm)   PainSc:   4       Hypertension   This is a chronic problem. The current episode started more than 1 year ago. The problem is unchanged. The problem is controlled. There are no associated agents to hypertension. Risk factors for coronary artery disease include dyslipidemia, obesity, post-menopausal state and sedentary lifestyle. Past treatments include ACE inhibitors. The current treatment provides moderate improvement. There are no compliance problems.  Hypertensive end-organ damage includes PVD.       Review of Systems   Musculoskeletal:        Infected knee joint improving       Past Medical History:   Diagnosis Date   • Chest pain    • Chronic back pain    • Diverticulosis of intestine 8/16/2016   • Gastroesophageal reflux disease without esophagitis 5/26/2017   • Hypertension    • Irritable bowel syndrome 11/2/2011   • Low back pain    • Palpitations    • Paresthesia     toes   • Perforated bowel    • Scoliosis    • Spinal stenosis          Current Outpatient Prescriptions:   •  amoxicillin (AMOXIL) 500 MG capsule, 1,000 mg Every 8 (Eight) Hours., Disp: , Rfl:   •  apixaban (ELIQUIS) 5 MG tablet tablet, Take 1 tablet by mouth 2 (Two) Times a Day., Disp: 60 tablet, Rfl: 2  •  aspirin 81 MG tablet, Take 81 mg by mouth daily., Disp: , Rfl:   •  atorvastatin (LIPITOR) 20 MG tablet, Take 1 tablet by mouth Daily With Dinner., Disp: 90 tablet, Rfl: 3  •  benazepril (LOTENSIN) 20 MG tablet, Take 1 tablet by mouth Daily., Disp: 90 tablet, Rfl: 2  •  calcium carb-cholecalciferol 600-800 MG-UNIT tablet, Take  by mouth Daily., Disp: , Rfl:   •  Docusate Calcium (STOOL SOFTENER PO), Take  " by mouth Daily., Disp: , Rfl:   •  gabapentin (NEURONTIN) 300 MG capsule, TAKE 1 CAPSULE THREE TIMES DAILY, Disp: , Rfl:   •  hydrochlorothiazide (HYDRODIURIL) 25 MG tablet, , Disp: , Rfl:   •  HYDROcodone-acetaminophen (NORCO)  MG per tablet, Take 1 tablet by mouth Every 6 (Six) Hours As Needed for Moderate Pain (4-6)., Disp: 90 tablet, Rfl: 0  •  metoprolol succinate XL (TOPROL-XL) 25 MG 24 hr tablet, Take 25 mg by mouth Daily., Disp: , Rfl:   •  omeprazole (PriLOSEC) 20 MG capsule, , Disp: , Rfl:   •  oxyCODONE-acetaminophen (PERCOCET)  MG per tablet, , Disp: , Rfl:   •  polyethylene glycol (MIRALAX) powder, , Disp: , Rfl:   •  Probiotic Product (PROBIOTIC DAILY PO), Take  by mouth., Disp: , Rfl:     No Known Allergies    Patient Active Problem List   Diagnosis   • Abdominal bloating   • Flatulence   • Diverticulosis of intestine   • Hypertension   • Irritable bowel syndrome   • Irritable bowel syndrome with constipation   • Small intestinal bacterial overgrowth   • Spinal stenosis   • Preop cardiovascular exam right knee explant Dr Liu   • Essential hypertension   • Gastroesophageal reflux disease without esophagitis   • BERGERON (dyspnea on exertion)   • Abnormal ECG       Social History     Social History   • Marital status:      Spouse name: N/A   • Number of children: N/A   • Years of education: N/A     Social History Main Topics   • Smoking status: Never Smoker   • Smokeless tobacco: Never Used   • Alcohol use Yes      Comment: occ   • Drug use: No   • Sexual activity: Defer     Other Topics Concern   • None     Social History Narrative       Past Surgical History:   Procedure Laterality Date   • BACK SURGERY     • BREAST BIOPSY Right 25 yrs ago   • CATARACT EXTRACTION Bilateral    • COLON RESECTION SMALL BOWEL  2016    with colostomy for 6 months, already revised.   • COLON SURGERY     • COLONOSCOPY     • LUMBAR LAMINECTOMY     • RENAL ARTERY STENT Right    • TOTAL KNEE ARTHROPLASTY Right  "       Physical Exam   Constitutional: She is oriented to person, place, and time. She appears well-developed and well-nourished.   Cardiovascular: Normal rate and regular rhythm.    Pulmonary/Chest: Effort normal.   Musculoskeletal:   1 enbedded stitch removed from left knee   Neurological: She is alert and oriented to person, place, and time.   Psychiatric: She has a normal mood and affect. Her behavior is normal. Judgment and thought content normal.   Vitals reviewed.      Vitals:    07/18/17 1238   BP: 124/70   BP Location: Left arm   Patient Position: Sitting   Cuff Size: Adult   Pulse: 78   Temp: 98.6 °F (37 °C)   TempSrc: Oral   SpO2: 98%   Weight: 156 lb (70.8 kg)   Height: 64\" (162.6 cm)       Ariana was seen today for follow-up.    Diagnoses and all orders for this visit:    Essential hypertension        Problem List Items Addressed This Visit        Cardiovascular and Mediastinum    Essential hypertension - Primary    Relevant Medications    metoprolol succinate XL (TOPROL-XL) 25 MG 24 hr tablet                Plan continue seeing specialist-see me 2 months after they decide about the spacer in her right knee                      Bill Schilling MD  7/18/2017  "

## 2017-07-19 ENCOUNTER — TELEPHONE (OUTPATIENT)
Dept: FAMILY MEDICINE CLINIC | Facility: CLINIC | Age: 77
End: 2017-07-19

## 2017-07-19 DIAGNOSIS — M25.511 ACUTE PAIN OF RIGHT SHOULDER: Primary | ICD-10-CM

## 2017-07-19 NOTE — TELEPHONE ENCOUNTER
Xray of shoulder was ordered by me.  She may require re-visit to evaluate the shoulder. Torres Dinh MD 7/19/2017 1:26 PM

## 2017-07-19 NOTE — TELEPHONE ENCOUNTER
Emerita from [facility] called.  Had scheduled visit with patient today.  Patient was seen in the office yesterday for a follow up.  When she was getting up from exam table felt a pop in her right shoulder.  Today she has severe pain the right arm and shoulder.  She has pain medication and took that last night and today.  Has also used ice and heat and no relief.  She does have a knot on the shoulder.  She is unable to raise arm without being in severe pain.  The nurse did not know if we could order x-rays or MRI?

## 2017-07-19 NOTE — TELEPHONE ENCOUNTER
Spoke with Mr. Gutierrez, advised xray order faxed to hospital and will notify as soon as we have results

## 2017-07-20 ENCOUNTER — TELEPHONE (OUTPATIENT)
Dept: VASCULAR SURGERY | Age: 77
End: 2017-07-20

## 2017-07-20 ENCOUNTER — TELEPHONE (OUTPATIENT)
Dept: FAMILY MEDICINE CLINIC | Facility: CLINIC | Age: 77
End: 2017-07-20

## 2017-07-20 ENCOUNTER — OFFICE VISIT (OUTPATIENT)
Dept: FAMILY MEDICINE CLINIC | Facility: CLINIC | Age: 77
End: 2017-07-20

## 2017-07-20 VITALS
SYSTOLIC BLOOD PRESSURE: 124 MMHG | RESPIRATION RATE: 20 BRPM | HEART RATE: 100 BPM | DIASTOLIC BLOOD PRESSURE: 66 MMHG | WEIGHT: 159.2 LBS | TEMPERATURE: 98.5 F | BODY MASS INDEX: 27.18 KG/M2 | HEIGHT: 64 IN | OXYGEN SATURATION: 95 %

## 2017-07-20 DIAGNOSIS — M25.511 ACUTE PAIN OF RIGHT SHOULDER: Primary | ICD-10-CM

## 2017-07-20 DIAGNOSIS — M00.9 JOINT INFECTION (HCC): ICD-10-CM

## 2017-07-20 DIAGNOSIS — M25.511 ACUTE PAIN OF RIGHT SHOULDER: ICD-10-CM

## 2017-07-20 DIAGNOSIS — Z79.01 CHRONIC ANTICOAGULATION: ICD-10-CM

## 2017-07-20 PROCEDURE — 99214 OFFICE O/P EST MOD 30 MIN: CPT | Performed by: FAMILY MEDICINE

## 2017-07-20 NOTE — TELEPHONE ENCOUNTER
Patient has called regarding results for shoulder x-ray.  States her pain is unbearable making it hard for her to use walker.  She has had a recent knee replacement.

## 2017-07-20 NOTE — PROGRESS NOTES
Chief Complaint   Patient presents with   • Shoulder Pain     Patient hurt Rt shoulder while turning over on an exam table at another office x3 days        History:  Ariana Gutierrez is a 77 y.o. female presents who today for evaluation of the above problems.  PCP currently listed as Bill Schilling MD.   Present with  today.  Permission to speak freely discussed and patient agreed.  Tuesday am in office turned funny felt pain in right shoulder.  Pain is located along the posterior shoulder.  Starts in back medial scapula and radiates over her shoulder.  Xray was negative. She has mulitple issues currently.  Klebsiella infection in knee  05/30/17.  Artery cut accidentally while in there.  Complications from surgery bypass turned into very long recovery.  She ended up with DVT post surgery.  IV ABX x 37 days.  1 week ago PICC out and was add to amoxil 3000 mg daily for the next 2 weeks.  Prominent problem, prominent pain in the right shoulder region.  Pain is not letting up.  She is on pain meds percocet and this is not helping her pain at all. Constantly in pain.  Cannot lift arm, cannot feed self, put up hair, lift arm up.  Pain worse with elevation of arm.  Pain goes up into neck. No injury, no falls, no other issues.  She think sshe turned too fast.  She uses walker to ambulate. Pain throughout right shoulder . We did a diagnostic injection of subacromial bursa and and biceps long head with 3cc of lidocaine into each location.  No benefit.  Cannot do special tests.  Drop arm test is + on right.  She is on percocet already.  Tender throughout lev scap, trap and deltoid, bicep and tricep.  ?Strain.  Originally thought frozen shoulder but the injection provided 0 benefit.  Again dx injection only, no charge.  We tried bicep as speed test was +.  She could not tolerate garcia/jyoti or NEER.  We will send to phys therapy.     Ariana Gutierrez  has a past medical history of Chest pain; Chronic back  pain; Diverticulosis of intestine (8/16/2016); Gastroesophageal reflux disease without esophagitis (5/26/2017); Hypertension; Irritable bowel syndrome (11/2/2011); Low back pain; Palpitations; Paresthesia; Perforated bowel; Scoliosis; and Spinal stenosis.    No Known Allergies  Past Medical History:   Diagnosis Date   • Chest pain    • Chronic back pain    • Diverticulosis of intestine 8/16/2016   • Gastroesophageal reflux disease without esophagitis 5/26/2017   • Hypertension    • Irritable bowel syndrome 11/2/2011   • Low back pain    • Palpitations    • Paresthesia     toes   • Perforated bowel    • Scoliosis    • Spinal stenosis      Past Surgical History:   Procedure Laterality Date   • BACK SURGERY     • BREAST BIOPSY Right 25 yrs ago   • CATARACT EXTRACTION Bilateral    • COLON RESECTION SMALL BOWEL  2016    with colostomy for 6 months, already revised.   • COLON SURGERY     • COLONOSCOPY     • LUMBAR LAMINECTOMY     • RENAL ARTERY STENT Right    • TOTAL KNEE ARTHROPLASTY Right      Family History   Problem Relation Age of Onset   • Breast cancer Mother    • Heart disease Mother    • Coronary artery disease Mother    • Peripheral vascular disease Mother    • No Known Problems Sister    • Heart disease Brother        Current Outpatient Prescriptions on File Prior to Visit   Medication Sig Dispense Refill   • amoxicillin (AMOXIL) 500 MG capsule 1,000 mg Every 8 (Eight) Hours.     • apixaban (ELIQUIS) 5 MG tablet tablet Take 1 tablet by mouth 2 (Two) Times a Day. 60 tablet 2   • aspirin 81 MG tablet Take 81 mg by mouth daily.     • atorvastatin (LIPITOR) 20 MG tablet Take 1 tablet by mouth Daily With Dinner. 90 tablet 3   • benazepril (LOTENSIN) 20 MG tablet Take 1 tablet by mouth Daily. 90 tablet 2   • calcium carb-cholecalciferol 600-800 MG-UNIT tablet Take  by mouth Daily.     • Docusate Calcium (STOOL SOFTENER PO) Take  by mouth Daily.     • gabapentin (NEURONTIN) 300 MG capsule TAKE 1 CAPSULE THREE TIMES  DAILY     • hydrochlorothiazide (HYDRODIURIL) 25 MG tablet      • metoprolol succinate XL (TOPROL-XL) 25 MG 24 hr tablet Take 25 mg by mouth Daily.     • omeprazole (PriLOSEC) 20 MG capsule      • oxyCODONE-acetaminophen (PERCOCET)  MG per tablet      • polyethylene glycol (MIRALAX) powder      • Probiotic Product (PROBIOTIC DAILY PO) Take  by mouth.     • HYDROcodone-acetaminophen (NORCO)  MG per tablet Take 1 tablet by mouth Every 6 (Six) Hours As Needed for Moderate Pain (4-6). 90 tablet 0     No current facility-administered medications on file prior to visit.        Family history, surgical history, past medical history, Allergies and meds reviewed with patient today and updated in [facility] EMR.     ROS:  Review of Systems   Constitutional: Negative for activity change, appetite change, chills, fatigue and fever.   HENT: Negative for congestion, dental problem, ear discharge, ear pain, hearing loss, postnasal drip, rhinorrhea, sneezing, sore throat and tinnitus.    Eyes: Negative for photophobia, pain, discharge, redness, itching and visual disturbance.   Respiratory: Negative for apnea, chest tightness, shortness of breath, wheezing and stridor.    Cardiovascular: Negative for chest pain.   Gastrointestinal: Negative for abdominal pain, blood in stool, diarrhea, nausea and vomiting.   Endocrine: Negative for cold intolerance, heat intolerance, polydipsia, polyphagia and polyuria.   Genitourinary: Negative for difficulty urinating, dysuria, hematuria, urgency, vaginal bleeding and vaginal discharge.   Musculoskeletal: Positive for arthralgias, myalgias and neck pain. Negative for back pain and gait problem.   Skin: Negative for color change and rash.   Allergic/Immunologic: Negative for environmental allergies and food allergies.   Neurological: Negative for dizziness, seizures, facial asymmetry, numbness and headaches.   Hematological: Negative for adenopathy.   Psychiatric/Behavioral: Negative for  "agitation, behavioral problems, confusion, self-injury, sleep disturbance and suicidal ideas.       OBJECTIVE:  Vitals:    07/20/17 1529   BP: 124/66   BP Location: Left arm   Patient Position: Sitting   Cuff Size: Adult   Pulse: 100   Resp: 20   Temp: 98.5 °F (36.9 °C)   TempSrc: Oral   SpO2: 95%   Weight: 159 lb 3.2 oz (72.2 kg)   Height: 64\" (162.6 cm)     Physical Exam   Constitutional: She appears well-developed and well-nourished. No distress.   HENT:   Head: Normocephalic and atraumatic.   Right Ear: External ear normal.   Left Ear: External ear normal.   Nose: Nose normal.   Mouth/Throat: Oropharynx is clear and moist.   Eyes: Conjunctivae and EOM are normal. Pupils are equal, round, and reactive to light.   Neck: Normal range of motion. Neck supple. No thyromegaly present.   Cardiovascular: Regular rhythm, normal heart sounds and intact distal pulses.  Tachycardia present.    Pulmonary/Chest: Effort normal and breath sounds normal. No respiratory distress. She has no rales. She exhibits no tenderness.   Abdominal: Soft. Bowel sounds are normal. There is no tenderness. There is no rebound and no guarding.   Musculoskeletal:        Right shoulder: She exhibits decreased range of motion, tenderness, bony tenderness, pain, spasm and decreased strength.        Left shoulder: She exhibits normal range of motion, no tenderness, no bony tenderness, no deformity, no pain, no spasm and normal pulse.        Right elbow: She exhibits normal range of motion, no swelling and no effusion. No radial head, no medial epicondyle, no lateral epicondyle and no olecranon process tenderness noted.        Right wrist: She exhibits normal range of motion, no tenderness, no crepitus and no deformity.        Cervical back: She exhibits decreased range of motion, tenderness, pain and spasm.   Axial load(SPurling) negative bilaterally.  Tender along deltoid, trapezius and bicep insertion point.  See below for specialized examination.  " We decided to do a diagnostic injection of lidocaine into the subacromial space with 4 cc 1% lidocaine with 23G 1 1/2 inch needle.  Patient sat arm hanign to side o distract from acromion.  Lateral edge of acromion id.  Needle inserted at mid point of acromian and angled upwards under to full length of needle.  Fluid injected as bolus.  Prior to injection skin cleansed with betadine x 3.  Area massaged, arm moved through passive ROM.  No improvement, no benefit.  Speed test was + and thus we decided to inject bicep tendon region. Patient sat with supported elbow at right angle tender area identified, needle inserted perpendicularly to skin at highest part of tenderness and angled parallel to tendon. Diagnostic injection only of lidocaine 0.75ml 2% injected as bolus into tendon sheath.  Prior to injection skin cleansed with betadine x 3.         Lymphadenopathy:     She has no cervical adenopathy.     She has no axillary adenopathy.        Right: No inguinal adenopathy present.        Left: No inguinal adenopathy present.   Neurological: She is alert. She has normal strength and normal reflexes. No sensory deficit. Gait (wide based uses walker. ) abnormal. Coordination normal.   Skin: Skin is warm and dry. No rash noted. She is not diaphoretic.   Psychiatric: She has a normal mood and affect. Her behavior is normal. Judgment and thought content normal.   Nursing note and vitals reviewed.    Upper extremity- Symmetrical posture.  No visible deformity.  Normal sensation along medial and lateral upper extremity proximally and distally.  Tennis provocation testing: negative Tenderness lateral/medial epicondyle absent.  5/5 and strength 5/5 bilateral UE.  Elbow palpated, no tenderness overlying olecranon.  Normal supination, pronation to active/passive ROM and to resisted rotation. Bicep insertion/tricep insertion appear normal without obvious pathology/tenderness. Rotator cuff evaluated and intact.  Open can/empty  can and lift off negative.      Inspection: normal deltoid, symmetrical, normal clavicle, normal SC and AC joint appearance, no wasting.     Palpation:   Tenderness along levator scapulae:  present  Tenderness along Rhomboids:          absent  Tenderness along trapezius:     present  Tenderness along supraspinatus:      present  Tenderness along infraspinatus:        present  Tenderness along teres minor:          present  Tenderness along deltoid:         present  Tightness within pectoralis group:     absent  Tenderness at acromion:                    absent    ROM: Starting with hands at sides arms abducted to 90 degrees with palms facing down (glenohumeral function), arms then rasied to vertical above head with palms facing eachother (scapulothoracic motion for 600, GH and scapthoracic final 300. Both arms behind neck (external rotation/abduction), both arms behind back internal rotation and adduction.      Special shoulder tests:  Supraspinatus: Full Can/Empty Can:  Elbow flexed to 900 patient abducts against resistance, drop arm, full can/empty can.   [x]  Pain  []  Weakness  [] Both    []  Negative        Infraspinatus/teres minor: Elbow flexed to 900 arm adducted, patient forearm rotated into external rotation.    [x]  Pain  []  Weakness  []  Both    []  Negative        Belly Press Test:  Arm flat against stomach, elbow anterior to midaxillary line. Press on abdomen without moving elbow. If cannot keep elbow at same location, supports infraspinatus tear.  [x]  Present []  Absent    Subscapularis: Push off test (Daniels Lift off): [x]  Pain  []  Weakness  []  Both    []  Negative          SLAP TEAR EVALUATION (KRYSTYNA CLARK):   []  Pain  []  Weakness  []  Both    [x]  Negative           Speed Test: Pain present    Impingement:  Painful arc Test (NEER):  With patient sitting upright, passively elevated pronated arm to above the shoulder. Pain along anterolatal shoulder suggests impingement.     []  Pain  []   Weakness  []  Both   [x]  Negative        Internal rotation (Jimenez-Alex): Shoulder flexed to 90, arm adducted and elbow at 90 medially rotate.   [x]  Pain  []  Weakness  []  Both    []  Negative    Drop arm test:  [x] Positive       [] Negative        Negative x-ray of right shoulder 7/19/17.        Assessment/Plan:  Patient presented with a few days of shoulder pain on the right with very vague symptoms of pain in the trapezius as well as bicep area.  She had symptoms consistent with subacromial bursitis as well as biceps tendinitis.  We did diagnostic injections with lidocaine into both regions.  She did not want these initially and are described the risks benefits and ultimately she chose to have them.  She is worried about infection as she has recently had a joint infection.  She is also on Eliquis.  Again we discussed the risks and benefits of the procedure and proceeded with diagnostic injection.  These injections did nothing.  We will refer her to physical therapy.  Tenderness trapezius, levator scapular region, occipital region R>L.  She has pain/tenderness without weakness. Neurologically intact.  We reviewed her risks of infection.  Cleansed skin extra carefully.  She is on amoxil due to recent infection.  She is on eliquis and ASA.  Vitals are stable, mildly tachycardic (likely pain response).  She was very leery of injection.  Again R/B/A to these d/w patient. Option 1 do nothing and refer to phys therapy, option 2 injection.  I will not increase pain meds.  I would not recommend NSAIDS>  Steroids of ?benefit at this time.  No radicular findings.  No falls, no trauma, shifted funny and now has pain.  ?Pain med dependence/tolerance.  F/U with PCP in 1 week if no better.     Orders Placed Today:  Ariana was seen today for shoulder pain.    Diagnoses and all orders for this visit:    Acute pain of right shoulder  -     Ambulatory Referral to Physical Therapy Evaluate and treat    Chronic  anticoagulation    Joint infection            Risks/benefits of current and new medications discussed with the patient and or family today.  The patient/family are aware and accept that if there any side effects they should call or return to clinic as soon as possible.  Appropriate F/U discussed for topics addressed today. All questions were answered to the satisfactory state of patient/family.  Should symptoms fail to improve or worsen they agree to call or return to clinic or to go to the ER. Education handouts were offered on any new Rx if requested.  Discussed the importance of following up with any needed screening tests/labs/specialist appointments and any requested follow-up recommended by me today.  Importance of maintaining follow-up discussed and patient accepts that missed appointments can delay diagnosis and potentially lead to worsening of conditions.    An After Visit Summary was printed and given to the patient at discharge.  Follow-up: Return in about 1 week (around 7/27/2017) for PCP.         Torres Dinh M.D. 7/20/2017     Called patient  7/24/2017.  Went to physical therapy day after OV 7/21/17.  She does not think phys therapy helped her symptoms.  She is supposed to get in to see them again.  After physical therapy, she noted similar symptoms on the left side.  This is not as bad now.  Ice/Heat and topicals.  This is still there, not much different.  Now left knee pain after changing gait.  She wanted to try to see what to do next.  Using percocet as recommended.  She is using this and her pain has been better today than over past week.  She cannot tell if she is getting much better.  Everything hurts. I discussed pain medication can cause worsening pain as this has been present for a long time.  Chronic opiate use.  ?dependence/tolerance issue.  I discussed topical therapy, icyhot/bengay/biofreeze etc (pick one).  Continue w/ phys therapy. F/U with PCP.     Torres Dinh MD 7/24/2017  8:26 AM

## 2017-07-21 ENCOUNTER — HOSPITAL ENCOUNTER (OUTPATIENT)
Dept: PHYSICAL THERAPY | Facility: HOSPITAL | Age: 77
Setting detail: THERAPIES SERIES
Discharge: HOME | End: 2017-07-21

## 2017-07-21 DIAGNOSIS — M25.511 ACUTE PAIN OF RIGHT SHOULDER: Primary | ICD-10-CM

## 2017-07-21 PROCEDURE — 97161 PT EVAL LOW COMPLEX 20 MIN: CPT | Performed by: PHYSICAL THERAPIST

## 2017-07-21 PROCEDURE — 97110 THERAPEUTIC EXERCISES: CPT | Performed by: PHYSICAL THERAPIST

## 2017-07-21 PROCEDURE — G8985 CARRY GOAL STATUS: HCPCS | Performed by: PHYSICAL THERAPIST

## 2017-07-21 PROCEDURE — G8984 CARRY CURRENT STATUS: HCPCS | Performed by: PHYSICAL THERAPIST

## 2017-07-21 NOTE — THERAPY EVALUATION
Outpatient Physical Therapy Ortho Initial Evaluation  [facility]     Patient Name: Ariana Gutierrez  : 1940  MRN: [id number]  Today's Date: 2017      Visit Date: 2017     Subjective Improvement: N/A      Attendance:   Approved:   Medicare guidelines        MD follow up:   TBD        RC date:   17        Patient Active Problem List   Diagnosis   • Abdominal bloating   • Flatulence   • Diverticulosis of intestine   • Hypertension   • Irritable bowel syndrome   • Irritable bowel syndrome with constipation   • Small intestinal bacterial overgrowth   • Spinal stenosis   • Preop cardiovascular exam right knee explant Dr Liu   • Essential hypertension   • Gastroesophageal reflux disease without esophagitis   • BERGERON (dyspnea on exertion)   • Abnormal ECG        Past Medical History:   Diagnosis Date   • Chest pain    • Chronic back pain    • Diverticulosis of intestine 2016   • Gastroesophageal reflux disease without esophagitis 2017   • Hypertension    • Irritable bowel syndrome 2011   • Low back pain    • Palpitations    • Paresthesia     toes   • Perforated bowel    • Scoliosis    • Spinal stenosis         Past Surgical History:   Procedure Laterality Date   • BACK SURGERY     • BREAST BIOPSY Right 25 yrs ago   • CATARACT EXTRACTION Bilateral    • COLON RESECTION SMALL BOWEL  2016    with colostomy for 6 months, already revised.   • COLON SURGERY     • COLONOSCOPY     • LUMBAR LAMINECTOMY     • RENAL ARTERY STENT Right    • TOTAL KNEE ARTHROPLASTY Right      No Known Allergies      Current Outpatient Prescriptions:   •  amoxicillin (AMOXIL) 500 MG capsule, 1,000 mg Every 8 (Eight) Hours., Disp: , Rfl:   •  apixaban (ELIQUIS) 5 MG tablet tablet, Take 1 tablet by mouth 2 (Two) Times a Day., Disp: 60 tablet, Rfl: 2  •  aspirin 81 MG tablet, Take 81 mg by mouth daily., Disp: , Rfl:   •  atorvastatin (LIPITOR) 20 MG tablet, Take 1 tablet by mouth Daily With  Dinner., Disp: 90 tablet, Rfl: 3  •  benazepril (LOTENSIN) 20 MG tablet, Take 1 tablet by mouth Daily., Disp: 90 tablet, Rfl: 2  •  calcium carb-cholecalciferol 600-800 MG-UNIT tablet, Take  by mouth Daily., Disp: , Rfl:   •  Docusate Calcium (STOOL SOFTENER PO), Take  by mouth Daily., Disp: , Rfl:   •  gabapentin (NEURONTIN) 300 MG capsule, TAKE 1 CAPSULE THREE TIMES DAILY, Disp: , Rfl:   •  hydrochlorothiazide (HYDRODIURIL) 25 MG tablet, , Disp: , Rfl:   •  HYDROcodone-acetaminophen (NORCO)  MG per tablet, Take 1 tablet by mouth Every 6 (Six) Hours As Needed for Moderate Pain (4-6)., Disp: 90 tablet, Rfl: 0  •  metoprolol succinate XL (TOPROL-XL) 25 MG 24 hr tablet, Take 25 mg by mouth Daily., Disp: , Rfl:   •  omeprazole (PriLOSEC) 20 MG capsule, , Disp: , Rfl:   •  oxyCODONE-acetaminophen (PERCOCET)  MG per tablet, , Disp: , Rfl:   •  polyethylene glycol (MIRALAX) powder, , Disp: , Rfl:   •  Probiotic Product (PROBIOTIC DAILY PO), Take  by mouth., Disp: , Rfl:       Visit Dx:     ICD-10-CM ICD-9-CM   1. Acute pain of right shoulder M25.511 719.41             Patient History       07/21/17 1100          History    Chief Complaint Pain  -KG      Type of Pain Shoulder pain  -KG      Date Current Problem(s) Began 07/18/17  -KG      Brief Description of Current Complaint Pt presents with c/o R shoulder pain. States on Tuesday 7/18 while at the MD office she was lying on the table, turned over quick and felt a pull in her R shoulder. Until yesterday, states the pain pills haven't done much. States yesterday & today the pain pills have helped some. Can't lift arm, can't brush her teeth, can't feed herself with her R arm. States she can't raise her shoulder due to pain. States she received 2 injections yesterday but can't really tell if they've helped much. States she's recently had some issues with her R knee. Had an infection in her knee, had surgery to remove hardware & is on anti-biotics. States she also  had issues with blood clot in her R leg in May.  -KG      Onset Date- PT 7/21/17  -KG      Patient/Caregiver Goals Relieve pain  -KG      Hand Dominance right-handed  -KG      Occupation/sports/leisure activities Pt enjoys reading, boating/being on the water  -KG      What clinical tests have you had for this problem? X-ray  -KG      Results of Clinical Tests Negative per pt report  -KG      Pain     Pain Location Shoulder  -KG      Pain at Present 8  -KG      Pain at Best 8  -KG      Pain at Worst 10  -KG      Pain Frequency Constant/continuous  -KG      Pain Description Aching;Sharp  -KG      What Performance Factors Make the Current Problem(s) WORSE? Trying to raise arm  -KG      What Performance Factors Make the Current Problem(s) BETTER? Ice  -KG      Pain Comments Sharp pain with movement  -KG      Tolerance Time- Lying Increased pain lying on R side; sleeps in recliner at this time  -KG      Is your sleep disturbed? Yes  -KG      Difficulties at work? N/A  -KG      Difficulties with ADL's? Pt unable to brush hair, brush teeth, or feed self with her R arm as she cannot lift it without pain.  -KG        User Key  (r) = Recorded By, (t) = Taken By, (c) = Cosigned By    Initials Name Provider Type    KG Joann Rodriguez, PT Physical Therapist                PT Ortho       07/21/17 1100    Precautions and Contraindications    Precautions/Limitations no known precautions/limitations  -KG    Subjective Pain    Able to rate subjective pain? yes  -KG    Pre-Treatment Pain Level 8  -KG    Posture/Observations    Posture/Observations Comments Pt shows no signs of acute distress. Ambulates with RW, slight antalgic gait. Pt avoids pusing with RUE when standing up from chair this date. Poor postural awareness in seated position. L shoulder elevated greater than R upon observation.  -KG    Sensation    Sensation WNL? WNL  -KG    Light Touch No apparent deficits  -KG    Special Tests/Palpation    Special Tests/Palpation  Shoulder  -KG    Shoulder Girdle Palpation    Supraspinatus Insertion Right:;Tender  -KG    Long Head of Biceps Right:;Tender  -KG    Teres Minor Right:;Tender  -KG    Greater Tubercule Right:;Tender  -KG    Upper Trap Right:;Tender  -KG    Levator Scapula Right:;Tender  -KG    Rhomboid Right:;Tender  -KG    Shoulder Girdle Palpation? Yes  -KG    Shoulder Impingement/Rotator Cuff Special Tests    Jimenez-Alex Test (RC Lesion vs. Bursitis) --   Unable to move RUE into test position due to pain  -KG    Full Can Test (RC Lesion) --   Unable to move RUE into test position due to pain  -KG    Empty Can Test (RC Lesion) --   Unable to move RUE into test position due to pain  -KG    ROM (Range of Motion)    General ROM upper extremity range of motion deficits identified  -KG    Left Shoulder    Extension AROM Deficit --  -KG    Right Shoulder    Flexion AROM Deficit 20 deg supine  -KG    Flexion PROM Deficit 110 deg  -KG    ABduction AROM Deficit 54 deg sup  -KG    ABduction PROM Deficit 85 deg  -KG    External Rotation PROM Deficit 40 deg  -KG    Internal Rotation PROM Deficit 60 deg  -KG    ROM Impairment Detail Pt very guarded with PROM this date. Pt unable to actively raise arm while standing due to increased pain therefore AROM tested in supine position.  -KG    General UE Assessment    ROM shoulder, left: UE ROM deficit;shoulder, right: UE ROM deficit  -KG    MMT (Manual Muscle Testing)    General MMT Assessment upper extremity strength deficits identified  -KG    General MMT Assessment Detail R shoulder strength not formally tested at this time  -KG    Upper Extremity    Upper Ext Manual Muscle Testing right shoulder strength deficit  -KG      User Key  (r) = Recorded By, (t) = Taken By, (c) = Cosigned By    Initials Name Provider Type    KG Joann Rodriguez, PT Physical Therapist                            Therapy Education       07/21/17 1100          Therapy Education    Given HEP;Symptoms/condition  management;Pain management;Posture/body mechanics  -KG      Program New   Ice education; pendulums 3-way, supine shoulder abd, scap squeeze, UT stretch, elbow flex/ex AROM  -KG      How Provided Verbal;Demonstration;Written  -KG      Provided to Patient  -KG      Level of Understanding Teach back education performed;Verbalized;Demonstrated  -KG        User Key  (r) = Recorded By, (t) = Taken By, (c) = Cosigned By    Initials Name Provider Type    KG Joann Rodriguez, PT Physical Therapist                PT OP Goals       07/21/17 1100       PT Short Term Goals    STG Date to Achieve 08/04/17  -KG     STG 1 Indep in HEP  -KG     STG 1 Progress New  -KG     STG 2 Tolerate 45 min treatment session without increased pain  -KG     STG 2 Progress New  -KG     STG 3 Subjectively report decreased resting to pain to 6/10 or less  -KG     STG 3 Progress New  -KG     STG 4 Demonstrate R shoulder flex PROM to 140 deg or greater  -KG     STG 4 Progress New  -KG     STG 5 Demonstrate R shoulder abd PROM to 130 deg or greater  -KG     STG 5 Progress New  -KG     Long Term Goals    LTG Date to Achieve 08/18/17  -KG     LTG 1 Subjectively report 60% improvement or greater  -KG     LTG 1 Progress New  -KG     LTG 2 QuickDASH score to 70% or less  -KG     LTG 2 Progress New  -KG     LTG 3 Demonstrate R shoulder flex AROM to 90 deg or greater  -KG     LTG 3 Progress New  -KG     LTG 4 Demonstrate R shoulder abd AROM to 90 deg or greater  -KG     LTG 4 Progress New  -KG     LTG 5 Demonstrate R shoulder flex/abd MMT to 4-/5  -KG     LTG 5 Progress New  -KG     LTG 6 Demonstrate R shoulder ER/IR to 4/5 or greater  -KG     LTG 6 Progress New  -KG     LTG 7 Subjectively report decreased TTP at anteriolateral shoulder  -KG     LTG 7 Progress New  -KG     Time Calculation    PT Goal Re-Cert Due Date 08/11/17   Visit 1/1, MD [facility]  -KG       User Key  (r) = Recorded By, (t) = Taken By, (c) = Cosigned By    Initials Name Provider Type    KG  Joann Rodriguez, PT Physical Therapist                PT Assessment/Plan       07/21/17 1100       PT Assessment    Functional Limitations Limitation in home management;Limitations in community activities;Limitations in functional capacity and performance;Performance in leisure activities;Performance in self-care ADL  -KG     Impairments Impaired flexibility;Impaired muscle endurance;Muscle strength;Pain;Posture;Range of motion  -KG     Assessment Comments Pt presents with acute R shoulder pain. Unable to complete rotator cuff/impingement tests as pt unable to raise arm into testing postion. Very TTP at LH bicep tendon, suprapsinatus, & UT/med scap border. Demonstrates decreased shouler ROM & overall strength, limiting performance of ADL's. Very guarded with shoulder PROM, tested supine for AROM. Increased pain with eccentric lowering. Possible RC pathology present. Pt will benefit from skilled PT services to address these limitations for improvements in overall functional mobility.  -KG     Rehab Potential Good  -KG     Patient/caregiver participated in establishment of treatment plan and goals Yes  -KG     Patient would benefit from skilled therapy intervention Yes  -KG     PT Plan    PT Frequency 2x/week  -KG     Predicted Duration of Therapy Intervention (days/wks) 4 weeks  -KG     Planned CPT's? PT EVAL LOW COMPLEXITY: 02613;PT RE-EVAL: 94766;PT THER PROC EA 15 MIN: 26514;PT THER ACT EA 15 MIN: 10531;PT MANUAL THERAPY EA 15 MIN: 02503;PT NEUROMUSC RE-EDUCATION EA 15 MIN: 75010;PT SELF CARE/HOME MGMT/TRAIN EA 15: 23188;PT ELECTRICAL STIM UNATTEND: ;PT ULTRASOUND EA 15 MIN: 04981;PT THER SUPP EA 15 MIN  -KG     Physical Therapy Interventions (Optional Details) home exercise program;joint mobilization;manual therapy techniques;modalities;neuromuscular re-education;patient/family education;postural re-education;ROM (Range of Motion);stretching;strengthening  -KG     PT Plan Comments HEP, UE stretching,  shoulder P/AAROM, progressing to AROM tolerated, gentle strengthening/shoulder isometrics, scap stability, posture ed, IFC/ice prn for pain.   -KG       User Key  (r) = Recorded By, (t) = Taken By, (c) = Cosigned By    Initials Name Provider Type    JESSICA Rodriguez PT Physical Therapist                Modalities       07/21/17 1100          Subjective Pain    Post-Treatment Pain Level 8  -KG      Ice    Patient denies application of Ice Yes  -KG      Patient reports will apply ice at home to involved area Yes   Given ice bag to go  -KG        User Key  (r) = Recorded By, (t) = Taken By, (c) = Cosigned By    Initials Name Provider Type    JESSICA Rodriguez PT Physical Therapist              Exercises       07/21/17 1100          Subjective Comments    Subjective Comments Pt states most of her pain is at her anteriolateral R shoulder at this time. Does have pain at UT as well. States it's too painful to raise her arm at this time.  -KG      Subjective Pain    Able to rate subjective pain? yes  -KG      Pre-Treatment Pain Level 8  -KG      Post-Treatment Pain Level 8  -KG      Aquatics    Aquatics performed? No  -KG      Exercise 1    Exercise Name 1 Scap Squeezes  -KG      Sets 1 1  -KG      Reps 1 10  -KG      Exercise 2    Exercise Name 2 Elbow Flex/Ext AROM  -KG      Sets 2 1  -KG      Reps 2 10  -KG      Exercise 3    Exercise Name 3 Supine Shoulder Abd  -KG      Sets 3 1  -KG      Reps 3 10  -KG      Exercise 4    Exercise Name 4 UT Stretch  -KG      Reps 4 2  -KG      Time (Seconds) 4 20  -KG      Exercise 5    Exercise Name 5 Pendulums: 3-way  -KG      Sets 5 1  -KG      Reps 5 15  -KG        User Key  (r) = Recorded By, (t) = Taken By, (c) = Cosigned By    Initials Name Provider Type    JESSICA Rodriguez PT Physical Therapist           Manual Rx (last 36 hours)      Manual Treatments       07/21/17 1100          Manual Rx 1    Manual Rx 1 Location R Shoulder  -KG      Manual Rx 1 Type PROM flex/ext,  ER/IR at 45 deg  -KG      Manual Rx 1 Duration 8 min  -KG        User Key  (r) = Recorded By, (t) = Taken By, (c) = Cosigned By    Initials Name Provider Type    JESSICA Rodriguez PT Physical Therapist                            Outcome Measures       07/21/17 1100          Quick DASH    Open a tight or new jar. 5  -KG      Do heavy household chores (e.g., wash walls, wash floors) 5  -KG      Carry a shopping bag or briefcase 4  -KG      Wash your back 5  -KG      Use a knife to cut food 5  -KG      Recreational activities in which you take some force or impact through your arm, should or hand (e.g. golf, hammering, tennis, etc.) 5  -KG      During the past week, to what extent has your arm, shoulder, or hand problem interfered with your normal social activites with family, friends, neighbors or groups? 5  -KG      During the past week, were you limited in your work or other regular daily activities as a result of your arm, shoulder or hand problem? 5  -KG      Arm, Shoulder, or hand pain 5  -KG      Tingling (pins and needles) in your arm, shoulder, or hand 1  -KG      During the past week, how much difficulty have you had sleeping because of the pain in your arm, shoulder or hand? 3  -KG      Number of Questions Answered 11  -KG      Quick DASH Score 84.09  -KG      Functional Assessment    Outcome Measure Options Quick DASH  -KG        User Key  (r) = Recorded By, (t) = Taken By, (c) = Cosigned By    Initials Name Provider Type    JESSICA Rodriguez PT Physical Therapist            Time Calculation:   Start Time: 1110  Stop Time: 1200  Time Calculation (min): 50 min  Total Timed Code Minutes- PT: 20 minute(s)     Therapy Charges for Today     Code Description Service Date Service Provider Modifiers Qty    07147785096 HC PT CARRY MOV HAND OBJ CURRENT 7/21/2017 Joann Rodriguez, PT GP, CL 1    58093762997 HC PT CARRY MOV HAND OBJ PROJECTED 7/21/2017 Joann Rodriguez, PT GP, CJ 1    90065554281 HC PT EVAL LOW  COMPLEXITY 2 7/21/2017 Joann Rodriguez, PT GP 1    18750371155 HC PT THER PROC EA 15 MIN 7/21/2017 Joann Rodriguez, PT GP 1          PT G-Codes  PT Professional Judgement Used?: Yes  Outcome Measure Options: Quick DASH  Score: 84%  Functional Limitation: Carrying, moving and handling objects  Carrying, Moving and Handling Objects Current Status (): At least 60 percent but less than 80 percent impaired, limited or restricted  Carrying, Moving and Handling Objects Goal Status (): At least 20 percent but less than 40 percent impaired, limited or restricted         Joann Rodriguez, PT  7/21/2017

## 2017-07-25 ENCOUNTER — APPOINTMENT (OUTPATIENT)
Dept: PHYSICAL THERAPY | Facility: HOSPITAL | Age: 77
End: 2017-07-25

## 2017-08-16 ENCOUNTER — OFFICE VISIT (OUTPATIENT)
Dept: FAMILY MEDICINE CLINIC | Facility: CLINIC | Age: 77
End: 2017-08-16

## 2017-08-16 VITALS
TEMPERATURE: 98.7 F | OXYGEN SATURATION: 96 % | BODY MASS INDEX: 26.8 KG/M2 | SYSTOLIC BLOOD PRESSURE: 134 MMHG | DIASTOLIC BLOOD PRESSURE: 60 MMHG | HEIGHT: 64 IN | WEIGHT: 157 LBS | HEART RATE: 90 BPM

## 2017-08-16 DIAGNOSIS — M19.90 ARTHRITIS: Primary | ICD-10-CM

## 2017-08-16 PROCEDURE — 99213 OFFICE O/P EST LOW 20 MIN: CPT | Performed by: FAMILY MEDICINE

## 2017-08-16 NOTE — PROGRESS NOTES
Subjective   Ariana Gutierrez is a 77 y.o. female.     Arthritis   Presents for follow-up visit. She complains of pain and joint swelling. The symptoms have been stable. Affected locations include the right shoulder, left shoulder, left wrist and left knee. Her pain is at a severity of 2/10. (Has known septic right knee) Compliance with medications is %.       The following portions of the patient's history were reviewed and updated as appropriate: allergies, current medications, past family history, past medical history, past social history, past surgical history and problem list.    Review of Systems   Musculoskeletal: Positive for arthritis and joint swelling.        On Lipitor       Objective   Physical Exam   Constitutional: She is oriented to person, place, and time. She appears well-developed and well-nourished.   Cardiovascular: Normal rate and regular rhythm.    Pulmonary/Chest: Effort normal and breath sounds normal.   Musculoskeletal:   No active warmth at this time   Neurological: She is alert and oriented to person, place, and time.   Skin: Skin is warm and dry.   No petechial rash at this time   Psychiatric: She has a normal mood and affect. Her behavior is normal. Thought content normal.   Vitals reviewed.      Assessment/Plan   Ariana was seen today for consult.    Diagnoses and all orders for this visit:    Arthritis         Plan we will talk to Dr. Zapata about a MADELIN

## 2017-08-22 ENCOUNTER — TELEPHONE (OUTPATIENT)
Dept: FAMILY MEDICINE CLINIC | Facility: CLINIC | Age: 77
End: 2017-08-22

## 2017-08-22 DIAGNOSIS — M25.511 ACUTE PAIN OF RIGHT SHOULDER: Primary | ICD-10-CM

## 2017-08-22 NOTE — TELEPHONE ENCOUNTER
PT CALLED & STATED DR ROSAS'S HAD OK'D PT-SHE HAD LAST SESSION TODAY. PHYSICAL THERAPIST XIOMY STATED PT NEEDS A NEW ORDER TO CONTINUE THERAPY FOR 4-6 WKS LONGER. PT HAS AN APPT ON 08/25.  [facility]X [facility]  [phone].  Need to check with Dr. Liu to see how long he wants physical therapy

## 2017-08-22 NOTE — TELEPHONE ENCOUNTER
SPOKE WITH PT'S SPOUSE & ADVISED PT WOULD NEED TO CONTACT DR GARCIA TO SEE ABOUT PHYSICAL THERAPY. HE VOICED UNDERSTANDING.

## 2017-08-22 NOTE — TELEPHONE ENCOUNTER
Patient contacted [facility]  Spoke with Catrachita.  Dr. Louise's ordered physical therapy for patient for her shoulder..  Patient is being treated by Dr. Liu for her knee and not her shoulder.  We need to fax new order to continue therapy to [facility] send orders for treatment evaluate right shoulder pain to physical therapy

## 2017-09-15 ENCOUNTER — OFFICE VISIT (OUTPATIENT)
Dept: FAMILY MEDICINE CLINIC | Facility: CLINIC | Age: 77
End: 2017-09-15

## 2017-09-15 VITALS
BODY MASS INDEX: 26.26 KG/M2 | TEMPERATURE: 98.3 F | HEART RATE: 76 BPM | OXYGEN SATURATION: 97 % | SYSTOLIC BLOOD PRESSURE: 110 MMHG | HEIGHT: 64 IN | WEIGHT: 153.8 LBS | DIASTOLIC BLOOD PRESSURE: 78 MMHG

## 2017-09-15 DIAGNOSIS — E78.2 MIXED HYPERLIPIDEMIA: Primary | ICD-10-CM

## 2017-09-15 DIAGNOSIS — R53.83 FATIGUE, UNSPECIFIED TYPE: ICD-10-CM

## 2017-09-15 DIAGNOSIS — Z23 NEED FOR INFLUENZA VACCINATION: ICD-10-CM

## 2017-09-15 LAB
ALBUMIN SERPL-MCNC: 4.2 G/DL (ref 3.5–5)
ALBUMIN/GLOB SERPL: 1.3 G/DL (ref 1.1–2.5)
ALP SERPL-CCNC: 96 U/L (ref 24–120)
ALT SERPL-CCNC: 32 U/L (ref 0–54)
AST SERPL-CCNC: 26 U/L (ref 7–45)
BASOPHILS # BLD AUTO: 0.03 10*3/MM3 (ref 0–0.2)
BASOPHILS NFR BLD AUTO: 0.3 % (ref 0–2)
BILIRUB SERPL-MCNC: 0.8 MG/DL (ref 0.1–1)
BUN SERPL-MCNC: 23 MG/DL (ref 5–21)
BUN/CREAT SERPL: 25.3 (ref 7–25)
CALCIUM SERPL-MCNC: 9.6 MG/DL (ref 8.4–10.4)
CHLORIDE SERPL-SCNC: 95 MMOL/L (ref 98–110)
CHOLEST SERPL-MCNC: 174 MG/DL (ref 130–200)
CO2 SERPL-SCNC: 31 MMOL/L (ref 24–31)
CREAT SERPL-MCNC: 0.91 MG/DL (ref 0.5–1.4)
EOSINOPHIL # BLD AUTO: 0.15 10*3/MM3 (ref 0–0.7)
EOSINOPHIL NFR BLD AUTO: 1.5 % (ref 0–4)
ERYTHROCYTE [DISTWIDTH] IN BLOOD BY AUTOMATED COUNT: 15.7 % (ref 12–15)
GLOBULIN SER CALC-MCNC: 3.2 GM/DL
GLUCOSE SERPL-MCNC: 96 MG/DL (ref 70–100)
HCT VFR BLD AUTO: 38.4 % (ref 37–47)
HDLC SERPL-MCNC: 55 MG/DL
HGB BLD-MCNC: 11.8 G/DL (ref 12–16)
IMM GRANULOCYTES # BLD: 0.04 10*3/MM3 (ref 0–0.03)
IMM GRANULOCYTES NFR BLD: 0.4 % (ref 0–5)
LDLC SERPL CALC-MCNC: 105 MG/DL (ref 0–99)
LYMPHOCYTES # BLD AUTO: 1.29 10*3/MM3 (ref 0.72–4.86)
LYMPHOCYTES NFR BLD AUTO: 12.6 % (ref 15–45)
MCH RBC QN AUTO: 27.1 PG (ref 28–32)
MCHC RBC AUTO-ENTMCNC: 30.7 G/DL (ref 33–36)
MCV RBC AUTO: 88.1 FL (ref 82–98)
MONOCYTES # BLD AUTO: 1.03 10*3/MM3 (ref 0.19–1.3)
MONOCYTES NFR BLD AUTO: 10 % (ref 4–12)
NEUTROPHILS # BLD AUTO: 7.71 10*3/MM3 (ref 1.87–8.4)
NEUTROPHILS NFR BLD AUTO: 75.2 % (ref 39–78)
PLATELET # BLD AUTO: 348 10*3/MM3 (ref 130–400)
POTASSIUM SERPL-SCNC: 4.7 MMOL/L (ref 3.5–5.3)
PROT SERPL-MCNC: 7.4 G/DL (ref 6.3–8.7)
RBC # BLD AUTO: 4.36 10*6/MM3 (ref 4.2–5.4)
SODIUM SERPL-SCNC: 136 MMOL/L (ref 135–145)
TRIGL SERPL-MCNC: 69 MG/DL (ref 0–149)
VLDLC SERPL CALC-MCNC: 13.8 MG/DL
WBC # BLD AUTO: 10.25 10*3/MM3 (ref 4.8–10.8)

## 2017-09-15 PROCEDURE — 90471 IMMUNIZATION ADMIN: CPT | Performed by: FAMILY MEDICINE

## 2017-09-15 PROCEDURE — 99213 OFFICE O/P EST LOW 20 MIN: CPT | Performed by: FAMILY MEDICINE

## 2017-09-15 PROCEDURE — 90662 IIV NO PRSV INCREASED AG IM: CPT | Performed by: FAMILY MEDICINE

## 2017-09-15 NOTE — PROGRESS NOTES
Subjective   Ariana Gutierrez is a 77 y.o. female.     Hyperlipidemia   This is a chronic problem. The current episode started more than 1 year ago. The problem is uncontrolled. Recent lipid tests were reviewed and are high. There are no known factors aggravating her hyperlipidemia. Associated symptoms include chest pain. Current antihyperlipidemic treatment includes diet change. There are no compliance problems.  Risk factors for coronary artery disease include obesity, post-menopausal, a sedentary lifestyle and hypertension.       The following portions of the patient's history were reviewed and updated as appropriate: allergies, current medications, past family history, past medical history, past social history, past surgical history and problem list.    Review of Systems   Cardiovascular: Positive for chest pain. Negative for leg swelling.        History of silent heart attack   Musculoskeletal: Positive for arthralgias.       Objective   Physical Exam   Constitutional: She is oriented to person, place, and time. She appears well-developed and well-nourished.   Cardiovascular: Normal rate and regular rhythm.    Pulmonary/Chest: Effort normal and breath sounds normal.   Neurological: She is alert and oriented to person, place, and time.   Skin: Skin is warm.   Psychiatric: She has a normal mood and affect. Her behavior is normal. Judgment and thought content normal.   Nursing note and vitals reviewed.      Assessment/Plan   Airana was seen today for follow-up.    Diagnoses and all orders for this visit:    Mixed hyperlipidemia  -     Comprehensive Metabolic Panel  -     Lipid Panel    Fatigue, unspecified type  -     CBC & Differential       Above-seeing infectious disease specialist next week-may need MADELIN

## 2017-09-18 ENCOUNTER — TELEPHONE (OUTPATIENT)
Dept: FAMILY MEDICINE CLINIC | Facility: CLINIC | Age: 77
End: 2017-09-18

## 2017-09-18 NOTE — TELEPHONE ENCOUNTER
----- Message from Bill Schilling MD sent at 9/18/2017  7:07 AM CDT -----  Lab pretty good except bad  LDL cholesterol 105–with known disease and should be 70 or less– keep trying on fat restricted diet

## 2017-09-21 ENCOUNTER — HOSPITAL ENCOUNTER (OUTPATIENT)
Dept: VASCULAR LAB | Age: 77
Discharge: HOME | End: 2017-09-21
Payer: MEDICARE

## 2017-09-21 DIAGNOSIS — I82.441 DEEP VEIN THROMBOSIS (DVT) OF TIBIAL VEIN OF RIGHT LOWER EXTREMITY, UNSPECIFIED CHRONICITY (HCC): ICD-10-CM

## 2017-09-21 DIAGNOSIS — M25.571 PAIN AND SWELLING OF ANKLE, RIGHT: ICD-10-CM

## 2017-09-21 DIAGNOSIS — M25.471 PAIN AND SWELLING OF ANKLE, RIGHT: ICD-10-CM

## 2017-09-21 LAB
D DIMER: 3.47 UG/ML FEU (ref 0–0.48)
SEDIMENTATION RATE, ERYTHROCYTE: 87 MM/HR (ref 0–25)

## 2017-09-21 PROCEDURE — 93971 EXTREMITY STUDY: CPT

## 2017-09-22 LAB — C-REACTIVE PROTEIN: 1.68 MG/DL (ref 0–0.5)

## 2017-09-27 DIAGNOSIS — I70.213 ATHEROSCLEROSIS OF NATIVE ARTERY OF BOTH LOWER EXTREMITIES WITH INTERMITTENT CLAUDICATION (HCC): Primary | ICD-10-CM

## 2017-10-04 DIAGNOSIS — Z12.31 SCREENING MAMMOGRAM, ENCOUNTER FOR: Primary | ICD-10-CM

## 2017-10-04 RX ORDER — OMEPRAZOLE 20 MG/1
20 CAPSULE, DELAYED RELEASE ORAL DAILY
Qty: 90 CAPSULE | Refills: 2 | Status: SHIPPED | OUTPATIENT
Start: 2017-10-04 | End: 2018-01-15 | Stop reason: SDUPTHER

## 2017-10-04 RX ORDER — HYDROCHLOROTHIAZIDE 25 MG/1
25 TABLET ORAL DAILY
Qty: 90 TABLET | Refills: 2 | Status: SHIPPED | OUTPATIENT
Start: 2017-10-04 | End: 2018-05-07 | Stop reason: SDUPTHER

## 2017-10-05 ENCOUNTER — HOSPITAL ENCOUNTER (OUTPATIENT)
Dept: VASCULAR LAB | Age: 77
Discharge: HOME | DRG: 486 | End: 2017-10-05
Payer: MEDICARE

## 2017-10-05 ENCOUNTER — OFFICE VISIT (OUTPATIENT)
Dept: VASCULAR SURGERY | Age: 77
End: 2017-10-05

## 2017-10-05 VITALS
DIASTOLIC BLOOD PRESSURE: 81 MMHG | SYSTOLIC BLOOD PRESSURE: 121 MMHG | HEART RATE: 83 BPM | TEMPERATURE: 96.9 F | RESPIRATION RATE: 18 BRPM

## 2017-10-05 DIAGNOSIS — M79.89 PAIN AND SWELLING OF LOWER EXTREMITY, LEFT: Primary | ICD-10-CM

## 2017-10-05 DIAGNOSIS — I70.213 ATHEROSCLEROSIS OF NATIVE ARTERY OF BOTH LOWER EXTREMITIES WITH INTERMITTENT CLAUDICATION (HCC): Primary | ICD-10-CM

## 2017-10-05 DIAGNOSIS — I73.9 PVD (PERIPHERAL VASCULAR DISEASE) (HCC): ICD-10-CM

## 2017-10-05 DIAGNOSIS — M79.605 PAIN AND SWELLING OF LOWER EXTREMITY, LEFT: Primary | ICD-10-CM

## 2017-10-05 DIAGNOSIS — Z86.718 PERSONAL HISTORY OF DVT (DEEP VEIN THROMBOSIS): ICD-10-CM

## 2017-10-05 DIAGNOSIS — I74.3 POPLITEAL ARTERY THROMBOSIS, RIGHT (HCC): ICD-10-CM

## 2017-10-05 PROCEDURE — 93922 UPR/L XTREMITY ART 2 LEVELS: CPT

## 2017-10-05 PROCEDURE — 99024 POSTOP FOLLOW-UP VISIT: CPT | Performed by: PHYSICIAN ASSISTANT

## 2017-10-05 PROCEDURE — 93971 EXTREMITY STUDY: CPT

## 2017-10-05 PROCEDURE — 93926 LOWER EXTREMITY STUDY: CPT

## 2017-10-05 NOTE — PROGRESS NOTES
Patient Care Team:  Ava Weiss MD as PCP - [address] [place] [address],Third Floor, DO as Consulting Physician (Gastroenterology)      History and Physical    Ms. Bhanu Christianson is a 77 yo female who presents today for follow up after a right popliteal artery repair following knee surgery. This was performed on 6/5/17. She had a right A'scan today with LINDA's. Current medication include ASA and a statin daily as well as Eliquis 5mg BID. she has a history of a right leg DVT. She reports that she has has left leg swelling and has seen Dr. Evita Olson and had fluid aspirated from her knee. She reports continued pain and swelling in the right knee. Robbi Lazo is a 78 y.o. female with the following history reviewed and recorded in [employer]:  Patient Active Problem List    Diagnosis Date Noted    Personal history of DVT (deep vein thrombosis) 10/18/2017    Atheromatous plaque 06/04/2017    Iron deficiency anemia 06/01/2017    Gastroesophageal reflux disease without esophagitis 05/31/2017    Constipation due to opioid therapy 05/31/2017    Popliteal artery thrombosis, right ([address]     History of diverticulosis 08/16/2016    Small intestinal bacterial overgrowth 12/18/2014    Irritable bowel syndrome with constipation 11/06/2014    Bloating 11/06/2014    Gas pain 11/06/2014     Updating deleted diagnoses      Spinal stenosis 02/17/2014    IBS (irritable bowel syndrome) 11/02/2011    HTN (hypertension) 11/02/2011     Current Outpatient Prescriptions   Medication Sig Dispense Refill    apixaban (ELIQUIS) 5 MG TABS tablet Take 5 mg by mouth 2 times daily      atorvastatin (LIPITOR) 20 MG tablet Take 20 mg by mouth daily      Saccharomyces boulardii (PROBIOTIC) 250 MG CAPS Take by mouth daily      oxyCODONE-acetaminophen (PERCOCET)  MG per tablet Take 1 tablet by mouth every 6 hours as needed for Pain .       metoprolol succinate (TOPROL XL) 25 MG extended release tablet TAKE 1 TABLET BY MOUTH DAILY 90 tablet 3    (Site: Right Arm, Position: Sitting, Cuff Size: Medium Adult) Comment: right  Pulse 83   Temp 96.9 °F (36.1 °C)   Resp 18     Constitutional  well developed, well nourished. No diaphoresis or acute distress. HENT  head normocephalic. Right external ear canal appears normal.  Left external ear canal appears normal.  Septum appears midline. Eyes  conjunctiva normal.  EOMS normal.  No exudate. No icterus. Neck- ROM appears normal, no tracheal deviation. Cardiovascular  Regular rate and rhythm. Heart sounds are normal.  No murmur, rub, or gallop. Carotid pulses are 2+ to palpation bilaterally without bruit. Extremities - Radial and brachial pulses are 2+ to palpation bilaterally. DP and PT pulses are not palpable. No cyanosis, clubbing, or significant edema. No signs atheroembolic event. Incision right popliteal fossa healing well. Pulmonary  effort appears normal.  No respiratory distress. Lungs - Breath sounds normal. No wheezes or rales. GI - Abdomen  soft, non tender, bowel sounds X 4 quadrants. No guarding or rebound tenderness. No distension or palpable mass. Genitourinary  deferred. Musculoskeletal  swelling and tenderness left knee. Neurologic  alert and oriented X 3. Physiologic. Skin  warm, dry, and intact. No rash, erythema, or pallor. Psychiatric  mood, affect, and behavior appear normal.  Judgment and thought processes appear normal.    Risk factors for atherosclerosis of all vascular beds have been reviewed with the patient including:  Family history, tobacco abuse in all forms, elevated cholesterol, hyperlipidemia, and diabetes. Lower extremity arterial study:     Right LINDA 1.01, Left LINDA 1.02. Individual films reviewed: Yes. Test results were reviewed with the patient. Assessment    1. Pain and swelling of lower extremity, left    2. Personal history of DVT (deep vein thrombosis)    3. Popliteal artery thrombosis, right ([address]    4.  PVD (peripheral

## 2017-10-06 ENCOUNTER — ANESTHESIA EVENT (OUTPATIENT)
Dept: OPERATING ROOM | Age: 77
DRG: 486 | End: 2017-10-06
Payer: MEDICARE

## 2017-10-06 ENCOUNTER — HOSPITAL ENCOUNTER (INPATIENT)
Age: 77
LOS: 2 days | Discharge: HOME HEALTH | DRG: 486 | End: 2017-10-08
Attending: ORTHOPAEDIC SURGERY | Admitting: ORTHOPAEDIC SURGERY
Payer: MEDICARE

## 2017-10-06 ENCOUNTER — ANESTHESIA (OUTPATIENT)
Dept: OPERATING ROOM | Age: 77
DRG: 486 | End: 2017-10-06
Payer: MEDICARE

## 2017-10-06 VITALS
SYSTOLIC BLOOD PRESSURE: 115 MMHG | RESPIRATION RATE: 1 BRPM | TEMPERATURE: 96.3 F | DIASTOLIC BLOOD PRESSURE: 96 MMHG | OXYGEN SATURATION: 99 %

## 2017-10-06 PROCEDURE — 6360000002 HC RX W HCPCS: Performed by: NURSE ANESTHETIST, CERTIFIED REGISTERED

## 2017-10-06 PROCEDURE — G8979 MOBILITY GOAL STATUS: HCPCS

## 2017-10-06 PROCEDURE — 94664 DEMO&/EVAL PT USE INHALER: CPT

## 2017-10-06 PROCEDURE — 0SBD0ZZ EXCISION OF LEFT KNEE JOINT, OPEN APPROACH: ICD-10-PCS | Performed by: ORTHOPAEDIC SURGERY

## 2017-10-06 PROCEDURE — 3600000004 HC SURGERY LEVEL 4 BASE: Performed by: ORTHOPAEDIC SURGERY

## 2017-10-06 PROCEDURE — 6370000000 HC RX 637 (ALT 250 FOR IP): Performed by: ORTHOPAEDIC SURGERY

## 2017-10-06 PROCEDURE — C1729 CATH, DRAINAGE: HCPCS | Performed by: ORTHOPAEDIC SURGERY

## 2017-10-06 PROCEDURE — 2580000003 HC RX 258: Performed by: ORTHOPAEDIC SURGERY

## 2017-10-06 PROCEDURE — 3700000001 HC ADD 15 MINUTES (ANESTHESIA): Performed by: ORTHOPAEDIC SURGERY

## 2017-10-06 PROCEDURE — G8978 MOBILITY CURRENT STATUS: HCPCS

## 2017-10-06 PROCEDURE — 2580000003 HC RX 258: Performed by: ANESTHESIOLOGY

## 2017-10-06 PROCEDURE — 2500000003 HC RX 250 WO HCPCS: Performed by: ORTHOPAEDIC SURGERY

## 2017-10-06 PROCEDURE — 7100000001 HC PACU RECOVERY - ADDTL 15 MIN: Performed by: ORTHOPAEDIC SURGERY

## 2017-10-06 PROCEDURE — 93005 ELECTROCARDIOGRAM TRACING: CPT

## 2017-10-06 PROCEDURE — 1210000000 HC MED SURG R&B

## 2017-10-06 PROCEDURE — 3E0U029 INTRODUCTION OF OTHER ANTI-INFECTIVE INTO JOINTS, OPEN APPROACH: ICD-10-PCS | Performed by: ORTHOPAEDIC SURGERY

## 2017-10-06 PROCEDURE — 97161 PT EVAL LOW COMPLEX 20 MIN: CPT

## 2017-10-06 PROCEDURE — 2500000003 HC RX 250 WO HCPCS: Performed by: NURSE ANESTHETIST, CERTIFIED REGISTERED

## 2017-10-06 PROCEDURE — 7100000000 HC PACU RECOVERY - FIRST 15 MIN: Performed by: ORTHOPAEDIC SURGERY

## 2017-10-06 PROCEDURE — 6360000002 HC RX W HCPCS: Performed by: ORTHOPAEDIC SURGERY

## 2017-10-06 PROCEDURE — 97116 GAIT TRAINING THERAPY: CPT

## 2017-10-06 PROCEDURE — 2720000000 HC MISC SURG SUPPLY STERILE $0-50: Performed by: ORTHOPAEDIC SURGERY

## 2017-10-06 PROCEDURE — 87102 FUNGUS ISOLATION CULTURE: CPT

## 2017-10-06 PROCEDURE — 2720000003 HC MISC SUTURE/STAPLES/RELOADS/ETC: Performed by: ORTHOPAEDIC SURGERY

## 2017-10-06 PROCEDURE — 3600000014 HC SURGERY LEVEL 4 ADDTL 15MIN: Performed by: ORTHOPAEDIC SURGERY

## 2017-10-06 PROCEDURE — 2700000000 HC OXYGEN THERAPY PER DAY

## 2017-10-06 PROCEDURE — 2720000001 HC MISC SURG SUPPLY STERILE $51-500: Performed by: ORTHOPAEDIC SURGERY

## 2017-10-06 PROCEDURE — 0S9C3ZX DRAINAGE OF RIGHT KNEE JOINT, PERCUTANEOUS APPROACH, DIAGNOSTIC: ICD-10-PCS | Performed by: ORTHOPAEDIC SURGERY

## 2017-10-06 PROCEDURE — 87205 SMEAR GRAM STAIN: CPT

## 2017-10-06 PROCEDURE — 87070 CULTURE OTHR SPECIMN AEROBIC: CPT

## 2017-10-06 PROCEDURE — 3700000000 HC ANESTHESIA ATTENDED CARE: Performed by: ORTHOPAEDIC SURGERY

## 2017-10-06 RX ORDER — FENTANYL CITRATE 50 UG/ML
25 INJECTION, SOLUTION INTRAMUSCULAR; INTRAVENOUS
Status: DISCONTINUED | OUTPATIENT
Start: 2017-10-06 | End: 2017-10-06 | Stop reason: HOSPADM

## 2017-10-06 RX ORDER — DEXAMETHASONE SODIUM PHOSPHATE 10 MG/ML
INJECTION INTRAMUSCULAR; INTRAVENOUS PRN
Status: DISCONTINUED | OUTPATIENT
Start: 2017-10-06 | End: 2017-10-06 | Stop reason: SDUPTHER

## 2017-10-06 RX ORDER — SODIUM CHLORIDE 0.9 % (FLUSH) 0.9 %
10 SYRINGE (ML) INJECTION EVERY 12 HOURS SCHEDULED
Status: DISCONTINUED | OUTPATIENT
Start: 2017-10-06 | End: 2017-10-08 | Stop reason: HOSPADM

## 2017-10-06 RX ORDER — PROMETHAZINE HYDROCHLORIDE 25 MG/ML
6.25 INJECTION, SOLUTION INTRAMUSCULAR; INTRAVENOUS
Status: DISCONTINUED | OUTPATIENT
Start: 2017-10-06 | End: 2017-10-06 | Stop reason: HOSPADM

## 2017-10-06 RX ORDER — LIDOCAINE HYDROCHLORIDE 10 MG/ML
1 INJECTION, SOLUTION EPIDURAL; INFILTRATION; INTRACAUDAL; PERINEURAL ONCE
Status: DISCONTINUED | OUTPATIENT
Start: 2017-10-06 | End: 2017-10-06 | Stop reason: HOSPADM

## 2017-10-06 RX ORDER — FENTANYL CITRATE 50 UG/ML
INJECTION, SOLUTION INTRAMUSCULAR; INTRAVENOUS PRN
Status: DISCONTINUED | OUTPATIENT
Start: 2017-10-06 | End: 2017-10-06 | Stop reason: SDUPTHER

## 2017-10-06 RX ORDER — METOPROLOL SUCCINATE 25 MG/1
25 TABLET, EXTENDED RELEASE ORAL NIGHTLY
Status: DISCONTINUED | OUTPATIENT
Start: 2017-10-06 | End: 2017-10-08 | Stop reason: HOSPADM

## 2017-10-06 RX ORDER — SODIUM CHLORIDE, SODIUM LACTATE, POTASSIUM CHLORIDE, CALCIUM CHLORIDE 600; 310; 30; 20 MG/100ML; MG/100ML; MG/100ML; MG/100ML
INJECTION, SOLUTION INTRAVENOUS CONTINUOUS
Status: DISCONTINUED | OUTPATIENT
Start: 2017-10-06 | End: 2017-10-06

## 2017-10-06 RX ORDER — LABETALOL HYDROCHLORIDE 5 MG/ML
5 INJECTION, SOLUTION INTRAVENOUS EVERY 10 MIN PRN
Status: DISCONTINUED | OUTPATIENT
Start: 2017-10-06 | End: 2017-10-06 | Stop reason: HOSPADM

## 2017-10-06 RX ORDER — SODIUM CHLORIDE 9 MG/ML
INJECTION, SOLUTION INTRAVENOUS CONTINUOUS
Status: DISCONTINUED | OUTPATIENT
Start: 2017-10-06 | End: 2017-10-08 | Stop reason: HOSPADM

## 2017-10-06 RX ORDER — HYDROMORPHONE HYDROCHLORIDE 2 MG/1
8 TABLET ORAL EVERY 4 HOURS PRN
Status: DISCONTINUED | OUTPATIENT
Start: 2017-10-06 | End: 2017-10-08 | Stop reason: HOSPADM

## 2017-10-06 RX ORDER — GABAPENTIN 300 MG/1
300 CAPSULE ORAL 3 TIMES DAILY
Status: DISCONTINUED | OUTPATIENT
Start: 2017-10-06 | End: 2017-10-08 | Stop reason: HOSPADM

## 2017-10-06 RX ORDER — ACETAMINOPHEN 325 MG/1
650 TABLET ORAL EVERY 4 HOURS PRN
Status: DISCONTINUED | OUTPATIENT
Start: 2017-10-06 | End: 2017-10-08 | Stop reason: HOSPADM

## 2017-10-06 RX ORDER — LIDOCAINE HYDROCHLORIDE 10 MG/ML
1 INJECTION, SOLUTION EPIDURAL; INFILTRATION; INTRACAUDAL; PERINEURAL
Status: DISCONTINUED | OUTPATIENT
Start: 2017-10-06 | End: 2017-10-06 | Stop reason: HOSPADM

## 2017-10-06 RX ORDER — OMEPRAZOLE 20 MG/1
20 CAPSULE, DELAYED RELEASE ORAL EVERY MORNING
Status: DISCONTINUED | OUTPATIENT
Start: 2017-10-07 | End: 2017-10-06 | Stop reason: CLARIF

## 2017-10-06 RX ORDER — SODIUM CHLORIDE 0.9 % (FLUSH) 0.9 %
10 SYRINGE (ML) INJECTION EVERY 12 HOURS SCHEDULED
Status: DISCONTINUED | OUTPATIENT
Start: 2017-10-06 | End: 2017-10-06 | Stop reason: HOSPADM

## 2017-10-06 RX ORDER — SODIUM CHLORIDE 0.9 % (FLUSH) 0.9 %
10 SYRINGE (ML) INJECTION PRN
Status: DISCONTINUED | OUTPATIENT
Start: 2017-10-06 | End: 2017-10-08 | Stop reason: HOSPADM

## 2017-10-06 RX ORDER — PROPOFOL 10 MG/ML
INJECTION, EMULSION INTRAVENOUS PRN
Status: DISCONTINUED | OUTPATIENT
Start: 2017-10-06 | End: 2017-10-06 | Stop reason: SDUPTHER

## 2017-10-06 RX ORDER — DIPHENHYDRAMINE HYDROCHLORIDE 50 MG/ML
12.5 INJECTION INTRAMUSCULAR; INTRAVENOUS
Status: DISCONTINUED | OUTPATIENT
Start: 2017-10-06 | End: 2017-10-06 | Stop reason: HOSPADM

## 2017-10-06 RX ORDER — LIDOCAINE HYDROCHLORIDE 10 MG/ML
INJECTION, SOLUTION EPIDURAL; INFILTRATION; INTRACAUDAL; PERINEURAL PRN
Status: DISCONTINUED | OUTPATIENT
Start: 2017-10-06 | End: 2017-10-06 | Stop reason: SDUPTHER

## 2017-10-06 RX ORDER — MORPHINE SULFATE 4 MG/ML
2 INJECTION, SOLUTION INTRAMUSCULAR; INTRAVENOUS EVERY 5 MIN PRN
Status: DISCONTINUED | OUTPATIENT
Start: 2017-10-06 | End: 2017-10-06 | Stop reason: HOSPADM

## 2017-10-06 RX ORDER — LANOLIN ALCOHOL/MO/W.PET/CERES
3 CREAM (GRAM) TOPICAL NIGHTLY
Status: DISCONTINUED | OUTPATIENT
Start: 2017-10-06 | End: 2017-10-08 | Stop reason: HOSPADM

## 2017-10-06 RX ORDER — SODIUM CHLORIDE 9 MG/ML
INJECTION, SOLUTION INTRAVENOUS CONTINUOUS
Status: DISCONTINUED | OUTPATIENT
Start: 2017-10-06 | End: 2017-10-06

## 2017-10-06 RX ORDER — MORPHINE SULFATE 4 MG/ML
4 INJECTION, SOLUTION INTRAMUSCULAR; INTRAVENOUS
Status: DISCONTINUED | OUTPATIENT
Start: 2017-10-06 | End: 2017-10-08 | Stop reason: HOSPADM

## 2017-10-06 RX ORDER — ROCURONIUM BROMIDE 10 MG/ML
INJECTION, SOLUTION INTRAVENOUS PRN
Status: DISCONTINUED | OUTPATIENT
Start: 2017-10-06 | End: 2017-10-06 | Stop reason: SDUPTHER

## 2017-10-06 RX ORDER — EPHEDRINE SULFATE 50 MG/ML
INJECTION, SOLUTION INTRAVENOUS PRN
Status: DISCONTINUED | OUTPATIENT
Start: 2017-10-06 | End: 2017-10-06 | Stop reason: SDUPTHER

## 2017-10-06 RX ORDER — DOCUSATE SODIUM 100 MG/1
100 CAPSULE, LIQUID FILLED ORAL 2 TIMES DAILY
Status: DISCONTINUED | OUTPATIENT
Start: 2017-10-06 | End: 2017-10-08 | Stop reason: HOSPADM

## 2017-10-06 RX ORDER — ONDANSETRON 2 MG/ML
INJECTION INTRAMUSCULAR; INTRAVENOUS PRN
Status: DISCONTINUED | OUTPATIENT
Start: 2017-10-06 | End: 2017-10-06 | Stop reason: SDUPTHER

## 2017-10-06 RX ORDER — SODIUM CHLORIDE 0.9 % (FLUSH) 0.9 %
10 SYRINGE (ML) INJECTION PRN
Status: DISCONTINUED | OUTPATIENT
Start: 2017-10-06 | End: 2017-10-06 | Stop reason: HOSPADM

## 2017-10-06 RX ORDER — ONDANSETRON 2 MG/ML
4 INJECTION INTRAMUSCULAR; INTRAVENOUS EVERY 6 HOURS PRN
Status: DISCONTINUED | OUTPATIENT
Start: 2017-10-06 | End: 2017-10-08 | Stop reason: HOSPADM

## 2017-10-06 RX ORDER — HYDROMORPHONE HYDROCHLORIDE 2 MG/1
4 TABLET ORAL EVERY 4 HOURS PRN
Status: DISCONTINUED | OUTPATIENT
Start: 2017-10-06 | End: 2017-10-08 | Stop reason: HOSPADM

## 2017-10-06 RX ORDER — MEPERIDINE HYDROCHLORIDE 50 MG/ML
12.5 INJECTION INTRAMUSCULAR; INTRAVENOUS; SUBCUTANEOUS EVERY 5 MIN PRN
Status: DISCONTINUED | OUTPATIENT
Start: 2017-10-06 | End: 2017-10-06 | Stop reason: HOSPADM

## 2017-10-06 RX ORDER — HYDRALAZINE HYDROCHLORIDE 20 MG/ML
5 INJECTION INTRAMUSCULAR; INTRAVENOUS EVERY 10 MIN PRN
Status: DISCONTINUED | OUTPATIENT
Start: 2017-10-06 | End: 2017-10-06 | Stop reason: HOSPADM

## 2017-10-06 RX ORDER — 0.9 % SODIUM CHLORIDE 0.9 %
500 INTRAVENOUS SOLUTION INTRAVENOUS PRN
Status: DISCONTINUED | OUTPATIENT
Start: 2017-10-06 | End: 2017-10-08 | Stop reason: HOSPADM

## 2017-10-06 RX ORDER — MORPHINE SULFATE 4 MG/ML
4 INJECTION, SOLUTION INTRAMUSCULAR; INTRAVENOUS EVERY 5 MIN PRN
Status: DISCONTINUED | OUTPATIENT
Start: 2017-10-06 | End: 2017-10-06 | Stop reason: HOSPADM

## 2017-10-06 RX ORDER — MORPHINE SULFATE 4 MG/ML
2 INJECTION, SOLUTION INTRAMUSCULAR; INTRAVENOUS
Status: DISCONTINUED | OUTPATIENT
Start: 2017-10-06 | End: 2017-10-08 | Stop reason: HOSPADM

## 2017-10-06 RX ORDER — ASPIRIN 81 MG/1
81 TABLET ORAL 2 TIMES DAILY
Status: DISCONTINUED | OUTPATIENT
Start: 2017-10-06 | End: 2017-10-06

## 2017-10-06 RX ORDER — METOCLOPRAMIDE HYDROCHLORIDE 5 MG/ML
10 INJECTION INTRAMUSCULAR; INTRAVENOUS
Status: DISCONTINUED | OUTPATIENT
Start: 2017-10-06 | End: 2017-10-06 | Stop reason: HOSPADM

## 2017-10-06 RX ORDER — ATORVASTATIN CALCIUM 20 MG/1
20 TABLET, FILM COATED ORAL DAILY
Status: DISCONTINUED | OUTPATIENT
Start: 2017-10-06 | End: 2017-10-08 | Stop reason: HOSPADM

## 2017-10-06 RX ORDER — MIDAZOLAM HYDROCHLORIDE 1 MG/ML
2 INJECTION INTRAMUSCULAR; INTRAVENOUS
Status: DISCONTINUED | OUTPATIENT
Start: 2017-10-06 | End: 2017-10-06 | Stop reason: HOSPADM

## 2017-10-06 RX ORDER — FENTANYL CITRATE 50 UG/ML
50 INJECTION, SOLUTION INTRAMUSCULAR; INTRAVENOUS
Status: DISCONTINUED | OUTPATIENT
Start: 2017-10-06 | End: 2017-10-06 | Stop reason: HOSPADM

## 2017-10-06 RX ORDER — MIDAZOLAM HYDROCHLORIDE 1 MG/ML
INJECTION INTRAMUSCULAR; INTRAVENOUS PRN
Status: DISCONTINUED | OUTPATIENT
Start: 2017-10-06 | End: 2017-10-06 | Stop reason: SDUPTHER

## 2017-10-06 RX ORDER — PANTOPRAZOLE SODIUM 40 MG/1
40 TABLET, DELAYED RELEASE ORAL
Status: DISCONTINUED | OUTPATIENT
Start: 2017-10-07 | End: 2017-10-08 | Stop reason: HOSPADM

## 2017-10-06 RX ORDER — ENALAPRILAT 2.5 MG/2ML
1.25 INJECTION INTRAVENOUS
Status: DISCONTINUED | OUTPATIENT
Start: 2017-10-06 | End: 2017-10-06 | Stop reason: HOSPADM

## 2017-10-06 RX ORDER — CEFAZOLIN SODIUM 1 G/3ML
INJECTION, POWDER, FOR SOLUTION INTRAMUSCULAR; INTRAVENOUS PRN
Status: DISCONTINUED | OUTPATIENT
Start: 2017-10-06 | End: 2017-10-06 | Stop reason: SDUPTHER

## 2017-10-06 RX ADMIN — Medication 10 ML: at 21:54

## 2017-10-06 RX ADMIN — DOCUSATE SODIUM 100 MG: 100 CAPSULE, LIQUID FILLED ORAL at 14:34

## 2017-10-06 RX ADMIN — PROPOFOL 150 MG: 10 INJECTION, EMULSION INTRAVENOUS at 11:52

## 2017-10-06 RX ADMIN — HYDROMORPHONE HYDROCHLORIDE 1 MG: 1 INJECTION, SOLUTION INTRAMUSCULAR; INTRAVENOUS; SUBCUTANEOUS at 12:36

## 2017-10-06 RX ADMIN — GABAPENTIN 300 MG: 300 CAPSULE ORAL at 20:35

## 2017-10-06 RX ADMIN — METOPROLOL SUCCINATE 25 MG: 25 TABLET, EXTENDED RELEASE ORAL at 20:35

## 2017-10-06 RX ADMIN — MORPHINE SULFATE 4 MG: 4 INJECTION, SOLUTION INTRAMUSCULAR; INTRAVENOUS at 23:26

## 2017-10-06 RX ADMIN — MIDAZOLAM HYDROCHLORIDE 2 MG: 1 INJECTION, SOLUTION INTRAMUSCULAR; INTRAVENOUS at 11:46

## 2017-10-06 RX ADMIN — FENTANYL CITRATE 50 MCG: 50 INJECTION INTRAMUSCULAR; INTRAVENOUS at 12:07

## 2017-10-06 RX ADMIN — ONDANSETRON HYDROCHLORIDE 4 MG: 2 INJECTION, SOLUTION INTRAVENOUS at 12:27

## 2017-10-06 RX ADMIN — DEXAMETHASONE SODIUM PHOSPHATE 10 MG: 10 INJECTION INTRAMUSCULAR; INTRAVENOUS at 12:02

## 2017-10-06 RX ADMIN — SODIUM CHLORIDE: 9 INJECTION, SOLUTION INTRAVENOUS at 14:42

## 2017-10-06 RX ADMIN — FENTANYL CITRATE 50 MCG: 50 INJECTION INTRAMUSCULAR; INTRAVENOUS at 12:13

## 2017-10-06 RX ADMIN — SODIUM CHLORIDE, SODIUM LACTATE, POTASSIUM CHLORIDE, AND CALCIUM CHLORIDE: 600; 310; 30; 20 INJECTION, SOLUTION INTRAVENOUS at 10:08

## 2017-10-06 RX ADMIN — LIDOCAINE HYDROCHLORIDE 50 MG: 10 INJECTION, SOLUTION EPIDURAL; INFILTRATION; INTRACAUDAL; PERINEURAL at 11:52

## 2017-10-06 RX ADMIN — HYDROMORPHONE HYDROCHLORIDE 8 MG: 2 TABLET ORAL at 21:53

## 2017-10-06 RX ADMIN — FENTANYL CITRATE 50 MCG: 50 INJECTION INTRAMUSCULAR; INTRAVENOUS at 12:15

## 2017-10-06 RX ADMIN — SUGAMMADEX 150 MG: 100 INJECTION, SOLUTION INTRAVENOUS at 12:27

## 2017-10-06 RX ADMIN — FENTANYL CITRATE 50 MCG: 50 INJECTION INTRAMUSCULAR; INTRAVENOUS at 11:52

## 2017-10-06 RX ADMIN — MORPHINE SULFATE 2 MG: 4 INJECTION, SOLUTION INTRAMUSCULAR; INTRAVENOUS at 14:34

## 2017-10-06 RX ADMIN — PROPOFOL 50 MG: 10 INJECTION, EMULSION INTRAVENOUS at 12:16

## 2017-10-06 RX ADMIN — HYDROMORPHONE HYDROCHLORIDE 8 MG: 2 TABLET ORAL at 17:13

## 2017-10-06 RX ADMIN — EPHEDRINE SULFATE 15 MG: 50 INJECTION, SOLUTION INTRAMUSCULAR; INTRAVENOUS; SUBCUTANEOUS at 11:55

## 2017-10-06 RX ADMIN — ROCURONIUM BROMIDE 30 MG: 10 INJECTION INTRAVENOUS at 11:52

## 2017-10-06 RX ADMIN — ASPIRIN 81 MG: 81 TABLET ORAL at 14:34

## 2017-10-06 RX ADMIN — CEFAZOLIN 2000 MG: 1 INJECTION, POWDER, FOR SOLUTION INTRAVENOUS at 12:12

## 2017-10-06 RX ADMIN — SODIUM CHLORIDE, SODIUM LACTATE, POTASSIUM CHLORIDE, AND CALCIUM CHLORIDE: 600; 310; 30; 20 INJECTION, SOLUTION INTRAVENOUS at 12:16

## 2017-10-06 RX ADMIN — GABAPENTIN 300 MG: 300 CAPSULE ORAL at 14:34

## 2017-10-06 RX ADMIN — ASPIRIN 325 MG: 325 TABLET, DELAYED RELEASE ORAL at 20:35

## 2017-10-06 RX ADMIN — CEFAZOLIN SODIUM 1 G: 1 INJECTION, SOLUTION INTRAVENOUS at 20:35

## 2017-10-06 RX ADMIN — SODIUM CHLORIDE: 9 INJECTION, SOLUTION INTRAVENOUS at 23:21

## 2017-10-06 RX ADMIN — DOCUSATE SODIUM 100 MG: 100 CAPSULE, LIQUID FILLED ORAL at 20:34

## 2017-10-06 RX ADMIN — HYDROMORPHONE HYDROCHLORIDE 0.5 MG: 1 INJECTION, SOLUTION INTRAMUSCULAR; INTRAVENOUS; SUBCUTANEOUS at 12:54

## 2017-10-06 RX ADMIN — MELATONIN 3 MG ORAL TABLET 3 MG: 3 TABLET ORAL at 20:35

## 2017-10-06 ASSESSMENT — PAIN SCALES - GENERAL
PAINLEVEL_OUTOF10: 6
PAINLEVEL_OUTOF10: 10
PAINLEVEL_OUTOF10: 8
PAINLEVEL_OUTOF10: 8
PAINLEVEL_OUTOF10: 6
PAINLEVEL_OUTOF10: 6

## 2017-10-06 ASSESSMENT — PAIN - FUNCTIONAL ASSESSMENT: PAIN_FUNCTIONAL_ASSESSMENT: 0-10

## 2017-10-06 ASSESSMENT — PAIN DESCRIPTION - PAIN TYPE: TYPE: ACUTE PAIN

## 2017-10-06 ASSESSMENT — PAIN DESCRIPTION - ORIENTATION: ORIENTATION: LEFT

## 2017-10-06 ASSESSMENT — PAIN DESCRIPTION - LOCATION: LOCATION: KNEE

## 2017-10-06 NOTE — PROGRESS NOTES
Physical Therapy    Facility/Department: [facility] ONCOLOGY UNIT  Initial Assessment    NAME: Valdemar Real  : 1940  MRN: [id number]    Date of Service: 10/6/2017    Patient Diagnosis(es): There were no encounter diagnoses. has a past medical history of Chronic back pain; Constipation; GERD (gastroesophageal reflux disease); Hernia, hiatal; Hypertension; Irritable bowel syndrome; Kidney stones; Neuropathy ([address] Schatzki's ring; Scoliosis; and Scoliosis. has a past surgical history that includes back surgery; eye surgery; joint replacement; Colonoscopy (2011); Upper gastrointestinal endoscopy (? ); ovarian cyst removal; Kidney stone surgery; Breast surgery; other surgical history (2015); colostomy; Arteriogram (Right, 2017); REMOVE HARDWARE KNEE (Right, 2017); Arteriogram (Right, 2017); and incis/drain thigh/knee abscess,deep (Left, 10/6/2017).     Restrictions  Restrictions/Precautions  Restrictions/Precautions: Fall Risk, Weight Bearing  Lower Extremity Weight Bearing Restrictions  Left Lower Extremity Weight Bearing: Weight Bearing As Tolerated  Vision/Hearing  Vision: Within Functional Limits  Hearing: Within functional limits     Subjective  General  Chart Reviewed: Yes  Family / Caregiver Present: Yes (SPOUSE)  Diagnosis: L KNEE I&D  Subjective  Subjective: Pt HURTING BUT WANTS TO TRY WALKING  Pain Screening  Patient Currently in Pain: Yes  Pain Assessment  Pain Assessment: 0-10  Pain Level: 10  Pain Type: Acute pain  Pain Location: Knee  Pain Orientation: Left  Vital Signs  Patient Currently in Pain: Yes  Oxygen Therapy  O2 Device: Nasal cannula  O2 Flow Rate (L/min): 2 L/min  Patient Observation  Observations: REMOVED O2 FOR GT       Orientation  Orientation  Overall Orientation Status: Within Normal Limits    Social/Functional History  Social/Functional History  Lives With: Spouse  Type of Home: House  Home Layout: One level  Home Access: Stairs to enter without rails  Home Equipment: Rolling walker  ADL Assistance: Independent  Ambulation Assistance: Independent  Transfer Assistance: Independent  Objective     Observation/Palpation  Observation: ACE WRAP L KNEE, MAGUE DRAIN    AROM RLE (degrees)  RLE AROM: WFL  AROM LLE (degrees)  LLE General AROM: Pt POSITIONS L KNEE MOSTLY EXTENDED DUE TO PAIN  Strength RLE  Comment: GROSSLY 5/5  Strength LLE  Comment: GROSSLY +3/5        Bed mobility  Supine to Sit: Contact guard assistance  Sit to Supine: Contact guard assistance  Transfers  Sit to Stand: Contact guard assistance  Bed to Chair: Contact guard assistance  Ambulation  Ambulation?: Yes  WB Status: WBAT  Ambulation 1  Device: Rolling Walker  Assistance: Contact guard assistance  Quality of Gait: STEP-TO PATTERN, SUFFICIENT WEIGHTBEARING BUT GUARDED  Distance: 10'     Balance  Sitting - Dynamic: Good  Standing - Dynamic: Fair;+        Assessment   Body structures, Functions, Activity limitations: Decreased functional mobility ; Decreased balance;Decreased ROM; Decreased strength  Assessment: Pt BEARING WEIGHT THROUGH LLE BUT PAINFUL. Pt MOTIVATED TO KEEP MOVING. REQUIRES PT FOLLOW UP: Yes  Activity Tolerance  Activity Tolerance: Patient Tolerated treatment well;Patient limited by pain     Discharge Recommendations:  Continue to assess pending progress      Plan   Plan  Times per week: AT LEAST 7  Current Treatment Recommendations: Strengthening, ROM, Balance Training, Functional Mobility Training, Gait Training, Transfer Training, Safety Education & Training, Patient/Caregiver Education & Training    G-Code  PT G-Codes  Functional Assessment Tool Used: BED TO CHAIR  Functional Limitation: Mobility: Walking and moving around  Mobility: Walking and Moving Around Current Status ():  At least 20 percent but less than 40 percent impaired, limited or restricted  Mobility: Walking and Moving Around Goal Status (): 0 percent impaired, limited or restricted  OutComes Score

## 2017-10-06 NOTE — PROGRESS NOTES
Physical Therapy    Erasmo Hernandez  [id number]       10/06/17 1610   Restrictions/Precautions   Restrictions/Precautions Fall Risk;Weight Bearing   Lower Extremity Weight Bearing Restrictions   Left Lower Extremity Weight Bearing Weight Bearing As Tolerated   General   Diagnosis L KNEE I&D   Subjective   Subjective Pt will need BR soon, but wants to go back to bed first   Transfers   Sit to Stand Contact guard assistance   Bed to Chair Contact guard assistance   Ambulation 1   Device Rolling [address] 53 guard assistance   Quality of Gait STEP-TO PATTERN, SUFFICIENT WEIGHTBEARING BUT GUARDED   Distance 25'   Balance   Sitting - Dynamic Good   Standing - Dynamic Fair;+   Short term goals   Time Frame for Short term goals 14 DAYS   Short term goal 1 BED MOB INDEPENDENT   Short term goal 2 TRANSFERS MOD IND   Short term goal 3 ' RW MOD IND   Conditions Requiring Skilled Therapeutic Intervention   Body structures, Functions, Activity limitations Decreased functional mobility ; Decreased balance;Decreased ROM; Decreased strength   Assessment Pt BEARING WEIGHT THROUGH LLE BUT PAINFUL. Pt MOTIVATED TO KEEP MOVING. Discharge Recommendations Continue to assess pending progress   Activity Tolerance   Activity Tolerance Patient Tolerated treatment well;Patient limited by pain   Plan   Times per week AT LEAST 7   Current Treatment Recommendations Strengthening;ROM;Balance Training;Functional Mobility Training;Gait Training;Transfer Training; Safety Education & Training;Patient/Caregiver Education & Training     Electronically signed by Maris Chiu PT on 10/6/2017 at 4:12 PM

## 2017-10-06 NOTE — CONSULTS
CONSULTATION NOTE :ID       Patient - Lan Sandoval,  Age - 78 y.o.    - 1940      Room Number - 1411/068-19   N -  [id number]   Acct # - [de-identified]  Date of Admission -  10/6/2017  9:31 AM  Patient's PCP: Leta Cat MD     Requesting Physician: Humberto Bernal [facility],*    REASON FOR CONSULTATION      suspected infection post op right total knee replacement            HISTORY OF PRESENT ILLNESS       This is a very pleasant 78 y.o. female who was admitted to the hospital with a chief complaints of  Left knee pain. 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 As an outpatient she had been followed by  [facility] and had aspirates of that knee revealed Klebsiella species via a Diathrix from Dr. Nazanin Granda office. She was brought to surgery and underwent removal of the implants (upper about 12years old) and placement of antibiotic articulate and spacer. She had a vascular injury at that time and Dr. Kaylie Ratliff subsequently preformed a right lower extremity arteriogram, greater saphenous vein harvest from the left calf and the right popliteal artery bypass with reverse greater saphenous vein interposition. General repair of the right popliteal vein with a greater saphenous vein patch as well. Cultures from that admission were positive for staph lulgdenesis as well as Propionibacterium acnes. No Klebsiella was isolated. She was discharged on IV cefazolin and had remained on oral Keflex as an outpatient. And stopped that therapy 2 weeks ago    As an outpatient she has had quite a bit of problems with his migratory pain in shoulders as well as left thumb and left knee. She had a injection in her left knee that actually improved her symptoms for short amount time in her left knee pain worsened. She saw  [facility] in the office on .  The knee was aspirated at that time and was sent for Eliquis  and on aspirin      PHYSICAL EXAM:     /72  Pulse 75  Temp 96.8 °F (36 °C) (Temporal)   Resp 20  Ht 5' 4\" (1.626 m)  Wt 155 lb (70.3 kg)  SpO2 95%  BMI 26.61 kg/m2 Temp (24hrs), Av.7 °F (35.9 °C), Min:91.2 °F (32.9 °C), Max:98.2 °F (36.8 °C)    General:  Awake, alert, not in distress. HEENT: pink conjunctiva, anicteric sclera  LN: no enlarged cervical, supraclavicular LAD  Chest:  Lungs without crackles, ronchi or wheezing  Cardiovascular:  RRR ,S1S2  Abdomen:  Soft, non tender to palpation, BS positive  Extremities: Right lower extremity incision about her knee is clean dry and intact and healed. Left knee has postoperative dressing in place with Ace wrap over it. There is a drain noted with serosanguineous fluid. Skin:  Warm and dry. CNS: Moves all extremities, speech clear, follows commands  PSYCH: alert, pleasant and cooperative. Somewhat anxious which is not unusual  IV access:  Peripheral IV antecubital right, condition patent and no redness         LABS:     Results for Ras JOEL (MRN [id number]) as of 10/6/2017 17:45   Ref. Range 2017 09:49 2017 10:34   CRP Latest Ref Range: 0.00 - 0.50 mg/dL <0.3 1.68 (H)     Results for [facility], RAFAEL JOEL (MRN [id number]) as of 10/6/2017 17:45   Ref.  Range 2017 09:49 2017 10:34   Sed Rate Latest Ref Range: 0 - 25 mm/Hr 48 (H) 87 (H)     >>>>>>>Lab flowsheet from [facility] in the blue chart          ASSESSMENT AND PLAN     Patient Active Problem List   Diagnosis    IBS (irritable bowel syndrome)    HTN (hypertension)    Spinal stenosis    Irritable bowel syndrome with constipation    Bloating    Gas pain    Small intestinal bacterial overgrowth    History of diverticulosis    Popliteal artery thrombosis, right (HCC)    Gastroesophageal reflux disease without esophagitis    Constipation due to opioid therapy    Iron deficiency anemia    Atheromatous plaque     65-year-old female who I saw in May of last year after a complicated right total knee explant with associated vascular injury. The patient's outpatient Diathrix was positive for Klebsiella however her inpatient cultures were positive for Staphylococcus lugdenesis and Propionibacterium species. She did well as an outpatient from the standpoint of her right knee surgery on IV cefazolin and then oral Keflex. She's been off that for about 2 weeks. She has had variable issues including migratory arthralgias involving her shoulders bilaterally, left thumb, and left knee. More consistently her left knee has been quite painful. Was aspirated per Dr. Mckenzie Franco on October 2. Per information in the blue chart the fluid was positive for CPPD crystals. Her white count was around 12,000 with 85% neutrophils. Per handwritten note the culture was negative as of October 4. She was taken to surgery today for I&D of that left knee (there is no prosthesis in this knee). She also underwent aspiration of the right knee. Continue cefazolin    Monitor surgical cultures    Try to get updated cultures are final results from culture sent from Dr. Sexton Rank office October 2.       Thank you Karly Malhotra,Rob for allowing me to participate in this patient's care  Electronically signed by Stefania Blevins MD on 10/6/2017 at 5:31 PM

## 2017-10-06 NOTE — ANESTHESIA PRE PROCEDURE
Department of Anesthesiology  Preprocedure Note       Name:  Faustina Davison   Age:  78 y.o.  :  1940                                          MRN:  [id number]         Date:  10/6/2017      Surgeon: Ramona Capellan):  Lester Ortiz MD    Procedure: Procedure(s):  KNEE INCISION AND DRAINAGE    Medications prior to admission:   Prior to Admission medications    Medication Sig Start Date End Date Taking? Authorizing Provider   apixaban (ELIQUIS) 5 MG TABS tablet Take 5 mg by mouth 2 times daily    Historical Provider, MD   atorvastatin (LIPITOR) 20 MG tablet Take 20 mg by mouth daily    Historical Provider, MD   Saccharomyces boulardii (PROBIOTIC) 250 MG CAPS Take by mouth daily    Historical Provider, MD   CefTRIAXone Sodium (ROCEPHIN IV) Infuse 2 g intravenously daily 2 grams at home daily    Historical Provider, MD   oxyCODONE-acetaminophen (PERCOCET)  MG per tablet Take 1 tablet by mouth every 6 hours as needed for Pain . Historical Provider, MD   metoprolol succinate (TOPROL XL) 25 MG extended release tablet TAKE 1 TABLET BY MOUTH DAILY 17   [place] Adame   melatonin 3 MG TABS tablet Take 3 mg by mouth daily    Historical Provider, MD   omeprazole (PRILOSEC) 20 MG capsule TAKE 1 CAPSULE EVERY DAY 16   [place] Adame   hydrochlorothiazide (HYDRODIURIL) 25 MG tablet TAKE 1 TABLET EVERY DAY 16   [place] Adame   benazepril (LOTENSIN) 20 MG tablet Take 1 tablet by mouth daily 16   Lori Suarez MD   gabapentin (NEURONTIN) 300 MG capsule TAKE 1 CAPSULE THREE TIMES DAILY 16   Lori Suarez MD   polyethylene glycol (MIRALAX) powder 1 scoop in 8 oz fluids bid 11/30/15   Hannah Luz MD   aspirin 81 MG tablet Take 81 mg by mouth daily. Historical Provider, MD   Calcium Carb-Cholecalciferol (CALCIUM + D3) 600-200 MG-UNIT TABS Take  by mouth Daily.     Historical Provider, MD       Current medications: 5/30/2017    KNEE HARDWARE REMOVAL AND INSERTION OF ANTIBIOTIC BEAD  performed by Saba Boland MD at [address] [place] [address]       Social History:    Social History   Substance Use Topics    Smoking status: Never Smoker    Smokeless tobacco: Never Used    Alcohol use No                                Counseling given: Not Answered      Vital Signs (Current): There were no vitals filed for this visit. BP Readings from Last 3 Encounters:   10/05/17 121/81   07/12/17 126/60   06/22/17 128/72       NPO Status:                                                                                 BMI:   Wt Readings from Last 3 Encounters:   06/15/17 160 lb (72.6 kg)   05/31/17 170 lb 4.8 oz (77.2 kg)   05/22/17 160 lb (72.6 kg)     There is no height or weight on file to calculate BMI.    CBC:   Lab Results   Component Value Date    WBC 12.1 06/03/2017    RBC 2.68 06/03/2017    HGB 8.5 06/05/2017    HCT 26.7 06/05/2017    HCT 44.0 11/02/2011    MCV 93.7 06/03/2017    RDW 16.4 06/03/2017     06/03/2017     11/02/2011       CMP:   Lab Results   Component Value Date     06/05/2017     11/02/2011    K 4.3 06/05/2017    K 4.0 11/02/2011     06/05/2017     11/02/2011    CO2 30 06/05/2017    BUN 21 06/05/2017    CREATININE 1.1 06/15/2017    CREATININE 0.8 06/05/2017    CREATININE 1.0 11/02/2011    LABGLOM 48 06/15/2017    GLUCOSE 105 06/05/2017    PROT 7.3 08/09/2012    CALCIUM 8.8 06/05/2017    ALKPHOS 65 08/09/2012    ALKPHOS 58 11/02/2011    AST 19 08/09/2012    ALT 18 08/09/2012       POC Tests: No results for input(s): POCGLU, POCNA, POCK, POCCL, POCBUN, POCHEMO, POCHCT in the last 72 hours.     Coags:   Lab Results   Component Value Date    PROTIME 13.5 05/22/2017    INR 1.04 05/22/2017    APTT 27.7 05/22/2017       HCG (If Applicable): No results found for: PREGTESTUR, PREGSERUM, HCG,

## 2017-10-07 LAB
ANION GAP SERPL CALCULATED.3IONS-SCNC: 10 MMOL/L (ref 7–19)
BUN BLDV-MCNC: 17 MG/DL (ref 8–23)
CALCIUM SERPL-MCNC: 9 MG/DL (ref 8.8–10.2)
CHLORIDE BLD-SCNC: 100 MMOL/L (ref 98–111)
CO2: 26 MMOL/L (ref 22–29)
CREAT SERPL-MCNC: 0.8 MG/DL (ref 0.5–0.9)
GFR NON-AFRICAN AMERICAN: >60
GLUCOSE BLD-MCNC: 125 MG/DL (ref 74–109)
HCT VFR BLD CALC: 30.3 % (ref 37–47)
HEMOGLOBIN: 9.4 G/DL (ref 12–16)
POTASSIUM SERPL-SCNC: 4.1 MMOL/L (ref 3.5–5)
SODIUM BLD-SCNC: 136 MMOL/L (ref 136–145)

## 2017-10-07 PROCEDURE — 2580000003 HC RX 258: Performed by: ORTHOPAEDIC SURGERY

## 2017-10-07 PROCEDURE — 97116 GAIT TRAINING THERAPY: CPT

## 2017-10-07 PROCEDURE — 36415 COLL VENOUS BLD VENIPUNCTURE: CPT

## 2017-10-07 PROCEDURE — 6370000000 HC RX 637 (ALT 250 FOR IP): Performed by: ORTHOPAEDIC SURGERY

## 2017-10-07 PROCEDURE — 6360000002 HC RX W HCPCS: Performed by: ORTHOPAEDIC SURGERY

## 2017-10-07 PROCEDURE — 97110 THERAPEUTIC EXERCISES: CPT

## 2017-10-07 PROCEDURE — 1210000000 HC MED SURG R&B

## 2017-10-07 PROCEDURE — 85018 HEMOGLOBIN: CPT

## 2017-10-07 PROCEDURE — 2700000000 HC OXYGEN THERAPY PER DAY

## 2017-10-07 PROCEDURE — 80048 BASIC METABOLIC PNL TOTAL CA: CPT

## 2017-10-07 PROCEDURE — 85014 HEMATOCRIT: CPT

## 2017-10-07 RX ORDER — LISINOPRIL 20 MG/1
20 TABLET ORAL DAILY
Status: DISCONTINUED | OUTPATIENT
Start: 2017-10-07 | End: 2017-10-08 | Stop reason: HOSPADM

## 2017-10-07 RX ORDER — POLYETHYLENE GLYCOL 3350 17 G/17G
17 POWDER, FOR SOLUTION ORAL DAILY
Status: DISCONTINUED | OUTPATIENT
Start: 2017-10-07 | End: 2017-10-08 | Stop reason: HOSPADM

## 2017-10-07 RX ORDER — BENAZEPRIL HYDROCHLORIDE 10 MG/1
20 TABLET ORAL DAILY
Status: DISCONTINUED | OUTPATIENT
Start: 2017-10-07 | End: 2017-10-07 | Stop reason: CLARIF

## 2017-10-07 RX ORDER — CEPHALEXIN 500 MG/1
1000 CAPSULE ORAL EVERY 8 HOURS SCHEDULED
Status: DISCONTINUED | OUTPATIENT
Start: 2017-10-07 | End: 2017-10-08 | Stop reason: HOSPADM

## 2017-10-07 RX ORDER — HYDROCHLOROTHIAZIDE 25 MG/1
25 TABLET ORAL DAILY
Status: DISCONTINUED | OUTPATIENT
Start: 2017-10-07 | End: 2017-10-08 | Stop reason: HOSPADM

## 2017-10-07 RX ADMIN — MORPHINE SULFATE 4 MG: 4 INJECTION, SOLUTION INTRAMUSCULAR; INTRAVENOUS at 18:24

## 2017-10-07 RX ADMIN — MORPHINE SULFATE 2 MG: 4 INJECTION, SOLUTION INTRAMUSCULAR; INTRAVENOUS at 04:32

## 2017-10-07 RX ADMIN — SODIUM CHLORIDE: 9 INJECTION, SOLUTION INTRAVENOUS at 08:44

## 2017-10-07 RX ADMIN — PANTOPRAZOLE SODIUM 40 MG: 40 TABLET, DELAYED RELEASE ORAL at 06:40

## 2017-10-07 RX ADMIN — GABAPENTIN 300 MG: 300 CAPSULE ORAL at 08:43

## 2017-10-07 RX ADMIN — HYDROMORPHONE HYDROCHLORIDE 8 MG: 2 TABLET ORAL at 20:58

## 2017-10-07 RX ADMIN — ASPIRIN 325 MG: 325 TABLET, DELAYED RELEASE ORAL at 20:58

## 2017-10-07 RX ADMIN — CEFAZOLIN SODIUM 1 G: 1 INJECTION, SOLUTION INTRAVENOUS at 04:28

## 2017-10-07 RX ADMIN — LISINOPRIL 20 MG: 20 TABLET ORAL at 15:07

## 2017-10-07 RX ADMIN — MORPHINE SULFATE 4 MG: 4 INJECTION, SOLUTION INTRAMUSCULAR; INTRAVENOUS at 11:13

## 2017-10-07 RX ADMIN — HYDROMORPHONE HYDROCHLORIDE 8 MG: 2 TABLET ORAL at 08:49

## 2017-10-07 RX ADMIN — CEFAZOLIN SODIUM 1 G: 1 INJECTION, SOLUTION INTRAVENOUS at 17:50

## 2017-10-07 RX ADMIN — POLYETHYLENE GLYCOL 3350 17 G: 17 POWDER, FOR SOLUTION ORAL at 15:07

## 2017-10-07 RX ADMIN — HYDROMORPHONE HYDROCHLORIDE 8 MG: 2 TABLET ORAL at 02:00

## 2017-10-07 RX ADMIN — GABAPENTIN 300 MG: 300 CAPSULE ORAL at 13:55

## 2017-10-07 RX ADMIN — Medication 10 ML: at 08:43

## 2017-10-07 RX ADMIN — ASPIRIN 325 MG: 325 TABLET, DELAYED RELEASE ORAL at 08:43

## 2017-10-07 RX ADMIN — METOPROLOL SUCCINATE 25 MG: 25 TABLET, EXTENDED RELEASE ORAL at 20:58

## 2017-10-07 RX ADMIN — DOCUSATE SODIUM 100 MG: 100 CAPSULE, LIQUID FILLED ORAL at 08:42

## 2017-10-07 RX ADMIN — GABAPENTIN 300 MG: 300 CAPSULE ORAL at 20:58

## 2017-10-07 RX ADMIN — HYDROMORPHONE HYDROCHLORIDE 8 MG: 2 TABLET ORAL at 14:01

## 2017-10-07 RX ADMIN — DOCUSATE SODIUM 100 MG: 100 CAPSULE, LIQUID FILLED ORAL at 20:58

## 2017-10-07 RX ADMIN — MELATONIN 3 MG ORAL TABLET 3 MG: 3 TABLET ORAL at 20:58

## 2017-10-07 ASSESSMENT — PAIN DESCRIPTION - LOCATION: LOCATION: KNEE

## 2017-10-07 ASSESSMENT — ENCOUNTER SYMPTOMS
SHORTNESS OF BREATH: 0
DOUBLE VISION: 0
DIARRHEA: 0
VOMITING: 0
BLURRED VISION: 0
NAUSEA: 0
COUGH: 0

## 2017-10-07 ASSESSMENT — PAIN SCALES - GENERAL
PAINLEVEL_OUTOF10: 7
PAINLEVEL_OUTOF10: 6
PAINLEVEL_OUTOF10: 5
PAINLEVEL_OUTOF10: 5
PAINLEVEL_OUTOF10: 6
PAINLEVEL_OUTOF10: 6
PAINLEVEL_OUTOF10: 8
PAINLEVEL_OUTOF10: 5
PAINLEVEL_OUTOF10: 8
PAINLEVEL_OUTOF10: 6
PAINLEVEL_OUTOF10: 5

## 2017-10-07 ASSESSMENT — PAIN DESCRIPTION - ORIENTATION: ORIENTATION: LEFT

## 2017-10-07 NOTE — PROGRESS NOTES
Tele called to report pt's HR up in 140's. Call placed to hospitalist, waiting for response. Pt showing no s/s of acute distress at this time. Will continue to monitor.  Electronically signed by Vito Guadarrama RN on 10/7/2017 at 12:07 PM

## 2017-10-07 NOTE — PLAN OF CARE
Problem: Falls - Risk of  Goal: Absence of falls  Outcome: Ongoing      Problem: Pain:  Goal: Pain level will decrease  Pain level will decrease   Outcome: Ongoing    Goal: Control of acute pain  Control of acute pain   Outcome: Ongoing    Goal: Control of chronic pain  Control of chronic pain   Outcome: Ongoing

## 2017-10-07 NOTE — PROGRESS NOTES
Physical Therapy  Name: Aura Lerma  MRN:  [id number]  Date of service:  10/7/2017         10/07/17 1452   Restrictions/Precautions   Restrictions/Precautions Fall Risk;Weight Bearing   Lower Extremity Weight Bearing Restrictions   Left Lower Extremity Weight Bearing Weight Bearing As Tolerated   General   Family / Caregiver Present Yes   Subjective   Subjective Pt relays that her leg is very sensitive; has had multile att at starting IV this afternoon with a couple of infiltrations and declines therapy at this time   General Comment   Comments pt encouraged to perform AROM in bed to best of her ability this afternoon and pt receptive to this; pt just recently up to BR; ck back tomorrow       Electronically signed by Jace Noble PTA on 10/7/2017 at 2:53 PM

## 2017-10-07 NOTE — PROGRESS NOTES
Physical Therapy  Name: Thanh Lerenr  MRN:  [id number]  Date of service:  10/7/2017         10/07/17 0930   Restrictions/Precautions   Restrictions/Precautions Fall Risk;Weight Bearing   Lower Extremity Weight Bearing Restrictions   Left Lower Extremity Weight Bearing Weight Bearing As Tolerated   General   Chart Reviewed Yes   Family / Caregiver Present Yes   Subjective   Subjective Pt agrees to try to move around. Cont to report discomfort with wbing   General Comment   Comments in bed before and after session, declines up to chair   Pain Screening   Patient Currently in Pain Yes   Pain Assessment   Pain Assessment Faces   Pain Level 6  (with initial wbing though improves with distance)   Pain Location Knee   Pain Orientation Left   Pain Intervention(s) Cold applied;Repositioned; Ambulation/Increased activity   Bed Mobility   Supine to Sit Modified independent   Sit to Supine Modified independent   Comment use of bedrails for bed mob   Transfers   Sit to Stand Stand by assistance   Stand to sit Stand by assistance   Ambulation   Ambulation?  Yes   WB Status WBAT   Ambulation 1   Surface level tile   Device Rolling Walker   Assistance Contact guard assistance   Quality of Gait antalgic especially initially, cues for proper step sequence   Distance 80'   Exercises   Quad Sets 10   Heelslides 10 AA and very little ROM   Hip Abduction 10 AA   Knee Short Arc Quad 10 AA with discomfort   Ankle Pumps 10   Comments ex performed longsitting in bed for L LE except AP B   Short term goals   Time Frame for Short term goals 14 DAYS   Short term goal 1 BED MOB INDEPENDENT   Short term goal 2 TRANSFERS MOD IND   Short term goal 3 ' RW MOD IND   Conditions Requiring Skilled Therapeutic Intervention   Assessment pt reluctant to WB on L LE due to discomfort; inc distance and seemed to improve with time; cues required to properly sequence to protect sore L LE   Other Comments   Comments btb with SCDs on and active ice to post

## 2017-10-07 NOTE — CONSULTS
HARDWARE REMOVAL AND INSERTION OF ANTIBIOTIC BEAD  performed by Wily Coleman MD at [address]  ? [facility]        Medications Prior to Admission:       Prior to Admission medications    Medication Sig Start Date End Date Taking? Authorizing Provider   atorvastatin (LIPITOR) 20 MG tablet Take 20 mg by mouth daily   Yes Historical Provider, MD   Saccharomyces boulardii (PROBIOTIC) 250 MG CAPS Take by mouth daily   Yes Historical Provider, MD   CefTRIAXone Sodium (ROCEPHIN IV) Infuse 2 g intravenously daily 2 grams at home daily   Yes Historical Provider, MD   oxyCODONE-acetaminophen (PERCOCET)  MG per tablet Take 1 tablet by mouth every 6 hours as needed for Pain . Yes Historical Provider, MD   melatonin 3 MG TABS tablet Take 3 mg by mouth daily   Yes Historical Provider, MD   hydrochlorothiazide (HYDRODIURIL) 25 MG tablet TAKE 1 TABLET EVERY DAY 7/20/16  Yes [place]   benazepril (LOTENSIN) 20 MG tablet Take 1 tablet by mouth daily 5/26/16  Yes Chapin Bucio MD   Calcium Carb-Cholecalciferol (CALCIUM + D3) 600-200 MG-UNIT TABS Take  by mouth Daily. Yes Historical Provider, MD   apixaban (ELIQUIS) 5 MG TABS tablet Take 5 mg by mouth 2 times daily    Historical Provider, MD   metoprolol succinate (TOPROL XL) 25 MG extended release tablet TAKE 1 TABLET BY MOUTH DAILY 1/9/17   [place]   omeprazole (PRILOSEC) 20 MG capsule TAKE 1 CAPSULE EVERY DAY 8/12/16   [place] Colon   gabapentin (NEURONTIN) 300 MG capsule TAKE 1 CAPSULE THREE TIMES DAILY 5/23/16   Chapin Bucio MD   polyethylene glycol (MIRALAX) powder 1 scoop in 8 oz fluids bid 11/30/15   Hannah Luz MD   aspirin 81 MG tablet Take 81 mg by mouth daily. Historical Provider, MD        Allergies:       Review of patient's allergies indicates no known allergies.     Social History:     Tobacco:    reports that she has never smoked. She has never used smokeless tobacco.  Alcohol:      reports that she does not drink alcohol. Drug Use:  reports that she does not use drugs. Family History:     Family History   Problem Relation Age of Onset    Cancer Mother     Colon Polyps Neg Hx     Liver Cancer Neg Hx     Liver Disease Neg Hx     Stomach Cancer Neg Hx     Rectal Cancer Neg Hx     Esophageal Cancer Neg Hx     Colon Cancer Neg Hx        Review of Systems:     Review of Systems   Constitutional: Negative for chills and fever. HENT: Negative for congestion. Eyes: Negative for blurred vision and double vision. Respiratory: Negative for cough and shortness of breath. Cardiovascular: Negative for chest pain and leg swelling. Gastrointestinal: Negative for diarrhea, nausea and vomiting. Genitourinary: Negative for dysuria and urgency. Musculoskeletal: Positive for joint pain and myalgias. Skin: Negative for itching and rash. Neurological: Negative for dizziness, focal weakness and headaches. Psychiatric/Behavioral: Negative for depression. The patient is not nervous/anxious. Code Status:  Full Code    Physical Exam:     Vitals:  /72   Pulse 77   Temp 97.8 °F (36.6 °C) (Temporal)   Resp 20   Ht 5' 4\" (1.626 m)   Wt 155 lb (70.3 kg)   SpO2 93%   BMI 26.61 kg/m²   Temp (24hrs), Av.2 °F (36.2 °C), Min:96.7 °F (35.9 °C), Max:97.8 °F (36.6 °C)    Physical Exam   Constitutional: She is oriented to person, place, and time. She appears well-developed and well-nourished. No distress. HENT:   Head: Normocephalic and atraumatic. Mouth/Throat: Oropharynx is clear and moist.   Eyes: EOM are normal. Pupils are equal, round, and reactive to light. No scleral icterus. Neck: Normal range of motion. Neck supple. No tracheal deviation present. No thyromegaly present. Cardiovascular: Normal rate, regular rhythm and intact distal pulses.     Pulmonary/Chest: Effort normal and breath sounds

## 2017-10-08 VITALS
WEIGHT: 155 LBS | OXYGEN SATURATION: 95 % | DIASTOLIC BLOOD PRESSURE: 59 MMHG | HEIGHT: 64 IN | BODY MASS INDEX: 26.46 KG/M2 | SYSTOLIC BLOOD PRESSURE: 90 MMHG | TEMPERATURE: 97.5 F | HEART RATE: 93 BPM | RESPIRATION RATE: 18 BRPM

## 2017-10-08 LAB
ANION GAP SERPL CALCULATED.3IONS-SCNC: 11 MMOL/L (ref 7–19)
BUN BLDV-MCNC: 16 MG/DL (ref 8–23)
CALCIUM SERPL-MCNC: 9.3 MG/DL (ref 8.8–10.2)
CHLORIDE BLD-SCNC: 97 MMOL/L (ref 98–111)
CO2: 29 MMOL/L (ref 22–29)
CREAT SERPL-MCNC: 0.9 MG/DL (ref 0.5–0.9)
EKG P AXIS: 45 DEGREES
EKG P-R INTERVAL: 150 MS
EKG Q-T INTERVAL: 350 MS
EKG QRS DURATION: 72 MS
EKG QTC CALCULATION (BAZETT): 383 MS
EKG T AXIS: 13 DEGREES
GFR NON-AFRICAN AMERICAN: >60
GLUCOSE BLD-MCNC: 106 MG/DL (ref 74–109)
HCT VFR BLD CALC: 32.4 % (ref 37–47)
HEMOGLOBIN: 9.8 G/DL (ref 12–16)
POTASSIUM SERPL-SCNC: 5.1 MMOL/L (ref 3.5–5)
SODIUM BLD-SCNC: 137 MMOL/L (ref 136–145)

## 2017-10-08 PROCEDURE — 97116 GAIT TRAINING THERAPY: CPT

## 2017-10-08 PROCEDURE — 36415 COLL VENOUS BLD VENIPUNCTURE: CPT

## 2017-10-08 PROCEDURE — 6370000000 HC RX 637 (ALT 250 FOR IP): Performed by: INTERNAL MEDICINE

## 2017-10-08 PROCEDURE — 80048 BASIC METABOLIC PNL TOTAL CA: CPT

## 2017-10-08 PROCEDURE — 6370000000 HC RX 637 (ALT 250 FOR IP): Performed by: ORTHOPAEDIC SURGERY

## 2017-10-08 PROCEDURE — 85014 HEMATOCRIT: CPT

## 2017-10-08 PROCEDURE — 97110 THERAPEUTIC EXERCISES: CPT

## 2017-10-08 PROCEDURE — 85018 HEMOGLOBIN: CPT

## 2017-10-08 RX ORDER — CEPHALEXIN 500 MG/1
500 CAPSULE ORAL 4 TIMES DAILY
Qty: 60 CAPSULE | Refills: 0 | Status: SHIPPED | OUTPATIENT
Start: 2017-10-08 | End: 2018-02-13

## 2017-10-08 RX ADMIN — CEPHALEXIN 1000 MG: 500 CAPSULE ORAL at 03:15

## 2017-10-08 RX ADMIN — PANTOPRAZOLE SODIUM 40 MG: 40 TABLET, DELAYED RELEASE ORAL at 05:54

## 2017-10-08 RX ADMIN — DOCUSATE SODIUM 100 MG: 100 CAPSULE, LIQUID FILLED ORAL at 10:13

## 2017-10-08 RX ADMIN — POLYETHYLENE GLYCOL 3350 17 G: 17 POWDER, FOR SOLUTION ORAL at 10:12

## 2017-10-08 RX ADMIN — GABAPENTIN 300 MG: 300 CAPSULE ORAL at 10:13

## 2017-10-08 RX ADMIN — HYDROCHLOROTHIAZIDE 25 MG: 25 TABLET ORAL at 10:13

## 2017-10-08 RX ADMIN — CEPHALEXIN 1000 MG: 500 CAPSULE ORAL at 13:10

## 2017-10-08 RX ADMIN — ASPIRIN 325 MG: 325 TABLET, DELAYED RELEASE ORAL at 13:10

## 2017-10-08 RX ADMIN — HYDROMORPHONE HYDROCHLORIDE 8 MG: 2 TABLET ORAL at 05:57

## 2017-10-08 ASSESSMENT — PAIN SCALES - GENERAL
PAINLEVEL_OUTOF10: 6
PAINLEVEL_OUTOF10: 6

## 2017-10-08 NOTE — PROGRESS NOTES
Infectious Diseases Progress Note    Patient:  Ricky Madrigal  YOB: 1940  MRN: [id number]   Admit date: 10/6/2017   Admitting Physician: [facility],*  Primary Care Physician: Williams Eagle MD    Chief Complaint/Interval History:  She is without new symptoms. Spoke with Dr. Kecia Burch yesterday. We discussed her case. Both of us lean toward her left knee symptoms and findings being consistent with degenerative arthritis and pseudogout. Discussed with . [facility] yesterday as well. He feels symptoms likely related to pseudogout/degenerative arthritis as well. She has not had systemic symptoms such as fever to suggest infection. She's not had other localizing symptoms of infection. Operative cultures remain no growth to date.     In/Out    Intake/Output Summary (Last 24 hours) at 10/08/17 1236  Last data filed at 10/08/17 0805   Gross per 24 hour   Intake             1970 ml   Output               65 ml   Net             1905 ml     Allergies: No Known Allergies  Current Meds:   hydrochlorothiazide (HYDRODIURIL) tablet 25 mg Daily   polyethylene glycol (GLYCOLAX) packet 17 g Daily   lisinopril (PRINIVIL;ZESTRIL) tablet 20 mg Daily   cephALEXin (KEFLEX) capsule 1,000 mg 3 times per day   ceFAZolin (ANCEF) 1 g in dextrose 5 % 50 mL IVPB (premix) Q8H   atorvastatin (LIPITOR) tablet 20 mg Daily   gabapentin (NEURONTIN) capsule 300 mg TID   melatonin tablet 3 mg Nightly   metoprolol succinate (TOPROL XL) extended release tablet 25 mg Nightly   0.9 % sodium chloride infusion Continuous   0.9 % sodium chloride bolus PRN   sodium chloride flush 0.9 % injection 10 mL 2 times per day   sodium chloride flush 0.9 % injection 10 mL PRN   acetaminophen (TYLENOL) tablet 650 mg Q4H PRN   morphine injection 2 mg Q2H PRN   Or    morphine injection 4 mg Q2H PRN   docusate sodium (COLACE) capsule 100 mg BID   ondansetron (ZOFRAN) injection 4 mg Q6H PRN   HYDROmorphone (DILAUDID) tablet 4 mg Q4H PRN   Or HYDROmorphone (DILAUDID) tablet 8 mg Q4H PRN   pantoprazole (PROTONIX) tablet 40 mg QAM AC   aspirin EC tablet 325 mg BID     ROS no fever, chills, night sweats pulmonary, urinary symptoms. Vital Signs:  BP (!) 104/58   Pulse 96   Temp 97.3 °F (36.3 °C) (Temporal)   Resp 20   Ht 5' 4\" (1.626 m)   Wt 155 lb (70.3 kg)   SpO2 95%   BMI 26.61 kg/m²     Physical Exam   Right knee without erythema, swelling, or warmth. Left knee without cellulitis. Drain in place with a small amount of serosanguineous drainage. Line/IV site: No erythema, warmth, induration, or tenderness. Lab Results:  CBC:   Recent Labs      10/07/17   0226  10/08/17   0239   HGB  9.4*  9.8*     BMP:  Recent Labs      10/07/17   0226  10/08/17   0239   NA  136  137   K  4.1  5.1*   CL  100  97*   CO2  26  29   BUN  17  16   CREATININE  0.8  0.9   GLUCOSE  125*  106     Culture Results:  Operative cultures left knee-no growth    Radiology: None    Additional Studies Reviewed:  None    Impression:  1. History right knee infection-no symptomatic or physical exam evidence of recurrence  2. She had left knee pain and swelling-cultures no growth, crystal exam showed findings consistent with pseudogout, she has significant degenerative arthritis on x-ray. Suspect degenerative arthritis and pseudogout as a cause for her symptoms. Recommendations:  Discussed with Dr. Houston Rees and [facility]  All feel comfortable with discharge home  Would suggest Keflex 500 mg by mouth 4 times a day for 7 more days to be conservative just until we know cultures are negative. This would offer some additional protection in case cultures change.   Sure he has follow-up arranged with Dr. Houston Rees October 20  She will keep that appointment  She will call for earlier appointment if new or worsening problems in the interim  Discussed with nursing who is present with me when I saw patient today    Ana Maria Lee MD

## 2017-10-08 NOTE — PROGRESS NOTES
Reviewed discharge instructions and medications with pt and . Pt denied any questions/concerns at this time and verbalized understanding.

## 2017-10-08 NOTE — DISCHARGE SUMMARY
[facility] [address]  Dr. Bernard Garcia  Discharge Summary    The Jeremy Gutierrez is a 78 y.o. female underwent total knee exploration/I&D/Bactisure procedure without complication. Jeremy Gutierrez was admitted to the floor following her   recovery in the PACU. Discharge Diagnosis   Left    Knee Replacement    Current Inpatient Medications    Current Facility-Administered Medications: hydrochlorothiazide (HYDRODIURIL) tablet 25 mg, 25 mg, Oral, Daily  polyethylene glycol (GLYCOLAX) packet 17 g, 17 g, Oral, Daily  lisinopril (PRINIVIL;ZESTRIL) tablet 20 mg, 20 mg, Oral, Daily  cephALEXin (KEFLEX) capsule 1,000 mg, 1,000 mg, Oral, 3 times per day  ceFAZolin (ANCEF) 1 g in dextrose 5 % 50 mL IVPB (premix), 1 g, Intravenous, Q8H  atorvastatin (LIPITOR) tablet 20 mg, 20 mg, Oral, Daily  gabapentin (NEURONTIN) capsule 300 mg, 300 mg, Oral, TID  melatonin tablet 3 mg, 3 mg, Oral, Nightly  metoprolol succinate (TOPROL XL) extended release tablet 25 mg, 25 mg, Oral, Nightly  0.9 % sodium chloride infusion, , Intravenous, Continuous  0.9 % sodium chloride bolus, 500 mL, Intravenous, PRN  sodium chloride flush 0.9 % injection 10 mL, 10 mL, Intravenous, 2 times per day  sodium chloride flush 0.9 % injection 10 mL, 10 mL, Intravenous, PRN  acetaminophen (TYLENOL) tablet 650 mg, 650 mg, Oral, Q4H PRN  morphine injection 2 mg, 2 mg, Intravenous, Q2H PRN **OR** morphine injection 4 mg, 4 mg, Intravenous, Q2H PRN  docusate sodium (COLACE) capsule 100 mg, 100 mg, Oral, BID  ondansetron (ZOFRAN) injection 4 mg, 4 mg, Intravenous, Q6H PRN  HYDROmorphone (DILAUDID) tablet 4 mg, 4 mg, Oral, Q4H PRN **OR** HYDROmorphone (DILAUDID) tablet 8 mg, 8 mg, Oral, Q4H PRN  pantoprazole (PROTONIX) tablet 40 mg, 40 mg, Oral, QAM AC  aspirin EC tablet 325 mg, 325 mg, Oral, BID    Post-operatively the patients diet was advanced as tolerated and their incision was checked on POD #1.   The incision was clean, dry and intact

## 2017-10-09 ENCOUNTER — TRANSITIONAL CARE MANAGEMENT TELEPHONE ENCOUNTER (OUTPATIENT)
Dept: FAMILY MEDICINE CLINIC | Facility: CLINIC | Age: 77
End: 2017-10-09

## 2017-10-09 RX ORDER — DULOXETIN HYDROCHLORIDE 30 MG/1
30 CAPSULE, DELAYED RELEASE ORAL DAILY
COMMUNITY
Start: 2017-09-06 | End: 2017-12-11

## 2017-10-09 RX ORDER — CEPHALEXIN 500 MG/1
500 CAPSULE ORAL 3 TIMES DAILY
COMMUNITY
Start: 2017-09-11 | End: 2017-12-11

## 2017-10-10 LAB
ANAEROBIC CULTURE: NORMAL
CULTURE SURGICAL: NORMAL
GRAM STAIN RESULT: NORMAL

## 2017-10-11 ENCOUNTER — OFFICE VISIT (OUTPATIENT)
Dept: FAMILY MEDICINE CLINIC | Facility: CLINIC | Age: 77
End: 2017-10-11

## 2017-10-11 VITALS
OXYGEN SATURATION: 95 % | TEMPERATURE: 98.5 F | WEIGHT: 151 LBS | SYSTOLIC BLOOD PRESSURE: 102 MMHG | HEIGHT: 64 IN | DIASTOLIC BLOOD PRESSURE: 58 MMHG | BODY MASS INDEX: 25.78 KG/M2 | HEART RATE: 75 BPM

## 2017-10-11 DIAGNOSIS — M25.562 ACUTE PAIN OF LEFT KNEE: Primary | ICD-10-CM

## 2017-10-11 PROBLEM — M11.20 PSEUDOGOUT: Status: ACTIVE | Noted: 2017-10-11

## 2017-10-11 PROCEDURE — 99496 TRANSJ CARE MGMT HIGH F2F 7D: CPT | Performed by: FAMILY MEDICINE

## 2017-10-11 RX ORDER — CEPHALEXIN 500 MG/1
500 CAPSULE ORAL
COMMUNITY
Start: 2017-10-08 | End: 2017-12-11

## 2017-10-11 NOTE — PROGRESS NOTES
Transitional Care Follow Up Visit  Subjective     Arianachaparro Gutierrez is a 77 y.o. female who presents for a transitional care management visit.    Within 48 business hours after discharge our office contacted her via telephone to coordinate her care and needs.      I reviewed and discussed the details of that call along with the discharge summary, hospital problems, inpatient lab results, inpatient diagnostic studies, and consultation reports with Ariana.     Current outpatient and discharge medications have been reconciled for the patient.    Date of TCM Phone Call 10/9/2017   [facility]   Date of Admission 10/5/2017   Date of Discharge 10/8/2017   Discharge Disposition [facility]     Risk for Readmission (LACE) No Data Recorded    Knee Pain    The incident occurred more than 1 week ago. Injury mechanism: Arthroscopy left knee for possible infection.      Course During Hospital Stay:  Patient had left knee washed out because of the possibility of infection.  Cultures are negative to date but pseudogout crystals were found.     The following portions of the patient's history were reviewed and updated as appropriate: allergies, current medications, past family history, past medical history, past social history, past surgical history and problem list.    Review of Systems   Musculoskeletal:        Had recent infection right knee-improved; left knee aspirate suspicious for infection-precipitated exploration       Objective   Physical Exam   Constitutional: She is oriented to person, place, and time. She appears well-developed and well-nourished.   Cardiovascular: Normal rate and regular rhythm.    Pulmonary/Chest: Effort normal and breath sounds normal.   Musculoskeletal: Normal range of motion. She exhibits no edema.   No calf tenderness or edema   Neurological: She is alert and oriented to person, place, and time.   Skin: Skin is warm and dry.   Psychiatric: She has a normal mood and affect. Her  behavior is normal. Judgment and thought content normal.   Nursing note and vitals reviewed.      Assessment/Plan   Problems Addressed this Visit     None      Visit Diagnoses     Acute pain of left knee    -  Primary                 Plan-continue postop care-left knee

## 2017-10-18 ENCOUNTER — TELEPHONE (OUTPATIENT)
Dept: VASCULAR SURGERY | Age: 77
End: 2017-10-18

## 2017-10-18 PROBLEM — Z86.718 PERSONAL HISTORY OF DVT (DEEP VEIN THROMBOSIS): Status: ACTIVE | Noted: 2017-10-18

## 2017-11-07 LAB
FUNGUS (MYCOLOGY) CULTURE: NORMAL
KOH PREP: NORMAL

## 2017-11-14 ENCOUNTER — APPOINTMENT (OUTPATIENT)
Dept: MAMMOGRAPHY | Facility: HOSPITAL | Age: 77
End: 2017-11-14
Attending: OBSTETRICS & GYNECOLOGY

## 2017-11-17 ENCOUNTER — HOSPITAL ENCOUNTER (OUTPATIENT)
Dept: MAMMOGRAPHY | Facility: HOSPITAL | Age: 77
Discharge: HOME | End: 2017-11-17
Attending: OBSTETRICS & GYNECOLOGY | Admitting: OBSTETRICS & GYNECOLOGY

## 2017-11-17 PROCEDURE — 77063 BREAST TOMOSYNTHESIS BI: CPT

## 2017-11-17 PROCEDURE — G0202 SCR MAMMO BI INCL CAD: HCPCS

## 2017-11-21 ENCOUNTER — OFFICE VISIT (OUTPATIENT)
Dept: CARDIOLOGY | Facility: CLINIC | Age: 77
End: 2017-11-21

## 2017-11-21 VITALS
DIASTOLIC BLOOD PRESSURE: 70 MMHG | WEIGHT: 168 LBS | HEART RATE: 72 BPM | SYSTOLIC BLOOD PRESSURE: 110 MMHG | HEIGHT: 64 IN | OXYGEN SATURATION: 98 % | BODY MASS INDEX: 28.68 KG/M2

## 2017-11-21 VITALS
SYSTOLIC BLOOD PRESSURE: 110 MMHG | BODY MASS INDEX: 25.78 KG/M2 | DIASTOLIC BLOOD PRESSURE: 60 MMHG | HEART RATE: 98 BPM | WEIGHT: 151 LBS | OXYGEN SATURATION: 99 % | HEIGHT: 64 IN

## 2017-11-21 DIAGNOSIS — K21.9 GASTROESOPHAGEAL REFLUX DISEASE WITHOUT ESOPHAGITIS: ICD-10-CM

## 2017-11-21 DIAGNOSIS — Z01.810 PREOP CARDIOVASCULAR EXAM: ICD-10-CM

## 2017-11-21 DIAGNOSIS — R06.09 DYSPNEA ON EXERTION: ICD-10-CM

## 2017-11-21 DIAGNOSIS — R79.82 ELEVATED C-REACTIVE PROTEIN (CRP): ICD-10-CM

## 2017-11-21 DIAGNOSIS — M11.20 PSEUDOGOUT: ICD-10-CM

## 2017-11-21 DIAGNOSIS — R06.09 DOE (DYSPNEA ON EXERTION): ICD-10-CM

## 2017-11-21 DIAGNOSIS — R09.89 SUSPECTED ENDOCARDITIS: ICD-10-CM

## 2017-11-21 DIAGNOSIS — I10 ESSENTIAL HYPERTENSION: Primary | ICD-10-CM

## 2017-11-21 PROBLEM — R94.31 ABNORMAL ECG: Status: RESOLVED | Noted: 2017-05-26 | Resolved: 2017-11-21

## 2017-11-21 PROCEDURE — 99214 OFFICE O/P EST MOD 30 MIN: CPT | Performed by: INTERNAL MEDICINE

## 2017-11-21 PROCEDURE — 93000 ELECTROCARDIOGRAM COMPLETE: CPT | Performed by: INTERNAL MEDICINE

## 2017-11-21 RX ORDER — HYDROCODONE BITARTRATE AND ACETAMINOPHEN 10; 300 MG/1; MG/1
1 TABLET ORAL EVERY 6 HOURS PRN
COMMUNITY
End: 2017-12-11 | Stop reason: SDUPTHER

## 2017-11-21 RX ORDER — ASPIRIN 325 MG
325 TABLET ORAL DAILY
COMMUNITY
End: 2019-09-17

## 2017-11-21 NOTE — PROGRESS NOTES
Ariana Gutierrez  [id number]  1940  77 y.o.  female    Referring Provider: Bill Schilling MD    Reason for Follow-up Visit:  Routine follow up.  Recent knee surgery  Was referred back for possible MADELIN  Subjective    Overall feeling well   No chest pain   Mild shortness of breath   Mild exertional shortness of breath on exertion relieved with rest  No significant cough or wheezing  Going on for several months  No palpitations  No associated chest pain  No significant pedal edema  No fever or chills  No significant expectoration  No hemoptysis  No presyncope or syncope       Compliant with medications    History of present illness:  Ariana Gutierrez is a 77 y.o. yo female with history of hypertension who presents today for   Chief Complaint   Patient presents with   • Hypertension     6 MO F/U - S/P TEMPORARY KNEE REPLACEMENT/INFECTION (DR. GARCIA)   .    History  Past Medical History:   Diagnosis Date   • Chest pain    • Chronic back pain    • Deep vein thrombosis     S/P KNEE SURGERY 10/2017   • Diverticulosis of intestine 8/16/2016   • Gastroesophageal reflux disease without esophagitis 5/26/2017   • Hypertension    • Irritable bowel syndrome 11/2/2011   • Low back pain    • Palpitations    • Paresthesia     toes   • Perforated bowel    • Scoliosis    • Spinal stenosis    ,   Past Surgical History:   Procedure Laterality Date   • BACK SURGERY     • BREAST BIOPSY Right 25 yrs ago   • CATARACT EXTRACTION Bilateral    • COLON RESECTION SMALL BOWEL  2016    with colostomy for 6 months, already revised.   • COLON SURGERY     • COLONOSCOPY     • LUMBAR LAMINECTOMY     • RENAL ARTERY STENT Right    • TOTAL KNEE ARTHROPLASTY Right    • TOTAL KNEE ARTHROPLASTY     • VASCULAR SURGERY      10/2017 - DR GARCIA   ,   Family History   Problem Relation Age of Onset   • Breast cancer Mother    • Heart disease Mother    • Coronary artery disease Mother    • Peripheral vascular disease Mother    • No Known Problems  Sister    • Heart disease Brother    • No Known Problems Maternal Grandmother    • No Known Problems Maternal Grandfather    • No Known Problems Paternal Grandmother    • No Known Problems Paternal Grandfather    ,   Social History   Substance Use Topics   • Smoking status: Never Smoker   • Smokeless tobacco: Never Used   • Alcohol use Yes      Comment: occ   ,     Medications  Current Outpatient Prescriptions   Medication Sig Dispense Refill   • aspirin 325 MG tablet Take 325 mg by mouth Daily.     • benazepril (LOTENSIN) 20 MG tablet Take 1 tablet by mouth Daily. 90 tablet 2   • Docusate Calcium (STOOL SOFTENER PO) Take  by mouth Daily.     • gabapentin (NEURONTIN) 300 MG capsule TAKE 1 CAPSULE THREE TIMES DAILY     • hydrochlorothiazide (HYDRODIURIL) 25 MG tablet Take 1 tablet by mouth Daily. 90 tablet 2   • Hydrocodone-Acetaminophen (XODOL )  MG per tablet Take 1 tablet by mouth Every 6 (Six) Hours As Needed for Moderate Pain .     • metoprolol succinate XL (TOPROL-XL) 25 MG 24 hr tablet Take 25 mg by mouth Daily.     • omeprazole (priLOSEC) 20 MG capsule Take 1 capsule by mouth Daily. 90 capsule 2   • polyethylene glycol (MIRALAX) powder      • Probiotic Product (PROBIOTIC DAILY PO) Take  by mouth.     • apixaban (ELIQUIS) 5 MG tablet tablet Take 5 mg by mouth 2 (Two) Times a Day.     • cephalexin (KEFLEX) 500 MG capsule Take 500 mg by mouth 3 (Three) Times a Day.     • cephalexin (KEFLEX) 500 MG capsule Take 500 mg by mouth.     • DULoxetine (CYMBALTA) 30 MG capsule Take 30 mg by mouth Daily.       No current facility-administered medications for this visit.        Allergies:  Review of patient's allergies indicates no known allergies.    Review of Systems  Review of Systems   Constitution: Negative.   HENT: Negative.    Eyes: Negative.    Cardiovascular: Positive for dyspnea on exertion and paroxysmal nocturnal dyspnea. Negative for chest pain, claudication, cyanosis, irregular heartbeat, leg  "swelling, near-syncope, orthopnea, palpitations and syncope.   Respiratory: Negative.    Endocrine: Negative.    Hematologic/Lymphatic: Negative.    Skin: Negative.    Musculoskeletal: Positive for joint pain.   Gastrointestinal: Negative for anorexia.   Genitourinary: Negative.    Neurological: Negative.    Psychiatric/Behavioral: Negative.        Objective     Physical Exam:  /60  Pulse 98  Ht 64\" (162.6 cm)  Wt 151 lb (68.5 kg)  LMP  (LMP Unknown)  SpO2 99%  BMI 25.92 kg/m2  Physical Exam   Constitutional: She appears well-developed.   HENT:   Head: Normocephalic.   Neck: Normal carotid pulses and no JVD present. No tracheal tenderness present. Carotid bruit is not present. No tracheal deviation and no edema present.   Cardiovascular: Regular rhythm, normal heart sounds and normal pulses.    Pulmonary/Chest: Effort normal. No stridor.   Abdominal: Soft.   Neurological: She is alert. She has normal strength. No cranial nerve deficit or sensory deficit.   Skin: Skin is warm.   Psychiatric: She has a normal mood and affect. Her speech is normal and behavior is normal.       Results Review:       ECG 12 Lead  Date/Time: 11/21/2017 10:01 AM  Performed by: JESU PÉREZ  Authorized by: JESU PÉREZ   Comparison: compared with previous ECG from 6/14/2017  Similar to previous ECG  Rhythm: sinus rhythm  Rate: normal  Conduction: conduction normal  ST Segments: ST segments normal  T Waves: T waves normal  QRS axis: normal  Clinical impression: normal ECG            Assessment/Plan   Patient Active Problem List   Diagnosis   • Flatulence   • Diverticulosis of intestine   • Hypertension   • Irritable bowel syndrome   • Irritable bowel syndrome with constipation   • Small intestinal bacterial overgrowth   • Spinal stenosis   • Essential hypertension   • Gastroesophageal reflux disease without esophagitis   • BERGERON (dyspnea on exertion)   • Pseudogout   • Elevated C-reactive protein (CRP)       No palpitations. No " significant pedal edema. Compliant with medications and diet. Latest labs and medications reviewed.  EKG inferior infarct pattern Sinus rhythm 5/22/17      Plan:    Will discuss with Dr Holcomb the need for MADELIN  Check limited echocardiogram   Low salt cardiac diet.    Relevant printed educational materials given pertinent to above diagnoses    Close follow up with you as scheduled.  Intensive factor modifications.  See order list.    Counseled regarding disease appropriate diet, fluid, caffeine, stimulants and sodium intake as well as importance of compliance to diet, exercise and regular follow up.  Avoid NSAIDS and COX2 inhibitors. Use Acetaminophen PRN.  Keep August appointment     Return in about 2 weeks (around 12/5/2017).

## 2017-12-01 ENCOUNTER — HOSPITAL ENCOUNTER (OUTPATIENT)
Dept: CARDIOLOGY | Facility: HOSPITAL | Age: 77
Discharge: HOME | End: 2017-12-01
Attending: INTERNAL MEDICINE | Admitting: INTERNAL MEDICINE

## 2017-12-01 VITALS
DIASTOLIC BLOOD PRESSURE: 60 MMHG | HEIGHT: 64 IN | BODY MASS INDEX: 25.78 KG/M2 | SYSTOLIC BLOOD PRESSURE: 110 MMHG | WEIGHT: 151 LBS

## 2017-12-01 DIAGNOSIS — R09.89 SUSPECTED ENDOCARDITIS: ICD-10-CM

## 2017-12-01 DIAGNOSIS — R06.09 DYSPNEA ON EXERTION: ICD-10-CM

## 2017-12-01 PROCEDURE — 93325 DOPPLER ECHO COLOR FLOW MAPG: CPT

## 2017-12-01 PROCEDURE — 93321 DOPPLER ECHO F-UP/LMTD STD: CPT | Performed by: INTERNAL MEDICINE

## 2017-12-01 PROCEDURE — 93325 DOPPLER ECHO COLOR FLOW MAPG: CPT | Performed by: INTERNAL MEDICINE

## 2017-12-01 PROCEDURE — 93308 TTE F-UP OR LMTD: CPT | Performed by: INTERNAL MEDICINE

## 2017-12-01 PROCEDURE — 93308 TTE F-UP OR LMTD: CPT

## 2017-12-01 PROCEDURE — 93321 DOPPLER ECHO F-UP/LMTD STD: CPT

## 2017-12-02 LAB
BH CV ECHO MEAS - AO MAX PG (FULL): -0.65 MMHG
BH CV ECHO MEAS - AO MAX PG: 7 MMHG
BH CV ECHO MEAS - AO MEAN PG (FULL): 0 MMHG
BH CV ECHO MEAS - AO MEAN PG: 3 MMHG
BH CV ECHO MEAS - AO ROOT AREA (BSA CORRECTED): 1.6
BH CV ECHO MEAS - AO ROOT AREA: 5.7 CM^2
BH CV ECHO MEAS - AO ROOT DIAM: 2.7 CM
BH CV ECHO MEAS - AO V2 MAX: 132 CM/SEC
BH CV ECHO MEAS - AO V2 MEAN: 81 CM/SEC
BH CV ECHO MEAS - AO V2 VTI: 27.9 CM
BH CV ECHO MEAS - AVA(I,A): 3.7 CM^2
BH CV ECHO MEAS - AVA(I,D): 3.7 CM^2
BH CV ECHO MEAS - AVA(V,A): 3.3 CM^2
BH CV ECHO MEAS - AVA(V,D): 3.3 CM^2
BH CV ECHO MEAS - BSA(HAYCOCK): 1.8 M^2
BH CV ECHO MEAS - BSA: 1.7 M^2
BH CV ECHO MEAS - BZI_BMI: 25.9 KILOGRAMS/M^2
BH CV ECHO MEAS - BZI_METRIC_HEIGHT: 162.6 CM
BH CV ECHO MEAS - BZI_METRIC_WEIGHT: 68.5 KG
BH CV ECHO MEAS - CONTRAST EF 4CH: 67.4 ML/M^2
BH CV ECHO MEAS - EDV(CUBED): 95.4 ML
BH CV ECHO MEAS - EDV(MOD-SP4): 81 ML
BH CV ECHO MEAS - EDV(TEICH): 95.9 ML
BH CV ECHO MEAS - EF(CUBED): 78 %
BH CV ECHO MEAS - EF(MOD-SP4): 67.4 %
BH CV ECHO MEAS - EF(TEICH): 70.2 %
BH CV ECHO MEAS - ESV(CUBED): 21 ML
BH CV ECHO MEAS - ESV(MOD-SP4): 26.4 ML
BH CV ECHO MEAS - ESV(TEICH): 28.5 ML
BH CV ECHO MEAS - FS: 39.6 %
BH CV ECHO MEAS - IVS/LVPW: 0.97
BH CV ECHO MEAS - IVSD: 0.99 CM
BH CV ECHO MEAS - LA DIMENSION: 2.5 CM
BH CV ECHO MEAS - LA/AO: 0.93
BH CV ECHO MEAS - LAT PEAK E' VEL: 7.6 CM/SEC
BH CV ECHO MEAS - LV DIASTOLIC VOL/BSA (35-75): 46.7 ML/M^2
BH CV ECHO MEAS - LV MASS(C)D: 157.7 GRAMS
BH CV ECHO MEAS - LV MASS(C)DI: 90.8 GRAMS/M^2
BH CV ECHO MEAS - LV MAX PG: 7.6 MMHG
BH CV ECHO MEAS - LV MEAN PG: 3 MMHG
BH CV ECHO MEAS - LV SYSTOLIC VOL/BSA (12-30): 15.2 ML/M^2
BH CV ECHO MEAS - LV V1 MAX: 138 CM/SEC
BH CV ECHO MEAS - LV V1 MEAN: 73 CM/SEC
BH CV ECHO MEAS - LV V1 VTI: 33.3 CM
BH CV ECHO MEAS - LVIDD: 4.6 CM
BH CV ECHO MEAS - LVIDS: 2.8 CM
BH CV ECHO MEAS - LVLD AP4: 7.2 CM
BH CV ECHO MEAS - LVLS AP4: 5.5 CM
BH CV ECHO MEAS - LVOT AREA (M): 3.1 CM^2
BH CV ECHO MEAS - LVOT AREA: 3.1 CM^2
BH CV ECHO MEAS - LVOT DIAM: 2 CM
BH CV ECHO MEAS - LVPWD: 1 CM
BH CV ECHO MEAS - MED PEAK E' VEL: 4.68 CM/SEC
BH CV ECHO MEAS - MV A MAX VEL: 96 CM/SEC
BH CV ECHO MEAS - MV DEC TIME: 0.31 SEC
BH CV ECHO MEAS - MV E MAX VEL: 76.6 CM/SEC
BH CV ECHO MEAS - MV E/A: 0.8
BH CV ECHO MEAS - RAP SYSTOLE: 5 MMHG
BH CV ECHO MEAS - RVDD: 3.9 CM
BH CV ECHO MEAS - RVSP: 29.6 MMHG
BH CV ECHO MEAS - SI(AO): 92 ML/M^2
BH CV ECHO MEAS - SI(CUBED): 42.9 ML/M^2
BH CV ECHO MEAS - SI(LVOT): 60.3 ML/M^2
BH CV ECHO MEAS - SI(MOD-SP4): 31.5 ML/M^2
BH CV ECHO MEAS - SI(TEICH): 38.8 ML/M^2
BH CV ECHO MEAS - SV(AO): 159.7 ML
BH CV ECHO MEAS - SV(CUBED): 74.4 ML
BH CV ECHO MEAS - SV(LVOT): 104.6 ML
BH CV ECHO MEAS - SV(MOD-SP4): 54.6 ML
BH CV ECHO MEAS - SV(TEICH): 67.3 ML
BH CV ECHO MEAS - TR MAX VEL: 248 CM/SEC
E/E' RATIO: 16.4
LEFT ATRIUM VOLUME INDEX: 23.2 ML/M2
LEFT ATRIUM VOLUME: 40.4 CM3
LV EF 2D ECHO EST: 65 %

## 2017-12-11 ENCOUNTER — OFFICE VISIT (OUTPATIENT)
Dept: FAMILY MEDICINE CLINIC | Facility: CLINIC | Age: 77
End: 2017-12-11

## 2017-12-11 VITALS
DIASTOLIC BLOOD PRESSURE: 68 MMHG | SYSTOLIC BLOOD PRESSURE: 120 MMHG | HEART RATE: 86 BPM | TEMPERATURE: 98 F | WEIGHT: 149.2 LBS | OXYGEN SATURATION: 95 % | BODY MASS INDEX: 25.47 KG/M2 | HEIGHT: 64 IN

## 2017-12-11 DIAGNOSIS — R10.30 LOWER ABDOMINAL PAIN: Primary | ICD-10-CM

## 2017-12-11 DIAGNOSIS — Z87.39 HISTORY OF OSTEOMYELITIS: ICD-10-CM

## 2017-12-11 DIAGNOSIS — M54.9 BACK PAIN, UNSPECIFIED BACK LOCATION, UNSPECIFIED BACK PAIN LATERALITY, UNSPECIFIED CHRONICITY: ICD-10-CM

## 2017-12-11 PROCEDURE — 99214 OFFICE O/P EST MOD 30 MIN: CPT | Performed by: FAMILY MEDICINE

## 2017-12-11 RX ORDER — HYDROCODONE BITARTRATE AND ACETAMINOPHEN 10; 325 MG/1; MG/1
1 TABLET ORAL EVERY 6 HOURS PRN
Qty: 90 TABLET | Refills: 0 | Status: SHIPPED | OUTPATIENT
Start: 2017-12-11 | End: 2018-03-14 | Stop reason: SDUPTHER

## 2017-12-11 RX ORDER — GABAPENTIN 300 MG/1
300 CAPSULE ORAL 3 TIMES DAILY
Qty: 90 CAPSULE | Refills: 3 | Status: SHIPPED | OUTPATIENT
Start: 2017-12-11 | End: 2018-04-16 | Stop reason: DRUGHIGH

## 2017-12-11 RX ORDER — HYDROCODONE BITARTRATE AND ACETAMINOPHEN 10; 300 MG/1; MG/1
1 TABLET ORAL EVERY 6 HOURS PRN
Qty: 90 TABLET | Refills: 0 | Status: SHIPPED | OUTPATIENT
Start: 2017-12-11 | End: 2017-12-11

## 2017-12-11 NOTE — PROGRESS NOTES
Subjective   Ariana Gutierrez is a 77 y.o. female.     Abdominal Pain   This is a recurrent problem. The current episode started more than 1 month ago. The onset quality is gradual. The problem occurs daily. The problem has been unchanged. The pain is located in the generalized abdominal region. The pain is at a severity of 3/10. The pain is moderate. The quality of the pain is colicky. The abdominal pain radiates to the RLQ. Associated symptoms include constipation and diarrhea. Nothing (Took antibiotics for 6 months for osteomyelitis-has not had antibiotics for 2 months) aggravates the pain. The pain is relieved by nothing. She has tried nothing for the symptoms. Her past medical history is significant for abdominal surgery.       The following portions of the patient's history were reviewed and updated as appropriate: allergies, current medications, past family history, past medical history, past social history, past surgical history and problem list.    Review of Systems   Gastrointestinal: Positive for abdominal distention, abdominal pain, constipation and diarrhea.   Musculoskeletal: Positive for back pain.        History of osteomyelitis of right knee- history of scoliosis of spine with increasing pain       Objective   Physical Exam   Constitutional: She is oriented to person, place, and time. She appears well-developed and well-nourished.   Cardiovascular: Normal rate and regular rhythm.    Abdominal: Soft. Bowel sounds are normal. There is tenderness.   Musculoskeletal:   Scoliosis of the thoracolumbar spine with increasing pain on the right side-rule out fracture or osteomyelitis   Neurological: She is alert and oriented to person, place, and time.   Psychiatric: She has a normal mood and affect. Her behavior is normal. Judgment and thought content normal.   Nursing note and vitals reviewed.      Assessment/Plan   Ariana was seen today for consult.    Diagnoses and all orders for this visit:    Lower  abdominal pain    Other orders  -     Hydrocodone-Acetaminophen (XODOL )  MG per tablet; Take 1 tablet by mouth Every 6 (Six) Hours As Needed for Moderate Pain .  -     gabapentin (NEURONTIN) 300 MG capsule; Take 1 capsule by mouth 3 (Three) Times a Day.         Plan-has elevated to C-reactive protein, sedimentation rate, d-dimer-all unexplained

## 2017-12-12 LAB
ALBUMIN SERPL-MCNC: 4.2 G/DL (ref 3.5–5)
ALBUMIN/GLOB SERPL: 1.2 G/DL (ref 1.1–2.5)
ALP SERPL-CCNC: 80 U/L (ref 24–120)
ALT SERPL-CCNC: 48 U/L (ref 0–54)
AST SERPL-CCNC: 33 U/L (ref 7–45)
BASOPHILS # BLD AUTO: 0.03 10*3/MM3 (ref 0–0.2)
BASOPHILS NFR BLD AUTO: 0.5 % (ref 0–2)
BILIRUB SERPL-MCNC: 0.4 MG/DL (ref 0.1–1)
BUN SERPL-MCNC: 23 MG/DL (ref 5–21)
BUN/CREAT SERPL: 22.8 (ref 7–25)
CALCIUM SERPL-MCNC: 10 MG/DL (ref 8.4–10.4)
CHLORIDE SERPL-SCNC: 95 MMOL/L (ref 98–110)
CO2 SERPL-SCNC: 31 MMOL/L (ref 24–31)
CREAT SERPL-MCNC: 1.01 MG/DL (ref 0.5–1.4)
EOSINOPHIL # BLD AUTO: 0.16 10*3/MM3 (ref 0–0.7)
EOSINOPHIL NFR BLD AUTO: 2.5 % (ref 0–4)
ERYTHROCYTE [DISTWIDTH] IN BLOOD BY AUTOMATED COUNT: 16.4 % (ref 12–15)
GFR SERPLBLD CREATININE-BSD FMLA CKD-EPI: 53 ML/MIN/1.73
GFR SERPLBLD CREATININE-BSD FMLA CKD-EPI: 65 ML/MIN/1.73
GLOBULIN SER CALC-MCNC: 3.4 GM/DL
GLUCOSE SERPL-MCNC: 72 MG/DL (ref 70–100)
HCT VFR BLD AUTO: 40.2 % (ref 37–47)
HGB BLD-MCNC: 11.9 G/DL (ref 12–16)
IMM GRANULOCYTES # BLD: 0.03 10*3/MM3 (ref 0–0.03)
IMM GRANULOCYTES NFR BLD: 0.5 % (ref 0–5)
LYMPHOCYTES # BLD AUTO: 0.92 10*3/MM3 (ref 0.72–4.86)
LYMPHOCYTES NFR BLD AUTO: 14.6 % (ref 15–45)
MCH RBC QN AUTO: 26 PG (ref 28–32)
MCHC RBC AUTO-ENTMCNC: 29.6 G/DL (ref 33–36)
MCV RBC AUTO: 87.8 FL (ref 82–98)
MONOCYTES # BLD AUTO: 0.65 10*3/MM3 (ref 0.19–1.3)
MONOCYTES NFR BLD AUTO: 10.3 % (ref 4–12)
NEUTROPHILS # BLD AUTO: 4.5 10*3/MM3 (ref 1.87–8.4)
NEUTROPHILS NFR BLD AUTO: 71.6 % (ref 39–78)
NRBC BLD AUTO-RTO: 0 /100 WBC (ref 0–0)
PLATELET # BLD AUTO: 331 10*3/MM3 (ref 130–400)
POTASSIUM SERPL-SCNC: 4.5 MMOL/L (ref 3.5–5.3)
PROT SERPL-MCNC: 7.6 G/DL (ref 6.3–8.7)
RBC # BLD AUTO: 4.58 10*6/MM3 (ref 4.2–5.4)
SODIUM SERPL-SCNC: 138 MMOL/L (ref 135–145)
WBC # BLD AUTO: 6.29 10*3/MM3 (ref 4.8–10.8)

## 2017-12-14 ENCOUNTER — OFFICE VISIT (OUTPATIENT)
Dept: CARDIOLOGY | Facility: CLINIC | Age: 77
End: 2017-12-14

## 2017-12-14 VITALS
OXYGEN SATURATION: 96 % | WEIGHT: 148 LBS | HEART RATE: 74 BPM | BODY MASS INDEX: 25.27 KG/M2 | HEIGHT: 64 IN | DIASTOLIC BLOOD PRESSURE: 62 MMHG | SYSTOLIC BLOOD PRESSURE: 118 MMHG

## 2017-12-14 DIAGNOSIS — R06.09 DOE (DYSPNEA ON EXERTION): ICD-10-CM

## 2017-12-14 DIAGNOSIS — K57.90 DIVERTICULOSIS OF INTESTINE WITHOUT BLEEDING, UNSPECIFIED INTESTINAL TRACT LOCATION: ICD-10-CM

## 2017-12-14 DIAGNOSIS — I10 ESSENTIAL HYPERTENSION: Primary | ICD-10-CM

## 2017-12-14 PROCEDURE — 99214 OFFICE O/P EST MOD 30 MIN: CPT | Performed by: INTERNAL MEDICINE

## 2017-12-14 NOTE — PROGRESS NOTES
Ariana Gutierrez  [id number]  1940  77 y.o.  female    Referring Provider: Bill Schilling MD    Reason for Follow-up Visit:  Here for follow up after cardiac testing.    Routine follow up.  Recent knee surgery  Was referred back for possible transesophageal echo      Subjective  Similar symptoms as during last visit   Overall feeling well   No chest pain   Mild shortness of breath   Mild exertional shortness of breath on exertion relieved with rest  No significant cough or wheezing  Going on for several months  No palpitations  No associated chest pain  No significant pedal edema  No fever or chills  No significant expectoration  No hemoptysis  No presyncope or syncope       Compliant with medications    History of present illness:  Ariana Gutierrez is a 77 y.o. yo female with history of hypertension who presents today for   Chief Complaint   Patient presents with   • Hypertension     2 WK FU-RESULTS   .    History  Past Medical History:   Diagnosis Date   • Chest pain    • Chronic back pain    • Deep vein thrombosis     S/P KNEE SURGERY 10/2017   • Diverticulosis of intestine 8/16/2016   • Gastroesophageal reflux disease without esophagitis 5/26/2017   • Hypertension    • Irritable bowel syndrome 11/2/2011   • Low back pain    • Palpitations    • Paresthesia     toes   • Perforated bowel    • Scoliosis    • Spinal stenosis    ,   Past Surgical History:   Procedure Laterality Date   • BACK SURGERY     • BREAST BIOPSY Right 25 yrs ago   • CATARACT EXTRACTION Bilateral    • COLON RESECTION SMALL BOWEL  2016    with colostomy for 6 months, already revised.   • COLON SURGERY     • COLONOSCOPY     • LUMBAR LAMINECTOMY     • RENAL ARTERY STENT Right    • TOTAL KNEE ARTHROPLASTY Right    • TOTAL KNEE ARTHROPLASTY     • VASCULAR SURGERY      10/2017 - DR GARCIA   ,   Family History   Problem Relation Age of Onset   • Breast cancer Mother    • Heart disease Mother    • Coronary artery disease Mother    •  Peripheral vascular disease Mother    • No Known Problems Sister    • Heart disease Brother    • No Known Problems Maternal Grandmother    • No Known Problems Maternal Grandfather    • No Known Problems Paternal Grandmother    • No Known Problems Paternal Grandfather    ,   Social History   Substance Use Topics   • Smoking status: Never Smoker   • Smokeless tobacco: Never Used   • Alcohol use Yes      Comment: occ   ,     Medications  Current Outpatient Prescriptions   Medication Sig Dispense Refill   • aspirin 325 MG tablet Take 325 mg by mouth Daily.     • benazepril (LOTENSIN) 20 MG tablet Take 1 tablet by mouth Daily. 90 tablet 2   • Docusate Calcium (STOOL SOFTENER PO) Take  by mouth Daily.     • gabapentin (NEURONTIN) 300 MG capsule Take 1 capsule by mouth 3 (Three) Times a Day. 90 capsule 3   • hydrochlorothiazide (HYDRODIURIL) 25 MG tablet Take 1 tablet by mouth Daily. 90 tablet 2   • HYDROcodone-acetaminophen (NORCO)  MG per tablet Take 1 tablet by mouth Every 6 (Six) Hours As Needed for Moderate Pain . 90 tablet 0   • metoprolol succinate XL (TOPROL-XL) 25 MG 24 hr tablet Take 25 mg by mouth Daily.     • omeprazole (priLOSEC) 20 MG capsule Take 1 capsule by mouth Daily. 90 capsule 2   • polyethylene glycol (MIRALAX) powder      • Probiotic Product (PROBIOTIC DAILY PO) Take  by mouth.       No current facility-administered medications for this visit.        Allergies:  Review of patient's allergies indicates no known allergies.    Review of Systems  Review of Systems   Constitution: Negative.   HENT: Negative.    Eyes: Negative.    Cardiovascular: Positive for dyspnea on exertion and paroxysmal nocturnal dyspnea. Negative for chest pain, claudication, cyanosis, irregular heartbeat, leg swelling, near-syncope, orthopnea, palpitations and syncope.   Respiratory: Negative.    Endocrine: Negative.    Hematologic/Lymphatic: Negative.    Skin: Negative.    Musculoskeletal: Positive for joint pain.  "  Gastrointestinal: Negative for anorexia.   Genitourinary: Negative.    Neurological: Negative.    Psychiatric/Behavioral: Negative.        Objective     Physical Exam:  /62  Pulse 74  Ht 162.6 cm (64.02\")  Wt 67.1 kg (148 lb)  LMP  (LMP Unknown)  SpO2 96%  BMI 25.39 kg/m2  Physical Exam   Constitutional: She appears well-developed.   HENT:   Head: Normocephalic.   Neck: Normal carotid pulses and no JVD present. No tracheal tenderness present. Carotid bruit is not present. No tracheal deviation and no edema present.   Cardiovascular: Regular rhythm, normal heart sounds and normal pulses.    Pulmonary/Chest: Effort normal. No stridor.   Abdominal: Soft.   Neurological: She is alert. She has normal strength. No cranial nerve deficit or sensory deficit.   Skin: Skin is warm.   Psychiatric: She has a normal mood and affect. Her speech is normal and behavior is normal.       Results Review:     Procedures    Assessment/Plan   Patient Active Problem List   Diagnosis   • Flatulence   • Diverticulosis of intestine   • Hypertension   • Irritable bowel syndrome   • Irritable bowel syndrome with constipation   • Small intestinal bacterial overgrowth   • Spinal stenosis   • Essential hypertension   • Gastroesophageal reflux disease without esophagitis   • BERGERON (dyspnea on exertion)   • Pseudogout   • Elevated C-reactive protein (CRP)     Results for orders placed during the hospital encounter of 12/01/17   Adult Transthoracic Echo Limited W/ Cont if Necessary Per Protocol    Narrative · Left ventricular systolic function is normal. Estimated EF = 65%.  · Left ventricular diastolic dysfunction (grade I) consistent with   impaired relaxation.  · No evidence of pulmonary hypertension is present.          No palpitations. No significant pedal edema. Compliant with medications and diet. Latest labs and medications reviewed.  EKG inferior infarct pattern Sinus rhythm 5/22/17      Plan:    She want to wait for " transesophageal echo  For suspected endocarditis  Close follow up with you as scheduled.  Intensive factor modifications.  Keep LDL below 100 mg/dl. Monitor liver and renal functions.    Monitor CBC, CMP and Lipid Panel by primary  Monitor for any signs of bleeding including red or dark stools. Fall precautions.   Patient is asked to monitor BP at home or work, several times per month and return with written values at next office visit.   See order list.    Counseled regarding disease appropriate diet, fluid, caffeine, stimulants and sodium intake as well as importance of compliance to diet, exercise and regular follow up.  Avoid NSAIDS and COX2 inhibitors. Use Acetaminophen PRN.  Keep August appointment   Low salt/ HTN/ Heart healthy carbohydrate restricted cardiac diet.      Relevant printed educational materials given pertinent to above diagnoses    Gave a copy of my notes for the patient to review and follow specific advise and relevant findings if any, prognosis, along with my current and futrure plan       Return in about 6 months (around 6/14/2018).   Per her wishes

## 2017-12-18 ENCOUNTER — HOSPITAL ENCOUNTER (OUTPATIENT)
Dept: NUCLEAR MEDICINE | Facility: HOSPITAL | Age: 77
Discharge: HOME | End: 2017-12-18

## 2017-12-18 ENCOUNTER — HOSPITAL ENCOUNTER (OUTPATIENT)
Dept: CT IMAGING | Facility: HOSPITAL | Age: 77
Discharge: HOME | End: 2017-12-18

## 2017-12-18 DIAGNOSIS — R10.30 LOWER ABDOMINAL PAIN: ICD-10-CM

## 2017-12-18 DIAGNOSIS — M54.9 BACK PAIN, UNSPECIFIED BACK LOCATION, UNSPECIFIED BACK PAIN LATERALITY, UNSPECIFIED CHRONICITY: ICD-10-CM

## 2017-12-18 DIAGNOSIS — Z87.39 HISTORY OF OSTEOMYELITIS: ICD-10-CM

## 2017-12-18 PROCEDURE — 0 IOHEXOL 300 MG/ML SOLUTION: Performed by: FAMILY MEDICINE

## 2017-12-18 PROCEDURE — 0 TECHNETIUM OXIDRONATE KIT: Performed by: FAMILY MEDICINE

## 2017-12-18 PROCEDURE — A9561 TC99M OXIDRONATE: HCPCS | Performed by: FAMILY MEDICINE

## 2017-12-18 PROCEDURE — 0 IOPAMIDOL 61 % SOLUTION: Performed by: FAMILY MEDICINE

## 2017-12-18 PROCEDURE — 78306 BONE IMAGING WHOLE BODY: CPT

## 2017-12-18 PROCEDURE — 82565 ASSAY OF CREATININE: CPT

## 2017-12-18 PROCEDURE — 74178 CT ABD&PLV WO CNTR FLWD CNTR: CPT

## 2017-12-18 RX ADMIN — IOHEXOL 50 ML: 300 INJECTION, SOLUTION INTRAVENOUS at 08:45

## 2017-12-18 RX ADMIN — TECHNETIUM TC 99M OXIDRONATE 1 DOSE: 3.15 INJECTION, POWDER, LYOPHILIZED, FOR SOLUTION INTRAVENOUS at 09:15

## 2017-12-18 RX ADMIN — IOPAMIDOL 100 ML: 612 INJECTION, SOLUTION INTRAVENOUS at 08:45

## 2017-12-19 ENCOUNTER — TELEPHONE (OUTPATIENT)
Dept: CARDIOLOGY | Facility: CLINIC | Age: 77
End: 2017-12-19

## 2017-12-19 LAB — CREAT BLDA-MCNC: 1 MG/DL (ref 0.6–1.3)

## 2017-12-19 NOTE — TELEPHONE ENCOUNTER
PT CALLED SAID SHE TALKED TO HER MEDICAL DR AND IS READY FOR YOU TO ORDER HER MADELIN.    PLEASE ADVISE

## 2017-12-27 ENCOUNTER — TELEPHONE (OUTPATIENT)
Dept: CARDIOLOGY | Facility: CLINIC | Age: 77
End: 2017-12-27

## 2017-12-27 DIAGNOSIS — I38 ENDOCARDITIS, UNSPECIFIED CHRONICITY, UNSPECIFIED ENDOCARDITIS TYPE: Primary | ICD-10-CM

## 2017-12-27 NOTE — TELEPHONE ENCOUNTER
Pt called again today asking about her MADELIN. She doesn't understand why some one is not calling her back. I advised that the note had been routed to , and she should receive a call in a few days.

## 2018-01-04 ENCOUNTER — OFFICE VISIT (OUTPATIENT)
Dept: FAMILY MEDICINE CLINIC | Facility: CLINIC | Age: 78
End: 2018-01-04

## 2018-01-04 VITALS
WEIGHT: 144.8 LBS | SYSTOLIC BLOOD PRESSURE: 112 MMHG | HEART RATE: 103 BPM | HEIGHT: 64 IN | TEMPERATURE: 98.1 F | BODY MASS INDEX: 24.72 KG/M2 | OXYGEN SATURATION: 93 % | DIASTOLIC BLOOD PRESSURE: 58 MMHG

## 2018-01-04 DIAGNOSIS — J06.9 ACUTE URI: Primary | ICD-10-CM

## 2018-01-04 PROCEDURE — 96372 THER/PROPH/DIAG INJ SC/IM: CPT | Performed by: FAMILY MEDICINE

## 2018-01-04 PROCEDURE — 99214 OFFICE O/P EST MOD 30 MIN: CPT | Performed by: FAMILY MEDICINE

## 2018-01-04 RX ORDER — CEFTRIAXONE SODIUM 250 MG/1
500 INJECTION, POWDER, FOR SOLUTION INTRAMUSCULAR; INTRAVENOUS ONCE
Status: COMPLETED | OUTPATIENT
Start: 2018-01-04 | End: 2018-01-04

## 2018-01-04 RX ORDER — ALBUTEROL SULFATE 90 UG/1
2 AEROSOL, METERED RESPIRATORY (INHALATION) EVERY 4 HOURS PRN
Qty: 6.7 G | Refills: 2 | Status: SHIPPED | OUTPATIENT
Start: 2018-01-04 | End: 2018-03-14

## 2018-01-04 RX ORDER — LEVOFLOXACIN 500 MG/1
500 TABLET, FILM COATED ORAL DAILY
Qty: 10 TABLET | Refills: 0 | Status: SHIPPED | OUTPATIENT
Start: 2018-01-04 | End: 2018-01-11 | Stop reason: SDUPTHER

## 2018-01-04 RX ADMIN — CEFTRIAXONE SODIUM 500 MG: 250 INJECTION, POWDER, FOR SOLUTION INTRAMUSCULAR; INTRAVENOUS at 10:49

## 2018-01-04 NOTE — PROGRESS NOTES
Subjective   Ariana Gutierrez is a 77 y.o. female.     URI    This is a new problem. The current episode started in the past 7 days. The problem has been gradually worsening. The maximum temperature recorded prior to her arrival was 101 - 101.9 F. The fever has been present for 1 to 2 days. Associated symptoms include congestion, coughing, sinus pain, sneezing and a sore throat. She has tried acetaminophen and increased fluids for the symptoms. The treatment provided no relief.       The following portions of the patient's history were reviewed and updated as appropriate: allergies, current medications, past family history, past medical history, past social history, past surgical history and problem list.    Review of Systems   HENT: Positive for congestion, sinus pain, sneezing and sore throat.    Respiratory: Positive for cough.        Objective   Physical Exam   Constitutional: She is oriented to person, place, and time. She appears well-developed and well-nourished.   HENT:   Right Ear: External ear normal.   Left Ear: External ear normal.   Mouth/Throat: Oropharynx is clear and moist.   Eyes: Conjunctivae are normal.   Cardiovascular: Normal rate and regular rhythm.    Pulmonary/Chest: Effort normal. She has wheezes. She has rales.   Lymphadenopathy:     She has no cervical adenopathy.   Neurological: She is alert and oriented to person, place, and time.   Psychiatric: She has a normal mood and affect. Her behavior is normal. Judgment and thought content normal.   Nursing note and vitals reviewed.      Assessment/Plan   Ariana was seen today for uri.    Diagnoses and all orders for this visit:    Acute URI  -     cefTRIAXone (ROCEPHIN) injection 500 mg; Inject 500 mg into the shoulder, thigh, or buttocks 1 (One) Time.    Other orders  -     levoFLOXacin (LEVAQUIN) 500 MG tablet; Take 1 tablet by mouth Daily.  -     albuterol (PROVENTIL HFA;VENTOLIN HFA) 108 (90 Base) MCG/ACT inhaler; Inhale 2 puffs Every 4  (Four) Hours As Needed for Wheezing.           Plan-above-advised if not better in 48 hours to be evaluated in the emergency room at [place]-put off MADELIN-see me one week

## 2018-01-09 ENCOUNTER — APPOINTMENT (OUTPATIENT)
Dept: CARDIOLOGY | Facility: HOSPITAL | Age: 78
End: 2018-01-09
Attending: INTERNAL MEDICINE

## 2018-01-10 RX ORDER — BENAZEPRIL HYDROCHLORIDE 20 MG/1
TABLET ORAL
Qty: 90 TABLET | Refills: 2 | Status: SHIPPED | OUTPATIENT
Start: 2018-01-10 | End: 2018-08-15 | Stop reason: SDUPTHER

## 2018-01-11 ENCOUNTER — OFFICE VISIT (OUTPATIENT)
Dept: FAMILY MEDICINE CLINIC | Facility: CLINIC | Age: 78
End: 2018-01-11

## 2018-01-11 VITALS
HEIGHT: 64 IN | SYSTOLIC BLOOD PRESSURE: 110 MMHG | WEIGHT: 142.2 LBS | OXYGEN SATURATION: 95 % | DIASTOLIC BLOOD PRESSURE: 60 MMHG | HEART RATE: 112 BPM | BODY MASS INDEX: 24.28 KG/M2 | TEMPERATURE: 98.4 F

## 2018-01-11 DIAGNOSIS — J40 BRONCHITIS: ICD-10-CM

## 2018-01-11 DIAGNOSIS — R05.9 COUGH: Primary | ICD-10-CM

## 2018-01-11 PROCEDURE — 99213 OFFICE O/P EST LOW 20 MIN: CPT | Performed by: FAMILY MEDICINE

## 2018-01-11 RX ORDER — LEVOFLOXACIN 500 MG/1
500 TABLET, FILM COATED ORAL DAILY
Qty: 5 TABLET | Refills: 0 | Status: SHIPPED | OUTPATIENT
Start: 2018-01-11 | End: 2018-03-14

## 2018-01-15 DIAGNOSIS — R05.9 COUGH: ICD-10-CM

## 2018-01-15 RX ORDER — OMEPRAZOLE 20 MG/1
20 CAPSULE, DELAYED RELEASE ORAL DAILY
Qty: 90 CAPSULE | Refills: 2 | Status: SHIPPED | OUTPATIENT
Start: 2018-01-15 | End: 2018-09-04 | Stop reason: SDUPTHER

## 2018-01-15 NOTE — TELEPHONE ENCOUNTER
PT WAS ADVISED CXR SHOWED NO PNEUMONIA AND OK TO GO AHEAD WITH MADELIN. PT VOICED UNDERSTANDING.

## 2018-01-15 NOTE — TELEPHONE ENCOUNTER
NEEDS REFILL OMEPRAZOLE DR 20MG (1) QD # 90  [facility]        ALSO INQUIRING CXR RESULTS-STATES SHE IS STILL HAVING RESPIRATORY ISSUES CONGESTION/COUGH-BLOODY MUCOUS WHEN BLOWS NOSE.     SCHEDULED FOR MADELIN IN Christmas  TOMORROW-OK TO HAVE DONE OR DOES SHE NEED TO CANCEL? SHE IS STILL TAKING AN ANTIBIOTIC & MUCINEX.      [facility]     PLEASE ADVISE chest x-ray showed no pneumonia-okay to go with the MADELIN

## 2018-01-16 ENCOUNTER — HOSPITAL ENCOUNTER (OUTPATIENT)
Dept: CARDIOLOGY | Facility: HOSPITAL | Age: 78
Setting detail: HOSPITAL OUTPATIENT SURGERY
Discharge: HOME | End: 2018-01-16
Attending: INTERNAL MEDICINE | Admitting: INTERNAL MEDICINE

## 2018-01-16 ENCOUNTER — ANESTHESIA (OUTPATIENT)
Dept: CARDIOLOGY | Facility: HOSPITAL | Age: 78
End: 2018-01-16

## 2018-01-16 ENCOUNTER — ANESTHESIA EVENT (OUTPATIENT)
Dept: CARDIOLOGY | Facility: HOSPITAL | Age: 78
End: 2018-01-16

## 2018-01-16 VITALS — DIASTOLIC BLOOD PRESSURE: 50 MMHG | SYSTOLIC BLOOD PRESSURE: 90 MMHG

## 2018-01-16 VITALS
OXYGEN SATURATION: 99 % | DIASTOLIC BLOOD PRESSURE: 66 MMHG | BODY MASS INDEX: 26.28 KG/M2 | HEIGHT: 62 IN | RESPIRATION RATE: 18 BRPM | WEIGHT: 142.8 LBS | TEMPERATURE: 97.6 F | SYSTOLIC BLOOD PRESSURE: 100 MMHG | HEART RATE: 87 BPM

## 2018-01-16 DIAGNOSIS — I38 ENDOCARDITIS, UNSPECIFIED CHRONICITY, UNSPECIFIED ENDOCARDITIS TYPE: ICD-10-CM

## 2018-01-16 LAB
BH CV ECHO MEAS - BSA(HAYCOCK): 1.7 M^2
BH CV ECHO MEAS - BSA: 1.7 M^2
BH CV ECHO MEAS - BZI_BMI: 26 KILOGRAMS/M^2
BH CV ECHO MEAS - BZI_METRIC_HEIGHT: 157.5 CM
BH CV ECHO MEAS - BZI_METRIC_WEIGHT: 64.4 KG
LV EF 2D ECHO EST: 55 %

## 2018-01-16 PROCEDURE — 93320 DOPPLER ECHO COMPLETE: CPT

## 2018-01-16 PROCEDURE — 93325 DOPPLER ECHO COLOR FLOW MAPG: CPT | Performed by: INTERNAL MEDICINE

## 2018-01-16 PROCEDURE — 93320 DOPPLER ECHO COMPLETE: CPT | Performed by: INTERNAL MEDICINE

## 2018-01-16 PROCEDURE — 93325 DOPPLER ECHO COLOR FLOW MAPG: CPT

## 2018-01-16 PROCEDURE — 93312 ECHO TRANSESOPHAGEAL: CPT | Performed by: INTERNAL MEDICINE

## 2018-01-16 PROCEDURE — 25010000002 PROPOFOL 10 MG/ML EMULSION: Performed by: NURSE ANESTHETIST, CERTIFIED REGISTERED

## 2018-01-16 PROCEDURE — 93312 ECHO TRANSESOPHAGEAL: CPT

## 2018-01-16 RX ORDER — PROPOFOL 10 MG/ML
VIAL (ML) INTRAVENOUS AS NEEDED
Status: DISCONTINUED | OUTPATIENT
Start: 2018-01-16 | End: 2018-01-16 | Stop reason: SURG

## 2018-01-16 RX ORDER — SODIUM CHLORIDE 9 MG/ML
20 INJECTION, SOLUTION INTRAVENOUS CONTINUOUS
Status: DISCONTINUED | OUTPATIENT
Start: 2018-01-16 | End: 2018-01-17 | Stop reason: HOSPADM

## 2018-01-16 RX ORDER — SODIUM CHLORIDE 0.9 % (FLUSH) 0.9 %
1-10 SYRINGE (ML) INJECTION AS NEEDED
Status: DISCONTINUED | OUTPATIENT
Start: 2018-01-16 | End: 2018-01-17 | Stop reason: HOSPADM

## 2018-01-16 RX ORDER — LIDOCAINE HYDROCHLORIDE 20 MG/ML
INJECTION, SOLUTION INFILTRATION; PERINEURAL AS NEEDED
Status: DISCONTINUED | OUTPATIENT
Start: 2018-01-16 | End: 2018-01-16 | Stop reason: SURG

## 2018-01-16 RX ADMIN — LIDOCAINE HYDROCHLORIDE 30 MG: 20 INJECTION, SOLUTION INFILTRATION; PERINEURAL at 12:26

## 2018-01-16 RX ADMIN — PROPOFOL 40 MG: 10 INJECTION, EMULSION INTRAVENOUS at 12:35

## 2018-01-16 RX ADMIN — PROPOFOL 40 MG: 10 INJECTION, EMULSION INTRAVENOUS at 12:33

## 2018-01-16 RX ADMIN — SODIUM CHLORIDE 20 ML/HR: 9 INJECTION, SOLUTION INTRAVENOUS at 11:47

## 2018-01-16 RX ADMIN — PROPOFOL 40 MG: 10 INJECTION, EMULSION INTRAVENOUS at 12:31

## 2018-01-16 RX ADMIN — PROPOFOL 40 MG: 10 INJECTION, EMULSION INTRAVENOUS at 12:29

## 2018-01-16 RX ADMIN — PROPOFOL 40 MG: 10 INJECTION, EMULSION INTRAVENOUS at 12:34

## 2018-01-16 NOTE — ANESTHESIA POSTPROCEDURE EVALUATION
Patient: Ariana Gutierrez    Procedure Summary     Date Anesthesia Start Anesthesia Stop Room / Location    01/16/18 1225  [facility] CLOSE OBSERVATION UNIT       Procedure Diagnosis Scheduled Providers Provider    ADULT TRANSESOPHAGEAL ECHO (MADELIN) W/ CONT IF NECESSARY PER PROTOCOL Endocarditis, unspecified chronicity, unspecified endocarditis type  (Endocarditis) MD Mir Covarrubias CRNA          Anesthesia Type: general  Last vitals  BP   99/57 (01/16/18 1250)   Temp   97.6 °F (36.4 °C) (01/16/18 1134)   Pulse   81 (01/16/18 1250)   Resp   17 (01/16/18 1250)     SpO2   99 % (01/16/18 1250)     Post Anesthesia Care and Evaluation    Patient location during evaluation: PHASE II  Patient participation: complete - patient cannot participate  Level of consciousness: awake  Pain score: 0  Pain management: adequate  Airway patency: patent  Anesthetic complications: No anesthetic complications  PONV Status: none  Cardiovascular status: acceptable  Respiratory status: acceptable  Hydration status: acceptable  No anesthesia care post op

## 2018-01-16 NOTE — ANESTHESIA PREPROCEDURE EVALUATION
Anesthesia Evaluation     Patient summary reviewed   no history of anesthetic complications:  NPO Solid Status: > 8 hours  NPO Liquid Status: > 8 hours     Airway   Mallampati: II  TM distance: >3 FB  Neck ROM: full  no difficulty expected  Dental - normal exam     Pulmonary - normal exam   (+) recent URI (Will finish antibiotic course tomorrow, CXR per primary MD showed no PNA. Last fever >2  weeks),   (-) COPD, asthma, sleep apnea, not a smoker    ROS comment: CXR: chronic changes of interstitial fibrosis  Cardiovascular - normal exam    (+) hypertension, BERGERON,     ROS comment: Presenting for MADELIN to rule out endocarditis    Neuro/Psych  (-) seizures, TIA, CVA  GI/Hepatic/Renal/Endo    (+)  GERD,   (-) liver disease, no renal disease, diabetes    Musculoskeletal (-) negative ROS    Abdominal    Substance History      OB/GYN          Other                                              Anesthesia Plan    ASA 2     general     intravenous induction   Anesthetic plan and risks discussed with patient.

## 2018-01-19 ENCOUNTER — TELEPHONE (OUTPATIENT)
Dept: FAMILY MEDICINE CLINIC | Facility: CLINIC | Age: 78
End: 2018-01-19

## 2018-01-19 NOTE — TELEPHONE ENCOUNTER
PT WAS ADVISED OK TO  REPORT. ADVISED MD FELT LIKE PT NEEDED TO SEE SPINE MD AGAIN-STATED SHE WOULD THINK ABOUT IT AND GET BACK TO US.

## 2018-01-19 NOTE — TELEPHONE ENCOUNTER
PT HAD MADELIN COMPLETED-REQUESTING A COPY.  OK TO RELEASE?      Yes okay to release-I think it is time for a visit to the spine clinic at [place] for her back if she okays

## 2018-01-25 ENCOUNTER — DOCUMENTATION (OUTPATIENT)
Dept: PHYSICAL THERAPY | Facility: HOSPITAL | Age: 78
End: 2018-01-25

## 2018-02-02 RX ORDER — METOPROLOL SUCCINATE 25 MG/1
25 TABLET, EXTENDED RELEASE ORAL DAILY
Qty: 90 TABLET | Refills: 3 | Status: SHIPPED | OUTPATIENT
Start: 2018-02-02 | End: 2018-11-15 | Stop reason: SDUPTHER

## 2018-02-13 ENCOUNTER — OFFICE VISIT (OUTPATIENT)
Dept: GASTROENTEROLOGY | Age: 78
End: 2018-02-13
Payer: MEDICARE

## 2018-02-13 VITALS
DIASTOLIC BLOOD PRESSURE: 60 MMHG | WEIGHT: 145.8 LBS | HEIGHT: 63 IN | OXYGEN SATURATION: 96 % | SYSTOLIC BLOOD PRESSURE: 110 MMHG | HEART RATE: 85 BPM | BODY MASS INDEX: 25.83 KG/M2

## 2018-02-13 DIAGNOSIS — K63.89 SMALL INTESTINAL BACTERIAL OVERGROWTH: ICD-10-CM

## 2018-02-13 DIAGNOSIS — K58.1 IRRITABLE BOWEL SYNDROME WITH CONSTIPATION: ICD-10-CM

## 2018-02-13 DIAGNOSIS — R14.0 BLOATING: Primary | ICD-10-CM

## 2018-02-13 DIAGNOSIS — R14.1 GAS PAIN: ICD-10-CM

## 2018-02-13 PROCEDURE — G8427 DOCREV CUR MEDS BY ELIG CLIN: HCPCS | Performed by: INTERNAL MEDICINE

## 2018-02-13 PROCEDURE — 1123F ACP DISCUSS/DSCN MKR DOCD: CPT | Performed by: INTERNAL MEDICINE

## 2018-02-13 PROCEDURE — 1090F PRES/ABSN URINE INCON ASSESS: CPT | Performed by: INTERNAL MEDICINE

## 2018-02-13 PROCEDURE — G8400 PT W/DXA NO RESULTS DOC: HCPCS | Performed by: INTERNAL MEDICINE

## 2018-02-13 PROCEDURE — G8419 CALC BMI OUT NRM PARAM NOF/U: HCPCS | Performed by: INTERNAL MEDICINE

## 2018-02-13 PROCEDURE — 1036F TOBACCO NON-USER: CPT | Performed by: INTERNAL MEDICINE

## 2018-02-13 PROCEDURE — 4040F PNEUMOC VAC/ADMIN/RCVD: CPT | Performed by: INTERNAL MEDICINE

## 2018-02-13 PROCEDURE — G8484 FLU IMMUNIZE NO ADMIN: HCPCS | Performed by: INTERNAL MEDICINE

## 2018-02-13 PROCEDURE — 3017F COLORECTAL CA SCREEN DOC REV: CPT | Performed by: INTERNAL MEDICINE

## 2018-02-13 PROCEDURE — G8598 ASA/ANTIPLAT THER USED: HCPCS | Performed by: INTERNAL MEDICINE

## 2018-02-13 PROCEDURE — 99213 OFFICE O/P EST LOW 20 MIN: CPT | Performed by: INTERNAL MEDICINE

## 2018-02-13 RX ORDER — HYDROCODONE BITARTRATE AND ACETAMINOPHEN 10; 325 MG/1; MG/1
TABLET ORAL
COMMUNITY
Start: 2017-12-11

## 2018-02-13 RX ORDER — DOCUSATE CALCIUM 240 MG
1 CAPSULE ORAL
COMMUNITY
End: 2021-02-24

## 2018-02-13 RX ORDER — ASPIRIN/CALCIUM/MAG/ALUMINUM 325 MG
TABLET ORAL
COMMUNITY
End: 2021-02-24

## 2018-02-13 ASSESSMENT — ENCOUNTER SYMPTOMS
ANAL BLEEDING: 0
RESPIRATORY NEGATIVE: 1
CONSTIPATION: 1
ABDOMINAL DISTENTION: 0
SHORTNESS OF BREATH: 0
ABDOMINAL PAIN: 1
BLOOD IN STOOL: 0
COUGH: 0
RECTAL PAIN: 0
NAUSEA: 0
VOMITING: 0
DIARRHEA: 0
TROUBLE SWALLOWING: 0

## 2018-02-13 NOTE — PROGRESS NOTES
Plan:      Plan colonoscopy    Continue meds she is taking for constipation as this seems to be working. Further recs as needed pending evaluation.     She does not want an EGD

## 2018-03-06 ENCOUNTER — ANESTHESIA (OUTPATIENT)
Dept: ENDOSCOPY | Age: 78
End: 2018-03-06
Payer: MEDICARE

## 2018-03-06 ENCOUNTER — HOSPITAL ENCOUNTER (OUTPATIENT)
Age: 78
Setting detail: OUTPATIENT SURGERY
Discharge: HOME | End: 2018-03-06
Attending: INTERNAL MEDICINE | Admitting: INTERNAL MEDICINE
Payer: MEDICARE

## 2018-03-06 ENCOUNTER — ANESTHESIA EVENT (OUTPATIENT)
Dept: ENDOSCOPY | Age: 78
End: 2018-03-06
Payer: MEDICARE

## 2018-03-06 VITALS
HEIGHT: 63 IN | DIASTOLIC BLOOD PRESSURE: 55 MMHG | TEMPERATURE: 98.2 F | HEART RATE: 68 BPM | BODY MASS INDEX: 24.8 KG/M2 | OXYGEN SATURATION: 98 % | RESPIRATION RATE: 18 BRPM | WEIGHT: 140 LBS | SYSTOLIC BLOOD PRESSURE: 103 MMHG

## 2018-03-06 VITALS
DIASTOLIC BLOOD PRESSURE: 63 MMHG | OXYGEN SATURATION: 100 % | RESPIRATION RATE: 12 BRPM | SYSTOLIC BLOOD PRESSURE: 118 MMHG

## 2018-03-06 LAB
C DIFFICILE TOXIN, EIA: NORMAL
LACTOFERRIN, FECAL: POSITIVE
OCCULT BLOOD DIAGNOSTIC: NORMAL

## 2018-03-06 PROCEDURE — 88305 TISSUE EXAM BY PATHOLOGIST: CPT

## 2018-03-06 PROCEDURE — 87329 GIARDIA AG IA: CPT

## 2018-03-06 PROCEDURE — 87045 FECES CULTURE AEROBIC BACT: CPT

## 2018-03-06 PROCEDURE — 2500000003 HC RX 250 WO HCPCS: Performed by: INTERNAL MEDICINE

## 2018-03-06 PROCEDURE — 87328 CRYPTOSPORIDIUM AG IA: CPT

## 2018-03-06 PROCEDURE — 2580000003 HC RX 258: Performed by: INTERNAL MEDICINE

## 2018-03-06 PROCEDURE — 45380 COLONOSCOPY AND BIOPSY: CPT | Performed by: INTERNAL MEDICINE

## 2018-03-06 PROCEDURE — 87046 STOOL CULTR AEROBIC BACT EA: CPT

## 2018-03-06 PROCEDURE — 87335 E COLI 0157 AG IA: CPT

## 2018-03-06 PROCEDURE — 3700000000 HC ANESTHESIA ATTENDED CARE: Performed by: INTERNAL MEDICINE

## 2018-03-06 PROCEDURE — 7100000011 HC PHASE II RECOVERY - ADDTL 15 MIN: Performed by: INTERNAL MEDICINE

## 2018-03-06 PROCEDURE — 3700000001 HC ADD 15 MINUTES (ANESTHESIA): Performed by: INTERNAL MEDICINE

## 2018-03-06 PROCEDURE — 82272 OCCULT BLD FECES 1-3 TESTS: CPT

## 2018-03-06 PROCEDURE — 87324 CLOSTRIDIUM AG IA: CPT

## 2018-03-06 PROCEDURE — 6360000002 HC RX W HCPCS: Performed by: NURSE ANESTHETIST, CERTIFIED REGISTERED

## 2018-03-06 PROCEDURE — 2500000003 HC RX 250 WO HCPCS: Performed by: NURSE ANESTHETIST, CERTIFIED REGISTERED

## 2018-03-06 PROCEDURE — 7100000010 HC PHASE II RECOVERY - FIRST 15 MIN: Performed by: INTERNAL MEDICINE

## 2018-03-06 PROCEDURE — 83630 LACTOFERRIN FECAL (QUAL): CPT

## 2018-03-06 PROCEDURE — 3609010300 HC COLONOSCOPY W/BIOPSY SINGLE/MULTIPLE: Performed by: INTERNAL MEDICINE

## 2018-03-06 RX ORDER — LIDOCAINE HYDROCHLORIDE 10 MG/ML
INJECTION, SOLUTION EPIDURAL; INFILTRATION; INTRACAUDAL; PERINEURAL PRN
Status: DISCONTINUED | OUTPATIENT
Start: 2018-03-06 | End: 2018-03-06 | Stop reason: SDUPTHER

## 2018-03-06 RX ORDER — PROPOFOL 10 MG/ML
INJECTION, EMULSION INTRAVENOUS PRN
Status: DISCONTINUED | OUTPATIENT
Start: 2018-03-06 | End: 2018-03-06 | Stop reason: SDUPTHER

## 2018-03-06 RX ORDER — SODIUM CHLORIDE, SODIUM LACTATE, POTASSIUM CHLORIDE, CALCIUM CHLORIDE 600; 310; 30; 20 MG/100ML; MG/100ML; MG/100ML; MG/100ML
INJECTION, SOLUTION INTRAVENOUS CONTINUOUS
Status: DISCONTINUED | OUTPATIENT
Start: 2018-03-06 | End: 2018-03-06 | Stop reason: HOSPADM

## 2018-03-06 RX ORDER — LIDOCAINE HYDROCHLORIDE 10 MG/ML
1 INJECTION, SOLUTION EPIDURAL; INFILTRATION; INTRACAUDAL; PERINEURAL ONCE
Status: COMPLETED | OUTPATIENT
Start: 2018-03-06 | End: 2018-03-06

## 2018-03-06 RX ADMIN — LIDOCAINE HYDROCHLORIDE 1 ML: 10 INJECTION, SOLUTION EPIDURAL; INFILTRATION; INTRACAUDAL; PERINEURAL at 10:56

## 2018-03-06 RX ADMIN — LIDOCAINE HYDROCHLORIDE 5 ML: 10 INJECTION, SOLUTION EPIDURAL; INFILTRATION; INTRACAUDAL; PERINEURAL at 11:48

## 2018-03-06 RX ADMIN — SODIUM CHLORIDE, POTASSIUM CHLORIDE, SODIUM LACTATE AND CALCIUM CHLORIDE: 600; 310; 30; 20 INJECTION, SOLUTION INTRAVENOUS at 10:56

## 2018-03-06 RX ADMIN — PROPOFOL 160 MG: 10 INJECTION, EMULSION INTRAVENOUS at 11:48

## 2018-03-06 ASSESSMENT — PAIN - FUNCTIONAL ASSESSMENT
PAIN_FUNCTIONAL_ASSESSMENT: 0-10
PAIN_FUNCTIONAL_ASSESSMENT: 0-10

## 2018-03-06 ASSESSMENT — PAIN SCALES - GENERAL: PAINLEVEL_OUTOF10: 0

## 2018-03-06 NOTE — H&P
calcium (SURFAK) 240 MG capsule Take 1 tablet by mouth    Historical Provider, MD   polyethylene glycol (MIRALAX) powder 1 scoop in 8 oz fluids bid 11/30/15   Sanjeev Marr MD       Allergies:   has No Known Allergies. Vital Signs:   Vitals:    03/06/18 1024   BP: (!) 113/59   Pulse: 71   Resp: 20   Temp: 98.2 °F (36.8 °C)   SpO2: 97%       ROS:  Cardiac:  [x]WNL  []Comments:  Pulmonary:  [x]WNL   []Comments:  Neuro/Mental Status:  [x]WNL  []Comments:  Abdominal:                   Active Hospital Problems    Diagnosis Date Noted    Constipation due to opioid therapy [K59.03, Eugena Canard 05/31/2017    History of diverticulosis [Z87.19] 08/16/2016    Bloating [R14.0] 11/06/2014    IBS (irritable bowel syndrome) [K58.9] 11/02/2011        All other pertinent GI symptoms negative. Physical Exam:  Cardiac:  [x]WNL  []Comments:  Pulmonary:  [x]WNL   []Comments:  Neuro/Mental Status:  [x]WNL  []Comments:  Abdominal:  [x]WNL    []Comments:  Other:   []WNL  []Comments:    Informed Consent:  The risks and benefits of the procedure have been discussed with either the patient or if they cannot consent, their representative. Assessment:  Patient examined and appropriate for planned sedation and procedure. Plan:  Proceed with planned sedation and procedure as above.     Drake Ross DO  11:41 AM

## 2018-03-06 NOTE — OP NOTE
years. If any abnormal pathology is found, we will treat.     Maria Esther Ross DO  03/06/18  11:59 AM

## 2018-03-06 NOTE — ANESTHESIA PRE PROCEDURE
Joan Carr MD at [address] [facility]  ? [facility]       Social History:    Social History   Substance Use Topics    Smoking status: Never Smoker    Smokeless tobacco: Never Used    Alcohol use No                                Counseling given: Not Answered      Vital Signs (Current):   Vitals:    03/06/18 1024   BP: (!) 113/59   Pulse: 71   Resp: 20   Temp: 98.2 °F (36.8 °C)   TempSrc: Temporal   SpO2: 97%   Weight: 140 lb (63.5 kg)   Height: 5' 3\" (1.6 m)                                              BP Readings from Last 3 Encounters:   03/06/18 (!) 113/59   02/13/18 110/60   10/08/17 (!) 90/59       NPO Status: Time of last liquid consumption: 0600                        Time of last solid consumption: 1700                        Date of last liquid consumption: 03/06/18                        Date of last solid food consumption: 03/04/18    BMI:   Wt Readings from Last 3 Encounters:   03/06/18 140 lb (63.5 kg)   02/13/18 145 lb 12.8 oz (66.1 kg)   10/06/17 155 lb (70.3 kg)     Body mass index is 24.8 kg/m². CBC:   Lab Results   Component Value Date    WBC 12.1 06/03/2017    RBC 2.68 06/03/2017    HGB 9.8 10/08/2017    HCT 32.4 10/08/2017    HCT 44.0 11/02/2011    MCV 93.7 06/03/2017    RDW 16.4 06/03/2017     06/03/2017     11/02/2011       CMP:   Lab Results   Component Value Date     10/08/2017     11/02/2011    K 5.1 10/08/2017    K 4.0 11/02/2011    CL 97 10/08/2017     11/02/2011    CO2 29 10/08/2017    BUN 16 10/08/2017    CREATININE 0.9 10/08/2017    CREATININE 1.0 11/02/2011    LABGLOM >60 10/08/2017    GLUCOSE 106 10/08/2017    PROT 7.3 08/09/2012    CALCIUM 9.3 10/08/2017    ALKPHOS 65 08/09/2012    ALKPHOS 58 11/02/2011    AST 19 08/09/2012    ALT 18 08/09/2012       POC Tests: No results for input(s): POCGLU, POCNA, POCK, POCCL, POCBUN, POCHEMO, POCHCT in the last 72 hours.     Coags:   Lab Results   Component Value Date    PROTIME 13.5 05/22/2017    INR 1.04 05/22/2017    APTT 27.7 05/22/2017       HCG (If Applicable): No results found for: PREGTESTUR, PREGSERUM, HCG, HCGQUANT     ABGs:   Lab Results   Component Value Date    PHART 7.377 05/30/2017    PO2ART 291 05/30/2017    VXU4GEU 40 05/30/2017    BEART -1 05/30/2017    V5XWYBIW 100 05/30/2017        Type & Screen (If Applicable):  No results found for: LABABO, [address]    Anesthesia Evaluation  Patient summary reviewed and Nursing notes reviewed no history of anesthetic complications:   Airway: Mallampati: II  TM distance: >3 FB   Neck ROM: full  Mouth opening: > = 3 FB Dental: normal exam         Pulmonary:Negative Pulmonary ROS and normal exam                               Cardiovascular:    (+) hypertension:,          Beta Blocker:  Dose within 24 Hrs         Neuro/Psych:   Negative Neuro/Psych ROS              GI/Hepatic/Renal:   (+) hiatal hernia, GERD: well controlled, bowel prep,           Endo/Other: Negative Endo/Other ROS             Pt had no PAT visit       Abdominal:           Vascular:   + DVT, . Anesthesia Plan      general     ASA 2       Induction: intravenous. Anesthetic plan and risks discussed with patient.                       Lizet Cisneros CRNA   3/6/2018

## 2018-03-06 NOTE — ANESTHESIA POSTPROCEDURE EVALUATION
Department of Anesthesiology  Postprocedure Note    Patient: Kami Harrison  MRN: [id number]  YOB: 1940  Date of evaluation: 3/6/2018  Time:  11:59 AM     Procedure Summary     Date:  03/06/18 Room / Location:  [facility] ENDO 11 / [facility]    Anesthesia Start:  1146 Anesthesia Stop:      Procedure:  COLONOSCOPY DIAGNOSTIC OR SCREENING (N/A Abdomen) Diagnosis:  (IBS W/CONSTIPATION - BLOATING - GAS)    Surgeon:  Robin Casiano DO Responsible Provider:  Khadra Rodriguez CRNA    Anesthesia Type:  general ASA Status:  2          Anesthesia Type: general    Liu Phase I:      Liu Phase II:      Last vitals: Reviewed and per EMR flowsheets.        Anesthesia Post Evaluation    Patient location during evaluation: bedside  Patient participation: complete - patient participated  Level of consciousness: sleepy but conscious  Pain score: 0  Airway patency: patent  Nausea & Vomiting: no nausea and no vomiting  Complications: no  Cardiovascular status: hemodynamically stable and blood pressure returned to baseline  Respiratory status: acceptable and nasal cannula  Hydration status: stable

## 2018-03-07 LAB
CRYPTOSPORIDIUM ANTIGEN STOOL: NORMAL
GIARDIA ANTIGEN STOOL: NORMAL

## 2018-03-09 LAB
CULTURE, STOOL: NORMAL
E COLI SHIGA TOXIN ASSAY: NORMAL

## 2018-03-12 ENCOUNTER — TELEPHONE (OUTPATIENT)
Dept: GASTROENTEROLOGY | Age: 78
End: 2018-03-12

## 2018-03-13 NOTE — TELEPHONE ENCOUNTER
3-13-18 The patient called the office back today, I gave her the message from [facility], the patient is asking for Hussain to mail her a copy of her Op note to her home address, she likes to keep up with these things in her own personal file.  René garcia

## 2018-03-14 ENCOUNTER — OFFICE VISIT (OUTPATIENT)
Dept: FAMILY MEDICINE CLINIC | Facility: CLINIC | Age: 78
End: 2018-03-14

## 2018-03-14 VITALS
HEART RATE: 66 BPM | TEMPERATURE: 97.7 F | SYSTOLIC BLOOD PRESSURE: 110 MMHG | WEIGHT: 143.6 LBS | DIASTOLIC BLOOD PRESSURE: 68 MMHG | BODY MASS INDEX: 26.43 KG/M2 | OXYGEN SATURATION: 98 % | HEIGHT: 62 IN

## 2018-03-14 DIAGNOSIS — Z79.899 ENCOUNTER FOR LONG-TERM (CURRENT) USE OF MEDICATIONS: ICD-10-CM

## 2018-03-14 DIAGNOSIS — G89.29 CHRONIC BILATERAL LOW BACK PAIN WITH RIGHT-SIDED SCIATICA: ICD-10-CM

## 2018-03-14 DIAGNOSIS — R53.83 FATIGUE, UNSPECIFIED TYPE: ICD-10-CM

## 2018-03-14 DIAGNOSIS — M54.41 CHRONIC BILATERAL LOW BACK PAIN WITH RIGHT-SIDED SCIATICA: ICD-10-CM

## 2018-03-14 DIAGNOSIS — M65.9 SYNOVITIS: Primary | ICD-10-CM

## 2018-03-14 PROBLEM — K59.03 CONSTIPATION DUE TO OPIOID THERAPY: Status: RESOLVED | Noted: 2017-05-31 | Resolved: 2018-03-14

## 2018-03-14 PROBLEM — I74.3 POPLITEAL ARTERY THROMBOSIS, RIGHT (HCC): Status: ACTIVE | Noted: 2018-03-14

## 2018-03-14 PROBLEM — Z86.718 PERSONAL HISTORY OF DVT (DEEP VEIN THROMBOSIS): Status: RESOLVED | Noted: 2017-10-18 | Resolved: 2018-03-14

## 2018-03-14 PROBLEM — D50.9 IRON DEFICIENCY ANEMIA: Status: RESOLVED | Noted: 2017-06-01 | Resolved: 2018-03-14

## 2018-03-14 PROBLEM — R79.82 ELEVATED C-REACTIVE PROTEIN (CRP): Status: RESOLVED | Noted: 2017-11-21 | Resolved: 2018-03-14

## 2018-03-14 PROBLEM — D50.9 IRON DEFICIENCY ANEMIA: Status: ACTIVE | Noted: 2017-06-01

## 2018-03-14 PROBLEM — K21.9 GASTROESOPHAGEAL REFLUX DISEASE WITHOUT ESOPHAGITIS: Status: RESOLVED | Noted: 2017-05-26 | Resolved: 2018-03-14

## 2018-03-14 PROBLEM — I70.90 ATHEROMATOUS PLAQUE: Status: RESOLVED | Noted: 2017-06-04 | Resolved: 2018-03-14

## 2018-03-14 PROBLEM — T40.2X5A CONSTIPATION DUE TO OPIOID THERAPY: Status: RESOLVED | Noted: 2017-05-31 | Resolved: 2018-03-14

## 2018-03-14 PROBLEM — K59.03 CONSTIPATION DUE TO OPIOID THERAPY: Status: ACTIVE | Noted: 2017-05-31

## 2018-03-14 PROBLEM — Z86.718 PERSONAL HISTORY OF DVT (DEEP VEIN THROMBOSIS): Status: ACTIVE | Noted: 2017-10-18

## 2018-03-14 PROBLEM — M11.20 PSEUDOGOUT: Status: RESOLVED | Noted: 2017-10-11 | Resolved: 2018-03-14

## 2018-03-14 PROBLEM — I70.90 ATHEROMATOUS PLAQUE: Status: ACTIVE | Noted: 2017-06-04

## 2018-03-14 PROBLEM — T40.2X5A CONSTIPATION DUE TO OPIOID THERAPY: Status: ACTIVE | Noted: 2017-05-31

## 2018-03-14 PROBLEM — I74.3 POPLITEAL ARTERY THROMBOSIS, RIGHT (HCC): Status: RESOLVED | Noted: 2018-03-14 | Resolved: 2018-03-14

## 2018-03-14 PROBLEM — I10 ESSENTIAL HYPERTENSION: Status: RESOLVED | Noted: 2017-05-26 | Resolved: 2018-03-14

## 2018-03-14 PROBLEM — R06.09 DOE (DYSPNEA ON EXERTION): Status: RESOLVED | Noted: 2017-05-26 | Resolved: 2018-03-14

## 2018-03-14 LAB
BASOPHILS # BLD AUTO: 0.06 10*3/MM3 (ref 0–0.2)
BASOPHILS NFR BLD AUTO: 0.7 % (ref 0–2)
BUN SERPL-MCNC: 24 MG/DL (ref 5–21)
BUN/CREAT SERPL: 22.9 (ref 7–25)
CALCIUM SERPL-MCNC: 10.2 MG/DL (ref 8.4–10.4)
CHLORIDE SERPL-SCNC: 94 MMOL/L (ref 98–110)
CO2 SERPL-SCNC: 33 MMOL/L (ref 24–31)
CREAT SERPL-MCNC: 1.05 MG/DL (ref 0.5–1.4)
CRP SERPL-MCNC: <0.5 MG/DL (ref 0–0.99)
D DIMER PPP FEU-MCNC: 2.32 MG/L (FEU) (ref 0–0.5)
EOSINOPHIL # BLD AUTO: 0.21 10*3/MM3 (ref 0–0.7)
EOSINOPHIL NFR BLD AUTO: 2.5 % (ref 0–4)
ERYTHROCYTE [DISTWIDTH] IN BLOOD BY AUTOMATED COUNT: 16.6 % (ref 12–15)
ERYTHROCYTE [SEDIMENTATION RATE] IN BLOOD BY WESTERGREN METHOD: 49 MM/HR (ref 0–20)
GFR SERPLBLD CREATININE-BSD FMLA CKD-EPI: 51 ML/MIN/1.73
GFR SERPLBLD CREATININE-BSD FMLA CKD-EPI: 62 ML/MIN/1.73
GLUCOSE SERPL-MCNC: 98 MG/DL (ref 70–100)
HCT VFR BLD AUTO: 40.8 % (ref 37–47)
HGB BLD-MCNC: 12.3 G/DL (ref 12–16)
IMM GRANULOCYTES # BLD: 0.03 10*3/MM3 (ref 0–0.03)
IMM GRANULOCYTES NFR BLD: 0.4 % (ref 0–5)
LYMPHOCYTES # BLD AUTO: 1.39 10*3/MM3 (ref 0.72–4.86)
LYMPHOCYTES NFR BLD AUTO: 16.4 % (ref 15–45)
MCH RBC QN AUTO: 26.1 PG (ref 28–32)
MCHC RBC AUTO-ENTMCNC: 30.1 G/DL (ref 33–36)
MCV RBC AUTO: 86.6 FL (ref 82–98)
MONOCYTES # BLD AUTO: 0.71 10*3/MM3 (ref 0.19–1.3)
MONOCYTES NFR BLD AUTO: 8.4 % (ref 4–12)
NEUTROPHILS # BLD AUTO: 6.1 10*3/MM3 (ref 1.87–8.4)
NEUTROPHILS NFR BLD AUTO: 71.6 % (ref 39–78)
NRBC BLD AUTO-RTO: 0 /100 WBC (ref 0–0)
PLATELET # BLD AUTO: 351 10*3/MM3 (ref 130–400)
POTASSIUM SERPL-SCNC: 5.2 MMOL/L (ref 3.5–5.3)
RBC # BLD AUTO: 4.71 10*6/MM3 (ref 4.2–5.4)
SODIUM SERPL-SCNC: 139 MMOL/L (ref 135–145)
WBC # BLD AUTO: 8.5 10*3/MM3 (ref 4.8–10.8)

## 2018-03-14 PROCEDURE — 99214 OFFICE O/P EST MOD 30 MIN: CPT | Performed by: FAMILY MEDICINE

## 2018-03-14 RX ORDER — HYDROCODONE BITARTRATE AND ACETAMINOPHEN 10; 325 MG/1; MG/1
1 TABLET ORAL EVERY 6 HOURS PRN
Qty: 90 TABLET | Refills: 0 | Status: SHIPPED | OUTPATIENT
Start: 2018-03-14 | End: 2018-06-05 | Stop reason: SDUPTHER

## 2018-03-14 NOTE — PROGRESS NOTES
Subjective   Ariana Gutierrez is a 78 y.o. female.     This patient has severe degenerative scoliosis with a right-sided radiculopathy.  She has intractable pain and is going to be evaluated for neurosurgical intervention.  She had infection complications following a right knee replacement and lab is ordered today to check on his activity.      Back Pain   This is a chronic problem. The current episode started more than 1 year ago. The problem occurs daily. The problem has been gradually worsening since onset. The pain is present in the sacro-iliac. The pain radiates to the right foot, right thigh and right knee. The pain is at a severity of 3/10. The pain is moderate. The pain is the same all the time. The symptoms are aggravated by position. Stiffness is present all day. Associated symptoms include abdominal pain. Risk factors include history of osteoporosis, menopause and sedentary lifestyle. She has tried analgesics for the symptoms. The treatment provided mild relief.       The following portions of the patient's history were reviewed and updated as appropriate: allergies, current medications, past family history, past medical history, past social history, past surgical history and problem list.    Review of Systems   Cardiovascular:        MADELIN showed no vegetation or valvular heart disease   Gastrointestinal: Positive for abdominal pain.        Recent CT of abdomen and pelvis, colonoscopy, unrevealing or normal for age   Musculoskeletal: Positive for back pain.       Objective   Physical Exam   Constitutional: She is oriented to person, place, and time. She appears well-developed and well-nourished.   Musculoskeletal:   Severe degenerative scoliosis   Neurological: She is alert and oriented to person, place, and time.   Skin: Skin is warm and dry. Capillary refill takes less than 2 seconds.   Psychiatric: She has a normal mood and affect. Her behavior is normal. Thought content normal.   Nursing note and  vitals reviewed.      Assessment/Plan   Ariana was seen today for consult.    Diagnoses and all orders for this visit:    Synovitis  -     CBC & Differential  -     Basic Metabolic Panel  -     D-dimer, Quantitative  -     Sedimentation rate, automated  -     C-reactive protein    Fatigue, unspecified type  -     CBC & Differential    Encounter for long-term (current) use of medications  -     ToxASSURE Select 13 (MW) - Urine, Clean Catch    Chronic bilateral low back pain with right-sided sciatica    Other orders  -     HYDROcodone-acetaminophen (NORCO)  MG per tablet; Take 1 tablet by mouth Every 6 (Six) Hours As Needed for Moderate Pain .           Plan-lab-consulting with infectious disease specialist-if okay will get another opinion about surgery

## 2018-03-17 LAB — DRUGS UR: NORMAL

## 2018-04-03 ENCOUNTER — TELEPHONE (OUTPATIENT)
Dept: FAMILY MEDICINE CLINIC | Facility: CLINIC | Age: 78
End: 2018-04-03

## 2018-04-03 NOTE — TELEPHONE ENCOUNTER
PT WAS ADVISED MD SPOKE WITH NE'S NURSE SANTA AND PROVIDED FAX NUMBER TO SEND RECORDS [phone].    RECORDS WERE FAXED TO DR OLIVIA'S OFFICE VIA [employer].

## 2018-04-03 NOTE — TELEPHONE ENCOUNTER
NOTIFIED PT-RECORDS WERE FAXED AND [place] OFFICE WOULD BE CONTACTING PT WITH APPT INFORMATION.  SANTA PROVIDED DATE OF APPT FOR 04.10.18-WOULD PROVIDE PT WITH TIME.    VOICED UNDERSTANDING

## 2018-04-16 RX ORDER — GABAPENTIN 600 MG/1
600 TABLET ORAL 3 TIMES DAILY
Qty: 270 TABLET | Refills: 2 | Status: SHIPPED | OUTPATIENT
Start: 2018-04-16 | End: 2018-10-15 | Stop reason: SDUPTHER

## 2018-04-16 NOTE — TELEPHONE ENCOUNTER
PATIENT IS  MG TID.  AFTER APPT IN [place] ADVISED TO INCREASE  MG TID   90 DAY SUPPLY    TAWANNA 3/14/18

## 2018-04-26 ENCOUNTER — HOSPITAL ENCOUNTER (OUTPATIENT)
Dept: NON INVASIVE DIAGNOSTICS | Age: 78
Discharge: HOME | End: 2018-04-26
Payer: MEDICARE

## 2018-04-26 ENCOUNTER — OFFICE VISIT (OUTPATIENT)
Dept: VASCULAR SURGERY | Age: 78
End: 2018-04-26
Payer: MEDICARE

## 2018-04-26 VITALS
HEIGHT: 63 IN | WEIGHT: 140 LBS | SYSTOLIC BLOOD PRESSURE: 114 MMHG | HEART RATE: 73 BPM | BODY MASS INDEX: 24.8 KG/M2 | RESPIRATION RATE: 18 BRPM | DIASTOLIC BLOOD PRESSURE: 72 MMHG

## 2018-04-26 DIAGNOSIS — I74.3 POPLITEAL ARTERY THROMBOSIS, RIGHT (HCC): ICD-10-CM

## 2018-04-26 DIAGNOSIS — I73.9 PVD (PERIPHERAL VASCULAR DISEASE) (HCC): Primary | ICD-10-CM

## 2018-04-26 DIAGNOSIS — I73.9 PVD (PERIPHERAL VASCULAR DISEASE) (HCC): ICD-10-CM

## 2018-04-26 PROCEDURE — 4040F PNEUMOC VAC/ADMIN/RCVD: CPT | Performed by: PHYSICIAN ASSISTANT

## 2018-04-26 PROCEDURE — 93926 LOWER EXTREMITY STUDY: CPT

## 2018-04-26 PROCEDURE — 1090F PRES/ABSN URINE INCON ASSESS: CPT | Performed by: PHYSICIAN ASSISTANT

## 2018-04-26 PROCEDURE — 93922 UPR/L XTREMITY ART 2 LEVELS: CPT

## 2018-04-26 PROCEDURE — 1036F TOBACCO NON-USER: CPT | Performed by: PHYSICIAN ASSISTANT

## 2018-04-26 PROCEDURE — 1123F ACP DISCUSS/DSCN MKR DOCD: CPT | Performed by: PHYSICIAN ASSISTANT

## 2018-04-26 PROCEDURE — G8400 PT W/DXA NO RESULTS DOC: HCPCS | Performed by: PHYSICIAN ASSISTANT

## 2018-04-26 PROCEDURE — 99213 OFFICE O/P EST LOW 20 MIN: CPT | Performed by: PHYSICIAN ASSISTANT

## 2018-04-26 PROCEDURE — G8420 CALC BMI NORM PARAMETERS: HCPCS | Performed by: PHYSICIAN ASSISTANT

## 2018-04-26 PROCEDURE — G8598 ASA/ANTIPLAT THER USED: HCPCS | Performed by: PHYSICIAN ASSISTANT

## 2018-04-26 PROCEDURE — G8427 DOCREV CUR MEDS BY ELIG CLIN: HCPCS | Performed by: PHYSICIAN ASSISTANT

## 2018-04-26 RX ORDER — GABAPENTIN 800 MG/1
800 TABLET ORAL
COMMUNITY
Start: 2018-04-16

## 2018-05-07 RX ORDER — HYDROCHLOROTHIAZIDE 25 MG/1
TABLET ORAL
Qty: 90 TABLET | Refills: 0 | Status: SHIPPED | OUTPATIENT
Start: 2018-05-07 | End: 2018-07-10 | Stop reason: SDUPTHER

## 2018-06-05 ENCOUNTER — OFFICE VISIT (OUTPATIENT)
Dept: FAMILY MEDICINE CLINIC | Facility: CLINIC | Age: 78
End: 2018-06-05

## 2018-06-05 VITALS
HEART RATE: 76 BPM | OXYGEN SATURATION: 97 % | BODY MASS INDEX: 27.38 KG/M2 | SYSTOLIC BLOOD PRESSURE: 108 MMHG | TEMPERATURE: 98.9 F | HEIGHT: 62 IN | WEIGHT: 148.8 LBS | DIASTOLIC BLOOD PRESSURE: 62 MMHG

## 2018-06-05 DIAGNOSIS — R52 PAIN: Primary | ICD-10-CM

## 2018-06-05 PROBLEM — R07.9 CHEST PAIN: Status: ACTIVE | Noted: 2018-06-05

## 2018-06-05 PROBLEM — R00.2 PALPITATIONS: Status: ACTIVE | Noted: 2018-06-05

## 2018-06-05 PROBLEM — M41.9 SCOLIOSIS: Status: ACTIVE | Noted: 2018-06-05

## 2018-06-05 PROBLEM — G89.29 CHRONIC BACK PAIN: Status: ACTIVE | Noted: 2018-06-05

## 2018-06-05 PROBLEM — R20.2 PARESTHESIA: Status: ACTIVE | Noted: 2018-06-05

## 2018-06-05 PROBLEM — I82.409 DEEP VEIN THROMBOSIS (HCC): Status: ACTIVE | Noted: 2018-06-05

## 2018-06-05 PROBLEM — M54.9 CHRONIC BACK PAIN: Status: ACTIVE | Noted: 2018-06-05

## 2018-06-05 PROBLEM — K63.1 PERFORATED BOWEL: Status: ACTIVE | Noted: 2018-06-05

## 2018-06-05 PROBLEM — A49.9 BACTERIAL INFECTION: Status: ACTIVE | Noted: 2018-06-05

## 2018-06-05 PROBLEM — M54.50 LOW BACK PAIN: Status: ACTIVE | Noted: 2018-06-05

## 2018-06-05 PROCEDURE — 99214 OFFICE O/P EST MOD 30 MIN: CPT | Performed by: FAMILY MEDICINE

## 2018-06-05 RX ORDER — HYDROCODONE BITARTRATE AND ACETAMINOPHEN 10; 325 MG/1; MG/1
1 TABLET ORAL EVERY 6 HOURS PRN
Qty: 90 TABLET | Refills: 0 | Status: SHIPPED | OUTPATIENT
Start: 2018-06-05 | End: 2018-07-17 | Stop reason: SDUPTHER

## 2018-06-05 NOTE — PROGRESS NOTES
Subjective   Ariana Gutierrez is a 78 y.o. female.     Pain   This is a chronic problem. The current episode started more than 1 year ago. The problem occurs constantly. The problem has been gradually worsening. Associated symptoms include arthralgias, fatigue and neck pain. The symptoms are aggravated by exertion. Treatments tried: Pain management. The treatment provided no relief.       The following portions of the patient's history were reviewed and updated as appropriate: allergies, current medications, past family history, past medical history, past social history, past surgical history and problem list.    Review of Systems   Constitutional: Positive for fatigue.   Musculoskeletal: Positive for arthralgias, back pain and neck pain.        Severe scoliosis, right knee and foot pain-sees pain management and orthopedic specialist       Objective   Physical Exam   Constitutional: She is oriented to person, place, and time. She appears well-developed and well-nourished.   Neurological: She is alert and oriented to person, place, and time.   Psychiatric: She has a normal mood and affect. Her behavior is normal. Judgment and thought content normal.   Nursing note and vitals reviewed.      Assessment/Plan   Ariana was seen today for med refill.    Diagnoses and all orders for this visit:    Pain    Other orders  -     HYDROcodone-acetaminophen (NORCO)  MG per tablet; Take 1 tablet by mouth Every 6 (Six) Hours As Needed for Moderate Pain .         30 minutes spent evaluating other doctors reports and counseling the patient about what to do

## 2018-06-20 ENCOUNTER — OFFICE VISIT (OUTPATIENT)
Dept: FAMILY MEDICINE CLINIC | Facility: CLINIC | Age: 78
End: 2018-06-20

## 2018-06-20 VITALS
TEMPERATURE: 97.9 F | HEART RATE: 58 BPM | BODY MASS INDEX: 27.23 KG/M2 | DIASTOLIC BLOOD PRESSURE: 66 MMHG | SYSTOLIC BLOOD PRESSURE: 110 MMHG | OXYGEN SATURATION: 98 % | WEIGHT: 148 LBS | HEIGHT: 62 IN

## 2018-06-20 DIAGNOSIS — J01.00 ACUTE NON-RECURRENT MAXILLARY SINUSITIS: Primary | ICD-10-CM

## 2018-06-20 PROCEDURE — 99213 OFFICE O/P EST LOW 20 MIN: CPT | Performed by: FAMILY MEDICINE

## 2018-06-20 PROCEDURE — 96372 THER/PROPH/DIAG INJ SC/IM: CPT | Performed by: FAMILY MEDICINE

## 2018-06-20 RX ORDER — METHYLPREDNISOLONE ACETATE 40 MG/ML
40 INJECTION, SUSPENSION INTRA-ARTICULAR; INTRALESIONAL; INTRAMUSCULAR; SOFT TISSUE ONCE
Status: COMPLETED | OUTPATIENT
Start: 2018-06-20 | End: 2018-06-20

## 2018-06-20 RX ORDER — PREDNISONE 10 MG/1
TABLET ORAL
Qty: 21 TABLET | Refills: 0 | Status: SHIPPED | OUTPATIENT
Start: 2018-06-20 | End: 2018-07-09

## 2018-06-20 RX ORDER — AZITHROMYCIN 250 MG/1
TABLET, FILM COATED ORAL
Qty: 6 TABLET | Refills: 0 | Status: SHIPPED | OUTPATIENT
Start: 2018-06-20 | End: 2018-07-09

## 2018-06-20 RX ADMIN — METHYLPREDNISOLONE ACETATE 40 MG: 40 INJECTION, SUSPENSION INTRA-ARTICULAR; INTRALESIONAL; INTRAMUSCULAR; SOFT TISSUE at 13:44

## 2018-06-20 NOTE — PROGRESS NOTES
Subjective   Ariana Gutierrez is a 78 y.o. female.     Earache    There is pain in the right ear. This is a new problem. The current episode started in the past 7 days. The problem occurs constantly. The problem has been unchanged. Associated symptoms include coughing. She has tried nothing for the symptoms. Her past medical history is significant for hearing loss.       The following portions of the patient's history were reviewed and updated as appropriate: allergies, current medications, past family history, past medical history, past social history, past surgical history and problem list.    Review of Systems   HENT: Positive for ear pain, facial swelling and postnasal drip.    Respiratory: Positive for cough and wheezing.        Objective   Physical Exam   Constitutional: She is oriented to person, place, and time.   HENT:   Left Ear: External ear normal.   Mouth/Throat: Oropharynx is clear and moist.   Eustachian tube dysfunction   Cardiovascular: Normal rate and regular rhythm.    Pulmonary/Chest: Effort normal. She has wheezes.   Lymphadenopathy:     She has no cervical adenopathy.   Neurological: She is alert and oriented to person, place, and time.   Psychiatric: She has a normal mood and affect. Her behavior is normal. Judgment and thought content normal.   Nursing note and vitals reviewed.      Assessment/Plan   Ariana was seen today for earache and sinusitis.    Diagnoses and all orders for this visit:    Acute non-recurrent maxillary sinusitis  -     methylPREDNISolone acetate (DEPO-medrol) injection 40 mg; Inject 1 mL into the shoulder, thigh, or buttocks 1 (One) Time.    Other orders  -     azithromycin (ZITHROMAX Z-ANTONETTE) 250 MG tablet; Take 2 tablets the first day, then 1 tablet daily for 4 days.-Start today  -     PredniSONE (DELTASONE) 10 MG (21) tablet pack; Use as directed on package--start Thursday morning           Plan above

## 2018-07-09 ENCOUNTER — OFFICE VISIT (OUTPATIENT)
Dept: FAMILY MEDICINE CLINIC | Facility: CLINIC | Age: 78
End: 2018-07-09

## 2018-07-09 VITALS
DIASTOLIC BLOOD PRESSURE: 62 MMHG | HEIGHT: 62 IN | TEMPERATURE: 97.8 F | OXYGEN SATURATION: 97 % | WEIGHT: 146.2 LBS | HEART RATE: 72 BPM | SYSTOLIC BLOOD PRESSURE: 118 MMHG | BODY MASS INDEX: 26.91 KG/M2

## 2018-07-09 DIAGNOSIS — R52 PAIN: Primary | ICD-10-CM

## 2018-07-09 DIAGNOSIS — T14.8XXA BLISTER: ICD-10-CM

## 2018-07-09 PROCEDURE — 99213 OFFICE O/P EST LOW 20 MIN: CPT | Performed by: FAMILY MEDICINE

## 2018-07-09 PROCEDURE — 10060 I&D ABSCESS SIMPLE/SINGLE: CPT | Performed by: FAMILY MEDICINE

## 2018-07-09 RX ORDER — AMOXICILLIN 500 MG/1
500 CAPSULE ORAL 3 TIMES DAILY
Qty: 21 CAPSULE | Refills: 0 | Status: SHIPPED | OUTPATIENT
Start: 2018-07-09 | End: 2018-07-17

## 2018-07-09 NOTE — PROGRESS NOTES
Procedure   Incision & Drainage  Date/Time: 7/9/2018 3:13 PM  Performed by: DAVID GREWAL  Authorized by: DAVID GREWAL   Indications for incision and drainage: Blister right metatarsal head.  Body area: lower extremity  Location details: right foot    Anesthesia:  Anesthetic total: 0 mL    Sedation:  Patient sedated: no  Complexity: simple  Drainage: serous  Drainage amount: scant  Wound treatment: wound left open  Patient tolerance: Patient tolerated the procedure well with no immediate complications  Comments: The patient had a simple blister right metatarsal head-serous fluid was oozing-debrided and Band-Aids were placed.

## 2018-07-09 NOTE — PROGRESS NOTES
Subjective   Ariana Gutierrez is a 78 y.o. female.     Lower Extremity Issue   This is a recurrent problem. The current episode started 1 to 4 weeks ago. The problem occurs daily. The problem has been waxing and waning. Associated symptoms comments: Right lower extremity edema. The symptoms are aggravated by walking. She has tried nothing for the symptoms.       The following portions of the patient's history were reviewed and updated as appropriate: allergies, current medications, past family history, past medical history, past social history, past surgical history and problem list.    Review of Systems   Musculoskeletal:        Remote knee surgery on the right with compromise venous return on the right       Objective   Physical Exam   Constitutional: She is oriented to person, place, and time. She appears well-developed and well-nourished.   Musculoskeletal: She exhibits deformity. She exhibits no edema.   tenderness or Homans sign-presently no edema but 2 cm blister over the right metatarsal head   Neurological: She is alert and oriented to person, place, and time.   Skin: Skin is warm and dry.   Psychiatric: She has a normal mood and affect. Her behavior is normal. Judgment and thought content normal.   Nursing note and vitals reviewed.      Assessment/Plan   Ariana was seen today for foot swelling.    Diagnoses and all orders for this visit:    Pain    Other orders  -     amoxicillin (AMOXIL) 500 MG capsule; Take 1 capsule by mouth 3 (Three) Times a Day for 7 days.       Plan-advised support hose continue using inserts in shoes-go slow on revision of right knee

## 2018-07-10 RX ORDER — HYDROCHLOROTHIAZIDE 25 MG/1
TABLET ORAL
Qty: 90 TABLET | Refills: 0 | Status: SHIPPED | OUTPATIENT
Start: 2018-07-10 | End: 2018-09-10 | Stop reason: SDUPTHER

## 2018-07-17 ENCOUNTER — OFFICE VISIT (OUTPATIENT)
Dept: CARDIOLOGY | Facility: CLINIC | Age: 78
End: 2018-07-17

## 2018-07-17 VITALS
SYSTOLIC BLOOD PRESSURE: 140 MMHG | BODY MASS INDEX: 27.05 KG/M2 | OXYGEN SATURATION: 97 % | HEART RATE: 82 BPM | DIASTOLIC BLOOD PRESSURE: 62 MMHG | HEIGHT: 62 IN | WEIGHT: 147 LBS

## 2018-07-17 DIAGNOSIS — K21.9 GASTROESOPHAGEAL REFLUX DISEASE WITHOUT ESOPHAGITIS: ICD-10-CM

## 2018-07-17 DIAGNOSIS — M41.9 SCOLIOSIS OF LUMBAR SPINE, UNSPECIFIED SCOLIOSIS TYPE: ICD-10-CM

## 2018-07-17 DIAGNOSIS — I10 ESSENTIAL HYPERTENSION: ICD-10-CM

## 2018-07-17 DIAGNOSIS — M54.40 ACUTE RIGHT-SIDED LOW BACK PAIN WITH SCIATICA, SCIATICA LATERALITY UNSPECIFIED: ICD-10-CM

## 2018-07-17 DIAGNOSIS — R00.2 PALPITATIONS: Primary | ICD-10-CM

## 2018-07-17 DIAGNOSIS — R20.2 PARESTHESIA: ICD-10-CM

## 2018-07-17 PROBLEM — R07.9 CHEST PAIN: Status: RESOLVED | Noted: 2018-06-05 | Resolved: 2018-07-17

## 2018-07-17 PROCEDURE — 93000 ELECTROCARDIOGRAM COMPLETE: CPT | Performed by: INTERNAL MEDICINE

## 2018-07-17 PROCEDURE — 99214 OFFICE O/P EST MOD 30 MIN: CPT | Performed by: INTERNAL MEDICINE

## 2018-07-17 RX ORDER — HYDROCODONE BITARTRATE AND ACETAMINOPHEN 10; 325 MG/1; MG/1
1 TABLET ORAL EVERY 6 HOURS PRN
Qty: 90 TABLET | Refills: 0 | Status: SHIPPED | OUTPATIENT
Start: 2018-07-17 | End: 2018-08-21 | Stop reason: SDUPTHER

## 2018-07-17 NOTE — PROGRESS NOTES
Ariana Gutierrez  [id number]  1940  78 y.o.  female    Referring Provider: Bill Schilling MD    Reason for Follow-up Visit:  Here for follow up after cardiac testing.    Routine follow up.  Chronic low back pain    Had MADELIN in 1/18 no clots or vegetation    Subjective    Overall feeling well   No chest pain or shortness of breath   No palpitations  No significant pedal edema  Compliant with medications and dietary advice  Good effort tolerance  No presyncope or syncope  Compliant with medications  Chronic low back pain    Arthritic pain in small joints   Since starting beta blocker therapy no further palpitations    History of present illness:  Ariana Gutierrez is a 78 y.o. yo female with history of hypertension who presents today for   Chief Complaint   Patient presents with   • Hypertension     6 mo f/u   .    History  Past Medical History:   Diagnosis Date   • Bacterial infection    • Chest pain    • Chronic back pain    • Deep vein thrombosis (CMS/HCC)     S/P KNEE SURGERY 10/2017   • Diverticulosis of intestine 8/16/2016   • Gastroesophageal reflux disease without esophagitis 5/26/2017   • Hypertension    • Irritable bowel syndrome 11/2/2011   • Low back pain    • Palpitations    • Paresthesia     toes   • Perforated bowel (CMS/HCC)    • Scoliosis    • Spinal stenosis    ,   Past Surgical History:   Procedure Laterality Date   • BACK SURGERY     • BREAST BIOPSY Right 25 yrs ago   • CATARACT EXTRACTION Bilateral    • COLON RESECTION SMALL BOWEL  2016    with colostomy for 6 months, already revised.   • COLON SURGERY     • COLONOSCOPY     • LUMBAR LAMINECTOMY     • RENAL ARTERY STENT Right    • TOTAL KNEE ARTHROPLASTY Right    • TOTAL KNEE ARTHROPLASTY     • VASCULAR SURGERY      10/2017 - DR GARCIA   ,   Family History   Problem Relation Age of Onset   • Breast cancer Mother    • Heart disease Mother    • Coronary artery disease Mother    • Peripheral vascular disease Mother    • No Known Problems  Sister    • Heart disease Brother    • No Known Problems Maternal Grandmother    • No Known Problems Maternal Grandfather    • No Known Problems Paternal Grandmother    • No Known Problems Paternal Grandfather    ,   Social History   Substance Use Topics   • Smoking status: Never Smoker   • Smokeless tobacco: Never Used   • Alcohol use Yes      Comment: occ   ,     Medications  Current Outpatient Prescriptions   Medication Sig Dispense Refill   • aspirin 325 MG tablet Take 325 mg by mouth Daily.     • benazepril (LOTENSIN) 20 MG tablet TAKE 1 TABLET EVERY DAY 90 tablet 2   • Docusate Calcium (STOOL SOFTENER PO) Take 1 tablet by mouth Daily.     • gabapentin (NEURONTIN) 600 MG tablet Take 1 tablet by mouth 3 (Three) Times a Day. 270 tablet 2   • hydrochlorothiazide (HYDRODIURIL) 25 MG tablet TAKE 1 TABLET EVERY DAY 90 tablet 0   • HYDROcodone-acetaminophen (NORCO)  MG per tablet Take 1 tablet by mouth Every 6 (Six) Hours As Needed for Moderate Pain . 90 tablet 0   • metoprolol succinate XL (TOPROL-XL) 25 MG 24 hr tablet Take 1 tablet by mouth Daily. 90 tablet 3   • Multiple Vitamins-Minerals (PRESERVISION AREDS 2 PO) Take  by mouth Daily.     • omeprazole (priLOSEC) 20 MG capsule Take 1 capsule by mouth Daily. 90 capsule 2   • polyethylene glycol (MIRALAX) powder Take 17 g by mouth Daily.     • Probiotic Product (PROBIOTIC DAILY PO) Take 1 tablet by mouth Daily.       No current facility-administered medications for this visit.        Allergies:  Patient has no known allergies.    Review of Systems  Review of Systems   Constitution: Negative.   HENT: Negative.    Eyes: Negative.    Cardiovascular: Negative for chest pain, claudication, cyanosis, dyspnea on exertion, irregular heartbeat, leg swelling, near-syncope, orthopnea, palpitations, paroxysmal nocturnal dyspnea and syncope.   Respiratory: Negative.    Endocrine: Negative.    Hematologic/Lymphatic: Negative.    Skin: Negative.    Musculoskeletal: Positive  "for arthritis and joint pain.   Gastrointestinal: Positive for flatus. Negative for anorexia.   Genitourinary: Negative.    Neurological: Negative.    Psychiatric/Behavioral: Negative.        Objective     Physical Exam:  /62   Pulse 82   Ht 157.5 cm (62\")   Wt 66.7 kg (147 lb)   LMP  (LMP Unknown)   SpO2 97%   BMI 26.89 kg/m²   Physical Exam   Constitutional: She appears well-developed.   HENT:   Head: Normocephalic.   Neck: Normal carotid pulses and no JVD present. No tracheal tenderness present. Carotid bruit is not present. No tracheal deviation and no edema present.   Cardiovascular: Regular rhythm, normal heart sounds and normal pulses.    Pulmonary/Chest: Effort normal. No stridor.   Abdominal: Soft.   Neurological: She is alert. She has normal strength. No cranial nerve deficit or sensory deficit.   Skin: Skin is warm.   Psychiatric: She has a normal mood and affect. Her speech is normal and behavior is normal.       Results Review:       ECG 12 Lead  Date/Time: 7/17/2018 9:36 AM  Performed by: JESU PÉREZ  Authorized by: JESU PÉREZ   Comparison: compared with previous ECG from 11/21/2017  Similar to previous ECG  Rhythm: sinus rhythm  Rate: normal  Conduction: conduction normal  ST Segments: ST segments normal  T Waves: T waves normal  QRS axis: normal  Other: no other findings  Clinical impression: normal ECG            Assessment/Plan   Patient Active Problem List   Diagnosis   • Essential hypertension   • Gastroesophageal reflux disease without esophagitis   • Scoliosis   • Low back pain   • Perforated bowel (CMS/HCC)   • Paresthesia   • Palpitations   • Chronic back pain   • Deep vein thrombosis (CMS/HCC)   • Bacterial infection     Results for orders placed during the hospital encounter of 01/16/18   Adult Transesophageal Echo (MADELIN) W/ Cont if Necessary Per Protocol    Narrative · Left ventricular systolic function is normal. Estimated EF = 55%.  · No vegetations, clots or intracardiac " "shunts          No palpitations. No significant pedal edema. Compliant with medications and diet. Latest labs and medications reviewed.  EKG inferior infarct pattern Sinus rhythm 5/22/17      Plan:    Low salt/ HTN/ Heart healthy carbohydrate restricted cardiac diet as applicable to this patient's current diagnoses.   This handout has relevant information regarding shopping for food, preparing meals, what to eat at restaurants, tracking of food intake, information regarding sodium intake and salt content, how to read food labels, knowing what to eat, tips reagarding physical activity, calorie count and calorie expenditure. What foods to avoid. Information regarding alcoholic drinks along with \"good\" and \"bad\" fats.  Reiterated prior recommendations     The patient is advised to reduce or avoid caffeine or other cardiac stimulants.     The patient was advised that NSAID-type medications have three  very important potential side effects: cardiovascular complications, gastrointestinal irritation including hemorrhage and renal injuries.  Do not use anti-inflammatories such as Motrin/ibuprofen, Alleve/naprosyn, Mobic and like medications Use Tylenol instead.The patient expresses understanding of these issues and questions were answered.   Monitor for any signs of bleeding including red or dark stools. Fall precautions.       Monitor for any signs of bleeding including red or dark stools. Fall precautions.   Patient is asked to monitor BP at home or work, several times per month and return with written values at next office visit.     Advised staying uptodate with immunizations per established standard guidelines.    Reviewed available prior notes, consults, prior visits, laboratory findings, radiology And cardiology relevant reports. Updated chart as applicable. I have reviewed the patient's medical history in detail and updated the computerized patient record as relevant.    Updated patient regarding any new or relevant " abnormalities on review of records or any new findings on physical exam. Mentioned to patient about purpose of visit and desirable health short and long term goals and objectives.    Offered to give patient  a copy of my notes and relevant tests/ prior ECG etc for the patient to review and follow specific advise and relevant findings if any, prognosis, along with my current and future plans.      Questions were encouraged, asked and answered to the patient's  understanding and satisfaction. Questions if any regarding current medications and side effects, need for refills and importance of compliance to medications stressed.    Reviewed available prior notes, consults, prior visits, laboratory findings, radiology And cardiology relevant reports. Updated chart as applicable. I have reviewed the patient's medical history in detail and updated the computerized patient record as relevant.    Updated patient regarding any new or relevant abnormalities on review of records or any new findings on physical exam. Mentioned to patient about purpose of visit and desirable health short and long term goals and objectives.     No additional cardiac testing required at this point in time.     Monitor CBC, CMP and Lipid Panel by primary      Return in about 1 year (around 7/17/2019).

## 2018-07-24 ENCOUNTER — OFFICE VISIT (OUTPATIENT)
Dept: FAMILY MEDICINE CLINIC | Facility: CLINIC | Age: 78
End: 2018-07-24

## 2018-07-24 VITALS
BODY MASS INDEX: 26.87 KG/M2 | DIASTOLIC BLOOD PRESSURE: 66 MMHG | WEIGHT: 146 LBS | SYSTOLIC BLOOD PRESSURE: 120 MMHG | TEMPERATURE: 98.3 F | HEART RATE: 71 BPM | HEIGHT: 62 IN | OXYGEN SATURATION: 98 %

## 2018-07-24 DIAGNOSIS — I10 ESSENTIAL HYPERTENSION: ICD-10-CM

## 2018-07-24 DIAGNOSIS — M00.9 JOINT INFECTION (HCC): ICD-10-CM

## 2018-07-24 DIAGNOSIS — I82.431 ACUTE DEEP VEIN THROMBOSIS (DVT) OF POPLITEAL VEIN OF RIGHT LOWER EXTREMITY (HCC): ICD-10-CM

## 2018-07-24 DIAGNOSIS — R53.83 FATIGUE, UNSPECIFIED TYPE: Primary | ICD-10-CM

## 2018-07-24 DIAGNOSIS — Z00.00 ANNUAL PHYSICAL EXAM: ICD-10-CM

## 2018-07-24 DIAGNOSIS — E78.2 MIXED HYPERLIPIDEMIA: ICD-10-CM

## 2018-07-24 DIAGNOSIS — E55.9 VITAMIN D DEFICIENCY: ICD-10-CM

## 2018-07-24 LAB
BILIRUB BLD-MCNC: NEGATIVE MG/DL
CLARITY, POC: CLEAR
COLOR UR: YELLOW
GLUCOSE UR STRIP-MCNC: NEGATIVE MG/DL
KETONES UR QL: NEGATIVE
LEUKOCYTE EST, POC: ABNORMAL
NITRITE UR-MCNC: NEGATIVE MG/ML
PH UR: 7 [PH] (ref 5–8)
PROT UR STRIP-MCNC: NEGATIVE MG/DL
RBC # UR STRIP: NEGATIVE /UL
SP GR UR: 1.02 (ref 1–1.03)
UROBILINOGEN UR QL: NORMAL

## 2018-07-24 PROCEDURE — G0439 PPPS, SUBSEQ VISIT: HCPCS | Performed by: FAMILY MEDICINE

## 2018-07-24 PROCEDURE — 81003 URINALYSIS AUTO W/O SCOPE: CPT | Performed by: FAMILY MEDICINE

## 2018-07-24 PROCEDURE — 99213 OFFICE O/P EST LOW 20 MIN: CPT | Performed by: FAMILY MEDICINE

## 2018-07-24 NOTE — PROGRESS NOTES
QUICK REFERENCE INFORMATION:  The ABCs of the Annual Wellness Visit    Subsequent Medicare Wellness Visit    HEALTH RISK ASSESSMENT    1940    Recent Hospitalizations:  No hospitalization(s) within the last year..        Current Medical Providers:  Patient Care Team:  Bill Schilling MD as PCP - General (Family Medicine)  Bill Schilling MD as PCP - Claims Attributed  Hawk Guerin MD as Cardiologist (Cardiology)  Janee Hall MD as Consulting Physician (Infectious Diseases)        Smoking Status:  History   Smoking Status   • Never Smoker   Smokeless Tobacco   • Never Used       Alcohol Consumption:  History   Alcohol Use   • Yes     Comment: occ       Depression Screen:   PHQ-2/PHQ-9 Depression Screening 7/24/2018   Little interest or pleasure in doing things 0   Feeling down, depressed, or hopeless 0   Trouble falling or staying asleep, or sleeping too much -   Feeling tired or having little energy -   Poor appetite or overeating -   Feeling bad about yourself - or that you are a failure or have let yourself or your family down -   Trouble concentrating on things, such as reading the newspaper or watching television -   Moving or speaking so slowly that other people could have noticed. Or the opposite - being so fidgety or restless that you have been moving around a lot more than usual -   Thoughts that you would be better off dead, or of hurting yourself in some way -   Total Score 0   If you checked off any problems, how difficult have these problems made it for you to do your work, take care of things at home, or get along with other people? -       Health Habits and Functional and Cognitive Screening:  Functional & Cognitive Status 7/24/2018   Do you have difficulty preparing food and eating? No   Do you have difficulty bathing yourself, getting dressed or grooming yourself? No   Do you have difficulty using the toilet? No   Do you have difficulty moving around from place to place?  No   Do you have trouble with steps or getting out of a bed or a chair? No   In the past year have you fallen or experienced a near fall? No   Current Diet Well Balanced Diet   Dental Exam Up to date   Eye Exam Up to date   Exercise (times per week) 2 times per week   Current Exercise Activities Include Swimming   Do you need help using the phone?  No   Are you deaf or do you have serious difficulty hearing?  No   Do you need help with transportation? No   Do you need help shopping? No   Do you need help preparing meals?  No   Do you need help with housework?  No   Do you need help with laundry? No   Do you need help taking your medications? No   Do you need help managing money? No   Do you ever drive or ride in a car without wearing a seat belt? No   Have you felt unusual stress, anger or loneliness in the last month? No   Who do you live with? Spouse   If you need help, do you have trouble finding someone available to you? No   Have you been bothered in the last four weeks by sexual problems? No   Do you have difficulty concentrating, remembering or making decisions? No           Does the patient have evidence of cognitive impairment? Yes    Aspirin use counseling: Taking ASA appropriately as indicated      Recent Lab Results:  CMP:  Lab Results   Component Value Date    GLU 98 03/14/2018    BUN 24 (H) 03/14/2018    CREATININE 1.05 03/14/2018    EGFRIFNONA 51 (L) 03/14/2018    EGFRIFAFRI 62 03/14/2018    BCR 22.9 03/14/2018     03/14/2018    K 5.2 03/14/2018    CO2 33.0 (H) 03/14/2018    CALCIUM 10.2 03/14/2018    PROTENTOTREF 7.6 12/11/2017    ALBUMIN 4.20 12/11/2017    LABGLOBREF 3.4 12/11/2017    LABIL2 1.2 12/11/2017    BILITOT 0.4 12/11/2017    ALKPHOS 80 12/11/2017    AST 33 12/11/2017    ALT 48 12/11/2017     Lipid Panel:  Lab Results   Component Value Date    TRIG 69 09/15/2017    HDL 55 09/15/2017    VLDL 13.8 09/15/2017     HbA1c:       Visual Acuity:  No exam data present    Age-appropriate  Screening Schedule:  Refer to the list below for future screening recommendations based on patient's age, sex and/or medical conditions. Orders for these recommended tests are listed in the plan section. The patient has been provided with a written plan.    Health Maintenance   Topic Date Due   • INFLUENZA VACCINE  08/01/2018   • LIPID PANEL  09/15/2018   • MAMMOGRAM  11/17/2019   • COLONOSCOPY  03/06/2028   • PNEUMOCOCCAL VACCINES (65+ LOW/MEDIUM RISK)  Completed   • TDAP/TD VACCINES  Excluded   • ZOSTER VACCINE  Excluded        Subjective   History of Present Illness    Ariana Gutierrez is a 78 y.o. female who presents for an Subsequent Wellness Visit.    The following portions of the patient's history were reviewed and updated as appropriate: allergies, current medications, past family history, past medical history, past social history, past surgical history and problem list.    Outpatient Medications Prior to Visit   Medication Sig Dispense Refill   • aspirin 325 MG tablet Take 325 mg by mouth Daily.     • benazepril (LOTENSIN) 20 MG tablet TAKE 1 TABLET EVERY DAY 90 tablet 2   • Docusate Calcium (STOOL SOFTENER PO) Take 1 tablet by mouth Daily.     • gabapentin (NEURONTIN) 600 MG tablet Take 1 tablet by mouth 3 (Three) Times a Day. 270 tablet 2   • hydrochlorothiazide (HYDRODIURIL) 25 MG tablet TAKE 1 TABLET EVERY DAY 90 tablet 0   • HYDROcodone-acetaminophen (NORCO)  MG per tablet Take 1 tablet by mouth Every 6 (Six) Hours As Needed for Moderate Pain . 90 tablet 0   • metoprolol succinate XL (TOPROL-XL) 25 MG 24 hr tablet Take 1 tablet by mouth Daily. 90 tablet 3   • Multiple Vitamins-Minerals (PRESERVISION AREDS 2 PO) Take  by mouth Daily.     • omeprazole (priLOSEC) 20 MG capsule Take 1 capsule by mouth Daily. 90 capsule 2   • polyethylene glycol (MIRALAX) powder Take 17 g by mouth Daily.     • Probiotic Product (PROBIOTIC DAILY PO) Take 1 tablet by mouth Daily.       No facility-administered  medications prior to visit.        Patient Active Problem List   Diagnosis   • Essential hypertension   • Gastroesophageal reflux disease without esophagitis   • Scoliosis   • Low back pain   • Perforated bowel (CMS/HCC)   • Paresthesia   • Palpitations   • Chronic back pain   • Deep vein thrombosis (CMS/HCC)   • Bacterial infection   • Joint infection (CMS/HCC)       Advance Care Planning:  has NO advance directive - information provided to the patient today    Identification of Risk Factors:  Risk factors include: weight , unhealthy diet, cardiovascular risk, inactivity and increased fall risk.    Review of Systems   Respiratory: Negative for chest tightness.    Cardiovascular: Negative for chest pain and leg swelling.   Musculoskeletal: Positive for arthralgias, back pain and gait problem.        Chronic inflammatory process right knee-multiple cultures negative   Neurological:        Severe scoliosis of lumbar spine   All other systems reviewed and are negative.      Compared to one year ago, the patient feels her physical health is better.  Compared to one year ago, the patient feels her mental health is better.    Objective     Physical Exam   Constitutional: She is oriented to person, place, and time. She appears well-developed and well-nourished.   HENT:   Right Ear: External ear normal.   Left Ear: External ear normal.   Mouth/Throat: Oropharynx is clear and moist.   Eyes: Pupils are equal, round, and reactive to light. EOM are normal.   Neck: No thyromegaly present.   Good carotid pulses   Cardiovascular: Normal rate and regular rhythm.    Pulmonary/Chest: Effort normal and breath sounds normal.   Abdominal: Soft. Bowel sounds are normal. She exhibits no mass.   Genitourinary:   Genitourinary Comments: Gynecologic exam by another provider   Lymphadenopathy:     She has no cervical adenopathy.   Neurological: She is alert and oriented to person, place, and time.   Skin: Skin is warm and dry. Capillary  "refill takes less than 2 seconds.   Psychiatric: She has a normal mood and affect. Her behavior is normal. Judgment and thought content normal.   Nursing note and vitals reviewed.      Vitals:    07/24/18 0909   BP: 120/66   BP Location: Left arm   Patient Position: Sitting   Cuff Size: Adult   Pulse: 71   Temp: 98.3 °F (36.8 °C)   TempSrc: Oral   SpO2: 98%   Weight: 66.2 kg (146 lb)   Height: 157.5 cm (62.01\")   PainSc: 0-No pain       Patient's Body mass index is 26.7 kg/m². BMI is within normal parameters. No follow-up required.      Assessment/Plan   Patient Self-Management and Personalized Health Advice  The patient has been provided with information about: diet, exercise, weight management and fall prevention and preventive services including:   · Colorectal cancer screening, fecal occult blood test, Fall Risk plan of care done.    Visit Diagnoses:    ICD-10-CM ICD-9-CM   1. Fatigue, unspecified type R53.83 780.79   2. Joint infection (CMS/[facility]) M00.9 711.99   3. Acute deep vein thrombosis (DVT) of popliteal vein of right lower extremity (CMS/[facility]) I82.431 453.41   4. Mixed hyperlipidemia E78.2 272.2   5. Essential hypertension I10 401.9   6. Vitamin D deficiency E55.9 268.9       No orders of the defined types were placed in this encounter.      Outpatient Encounter Prescriptions as of 7/24/2018   Medication Sig Dispense Refill   • aspirin 325 MG tablet Take 325 mg by mouth Daily.     • benazepril (LOTENSIN) 20 MG tablet TAKE 1 TABLET EVERY DAY 90 tablet 2   • Docusate Calcium (STOOL SOFTENER PO) Take 1 tablet by mouth Daily.     • gabapentin (NEURONTIN) 600 MG tablet Take 1 tablet by mouth 3 (Three) Times a Day. 270 tablet 2   • hydrochlorothiazide (HYDRODIURIL) 25 MG tablet TAKE 1 TABLET EVERY DAY 90 tablet 0   • HYDROcodone-acetaminophen (NORCO)  MG per tablet Take 1 tablet by mouth Every 6 (Six) Hours As Needed for Moderate Pain . 90 tablet 0   • metoprolol succinate XL (TOPROL-XL) 25 MG 24 hr tablet " Take 1 tablet by mouth Daily. 90 tablet 3   • Multiple Vitamins-Minerals (PRESERVISION AREDS 2 PO) Take  by mouth Daily.     • omeprazole (priLOSEC) 20 MG capsule Take 1 capsule by mouth Daily. 90 capsule 2   • polyethylene glycol (MIRALAX) powder Take 17 g by mouth Daily.     • Probiotic Product (PROBIOTIC DAILY PO) Take 1 tablet by mouth Daily.       No facility-administered encounter medications on file as of 7/24/2018.        Reviewed use of high risk medication in the elderly: yes  Reviewed for potential of harmful drug interactions in the elderly: yes    Follow Up:  No Follow-up on file.     An After Visit Summary and PPPS with all of these plans were given to the patient.       plan-above-continue seeing multiple specialists for multiple problems

## 2018-07-24 NOTE — PATIENT INSTRUCTIONS
Fall Prevention in the Home  Falls can cause injuries and can affect people from all age groups. There are many simple things that you can do to make your home safe and to help prevent falls.  What can I do on the outside of my home?  · Regularly repair the edges of walkways and driveways and fix any cracks.  · Remove high doorway thresholds.  · Trim any shrubbery on the main path into your home.  · Use bright outdoor lighting.  · Clear walkways of debris and clutter, including tools and rocks.  · Regularly check that handrails are securely fastened and in good repair. Both sides of any steps should have handrails.  · Install guardrails along the edges of any raised decks or porches.  · Have leaves, snow, and ice cleared regularly.  · Use sand or salt on walkways during winter months.  · In the garage, clean up any spills right away, including grease or oil spills.  What can I do in the bathroom?  · Use night lights.  · Install grab bars by the toilet and in the tub and shower. Do not use towel bars as grab bars.  · Use non-skid mats or decals on the floor of the tub or shower.  · If you need to sit down while you are in the shower, use a plastic, non-slip stool.  · Keep the floor dry. Immediately clean up any water that spills on the floor.  · Remove soap buildup in the tub or shower on a regular basis.  · Attach bath mats securely with double-sided non-slip rug tape.  · Remove throw rugs and other tripping hazards from the floor.  What can I do in the bedroom?  · Use night lights.  · Make sure that a bedside light is easy to reach.  · Do not use oversized bedding that drapes onto the floor.  · Have a firm chair that has side arms to use for getting dressed.  · Remove throw rugs and other tripping hazards from the floor.  What can I do in the kitchen?  · Clean up any spills right away.  · Avoid walking on wet floors.  · Place frequently used items in easy-to-reach places.  · If you need to reach for something  above you, use a sturdy step stool that has a grab bar.  · Keep electrical cables out of the way.  · Do not use floor polish or wax that makes floors slippery. If you have to use wax, make sure that it is non-skid floor wax.  · Remove throw rugs and other tripping hazards from the floor.  What can I do in the stairways?  · Do not leave any items on the stairs.  · Make sure that there are handrails on both sides of the stairs. Fix handrails that are broken or loose. Make sure that handrails are as long as the stairways.  · Check any carpeting to make sure that it is firmly attached to the stairs. Fix any carpet that is loose or worn.  · Avoid having throw rugs at the top or bottom of stairways, or secure the rugs with carpet tape to prevent them from moving.  · Make sure that you have a light switch at the top of the stairs and the bottom of the stairs. If you do not have them, have them installed.  What are some other fall prevention tips?  · Wear closed-toe shoes that fit well and support your feet. Wear shoes that have rubber soles or low heels.  · When you use a stepladder, make sure that it is completely opened and that the sides are firmly locked. Have someone hold the ladder while you are using it. Do not climb a closed stepladder.  · Add color or contrast paint or tape to grab bars and handrails in your home. Place contrasting color strips on the first and last steps.  · Use mobility aids as needed, such as canes, walkers, scooters, and crutches.  · Turn on lights if it is dark. Replace any light bulbs that burn out.  · Set up furniture so that there are clear paths. Keep the furniture in the same spot.  · Fix any uneven floor surfaces.  · Choose a carpet design that does not hide the edge of steps of a stairway.  · Be aware of any and all pets.  · Review your medicines with your healthcare provider. Some medicines can cause dizziness or changes in blood pressure, which increase your risk of falling.  Talk  "with your health care provider about other ways that you can decrease your risk of falls. This may include working with a physical therapist or  to improve your strength, balance, and endurance.  This information is not intended to replace advice given to you by your health care provider. Make sure you discuss any questions you have with your health care provider.  Document Released: 12/08/2003 Document Revised: 05/16/2017 Document Reviewed: 01/22/2016  [employer] Interactive Patient Education © 2018 [employer]    Fat and Cholesterol Restricted Diet  Getting too much fat and cholesterol in your diet may cause health problems. Following this diet helps keep your fat and cholesterol at normal levels. This can keep you from getting sick.  What types of fat should I choose?  · Choose monosaturated and polyunsaturated fats. These are found in foods such as olive oil, canola oil, flaxseeds, walnuts, almonds, and seeds.  · Eat more omega-3 fats. Good choices include salmon, mackerel, sardines, tuna, flaxseed oil, and ground flaxseeds.  · Limit saturated fats. These are in animal products such as meats, butter, and cream. They can also be in plant products such as palm oil, palm kernel oil, and coconut oil.  · Avoid foods with partially hydrogenated oils in them. These contain trans fats. Examples of foods that have trans fats are stick margarine, some tub margarines, cookies, crackers, and other baked goods.  What general guidelines do I need to follow?  · Check food labels. Look for the words \"trans fat\" and \"saturated fat.\"  · When preparing a meal:  ? Fill half of your plate with vegetables and green salads.  ? Fill one fourth of your plate with whole grains. Look for the word \"whole\" as the first word in the ingredient list.  ? Fill one fourth of your plate with lean protein foods.  · Eat more foods that have fiber, like apples, carrots, beans, peas, and barley.  · Eat more home-cooked foods. Eat less at " restaurants and buffets.  · Limit or avoid alcohol.  · Limit foods high in starch and sugar.  · Limit fried foods.  · Cook foods without frying them. Baking, boiling, grilling, and broiling are all great options.  · Lose weight if you are overweight. Losing even a small amount of weight can help your overall health. It can also help prevent diseases such as diabetes and heart disease.  What foods can I eat?  Grains  Whole grains, such as whole wheat or whole grain breads, crackers, cereals, and pasta. Unsweetened oatmeal, bulgur, barley, quinoa, or brown rice. Corn or whole wheat flour tortillas.  Vegetables  Fresh or frozen vegetables (raw, steamed, roasted, or grilled). Green salads.  Fruits  All fresh, canned (in natural juice), or frozen fruits.  Meat and Other Protein Products  Ground beef (85% or leaner), grass-fed beef, or beef trimmed of fat. Skinless chicken or turkey. Ground chicken or turkey. Pork trimmed of fat. All fish and seafood. Eggs. Dried beans, peas, or lentils. Unsalted nuts or seeds. Unsalted canned or dry beans.  Dairy  Low-fat dairy products, such as skim or 1% milk, 2% or reduced-fat cheeses, low-fat ricotta or cottage cheese, or plain low-fat yogurt.  Fats and Oils  Tub margarines without trans fats. Light or reduced-fat mayonnaise and salad dressings. Avocado. Olive, canola, sesame, or safflower oils. Natural peanut or almond butter (choose ones without added sugar and oil).  The items listed above may not be a complete list of recommended foods or beverages. Contact your dietitian for more options.  What foods are not recommended?  Grains  White bread. White pasta. White rice. Cornbread. Bagels, pastries, and croissants. Crackers that contain trans fat.  Vegetables  White potatoes. Corn. Creamed or fried vegetables. Vegetables in a cheese sauce.  Fruits  Dried fruits. Canned fruit in light or heavy syrup. Fruit juice.  Meat and Other Protein Products  Fatty cuts of meat. Ribs, chicken  wings, rich, sausage, bologna, salami, chitterlings, fatback, hot dogs, bratwurst, and packaged luncheon meats. Liver and organ meats.  Dairy  Whole or 2% milk, cream, half-and-half, and cream cheese. Whole milk cheeses. Whole-fat or sweetened yogurt. Full-fat cheeses. Nondairy creamers and whipped toppings. Processed cheese, cheese spreads, or cheese curds.  Sweets and Desserts  Corn syrup, sugars, honey, and molasses. Candy. Jam and jelly. Syrup. Sweetened cereals. Cookies, pies, cakes, donuts, muffins, and ice cream.  Fats and Oils  Butter, stick margarine, lard, shortening, ghee, or rich fat. Coconut, palm kernel, or palm oils.  Beverages  Alcohol. Sweetened drinks (such as sodas, lemonade, and fruit drinks or punches).  The items listed above may not be a complete list of foods and beverages to avoid. Contact your dietitian for more information.  This information is not intended to replace advice given to you by your health care provider. Make sure you discuss any questions you have with your health care provider.  Document Released: 2013 Document Revised: 2017 Document Reviewed: 2015  [employer] Interactive Patient Education © 2018 [employer]    Medicare Wellness  Personal Prevention Plan of Service     Date of Office Visit:  2018  Encounter Provider:  Bill Schilling MD  Place of Service:  [facility]  Patient Name: Ariana Gutierrez  :  1940    As part of the Medicare Wellness portion of your visit today, we are providing you with this personalized preventive plan of services (PPPS). This plan is based upon recommendations of the [place] Preventive Services Task Force (USPSTF) and the Advisory Committee on Immunization Practices (ACIP).    This lists the preventive care services that should be considered, and provides dates of when you are due. Items listed as completed are up-to-date and do not require any further  intervention.    Health Maintenance   Topic Date Due   • INFLUENZA VACCINE  08/01/2018   • LIPID PANEL  09/15/2018   • MEDICARE ANNUAL WELLNESS  07/24/2019   • MAMMOGRAM  11/17/2019   • COLONOSCOPY  03/06/2028   • PNEUMOCOCCAL VACCINES (65+ LOW/MEDIUM RISK)  Completed   • TDAP/TD VACCINES  Excluded   • ZOSTER VACCINE  Excluded       Orders Placed This Encounter   Procedures   • TSH   • T4, free   • Comprehensive Metabolic Panel   • Lipid Panel   • Vitamin D 25 Hydroxy   • Sedimentation rate, automated   • C-reactive protein   • POC Urinalysis Dipstick, Automated   • CBC & Differential     Order Specific Question:   Manual Differential     Answer:   No       Return in 6 months (on 1/24/2019).

## 2018-07-25 LAB
25(OH)D3+25(OH)D2 SERPL-MCNC: 43.5 NG/ML (ref 30–100)
ALBUMIN SERPL-MCNC: 4.1 G/DL (ref 3.5–5)
ALBUMIN/GLOB SERPL: 1.3 G/DL (ref 1.1–2.5)
ALP SERPL-CCNC: 81 U/L (ref 24–120)
ALT SERPL-CCNC: 31 U/L (ref 0–54)
AST SERPL-CCNC: 32 U/L (ref 7–45)
BASOPHILS # BLD AUTO: 0.08 10*3/MM3 (ref 0–0.2)
BASOPHILS NFR BLD AUTO: 1 % (ref 0–2)
BILIRUB SERPL-MCNC: 0.2 MG/DL (ref 0.1–1)
BUN SERPL-MCNC: 21 MG/DL (ref 5–21)
BUN/CREAT SERPL: 21.2 (ref 7–25)
CALCIUM SERPL-MCNC: 9.8 MG/DL (ref 8.4–10.4)
CHLORIDE SERPL-SCNC: 98 MMOL/L (ref 98–110)
CHOLEST SERPL-MCNC: 198 MG/DL (ref 130–200)
CO2 SERPL-SCNC: 28 MMOL/L (ref 24–31)
CREAT SERPL-MCNC: 0.99 MG/DL (ref 0.5–1.4)
CRP SERPL-MCNC: <0.5 MG/DL (ref 0–0.99)
EOSINOPHIL # BLD AUTO: 0.18 10*3/MM3 (ref 0–0.7)
EOSINOPHIL NFR BLD AUTO: 2.3 % (ref 0–4)
ERYTHROCYTE [DISTWIDTH] IN BLOOD BY AUTOMATED COUNT: 15.9 % (ref 12–15)
ERYTHROCYTE [SEDIMENTATION RATE] IN BLOOD BY WESTERGREN METHOD: 28 MM/HR (ref 0–20)
GLOBULIN SER CALC-MCNC: 3.1 GM/DL
GLUCOSE SERPL-MCNC: 87 MG/DL (ref 70–100)
HCT VFR BLD AUTO: 42.3 % (ref 37–47)
HDLC SERPL-MCNC: 51 MG/DL
HGB BLD-MCNC: 12.7 G/DL (ref 12–16)
IMM GRANULOCYTES # BLD: 0.04 10*3/MM3 (ref 0–0.03)
IMM GRANULOCYTES NFR BLD: 0.5 % (ref 0–5)
LDLC SERPL CALC-MCNC: 131 MG/DL (ref 0–99)
LYMPHOCYTES # BLD AUTO: 1.25 10*3/MM3 (ref 0.72–4.86)
LYMPHOCYTES NFR BLD AUTO: 16.2 % (ref 15–45)
MCH RBC QN AUTO: 27.7 PG (ref 28–32)
MCHC RBC AUTO-ENTMCNC: 30 G/DL (ref 33–36)
MCV RBC AUTO: 92.4 FL (ref 82–98)
MONOCYTES # BLD AUTO: 0.58 10*3/MM3 (ref 0.19–1.3)
MONOCYTES NFR BLD AUTO: 7.5 % (ref 4–12)
NEUTROPHILS # BLD AUTO: 5.59 10*3/MM3 (ref 1.87–8.4)
NEUTROPHILS NFR BLD AUTO: 72.5 % (ref 39–78)
NRBC BLD AUTO-RTO: 0 /100 WBC (ref 0–0)
PLATELET # BLD AUTO: 305 10*3/MM3 (ref 130–400)
POTASSIUM SERPL-SCNC: 4.6 MMOL/L (ref 3.5–5.3)
PROT SERPL-MCNC: 7.2 G/DL (ref 6.3–8.7)
RBC # BLD AUTO: 4.58 10*6/MM3 (ref 4.2–5.4)
SODIUM SERPL-SCNC: 140 MMOL/L (ref 135–145)
T4 FREE SERPL-MCNC: 0.89 NG/DL (ref 0.78–2.19)
TRIGL SERPL-MCNC: 82 MG/DL (ref 0–149)
TSH SERPL DL<=0.005 MIU/L-ACNC: 3.03 MIU/ML (ref 0.47–4.68)
VLDLC SERPL CALC-MCNC: 16.4 MG/DL
WBC # BLD AUTO: 7.72 10*3/MM3 (ref 4.8–10.8)

## 2018-08-15 RX ORDER — BENAZEPRIL HYDROCHLORIDE 20 MG/1
TABLET ORAL
Qty: 90 TABLET | Refills: 2 | Status: SHIPPED | OUTPATIENT
Start: 2018-08-15 | End: 2019-04-11 | Stop reason: SDUPTHER

## 2018-08-21 RX ORDER — HYDROCODONE BITARTRATE AND ACETAMINOPHEN 10; 325 MG/1; MG/1
1 TABLET ORAL EVERY 6 HOURS PRN
Qty: 90 TABLET | Refills: 0 | Status: SHIPPED | OUTPATIENT
Start: 2018-08-21 | End: 2018-10-15 | Stop reason: SDUPTHER

## 2018-08-21 NOTE — TELEPHONE ENCOUNTER
MED REFILL     NORCO 10/325MG    (1) Q 6HRS      TAWANNA 06.05.18      [facility] [place]

## 2018-09-05 RX ORDER — OMEPRAZOLE 20 MG/1
CAPSULE, DELAYED RELEASE ORAL
Qty: 90 CAPSULE | Refills: 2 | Status: SHIPPED | OUTPATIENT
Start: 2018-09-05 | End: 2019-04-11 | Stop reason: SDUPTHER

## 2018-09-11 RX ORDER — HYDROCHLOROTHIAZIDE 25 MG/1
TABLET ORAL
Qty: 90 TABLET | Refills: 3 | Status: SHIPPED | OUTPATIENT
Start: 2018-09-11 | End: 2019-08-05 | Stop reason: ALTCHOICE

## 2018-10-15 ENCOUNTER — OFFICE VISIT (OUTPATIENT)
Dept: FAMILY MEDICINE CLINIC | Facility: CLINIC | Age: 78
End: 2018-10-15

## 2018-10-15 VITALS
BODY MASS INDEX: 28.26 KG/M2 | DIASTOLIC BLOOD PRESSURE: 75 MMHG | WEIGHT: 153.6 LBS | OXYGEN SATURATION: 94 % | HEART RATE: 75 BPM | TEMPERATURE: 98.4 F | HEIGHT: 62 IN | SYSTOLIC BLOOD PRESSURE: 129 MMHG

## 2018-10-15 DIAGNOSIS — Z23 NEED FOR IMMUNIZATION AGAINST INFLUENZA: ICD-10-CM

## 2018-10-15 DIAGNOSIS — J01.00 ACUTE NON-RECURRENT MAXILLARY SINUSITIS: Primary | ICD-10-CM

## 2018-10-15 DIAGNOSIS — Z23 NEED FOR PROPHYLACTIC VACCINATION AND INOCULATION AGAINST CHOLERA ALONE: ICD-10-CM

## 2018-10-15 PROCEDURE — 99213 OFFICE O/P EST LOW 20 MIN: CPT | Performed by: FAMILY MEDICINE

## 2018-10-15 PROCEDURE — G0008 ADMIN INFLUENZA VIRUS VAC: HCPCS | Performed by: FAMILY MEDICINE

## 2018-10-15 PROCEDURE — 90662 IIV NO PRSV INCREASED AG IM: CPT | Performed by: FAMILY MEDICINE

## 2018-10-15 RX ORDER — HYDROCODONE BITARTRATE AND ACETAMINOPHEN 10; 325 MG/1; MG/1
1 TABLET ORAL EVERY 6 HOURS PRN
Qty: 90 TABLET | Refills: 0 | Status: SHIPPED | OUTPATIENT
Start: 2018-10-15 | End: 2018-12-10 | Stop reason: SDUPTHER

## 2018-10-15 RX ORDER — AZITHROMYCIN 250 MG/1
TABLET, FILM COATED ORAL
Qty: 6 TABLET | Refills: 0 | Status: SHIPPED | OUTPATIENT
Start: 2018-10-15 | End: 2018-11-28

## 2018-10-15 RX ORDER — GABAPENTIN 600 MG/1
600 TABLET ORAL 3 TIMES DAILY
Qty: 270 TABLET | Refills: 2 | Status: SHIPPED | OUTPATIENT
Start: 2018-10-15 | End: 2019-06-12 | Stop reason: SDUPTHER

## 2018-10-15 RX ORDER — GABAPENTIN 600 MG/1
600 TABLET ORAL 3 TIMES DAILY
Qty: 270 TABLET | Refills: 2 | Status: SHIPPED | OUTPATIENT
Start: 2018-10-15 | End: 2018-10-15 | Stop reason: SDUPTHER

## 2018-10-15 NOTE — PROGRESS NOTES
Subjective   Ariana Gutierrez is a 78 y.o. female.   CONTROLLED SUBSTANCE TRACKING 3/14/2018 6/5/2018   Last Pro 3/14/2018 6/5/2018   Last UDS 3/14/2018 3/14/2018   Last Controlled Substance Agreement 3/14/2018 3/14/2018       Sinusitis   This is a new problem. The current episode started in the past 7 days. The problem is unchanged. There has been no fever. Associated symptoms include congestion, coughing, a hoarse voice, sinus pressure and sneezing. Past treatments include saline sprays.       The following portions of the patient's history were reviewed and updated as appropriate: allergies, current medications, past family history, past medical history, past social history, past surgical history and problem list.    Review of Systems   HENT: Positive for congestion, hoarse voice, sinus pressure and sneezing.    Respiratory: Positive for cough.        Objective   Physical Exam   Constitutional: She is oriented to person, place, and time.   HENT:   Right Ear: External ear normal.   Left Ear: External ear normal.   Mouth/Throat: Oropharyngeal exudate present.   Cardiovascular: Normal rate and regular rhythm.    Pulmonary/Chest: Effort normal and breath sounds normal.   Lymphadenopathy:     She has no cervical adenopathy.   Neurological: She is alert and oriented to person, place, and time.   Psychiatric: She has a normal mood and affect. Her behavior is normal. Thought content normal.   Nursing note and vitals reviewed.      Assessment/Plan   Ariana was seen today for sinusitis.    Diagnoses and all orders for this visit:    Acute non-recurrent maxillary sinusitis    Need for prophylactic vaccination and inoculation against cholera alone    Need for immunization against influenza  -     Fluzone High Dose =>65Years    Other orders  -     HYDROcodone-acetaminophen (NORCO)  MG per tablet; Take 1 tablet by mouth Every 6 (Six) Hours As Needed for Moderate Pain .  -     gabapentin (NEURONTIN) 600 MG tablet;  Take 1 tablet by mouth 3 (Three) Times a Day.  -     azithromycin (ZITHROMAX Z-ANTONETTE) 250 MG tablet; Take 2 tablets the first day, then 1 tablet daily for 4 days.         Plan above-feel like flu shot is okay        no abrasions, no jaundice, no lesions, no pruritis, and no rashes.

## 2018-11-14 ENCOUNTER — CLINICAL SUPPORT (OUTPATIENT)
Dept: FAMILY MEDICINE CLINIC | Facility: CLINIC | Age: 78
End: 2018-11-14

## 2018-11-14 ENCOUNTER — TELEPHONE (OUTPATIENT)
Dept: FAMILY MEDICINE CLINIC | Facility: CLINIC | Age: 78
End: 2018-11-14

## 2018-11-14 DIAGNOSIS — N39.0 URINARY TRACT INFECTION WITH HEMATURIA, SITE UNSPECIFIED: Primary | ICD-10-CM

## 2018-11-14 DIAGNOSIS — H91.90 HEARING LOSS, UNSPECIFIED HEARING LOSS TYPE, UNSPECIFIED LATERALITY: Primary | ICD-10-CM

## 2018-11-14 DIAGNOSIS — R31.9 URINARY TRACT INFECTION WITH HEMATURIA, SITE UNSPECIFIED: Primary | ICD-10-CM

## 2018-11-14 LAB
BILIRUB BLD-MCNC: NEGATIVE MG/DL
CLARITY, POC: ABNORMAL
COLOR UR: YELLOW
GLUCOSE UR STRIP-MCNC: NEGATIVE MG/DL
KETONES UR QL: NEGATIVE
LEUKOCYTE EST, POC: ABNORMAL
NITRITE UR-MCNC: NEGATIVE MG/ML
PH UR: 6 [PH] (ref 5–8)
PROT UR STRIP-MCNC: NEGATIVE MG/DL
RBC # UR STRIP: ABNORMAL /UL
SP GR UR: 1.01 (ref 1–1.03)
UROBILINOGEN UR QL: NORMAL

## 2018-11-14 PROCEDURE — 81003 URINALYSIS AUTO W/O SCOPE: CPT | Performed by: FAMILY MEDICINE

## 2018-11-14 RX ORDER — SULFAMETHOXAZOLE AND TRIMETHOPRIM 800; 160 MG/1; MG/1
1 TABLET ORAL 2 TIMES DAILY
Qty: 14 TABLET | Refills: 0 | Status: SHIPPED | OUTPATIENT
Start: 2018-11-14 | End: 2018-11-21

## 2018-11-14 NOTE — TELEPHONE ENCOUNTER
PT IS REQUESTING AN ENT REFERRAL-STATES RT EAR IS NOT IMPROVING. PREFERS DR REINOSO-FRIEND RECOMMENDED.       STATES HAD HEARING TEST @ [facility] IN [place]-SHOWED HEARING LOSS.        PT STATES HAS URINARY ISSUES-WILL BE COMING BY THIS AFTERNOON TO LEAVE SPECIMEN.             this does head and neck cancer surgery-do not believe he does hearing issues if he does refer

## 2018-11-14 NOTE — PROGRESS NOTES
Patient presented with symptoms of UTI.  Urine was collected, tested, and results viewed by MD.  MD sent medication to the pharmacy and urine culture was ordered.

## 2018-11-16 RX ORDER — METOPROLOL SUCCINATE 25 MG/1
TABLET, EXTENDED RELEASE ORAL
Qty: 90 TABLET | Refills: 2 | Status: SHIPPED | OUTPATIENT
Start: 2018-11-16 | End: 2019-06-19 | Stop reason: SDUPTHER

## 2018-11-20 LAB
BACTERIA UR CULT: ABNORMAL
BACTERIA UR CULT: ABNORMAL
OTHER ANTIBIOTIC SUSC ISLT: ABNORMAL

## 2018-11-21 ENCOUNTER — TELEPHONE (OUTPATIENT)
Dept: FAMILY MEDICINE CLINIC | Facility: CLINIC | Age: 78
End: 2018-11-21

## 2018-11-21 NOTE — TELEPHONE ENCOUNTER
MD out of town however called office and advised of lab results.  MD stated that patient is on correct medication for her UTI and to be sure to finish medication.  MA contacted patient and advised of the above information.  Patient verbalized understanding.

## 2018-11-28 ENCOUNTER — CLINICAL SUPPORT (OUTPATIENT)
Dept: FAMILY MEDICINE CLINIC | Facility: CLINIC | Age: 78
End: 2018-11-28

## 2018-11-28 DIAGNOSIS — N39.0 URINARY TRACT INFECTION WITHOUT HEMATURIA, SITE UNSPECIFIED: Primary | ICD-10-CM

## 2018-11-28 LAB
BILIRUB BLD-MCNC: NEGATIVE MG/DL
CLARITY, POC: CLEAR
COLOR UR: YELLOW
GLUCOSE UR STRIP-MCNC: NEGATIVE MG/DL
KETONES UR QL: NEGATIVE
LEUKOCYTE EST, POC: ABNORMAL
NITRITE UR-MCNC: NEGATIVE MG/ML
PH UR: 7 [PH] (ref 5–8)
PROT UR STRIP-MCNC: NEGATIVE MG/DL
RBC # UR STRIP: NEGATIVE /UL
SP GR UR: 1.01 (ref 1–1.03)
UROBILINOGEN UR QL: NORMAL

## 2018-11-28 PROCEDURE — 81003 URINALYSIS AUTO W/O SCOPE: CPT | Performed by: FAMILY MEDICINE

## 2018-11-28 RX ORDER — NITROFURANTOIN MACROCRYSTALS 50 MG/1
CAPSULE ORAL
Qty: 50 CAPSULE | Refills: 0 | Status: SHIPPED | OUTPATIENT
Start: 2018-11-28 | End: 2019-01-29

## 2018-11-28 NOTE — PROGRESS NOTES
Patient presented in office with symptoms of UTI.  Urine was collected and results viewed by MD.  MD ordered a urine culture and sent medications to the pharmacy and also advised patient to purchase and use some Monostat to compliment the medication he was sending to the pharmacy.

## 2018-11-29 DIAGNOSIS — Z12.31 VISIT FOR SCREENING MAMMOGRAM: Primary | ICD-10-CM

## 2018-11-30 ENCOUNTER — OFFICE VISIT (OUTPATIENT)
Dept: RETAIL CLINIC | Facility: CLINIC | Age: 78
End: 2018-11-30

## 2018-11-30 DIAGNOSIS — H91.90 HEARING LOSS, UNSPECIFIED HEARING LOSS TYPE, UNSPECIFIED LATERALITY: Primary | ICD-10-CM

## 2018-11-30 NOTE — PROGRESS NOTES
Patient was referred here by audiology for Otic irrigartion. Attempt made to irrigate ears, but wax is too adherent.  Patient will begin debrox drops and return on Tuesday for repeat attempt.    Geena Redding, BELINDA    No charge visit.

## 2018-12-01 LAB
BACTERIA UR CULT: NO GROWTH
BACTERIA UR CULT: NORMAL

## 2018-12-04 ENCOUNTER — OFFICE VISIT (OUTPATIENT)
Dept: RETAIL CLINIC | Facility: CLINIC | Age: 78
End: 2018-12-04

## 2018-12-04 VITALS
DIASTOLIC BLOOD PRESSURE: 70 MMHG | SYSTOLIC BLOOD PRESSURE: 132 MMHG | RESPIRATION RATE: 18 BRPM | HEART RATE: 80 BPM | OXYGEN SATURATION: 97 %

## 2018-12-04 DIAGNOSIS — H91.91 HEARING LOSS OF RIGHT EAR, UNSPECIFIED HEARING LOSS TYPE: Primary | ICD-10-CM

## 2018-12-04 DIAGNOSIS — H61.23 EXCESSIVE CERUMEN IN BOTH EAR CANALS: ICD-10-CM

## 2018-12-04 PROCEDURE — 99212 OFFICE O/P EST SF 10 MIN: CPT | Performed by: NURSE PRACTITIONER

## 2018-12-04 PROCEDURE — 69210 REMOVE IMPACTED EAR WAX UNI: CPT | Performed by: NURSE PRACTITIONER

## 2018-12-04 NOTE — PROGRESS NOTES
Chief Complaint   Patient presents with   • Hearing Loss   • Cerumen Impaction     Subjective   Ariana Gutierrez is a 78 y.o. female who presents to the clinic today with complaints of ear wax both ears. She reports having problems with decreased hearing right ear for about the last six months. She has seen her primary care provider and had steroids, antibiotics, and nasal sprays without improvement.  She was told by ENT that she should see an audiologist and then be referred to them by her primary care provider if needed.  She went to [facility] for hearing screen and was noted to have a large amount of wax right ear canal and a smaller amount left ear canal.  She was seen here last week and ear lavage was attempted. The wax was too adhered to her canals and she was instructed to use Debrox and return for lavage.  She denies any new concerns.   HPI    Current Outpatient Medications:   •  aspirin 325 MG tablet, Take 325 mg by mouth Daily., Disp: , Rfl:   •  benazepril (LOTENSIN) 20 MG tablet, TAKE 1 TABLET EVERY DAY, Disp: 90 tablet, Rfl: 2  •  Docusate Calcium (STOOL SOFTENER PO), Take 1 tablet by mouth Daily., Disp: , Rfl:   •  gabapentin (NEURONTIN) 600 MG tablet, Take 1 tablet by mouth 3 (Three) Times a Day., Disp: 270 tablet, Rfl: 2  •  hydrochlorothiazide (HYDRODIURIL) 25 MG tablet, TAKE 1 TABLET EVERY DAY, Disp: 90 tablet, Rfl: 3  •  HYDROcodone-acetaminophen (NORCO)  MG per tablet, Take 1 tablet by mouth Every 6 (Six) Hours As Needed for Moderate Pain ., Disp: 90 tablet, Rfl: 0  •  metoprolol succinate XL (TOPROL-XL) 25 MG 24 hr tablet, TAKE 1 TABLET EVERY DAY, Disp: 90 tablet, Rfl: 2  •  Multiple Vitamins-Minerals (PRESERVISION AREDS 2 PO), Take  by mouth Daily., Disp: , Rfl:   •  nitrofurantoin (MACRODANTIN) 50 MG capsule, 1 -- 3 times a day for 7 days then 1 daily till medicine gone, Disp: 50 capsule, Rfl: 0  •  omeprazole (priLOSEC) 20 MG capsule, TAKE 1 CAPSULE EVERY DAY, Disp: 90 capsule, Rfl:  2  •  polyethylene glycol (MIRALAX) powder, Take 17 g by mouth Daily., Disp: , Rfl:   •  Probiotic Product (PROBIOTIC DAILY PO), Take 1 tablet by mouth Daily., Disp: , Rfl:     Allergies:  Patient has no known allergies.    Past Medical History:   Diagnosis Date   • Bacterial infection    • Chest pain    • Chronic back pain    • Deep vein thrombosis (CMS/HCC)     S/P KNEE SURGERY 10/2017   • Diverticulosis of intestine 8/16/2016   • Gastroesophageal reflux disease without esophagitis 5/26/2017   • Hypertension    • Irritable bowel syndrome 11/2/2011   • Low back pain    • Palpitations    • Paresthesia     toes   • Perforated bowel (CMS/HCC)    • Scoliosis    • Spinal stenosis    • Vascular disease, peripheral (CMS/HCC)      Past Surgical History:   Procedure Laterality Date   • BACK SURGERY     • BREAST BIOPSY Right 25 yrs ago   • CATARACT EXTRACTION Bilateral    • COLON RESECTION SMALL BOWEL  2016    with colostomy for 6 months, already revised.   • COLON SURGERY     • COLONOSCOPY     • LUMBAR LAMINECTOMY     • RENAL ARTERY STENT Right    • TOTAL KNEE ARTHROPLASTY Right    • TOTAL KNEE ARTHROPLASTY     • VASCULAR SURGERY      10/2017 - DR GARCIA     Family History   Problem Relation Age of Onset   • Breast cancer Mother    • Heart disease Mother    • Coronary artery disease Mother    • Peripheral vascular disease Mother    • No Known Problems Sister    • Heart disease Brother    • No Known Problems Maternal Grandmother    • No Known Problems Maternal Grandfather    • No Known Problems Paternal Grandmother    • No Known Problems Paternal Grandfather      Social History     Tobacco Use   • Smoking status: Never Smoker   • Smokeless tobacco: Never Used   Substance Use Topics   • Alcohol use: Yes     Comment: occ   • Drug use: No       Review of Systems  Review of Systems   Constitutional: Negative for fatigue and fever.   HENT: Positive for hearing loss (right ear). Negative for ear pain.    Respiratory: Negative.         Objective   /70   Pulse 80   Resp 18   LMP  (LMP Unknown)   SpO2 97%       Physical Exam   Constitutional: She is oriented to person, place, and time. She appears well-developed and well-nourished. No distress.   HENT:   Head: Normocephalic and atraumatic.   Right Ear: External ear normal. No tenderness.   Left Ear: Tympanic membrane, external ear and ear canal normal. Tympanic membrane is not perforated, not erythematous, not retracted and not bulging.   Nose: Nose normal.   Mouth/Throat: Uvula is midline, oropharynx is clear and moist and mucous membranes are normal.   Right canal occluded by cerumen. TM could not be seen. Left canal with cerumen partially obstructing canal. Visualized portion of TM appears normal.    Eyes: Conjunctivae and EOM are normal. Pupils are equal, round, and reactive to light.   Neck: Normal range of motion. Neck supple.   Cardiovascular: Normal rate, regular rhythm and normal heart sounds.   Pulmonary/Chest: Effort normal and breath sounds normal.   Lymphadenopathy:     She has no cervical adenopathy.   Neurological: She is alert and oriented to person, place, and time.   Psychiatric: She has a normal mood and affect.   Vitals reviewed.      Assessment/Plan     Ariana was seen today for hearing loss and cerumen impaction.    Diagnoses and all orders for this visit:    Hearing loss of right ear, unspecified hearing loss type    Excessive cerumen in both ear canals      Your ears were irrigated today to remove ear wax.  Please follow up with your primary care provider, ENT, or audiology if you have any continued concerns    Ear Cerumen Removal Instrumentation  Date/Time: 12/4/2018 2:40 PM  Performed by: Ava Veronica APRN  Authorized by: Ava Veronica APRN   Consent: Verbal consent obtained.  Risks and benefits: risks, benefits and alternatives were discussed  Consent given by: patient  Patient understanding: patient states understanding of the procedure being  performed  Patient identity confirmed: verbally with patient and provided demographic data    Anesthesia:  Local Anesthetic: none  Cerumenolytics applied prior to procedure: She instilled Debrox this morning both ears.  Location details: right ear and left ear  Patient tolerance: Patient tolerated the procedure well with no immediate complications  Comments: Left ear irrigated with 1/3 hydrogen peroxide and 2/3 warm water solution.  Small amount of cerumen removed with curette once closer to entrance of canal. Post procedure: Tiny amount of cerumen remained in canal, non obstructing. Otherwise canal clear, TM intact.   Right ear irrigated with 1/3 hydrogen peroxide and 2/3 warm water solution.  Large amount of cerumen removed with curette once near entrance of canal. Post procedure: Canal clear, TM intact, possible effusion noted.  She reports no change in hearing.     Procedure type: irrigation   Sedation:  Patient sedated: no

## 2018-12-04 NOTE — PATIENT INSTRUCTIONS
Hearing Loss  Hearing loss is a partial or total loss of the ability to hear. This can be temporary or permanent, and it can happen in one or both ears. Hearing loss may be referred to as deafness.  Medical care is necessary to treat hearing loss properly and to prevent the condition from getting worse. Your hearing may partially or completely come back, depending on what caused your hearing loss and how severe it is. In some cases, hearing loss is permanent.  What are the causes?  Common causes of hearing loss include:  · Too much wax in the ear canal.  · Infection of the ear canal or middle ear.  · Fluid in the middle ear.  · Injury to the ear or surrounding area.  · An object stuck in the ear.  · Prolonged exposure to loud sounds, such as music.    Less common causes of hearing loss include:  · Tumors in the ear.  · Viral or bacterial infections, such as meningitis.  · A hole in the eardrum (perforated eardrum).  · Problems with the hearing nerve that sends signals between the brain and the ear.  · Certain medicines.    What are the signs or symptoms?  Symptoms of this condition may include:  · Difficulty telling the difference between sounds.  · Difficulty following a conversation when there is background noise.  · Lack of response to sounds in your environment. This may be most noticeable when you do not respond to startling sounds.  · Needing to turn up the volume on the television, radio, etc.  · Ringing in the ears.  · Dizziness.  · Pain in the ears.    How is this diagnosed?  This condition is diagnosed based on a physical exam and a hearing test (audiometry). The audiometry test will be performed by a hearing specialist (audiologist). You may also be referred to an ear, nose, and throat (ENT) specialist (otolaryngologist).  How is this treated?  Treatment for recent onset of hearing loss may include:  · Ear wax removal.  · Being prescribed medicines to prevent infection (antibiotics).  · Being prescribed  medicines to reduce inflammation (corticosteroids).    Follow these instructions at home:  · If you were prescribed an antibiotic medicine, take it as told by your health care provider. Do not stop taking the antibiotic even if you start to feel better.  · Take over-the-counter and prescription medicines only as told by your health care provider.  · Avoid loud noises.  · Return to your normal activities as told by your health care provider. Ask your health care provider what activities are safe for you.  · Keep all follow-up visits as told by your health care provider. This is important.  Contact a health care provider if:  · You feel dizzy.  · You develop new symptoms.  · You vomit or feel nauseous.  · You have a fever.  Get help right away if:  · You develop sudden changes in your vision.  · You have severe ear pain.  · You have new or increased weakness.  · You have a severe headache.  This information is not intended to replace advice given to you by your health care provider. Make sure you discuss any questions you have with your health care provider.  Document Released: 12/18/2006 Document Revised: 05/25/2017 Document Reviewed: 05/04/2016  [employer] Interactive Patient Education © 2018 [employer]      Earwax Buildup, Adult  The ears produce a substance called earwax that helps keep bacteria out of the ear and protects the skin in the ear canal. Occasionally, earwax can build up in the ear and cause discomfort or hearing loss.  What increases the risk?  This condition is more likely to develop in people who:  · Are male.  · Are elderly.  · Naturally produce more earwax.  · Clean their ears often with cotton swabs.  · Use earplugs often.  · Use in-ear headphones often.  · Wear hearing aids.  · Have narrow ear canals.  · Have earwax that is overly thick or sticky.  · Have eczema.  · Are dehydrated.  · Have excess hair in the ear canal.    What are the signs or symptoms?  Symptoms of this condition  include:  · Reduced or muffled hearing.  · A feeling of fullness in the ear or feeling that the ear is plugged.  · Fluid coming from the ear.  · Ear pain.  · Ear itch.  · Ringing in the ear.  · Coughing.  · An obvious piece of earwax that can be seen inside the ear canal.    How is this diagnosed?  This condition may be diagnosed based on:  · Your symptoms.  · Your medical history.  · An ear exam. During the exam, your health care provider will look into your ear with an instrument called an otoscope.    You may have tests, including a hearing test.  How is this treated?  This condition may be treated by:  · Using ear drops to soften the earwax.  · Having the earwax removed by a health care provider. The health care provider may:  ? Flush the ear with water.  ? Use an instrument that has a loop on the end (curette).  ? Use a suction device.  · Surgery to remove the wax buildup. This may be done in severe cases.    Follow these instructions at home:  · Take over-the-counter and prescription medicines only as told by your health care provider.  · Do not put any objects, including cotton swabs, into your ear. You can clean the opening of your ear canal with a washcloth or facial tissue.  · Follow instructions from your health care provider about cleaning your ears. Do not over-clean your ears.  · Drink enough fluid to keep your urine clear or pale yellow. This will help to thin the earwax.  · Keep all follow-up visits as told by your health care provider. If earwax builds up in your ears often or if you use hearing aids, consider seeing your health care provider for routine, preventive ear cleanings. Ask your health care provider how often you should schedule your cleanings.  · If you have hearing aids, clean them according to instructions from the  and your health care provider.  Contact a health care provider if:  · You have ear pain.  · You develop a fever.  · You have blood, pus, or other fluid coming  from your ear.  · You have hearing loss.  · You have ringing in your ears that does not go away.  · Your symptoms do not improve with treatment.  · You feel like the room is spinning (vertigo).  Summary  · Earwax can build up in the ear and cause discomfort or hearing loss.  · The most common symptoms of this condition include reduced or muffled hearing and a feeling of fullness in the ear or feeling that the ear is plugged.  · This condition may be diagnosed based on your symptoms, your medical history, and an ear exam.  · This condition may be treated by using ear drops to soften the earwax or by having the earwax removed by a health care provider.  · Do not put any objects, including cotton swabs, into your ear. You can clean the opening of your ear canal with a washcloth or facial tissue.  This information is not intended to replace advice given to you by your health care provider. Make sure you discuss any questions you have with your health care provider.  Document Released: 01/25/2006 Document Revised: 02/28/2018 Document Reviewed: 02/28/2018  [employer] Interactive Patient Education © 2018 [employer]      Your ears were irrigated today to remove ear wax.  Please follow up with your primary care provider, ENT, or audiology if you have any continued concerns.

## 2018-12-10 RX ORDER — HYDROCODONE BITARTRATE AND ACETAMINOPHEN 10; 325 MG/1; MG/1
1 TABLET ORAL EVERY 6 HOURS PRN
Qty: 90 TABLET | Refills: 0 | Status: SHIPPED | OUTPATIENT
Start: 2018-12-10 | End: 2019-02-12 | Stop reason: SDUPTHER

## 2018-12-18 ENCOUNTER — HOSPITAL ENCOUNTER (OUTPATIENT)
Dept: MAMMOGRAPHY | Facility: HOSPITAL | Age: 78
Discharge: HOME | End: 2018-12-18
Attending: OBSTETRICS & GYNECOLOGY | Admitting: OBSTETRICS & GYNECOLOGY

## 2018-12-18 DIAGNOSIS — Z12.31 VISIT FOR SCREENING MAMMOGRAM: ICD-10-CM

## 2018-12-18 PROCEDURE — 77063 BREAST TOMOSYNTHESIS BI: CPT

## 2018-12-18 PROCEDURE — 77067 SCR MAMMO BI INCL CAD: CPT

## 2018-12-26 ENCOUNTER — PROCEDURE VISIT (OUTPATIENT)
Dept: OTOLARYNGOLOGY | Age: 78
End: 2018-12-26
Payer: MEDICARE

## 2018-12-26 ENCOUNTER — OFFICE VISIT (OUTPATIENT)
Dept: OTOLARYNGOLOGY | Age: 78
End: 2018-12-26
Payer: MEDICARE

## 2018-12-26 VITALS
TEMPERATURE: 98.2 F | HEART RATE: 66 BPM | SYSTOLIC BLOOD PRESSURE: 128 MMHG | RESPIRATION RATE: 16 BRPM | DIASTOLIC BLOOD PRESSURE: 70 MMHG | BODY MASS INDEX: 26.58 KG/M2 | OXYGEN SATURATION: 97 % | WEIGHT: 150 LBS | HEIGHT: 63 IN

## 2018-12-26 DIAGNOSIS — H69.81 ETD (EUSTACHIAN TUBE DYSFUNCTION), RIGHT: Primary | ICD-10-CM

## 2018-12-26 DIAGNOSIS — H90.A31 MIXED CONDUCTIVE AND SENSORINEURAL HEARING LOSS OF RIGHT EAR WITH RESTRICTED HEARING OF LEFT EAR: ICD-10-CM

## 2018-12-26 DIAGNOSIS — H90.3 SENSORINEURAL HEARING LOSS (SNHL) OF BOTH EARS: ICD-10-CM

## 2018-12-26 DIAGNOSIS — H90.A31 MIXED CONDUCTIVE AND SENSORINEURAL HEARING LOSS OF RIGHT EAR WITH RESTRICTED HEARING OF LEFT EAR: Primary | ICD-10-CM

## 2018-12-26 DIAGNOSIS — H65.91 OME (OTITIS MEDIA WITH EFFUSION), RIGHT: ICD-10-CM

## 2018-12-26 PROCEDURE — G8598 ASA/ANTIPLAT THER USED: HCPCS | Performed by: OTOLARYNGOLOGY

## 2018-12-26 PROCEDURE — 1123F ACP DISCUSS/DSCN MKR DOCD: CPT | Performed by: OTOLARYNGOLOGY

## 2018-12-26 PROCEDURE — G8400 PT W/DXA NO RESULTS DOC: HCPCS | Performed by: OTOLARYNGOLOGY

## 2018-12-26 PROCEDURE — 92511 NASOPHARYNGOSCOPY: CPT | Performed by: OTOLARYNGOLOGY

## 2018-12-26 PROCEDURE — 1036F TOBACCO NON-USER: CPT | Performed by: OTOLARYNGOLOGY

## 2018-12-26 PROCEDURE — 99203 OFFICE O/P NEW LOW 30 MIN: CPT | Performed by: OTOLARYNGOLOGY

## 2018-12-26 PROCEDURE — 1101F PT FALLS ASSESS-DOCD LE1/YR: CPT | Performed by: OTOLARYNGOLOGY

## 2018-12-26 PROCEDURE — 92567 TYMPANOMETRY: CPT | Performed by: AUDIOLOGIST

## 2018-12-26 PROCEDURE — 1090F PRES/ABSN URINE INCON ASSESS: CPT | Performed by: OTOLARYNGOLOGY

## 2018-12-26 PROCEDURE — 4040F PNEUMOC VAC/ADMIN/RCVD: CPT | Performed by: OTOLARYNGOLOGY

## 2018-12-26 PROCEDURE — 92553 AUDIOMETRY AIR & BONE: CPT | Performed by: AUDIOLOGIST

## 2018-12-26 PROCEDURE — G8419 CALC BMI OUT NRM PARAM NOF/U: HCPCS | Performed by: OTOLARYNGOLOGY

## 2018-12-26 PROCEDURE — G8427 DOCREV CUR MEDS BY ELIG CLIN: HCPCS | Performed by: OTOLARYNGOLOGY

## 2018-12-26 PROCEDURE — G8484 FLU IMMUNIZE NO ADMIN: HCPCS | Performed by: OTOLARYNGOLOGY

## 2018-12-26 PROCEDURE — 92504 EAR MICROSCOPY EXAMINATION: CPT | Performed by: OTOLARYNGOLOGY

## 2018-12-26 NOTE — PROGRESS NOTES
for this frustrating condition was explained. Medications are of little help and middle ear space fluid and negative middle ear pressures typical spontaneously resolve at a rate of 1% of those effected per day. The net result is 1/3rd better in 1 month and 90% resolved in 3 months. 1#  Myringotomy and PE tube placement. 2#  Balloon dilatation of the Eustachian tube canal under general anesthesia. Review of Systems  A 12 point review of systems was completed, reviewed, and scanned to chart per staff. Patient medical history reviewed. Objective:     Physical Exam   Constitutional: She is oriented to person, place, and time. She appears well-developed and well-nourished. HENT:   Head: Normocephalic and atraumatic. Right Ear: External ear and ear canal normal. No drainage. A middle ear effusion is present. Decreased hearing is noted. Left Ear: Tympanic membrane, external ear and ear canal normal. No drainage. Decreased hearing is noted. Nose: Nose normal. No mucosal edema, rhinorrhea or septal deviation. Right sinus exhibits no maxillary sinus tenderness and no frontal sinus tenderness. Left sinus exhibits no maxillary sinus tenderness and no frontal sinus tenderness. Mouth/Throat: Uvula is midline and oropharynx is clear and moist. No oral lesions. Due to nature of patient complaint a microscopic exam of the ear was performed. Nasopharyngoscopy  In cases of unilateral otitis media with effusion in an adult, Nasopharyngoscopy is indicated and in fact warranted. Nasopharyngoscopy was performed today after anesthetization of the nasal cavity with oxymetazoline and lidocaine spray. The fiberoptic scope was passed transnasally and the posterior nasal cavity, nasopharyngeal mucosa, and eustachian tube orifice were visualized. All structures were normal with no lesions or ulcerations. Eyes: Pupils are equal, round, and reactive to light.  Conjunctivae and EOM are normal.   Neck:

## 2019-01-02 ENCOUNTER — PROCEDURE VISIT (OUTPATIENT)
Dept: OTOLARYNGOLOGY | Age: 79
End: 2019-01-02
Payer: MEDICARE

## 2019-01-02 VITALS
DIASTOLIC BLOOD PRESSURE: 62 MMHG | RESPIRATION RATE: 16 BRPM | BODY MASS INDEX: 26.58 KG/M2 | HEART RATE: 76 BPM | SYSTOLIC BLOOD PRESSURE: 114 MMHG | OXYGEN SATURATION: 95 % | HEIGHT: 63 IN | WEIGHT: 150 LBS

## 2019-01-02 DIAGNOSIS — H69.81 ETD (EUSTACHIAN TUBE DYSFUNCTION), RIGHT: ICD-10-CM

## 2019-01-02 DIAGNOSIS — H90.A31 MIXED CONDUCTIVE AND SENSORINEURAL HEARING LOSS OF RIGHT EAR WITH RESTRICTED HEARING OF LEFT EAR: Primary | ICD-10-CM

## 2019-01-02 PROCEDURE — 1101F PT FALLS ASSESS-DOCD LE1/YR: CPT | Performed by: OTOLARYNGOLOGY

## 2019-01-02 PROCEDURE — 69433 CREATE EARDRUM OPENING: CPT | Performed by: OTOLARYNGOLOGY

## 2019-01-02 PROCEDURE — G8428 CUR MEDS NOT DOCUMENT: HCPCS | Performed by: OTOLARYNGOLOGY

## 2019-01-02 PROCEDURE — 1123F ACP DISCUSS/DSCN MKR DOCD: CPT | Performed by: OTOLARYNGOLOGY

## 2019-01-02 PROCEDURE — G8484 FLU IMMUNIZE NO ADMIN: HCPCS | Performed by: OTOLARYNGOLOGY

## 2019-01-02 PROCEDURE — G8419 CALC BMI OUT NRM PARAM NOF/U: HCPCS | Performed by: OTOLARYNGOLOGY

## 2019-01-02 PROCEDURE — G8598 ASA/ANTIPLAT THER USED: HCPCS | Performed by: OTOLARYNGOLOGY

## 2019-01-02 PROCEDURE — 1036F TOBACCO NON-USER: CPT | Performed by: OTOLARYNGOLOGY

## 2019-01-02 PROCEDURE — 4040F PNEUMOC VAC/ADMIN/RCVD: CPT | Performed by: OTOLARYNGOLOGY

## 2019-01-02 PROCEDURE — G8400 PT W/DXA NO RESULTS DOC: HCPCS | Performed by: OTOLARYNGOLOGY

## 2019-01-02 PROCEDURE — 1090F PRES/ABSN URINE INCON ASSESS: CPT | Performed by: OTOLARYNGOLOGY

## 2019-01-10 ENCOUNTER — PROCEDURE VISIT (OUTPATIENT)
Dept: OTOLARYNGOLOGY | Age: 79
End: 2019-01-10
Payer: MEDICARE

## 2019-01-10 ENCOUNTER — OFFICE VISIT (OUTPATIENT)
Dept: OTOLARYNGOLOGY | Age: 79
End: 2019-01-10

## 2019-01-10 VITALS
WEIGHT: 150 LBS | BODY MASS INDEX: 26.58 KG/M2 | RESPIRATION RATE: 16 BRPM | TEMPERATURE: 97.9 F | OXYGEN SATURATION: 98 % | HEIGHT: 63 IN | SYSTOLIC BLOOD PRESSURE: 106 MMHG | DIASTOLIC BLOOD PRESSURE: 56 MMHG | HEART RATE: 63 BPM

## 2019-01-10 DIAGNOSIS — H65.91 OME (OTITIS MEDIA WITH EFFUSION), RIGHT: ICD-10-CM

## 2019-01-10 DIAGNOSIS — H90.A31 MIXED CONDUCTIVE AND SENSORINEURAL HEARING LOSS OF RIGHT EAR WITH RESTRICTED HEARING OF LEFT EAR: Primary | ICD-10-CM

## 2019-01-10 DIAGNOSIS — H69.81 ETD (EUSTACHIAN TUBE DYSFUNCTION), RIGHT: ICD-10-CM

## 2019-01-10 PROCEDURE — 92567 TYMPANOMETRY: CPT | Performed by: AUDIOLOGIST

## 2019-01-10 PROCEDURE — 92552 PURE TONE AUDIOMETRY AIR: CPT | Performed by: AUDIOLOGIST

## 2019-01-10 PROCEDURE — 99024 POSTOP FOLLOW-UP VISIT: CPT | Performed by: OTOLARYNGOLOGY

## 2019-01-29 ENCOUNTER — OFFICE VISIT (OUTPATIENT)
Dept: OBSTETRICS AND GYNECOLOGY | Facility: CLINIC | Age: 79
End: 2019-01-29

## 2019-01-29 VITALS
HEIGHT: 62 IN | SYSTOLIC BLOOD PRESSURE: 128 MMHG | DIASTOLIC BLOOD PRESSURE: 76 MMHG | BODY MASS INDEX: 28.34 KG/M2 | WEIGHT: 154 LBS

## 2019-01-29 DIAGNOSIS — Z86.718 PERSONAL HISTORY OF DVT (DEEP VEIN THROMBOSIS): ICD-10-CM

## 2019-01-29 DIAGNOSIS — Z78.9 NONSMOKER: ICD-10-CM

## 2019-01-29 DIAGNOSIS — Z01.419 ENCOUNTER FOR GYNECOLOGICAL EXAMINATION WITHOUT ABNORMAL FINDING: Primary | ICD-10-CM

## 2019-01-29 PROCEDURE — G0101 CA SCREEN;PELVIC/BREAST EXAM: HCPCS | Performed by: OBSTETRICS & GYNECOLOGY

## 2019-01-29 NOTE — PROGRESS NOTES
Subjective   Chief Complaint   Patient presents with   • Gynecologic Exam     Pt here for her yearly exam. She voices no problems/complaints.     Ariana Gutierrez is a 78 y.o. year old  menopausal female presenting to be seen for her annual exam.  The patient denies any vaginal bleeding in the past 12 months  Hot flashes and night sweats are not a significant problem.  Patient has had to have her knee replacement taken out due to infection, had a bowel surgery and a temporary colostomy (already taken down), and had multiple blood clots from those surgeries - all in the past 18 months.     She is not sexually active.   has some ED since Prostate cancer  Patient reports regular self breast exams: yes.  No concerns.  She exercises regularly: no.  Mobility very limited now    No Additional Complaints Reported    The following portions of the patient's history were reviewed and updated as appropriate:problem list, current medications, allergies, past family history, past medical history, past social history and past surgical history.  Social History    Tobacco Use      Smoking status: Never Smoker      Smokeless tobacco: Never Used    Review of Systems   Constitutional: Negative for activity change and unexpected weight change.   Respiratory: Negative for shortness of breath.    Cardiovascular: Negative for chest pain.   Gastrointestinal: Positive for abdominal distention, abdominal pain and constipation (takes laxatives qhs). Negative for diarrhea.   Genitourinary: Positive for difficulty urinating (was just recently treated with abx for whole month due to UTI but seems to be better now). Negative for enuresis, vaginal bleeding, vaginal discharge and vaginal pain.        New leakage of stool in past year   Musculoskeletal: Positive for arthralgias and back pain.   Neurological: Negative for dizziness and headaches.   Psychiatric/Behavioral: Positive for sleep disturbance (pain). Negative for dysphoric  "mood. The patient is not nervous/anxious.          Objective   /76   Ht 157.5 cm (62.01\")   Wt 69.9 kg (154 lb)   LMP  (LMP Unknown)   Breastfeeding? No   BMI 28.16 kg/m²   Physical Exam   Constitutional: She is oriented to person, place, and time. She appears well-developed and well-nourished. No distress.   HENT:   Head: Normocephalic and atraumatic.   Eyes: EOM are normal.   Neck: Normal range of motion. Neck supple. No thyromegaly present.   Cardiovascular: Normal rate and regular rhythm.   No murmur heard.  Pulmonary/Chest: Effort normal and breath sounds normal. Right breast exhibits no inverted nipple and no mass. Left breast exhibits no inverted nipple and no mass.   Abdominal: Soft. She exhibits no distension. There is tenderness (generalized, no different than we have discussed at many previous visits.  Since last visit with me there is new tenderness where she had a colostomy, and then reversal, last year).   Genitourinary: Vagina normal and uterus normal. Rectal exam shows anal tone normal. No breast tenderness or discharge. Pelvic exam was performed with patient supine. There is no tenderness or lesion on the right labia. There is no tenderness or lesion on the left labia. Cervix exhibits no motion tenderness and no discharge. Right adnexum displays no tenderness and no fullness. Left adnexum displays no tenderness and no fullness. No bleeding in the vagina. No vaginal discharge found.   Genitourinary Comments: Labia normal, no lesions noted.  Urethral meatus unremarkable, no prolapse of mucosa.  Anus/perineum normal.  Small speculum used, some discomfort, but no pain.  PAP not indicated.  Cx atrophied and almost flush with apex   Musculoskeletal: Normal range of motion. She exhibits no edema.   Neurological: She is alert and oriented to person, place, and time.   Skin: Skin is warm and dry.   Psychiatric: She has a normal mood and affect. Her behavior is normal. Judgment normal.   Nursing " note and vitals reviewed.         Assessment & Plan    Ariana was seen today for gynecologic exam.    Diagnoses and all orders for this visit:    Encounter for gynecological examination without abnormal finding: Patient with atrophy consistent with age, but no abnormalities on exam.  Since she was seen last, patient has had multiple health problems, including temporary colostomy and take-down.  Her activity is now limited by orthopedic problems; and she has lower abd tenderness where her colostomy used to be.  No Gyn problems.  Breast exam normal; patient just had a normal mammogram 11/2018.  She reports regular SBE.  Patient had bone scan last year.  She is scheduled to see PCP and have routine lab screening in just a few weeks.    Encounter for screening mammogram for breast cancer    Nonsmoker    Personal history of DVT (deep vein thrombosis)  Comments:  325 mg ASA        This note was electronically signed.    Ronit Schroeder MD  1/29/2019  11:20 AM

## 2019-02-12 ENCOUNTER — OFFICE VISIT (OUTPATIENT)
Dept: FAMILY MEDICINE CLINIC | Facility: CLINIC | Age: 79
End: 2019-02-12

## 2019-02-12 VITALS
SYSTOLIC BLOOD PRESSURE: 144 MMHG | DIASTOLIC BLOOD PRESSURE: 70 MMHG | HEART RATE: 68 BPM | BODY MASS INDEX: 28.71 KG/M2 | TEMPERATURE: 98.3 F | WEIGHT: 156 LBS | OXYGEN SATURATION: 95 % | HEIGHT: 62 IN

## 2019-02-12 DIAGNOSIS — E78.2 MIXED HYPERLIPIDEMIA: Primary | ICD-10-CM

## 2019-02-12 DIAGNOSIS — R53.83 FATIGUE, UNSPECIFIED TYPE: ICD-10-CM

## 2019-02-12 LAB
ALBUMIN SERPL-MCNC: 4.1 G/DL (ref 3.5–5)
ALBUMIN/GLOB SERPL: 1.2 G/DL (ref 1.1–2.5)
ALP SERPL-CCNC: 77 U/L (ref 24–120)
ALT SERPL-CCNC: 24 U/L (ref 0–54)
AST SERPL-CCNC: 26 U/L (ref 7–45)
BASOPHILS # BLD AUTO: 0.06 10*3/MM3 (ref 0–0.2)
BASOPHILS NFR BLD AUTO: 0.8 % (ref 0–2)
BILIRUB SERPL-MCNC: 0.4 MG/DL (ref 0.1–1)
BUN SERPL-MCNC: 19 MG/DL (ref 5–21)
BUN/CREAT SERPL: 19 (ref 7–25)
CALCIUM SERPL-MCNC: 9.8 MG/DL (ref 8.4–10.4)
CHLORIDE SERPL-SCNC: 93 MMOL/L (ref 98–110)
CHOLEST SERPL-MCNC: 205 MG/DL (ref 130–200)
CO2 SERPL-SCNC: 31 MMOL/L (ref 24–31)
CREAT SERPL-MCNC: 1 MG/DL (ref 0.5–1.4)
EOSINOPHIL # BLD AUTO: 0.23 10*3/MM3 (ref 0–0.7)
EOSINOPHIL NFR BLD AUTO: 3.1 % (ref 0–4)
ERYTHROCYTE [DISTWIDTH] IN BLOOD BY AUTOMATED COUNT: 14.2 % (ref 12–15)
GLOBULIN SER CALC-MCNC: 3.5 GM/DL
GLUCOSE SERPL-MCNC: 83 MG/DL (ref 70–100)
HCT VFR BLD AUTO: 42.7 % (ref 37–47)
HDLC SERPL-MCNC: 49 MG/DL
HGB BLD-MCNC: 12.9 G/DL (ref 12–16)
IMM GRANULOCYTES # BLD AUTO: 0.03 10*3/MM3 (ref 0–0.05)
IMM GRANULOCYTES NFR BLD AUTO: 0.4 % (ref 0–5)
LDLC SERPL CALC-MCNC: 137 MG/DL (ref 0–99)
LYMPHOCYTES # BLD AUTO: 1.29 10*3/MM3 (ref 0.72–4.86)
LYMPHOCYTES NFR BLD AUTO: 17.6 % (ref 15–45)
MCH RBC QN AUTO: 28.1 PG (ref 28–32)
MCHC RBC AUTO-ENTMCNC: 30.2 G/DL (ref 33–36)
MCV RBC AUTO: 93 FL (ref 82–98)
MONOCYTES # BLD AUTO: 0.54 10*3/MM3 (ref 0.19–1.3)
MONOCYTES NFR BLD AUTO: 7.4 % (ref 4–12)
NEUTROPHILS # BLD AUTO: 5.16 10*3/MM3 (ref 1.87–8.4)
NEUTROPHILS NFR BLD AUTO: 70.7 % (ref 39–78)
NRBC BLD AUTO-RTO: 0 /100 WBC (ref 0–0)
PLATELET # BLD AUTO: 291 10*3/MM3 (ref 130–400)
POTASSIUM SERPL-SCNC: 5.1 MMOL/L (ref 3.5–5.3)
PROT SERPL-MCNC: 7.6 G/DL (ref 6.3–8.7)
RBC # BLD AUTO: 4.59 10*6/MM3 (ref 4.2–5.4)
SODIUM SERPL-SCNC: 134 MMOL/L (ref 135–145)
TRIGL SERPL-MCNC: 95 MG/DL (ref 0–149)
VLDLC SERPL CALC-MCNC: 19 MG/DL
WBC # BLD AUTO: 7.31 10*3/MM3 (ref 4.8–10.8)

## 2019-02-12 PROCEDURE — 99213 OFFICE O/P EST LOW 20 MIN: CPT | Performed by: FAMILY MEDICINE

## 2019-02-12 RX ORDER — HYDROCODONE BITARTRATE AND ACETAMINOPHEN 10; 325 MG/1; MG/1
1 TABLET ORAL EVERY 6 HOURS PRN
Qty: 90 TABLET | Refills: 0 | Status: SHIPPED | OUTPATIENT
Start: 2019-02-12 | End: 2019-08-05 | Stop reason: SDUPTHER

## 2019-02-12 NOTE — PROGRESS NOTES
Subjective    CONTROLLED SUBSTANCE TRACKING 3/14/2018 6/5/2018 10/15/2018 2/12/2019   Last Pro 3/14/2018 6/5/2018 10/15/2018 2/12/2019   Report Number - - [id number]   Last UDS 3/14/2018 3/14/2018 3/14/2018 3/14/2018   Last Controlled Substance Agreement 3/14/2018 3/14/2018 3/14/2018 3/16/2018     Ariana Gutierrez is a 78 y.o. female.     78-year-old with chronic constipation fatigue and hyper lipidemia      Fatigue   This is a chronic problem. The current episode started more than 1 year ago. Associated symptoms include arthralgias and fatigue. Pertinent negatives include no chest pain. Nothing aggravates the symptoms. She has tried nothing for the symptoms.       The following portions of the patient's history were reviewed and updated as appropriate: allergies, current medications, past family history, past medical history, past social history, past surgical history and problem list.    Review of Systems   Constitutional: Positive for fatigue.   Cardiovascular: Negative for chest pain and leg swelling.   Gastrointestinal: Positive for constipation.   Musculoskeletal: Positive for arthralgias and back pain.        Scoliosis and intractable back pain       Objective   Physical Exam   Constitutional: She is oriented to person, place, and time.   HENT:   Ventilation tube on right   Cardiovascular: Normal rate and regular rhythm.   Pulmonary/Chest: Effort normal and breath sounds normal.   Musculoskeletal: She exhibits deformity. She exhibits no edema.   Major scoliosis spine   Neurological: She is alert and oriented to person, place, and time.   Psychiatric: She has a normal mood and affect. Her behavior is normal. Judgment and thought content normal.   Nursing note and vitals reviewed.      Assessment/Plan   Patient Active Problem List   Diagnosis   • Essential hypertension   • Gastroesophageal reflux disease without esophagitis   • Scoliosis   • Low back pain   • Perforated bowel (CMS/HCC)   •  Paresthesia   • Palpitations   • Chronic back pain   • Deep vein thrombosis (CMS/HCC)   • Bacterial infection   • Joint infection (CMS/HCC)     Ariana was seen today for hypertension.    Diagnoses and all orders for this visit:    Mixed hyperlipidemia  -     Comprehensive Metabolic Panel  -     Lipid Panel    Fatigue, unspecified type  -     CBC & Differential       Return if symptoms worsen or fail to improve, for Medicare Wellness.    Plan suggested pain management again-add milk of mag to constipation regimen-up-to-date on scopes

## 2019-04-12 RX ORDER — BENAZEPRIL HYDROCHLORIDE 20 MG/1
TABLET ORAL
Qty: 90 TABLET | Refills: 1 | Status: SHIPPED | OUTPATIENT
Start: 2019-04-12 | End: 2020-03-19 | Stop reason: SDUPTHER

## 2019-04-12 RX ORDER — OMEPRAZOLE 20 MG/1
CAPSULE, DELAYED RELEASE ORAL
Qty: 90 CAPSULE | Refills: 1 | Status: SHIPPED | OUTPATIENT
Start: 2019-04-12 | End: 2020-02-03

## 2019-05-14 ENCOUNTER — HOSPITAL ENCOUNTER (OUTPATIENT)
Dept: VASCULAR LAB | Age: 79
Discharge: HOME | End: 2019-05-14
Payer: MEDICARE

## 2019-05-14 ENCOUNTER — OFFICE VISIT (OUTPATIENT)
Dept: VASCULAR SURGERY | Age: 79
End: 2019-05-14
Payer: MEDICARE

## 2019-05-14 VITALS
DIASTOLIC BLOOD PRESSURE: 83 MMHG | RESPIRATION RATE: 18 BRPM | TEMPERATURE: 98.6 F | SYSTOLIC BLOOD PRESSURE: 140 MMHG | HEART RATE: 88 BPM

## 2019-05-14 DIAGNOSIS — I73.9 PVD (PERIPHERAL VASCULAR DISEASE) (HCC): ICD-10-CM

## 2019-05-14 DIAGNOSIS — I73.9 PVD (PERIPHERAL VASCULAR DISEASE) (HCC): Primary | ICD-10-CM

## 2019-05-14 PROCEDURE — 1123F ACP DISCUSS/DSCN MKR DOCD: CPT | Performed by: PHYSICIAN ASSISTANT

## 2019-05-14 PROCEDURE — G8598 ASA/ANTIPLAT THER USED: HCPCS | Performed by: PHYSICIAN ASSISTANT

## 2019-05-14 PROCEDURE — 1036F TOBACCO NON-USER: CPT | Performed by: PHYSICIAN ASSISTANT

## 2019-05-14 PROCEDURE — G8427 DOCREV CUR MEDS BY ELIG CLIN: HCPCS | Performed by: PHYSICIAN ASSISTANT

## 2019-05-14 PROCEDURE — 99213 OFFICE O/P EST LOW 20 MIN: CPT | Performed by: PHYSICIAN ASSISTANT

## 2019-05-14 PROCEDURE — G8419 CALC BMI OUT NRM PARAM NOF/U: HCPCS | Performed by: PHYSICIAN ASSISTANT

## 2019-05-14 PROCEDURE — 1090F PRES/ABSN URINE INCON ASSESS: CPT | Performed by: PHYSICIAN ASSISTANT

## 2019-05-14 PROCEDURE — 93922 UPR/L XTREMITY ART 2 LEVELS: CPT

## 2019-05-14 PROCEDURE — 93926 LOWER EXTREMITY STUDY: CPT

## 2019-05-14 PROCEDURE — G8400 PT W/DXA NO RESULTS DOC: HCPCS | Performed by: PHYSICIAN ASSISTANT

## 2019-05-14 PROCEDURE — 4040F PNEUMOC VAC/ADMIN/RCVD: CPT | Performed by: PHYSICIAN ASSISTANT

## 2019-05-14 NOTE — PROGRESS NOTES
Patient Care Team:  Hunter Bañuelos MD as PCP - [address] [place] [address],[address], DO as Consulting Physician (Gastroenterology)  Jennifer Groves MD as Consulting Physician (Otolaryngology)  KAJAL Guerrero as Audiologist (Audiology)      History and Physical    Mrs. Shiv Kearney comes in today for follow up right popliteal artery bypass with vein on 5/30/18. She denies any claudication, non healing wounds or IRP. She reports continued CBP that is unchanged. Helen Morales is a 70 y.o. female with the following history reviewed and recorded in [employer]:  Patient Active Problem List    Diagnosis Date Noted    PVD (peripheral vascular disease) ([facility] 04/26/2018    Personal history of DVT (deep vein thrombosis) 10/18/2017    Atheromatous plaque 06/04/2017    Iron deficiency anemia 06/01/2017    Gastroesophageal reflux disease without esophagitis 05/31/2017    Constipation due to opioid therapy 05/31/2017    Popliteal artery thrombosis, right ([facility]     History of diverticulosis 08/16/2016    Small intestinal bacterial overgrowth 12/18/2014    Irritable bowel syndrome with constipation 11/06/2014    Bloating 11/06/2014    Gas pain 11/06/2014     Updating deleted diagnoses      Spinal stenosis 02/17/2014    IBS (irritable bowel syndrome) 11/02/2011    HTN (hypertension) 11/02/2011     Current Outpatient Medications   Medication Sig Dispense Refill    gabapentin (NEURONTIN) 600 MG tablet Take 600 mg by mouth. Jeff Josselyn Aspirin Buf,[id number], (BUFFERED ASPIRIN) 325 MG TABS Take by mouth      HYDROcodone-acetaminophen (NORCO)  MG per tablet       metoprolol succinate (TOPROL XL) 25 MG extended release tablet TAKE 1 TABLET BY MOUTH DAILY 90 tablet 3    omeprazole (PRILOSEC) 20 MG capsule TAKE 1 CAPSULE EVERY DAY 90 capsule 3    hydrochlorothiazide (HYDRODIURIL) 25 MG tablet TAKE 1 TABLET EVERY DAY 90 tablet 3    benazepril (LOTENSIN) 20 MG tablet Take 1 tablet by mouth daily 90 tablet 3    polyethylene glycol (MIRALAX) powder 1 scoop in 8 oz fluids bid 6 Bottle 3    Multiple Vitamins-Minerals (ICAPS AREDS 2 PO) Take by mouth daily      docusate calcium (SURFAK) 240 MG capsule Take 1 tablet by mouth      Saccharomyces boulardii (PROBIOTIC) 250 MG CAPS Take by mouth daily       No current facility-administered medications for this visit. Allergies: Patient has no known allergies.   Past Medical History:   Diagnosis Date    Arthritis     Chronic back pain     Constipation     GERD (gastroesophageal reflux disease)     Hernia, hiatal     Hypertension     Irritable bowel syndrome     Kidney stones     Klebsiella infection bacterial     infection right knee    Neuropathy     Perforated bowel ([address]     Schatzki's ring     Scoliosis     Scoliosis      Past Surgical History:   Procedure Laterality Date    ARTERIOGRAM Right 5/30/2017    RIGHT LOWER EXTREMITY ANGIOGRAM; REPAIR OF RIGHT POPLITEAL ARTERY WITH INTERPOSITIONAL LEFT GREATER SAPHENOUS VEIN GRAFT; REPAIR OF RIGHT POPLITEAL VEIN performed by Maria Eugenia Bocanegra MD at [address] [place] [address] ARTERIOGRAM Right 5/30/2017    ARTERIOGRAM possible thrombectomy right lower extremity, possible bypass performed by Memo Torre MD at [address]      BG Biopsy    COLONOSCOPY  11/29/2011        COLONOSCOPY N/A 3/6/2018    Dr Paul Woodward surgical changes, melanosis coli-Mucosa    COLOSTOMY      EYE SURGERY      cataract both eyes    JOINT REPLACEMENT      RTK    KIDNEY STONE SURGERY      OTHER SURGICAL HISTORY  5/2015    perforated bowel surgery    OTHER SURGICAL HISTORY  09/2018    Ablation on back     OVARIAN CYST REMOVAL      SD INCIS/DRAIN THIGH/KNEE ABSCESS,DEEP Left 10/6/2017    KNEE INCISION AND DRAINAGE performed by Memo Torre MD [facility] Right 5/30/2017    KNEE HARDWARE REMOVAL AND INSERTION OF ANTIBIOTIC BEAD  performed by Memo Torre, [facility] at [facility]    UPPER GASTROINTESTINAL ENDOSCOPY  ? [facility]     Family History   Problem Relation Age of Onset    Cancer Mother     Colon Polyps Neg Hx     Liver Cancer Neg Hx     Liver Disease Neg Hx     Stomach Cancer Neg Hx     Rectal Cancer Neg Hx     Esophageal Cancer Neg Hx     Colon Cancer Neg Hx      Social History     Tobacco Use    Smoking status: Never Smoker    Smokeless tobacco: Never Used   Substance Use Topics    Alcohol use: No         Review of Systems    Constitutional - no significant activity change, appetite change, or unexpected weight change. No fever or chills. No diaphoresis or significant fatigue. HENT - no significant rhinorrhea or epistaxis. No tinnitus or significant hearing loss. Eyes - no sudden vision change or amaurosis. Respiratory - no significant shortness of breath, wheezing, or stridor. No apnea, cough, or chest tightness associated with shortness of breath. Cardiovascular - no chest pain, syncope, or significant dizziness. No palpitations or significant leg swelling. Patient reports no claudication. She reports chronic back, right knee and right foot pain. Gastrointestinal - no abdominal swelling or pain. No blood in stool. No severe constipation, diarrhea, nausea, or vomiting. Genitourinary - No difficulty urinating, dysuria, frequency, or urgency. No flank pain or hematuria. Musculoskeletal - no back pain, gait disturbance, or myalgia. Skin - no color change, rash, pallor, or new wound. Neurologic - no dizziness, facial asymmetry, or light headedness. No seizures. No speech difficulty or lateralizing weakness. Hematologic - no easy bruising or excessive bleeding. Psychiatric - no severe anxiety or nervousness. No confusion. All other review of systems are negative. Physical Exam    BP (!) 140/83 Comment: right  Pulse 88   Temp 98.6 °F (37 °C)   Resp 18     Constitutional - well developed, well nourished.   No daily  Recommend statin therapy daily (seh will d/w her PCP at upcoming appointment)  Good skin care/checks daily  We will follow up in 6 months with a repeat right A;scan with LINDA's or sooner if she claudication develops/worsens, patient develops wound or IRP

## 2019-06-12 RX ORDER — GABAPENTIN 600 MG/1
600 TABLET ORAL 3 TIMES DAILY
Qty: 270 TABLET | Refills: 0 | Status: SHIPPED | OUTPATIENT
Start: 2019-06-12 | End: 2019-08-05 | Stop reason: SDUPTHER

## 2019-06-19 RX ORDER — METOPROLOL SUCCINATE 25 MG/1
TABLET, EXTENDED RELEASE ORAL
Qty: 90 TABLET | Refills: 0 | Status: SHIPPED | OUTPATIENT
Start: 2019-06-19 | End: 2019-08-22 | Stop reason: SDUPTHER

## 2019-08-05 ENCOUNTER — OFFICE VISIT (OUTPATIENT)
Dept: FAMILY MEDICINE CLINIC | Facility: CLINIC | Age: 79
End: 2019-08-05

## 2019-08-05 VITALS
SYSTOLIC BLOOD PRESSURE: 127 MMHG | DIASTOLIC BLOOD PRESSURE: 81 MMHG | BODY MASS INDEX: 28.16 KG/M2 | OXYGEN SATURATION: 98 % | HEIGHT: 62 IN | TEMPERATURE: 97.5 F | HEART RATE: 78 BPM | WEIGHT: 153 LBS

## 2019-08-05 DIAGNOSIS — R52 PAIN: ICD-10-CM

## 2019-08-05 DIAGNOSIS — Z51.81 THERAPEUTIC DRUG MONITORING: Primary | ICD-10-CM

## 2019-08-05 PROCEDURE — 99214 OFFICE O/P EST MOD 30 MIN: CPT | Performed by: FAMILY MEDICINE

## 2019-08-05 RX ORDER — GABAPENTIN 800 MG/1
800 TABLET ORAL 3 TIMES DAILY
Qty: 270 TABLET | Refills: 1 | Status: SHIPPED | OUTPATIENT
Start: 2019-08-05 | End: 2019-09-17 | Stop reason: SDUPTHER

## 2019-08-05 RX ORDER — HYDROCODONE BITARTRATE AND ACETAMINOPHEN 10; 325 MG/1; MG/1
1 TABLET ORAL EVERY 6 HOURS PRN
Qty: 90 TABLET | Refills: 0 | Status: SHIPPED | OUTPATIENT
Start: 2019-08-05 | End: 2019-09-17 | Stop reason: SDUPTHER

## 2019-08-05 NOTE — PROGRESS NOTES
Subjective   Ariana Gutierrez is a 79 y.o. female.     79-year-old female with severe degenerative scoliosis and severe right lower extremity radiculopathy      Lower Extremity Issue   This is a chronic problem. The current episode started more than 1 year ago. The problem occurs daily. The problem has been waxing and waning. Associated symptoms include arthralgias. Nothing aggravates the symptoms. Treatments tried: Epidurals, ablations, no improvement-surgery advised if neurologic deficit occurs.       The following portions of the patient's history were reviewed and updated as appropriate: allergies, current medications, past family history, past medical history, past social history, past surgical history and problem list.    Review of Systems   Cardiovascular: Negative for leg swelling.   Musculoskeletal: Positive for arthralgias and back pain.       Objective   Physical Exam   Constitutional: She is oriented to person, place, and time.   Musculoskeletal: She exhibits no edema.   Neurological: She is alert and oriented to person, place, and time. She displays normal reflexes.   Strength is symmetrical   Psychiatric: She has a normal mood and affect. Her behavior is normal. Judgment and thought content normal.   Nursing note and vitals reviewed.      Assessment/Plan   Patient Active Problem List   Diagnosis   • Essential hypertension   • Gastroesophageal reflux disease without esophagitis   • Scoliosis   • Low back pain   • Perforated bowel (CMS/HCC)   • Paresthesia   • Palpitations   • Chronic back pain   • Deep vein thrombosis (CMS/HCC)   • Bacterial infection   • Joint infection (CMS/HCC)     Ariana was seen today for med refill.    Diagnoses and all orders for this visit:    Therapeutic drug monitoring  -     ToxASSURE Select 13 (MW) - Urine, Clean Catch    Pain    Other orders  -     gabapentin (NEURONTIN) 800 MG tablet; Take 1 tablet by mouth 3 (Three) Times a Day.  -     HYDROcodone-acetaminophen (NORCO)   MG per tablet; Take 1 tablet by mouth Every 6 (Six) Hours As Needed for Moderate Pain .       Return if symptoms worsen or fail to improve, for Annual physical.    Plan increase gabapentin to 800 mg 3 times daily-advised due annual physical

## 2019-08-10 LAB — DRUGS UR: NORMAL

## 2019-08-22 RX ORDER — METOPROLOL SUCCINATE 25 MG/1
TABLET, EXTENDED RELEASE ORAL
Qty: 90 TABLET | Refills: 0 | Status: SHIPPED | OUTPATIENT
Start: 2019-08-22 | End: 2019-08-30 | Stop reason: SDUPTHER

## 2019-08-29 ENCOUNTER — TELEPHONE (OUTPATIENT)
Dept: CARDIOLOGY | Facility: CLINIC | Age: 79
End: 2019-08-29

## 2019-08-29 NOTE — TELEPHONE ENCOUNTER
Patient called and left a message and she stated that she passed out at her kitchen table and her  came in and found her eyes where glazed over and she went up her stairs and took a nap. She stated that she has some  heaviness in her check and she kept burping a lot .     I advised her to go to the ER the next time this happens and made her an appointment for 08/30/2019

## 2019-08-30 ENCOUNTER — OFFICE VISIT (OUTPATIENT)
Dept: CARDIOLOGY | Facility: CLINIC | Age: 79
End: 2019-08-30

## 2019-08-30 VITALS
BODY MASS INDEX: 28.16 KG/M2 | HEART RATE: 71 BPM | DIASTOLIC BLOOD PRESSURE: 66 MMHG | WEIGHT: 153 LBS | HEIGHT: 62 IN | SYSTOLIC BLOOD PRESSURE: 166 MMHG

## 2019-08-30 DIAGNOSIS — R07.2 PRECORDIAL CHEST PAIN: ICD-10-CM

## 2019-08-30 DIAGNOSIS — R00.2 PALPITATIONS: ICD-10-CM

## 2019-08-30 DIAGNOSIS — R20.2 PARESTHESIA: ICD-10-CM

## 2019-08-30 DIAGNOSIS — G89.29 CHRONIC BACK PAIN, UNSPECIFIED BACK LOCATION, UNSPECIFIED BACK PAIN LATERALITY: ICD-10-CM

## 2019-08-30 DIAGNOSIS — M54.40 ACUTE RIGHT-SIDED LOW BACK PAIN WITH SCIATICA, SCIATICA LATERALITY UNSPECIFIED: Primary | ICD-10-CM

## 2019-08-30 DIAGNOSIS — I10 ESSENTIAL HYPERTENSION: ICD-10-CM

## 2019-08-30 DIAGNOSIS — M54.9 CHRONIC BACK PAIN, UNSPECIFIED BACK LOCATION, UNSPECIFIED BACK PAIN LATERALITY: ICD-10-CM

## 2019-08-30 PROCEDURE — 93000 ELECTROCARDIOGRAM COMPLETE: CPT | Performed by: INTERNAL MEDICINE

## 2019-08-30 PROCEDURE — 99214 OFFICE O/P EST MOD 30 MIN: CPT | Performed by: INTERNAL MEDICINE

## 2019-08-30 RX ORDER — NITROGLYCERIN 0.4 MG/1
TABLET SUBLINGUAL
Qty: 100 TABLET | Refills: 11 | Status: SHIPPED | OUTPATIENT
Start: 2019-08-30 | End: 2021-04-19 | Stop reason: SDUPTHER

## 2019-08-30 RX ORDER — METOPROLOL SUCCINATE 50 MG/1
50 TABLET, EXTENDED RELEASE ORAL DAILY
Qty: 90 TABLET | Refills: 3 | Status: SHIPPED | OUTPATIENT
Start: 2019-08-30 | End: 2020-09-14

## 2019-08-30 RX ORDER — METOPROLOL SUCCINATE 25 MG/1
50 TABLET, EXTENDED RELEASE ORAL DAILY
Qty: 90 TABLET | Refills: 3 | Status: SHIPPED | OUTPATIENT
Start: 2019-08-30 | End: 2019-08-30 | Stop reason: SDUPTHER

## 2019-08-30 NOTE — PROGRESS NOTES
Ariana Gutierrez  [id number]  1940  79 y.o.  female    Referring Provider: Bill Schilling MD    Reason for Follow-up Visit:  Here for follow up after cardiac testing.    Routine follow up.  Chronic low back pain    Had MADELIN in 1/18 no clots or vegetation    Subjective     Chest pressure with exertion, moderate substernal, pressure like. Lasts less than 5 minutes  Started 1 weeks ago  Occurs once or twice a week  No associated diaphoresis    No associated nausea  Radiation to jaw and teeth    Had syncope   woke up on floor  No receeding palpitations, no incontinence of urine or stools, no preceeding chest pain. No aura.  No seizure like activity     Precipitated with exertion    Relieved with rest  Not positional    No change with intake of food or antacids  No change with breathing  Moderate associated dyspnea         History of present illness:  Ariana Gutierrez is a 79 y.o. yo female with history of hypertension who presents today for   Chief Complaint   Patient presents with   • Loss of Consciousness     1 YR FU   • Shortness of Breath   .    History  Past Medical History:   Diagnosis Date   • Bacterial infection    • Chest pain    • Chronic back pain    • Deep vein thrombosis (CMS/HCC)     S/P KNEE SURGERY 10/2017   • Diverticulosis of intestine 8/16/2016   • Gastroesophageal reflux disease without esophagitis 5/26/2017   • Hypertension    • Irritable bowel syndrome 11/2/2011   • Low back pain    • Palpitations    • Paresthesia     toes   • Perforated bowel (CMS/HCC)    • Scoliosis    • Spinal stenosis    • Vascular disease, peripheral (CMS/HCC)    ,   Past Surgical History:   Procedure Laterality Date   • BACK SURGERY     • BREAST BIOPSY Right 25 yrs ago   • CATARACT EXTRACTION Bilateral    • COLON RESECTION SMALL BOWEL  2016    with colostomy for 6 months, already revised.   • COLON SURGERY     • COLONOSCOPY     • LUMBAR LAMINECTOMY     • RENAL ARTERY STENT Right    • TOTAL KNEE ARTHROPLASTY  Right    • TOTAL KNEE ARTHROPLASTY     • VASCULAR SURGERY      10/2017 - DR GARCIA   ,   Family History   Problem Relation Age of Onset   • Breast cancer Mother 80   • Heart disease Mother    • Coronary artery disease Mother    • Peripheral vascular disease Mother    • No Known Problems Sister    • Heart disease Brother    • No Known Problems Maternal Grandmother    • No Known Problems Maternal Grandfather    • No Known Problems Paternal Grandmother    • No Known Problems Paternal Grandfather    ,   Social History     Tobacco Use   • Smoking status: Never Smoker   • Smokeless tobacco: Never Used   Substance Use Topics   • Alcohol use: No     Frequency: Never   • Drug use: No   ,     Medications  Current Outpatient Medications   Medication Sig Dispense Refill   • aspirin 325 MG tablet Take 325 mg by mouth Daily.     • benazepril (LOTENSIN) 20 MG tablet TAKE 1 TABLET EVERY DAY 90 tablet 1   • Docusate Calcium (STOOL SOFTENER PO) Take 1 tablet by mouth Daily.     • gabapentin (NEURONTIN) 800 MG tablet Take 1 tablet by mouth 3 (Three) Times a Day. 270 tablet 1   • HYDROcodone-acetaminophen (NORCO)  MG per tablet Take 1 tablet by mouth Every 6 (Six) Hours As Needed for Moderate Pain . 90 tablet 0   • metoprolol succinate XL (TOPROL-XL) 50 MG 24 hr tablet Take 1 tablet by mouth Daily. 90 tablet 3   • Multiple Vitamins-Minerals (PRESERVISION AREDS 2 PO) Take  by mouth.     • omeprazole (priLOSEC) 20 MG capsule TAKE 1 CAPSULE EVERY DAY 90 capsule 1   • polyethylene glycol (MIRALAX) powder Take 17 g by mouth Daily.     • Probiotic Product (PROBIOTIC DAILY PO) Take 1 tablet by mouth Daily.     • nitroglycerin (NITROSTAT) 0.4 MG SL tablet 1 under the tongue as needed for angina, may repeat q5mins for up three doses 100 tablet 11     No current facility-administered medications for this visit.        Allergies:  Patient has no known allergies.    Review of Systems  Review of Systems   Constitution: Positive for weakness  "and malaise/fatigue.   HENT: Negative.    Eyes: Negative.    Cardiovascular: Positive for chest pain, dyspnea on exertion, palpitations and syncope. Negative for claudication, cyanosis, irregular heartbeat, leg swelling, near-syncope, orthopnea and paroxysmal nocturnal dyspnea.   Respiratory: Negative.    Endocrine: Negative.    Hematologic/Lymphatic: Negative.    Skin: Negative.    Musculoskeletal: Positive for arthritis and joint pain.   Gastrointestinal: Positive for flatus. Negative for anorexia.   Genitourinary: Negative.    Psychiatric/Behavioral: Negative.        Objective     Physical Exam:  /66   Pulse 71   Ht 157.5 cm (62.01\")   Wt 69.4 kg (153 lb)   LMP  (LMP Unknown)   BMI 27.98 kg/m²   Physical Exam   Constitutional: She appears well-developed.   HENT:   Head: Normocephalic.   Neck: Normal carotid pulses and no JVD present. No tracheal tenderness present. Carotid bruit is not present. No tracheal deviation and no edema present.   Cardiovascular: Regular rhythm, normal heart sounds and normal pulses.   Pulmonary/Chest: Effort normal. No stridor.   Abdominal: Soft. She exhibits no distension. There is no hepatosplenomegaly. There is no tenderness.   Neurological: She is alert. She has normal strength. No cranial nerve deficit or sensory deficit.   Skin: Skin is warm.   Psychiatric: She has a normal mood and affect. Her speech is normal and behavior is normal.       Results Review:       ECG 12 Lead  Date/Time: 8/30/2019 9:57 AM  Performed by: Hawk Guerin MD  Authorized by: Hawk Guerin MD   Comparison: compared with previous ECG from 7/17/2018  Similar to previous ECG  Rhythm: sinus rhythm  Rate: normal  Conduction: conduction normal  ST Segments: ST segments normal  T Waves: T waves normal  QRS axis: normal  Other: no other findings    Clinical impression: normal ECG            Assessment/Plan   Patient Active Problem List   Diagnosis   • Essential hypertension   • Gastroesophageal reflux " disease without esophagitis   • Scoliosis   • Low back pain   • Perforated bowel (CMS/HCC)   • Paresthesia   • Palpitations   • Chronic back pain   • Deep vein thrombosis (CMS/HCC)   • Bacterial infection   • Joint infection (CMS/HCC)     Results for orders placed during the hospital encounter of 18   Adult Transesophageal Echo (MADELIN) W/ Cont if Necessary Per Protocol    Narrative · Left ventricular systolic function is normal. Estimated EF = 55%.  · No vegetations, clots or intracardiac shunts           Plan    Orders Placed This Encounter   Procedures   • Holter Monitor - 72 Hour Up To 21 Days     Standing Status:   Future     Standing Expiration Date:   2020     Order Specific Question:   Reason for exam?     Answer:   Palpitations   • ECG 12 Lead     This order was created via procedure documentation   • Adult Stress Echo W/ Cont or Stress Agent if Necessary Per Protocol     Standing Status:   Future     Standing Expiration Date:   2020     Order Specific Question:   What stress agent will be used?     Answer:   Dobutamine     Order Specific Question:   Reason for Dobutamine?     Answer:   Unable to Exercise     Order Specific Question:   Reason for exam?     Answer:   Chest Pain    May need LINQ in future      Check BP and heart rates twice daily at least 3x / week at home and bring a recording for me to review next visit  IF BP >135/85 call sooner     Requested Prescriptions     Signed Prescriptions Disp Refills   • nitroglycerin (NITROSTAT) 0.4 MG SL tablet 100 tablet 11     Si under the tongue as needed for angina, may repeat q5mins for up three doses   • metoprolol succinate XL (TOPROL-XL) 50 MG 24 hr tablet 90 tablet 3     Sig: Take 1 tablet by mouth Daily.        Can reduce   ____________________________________________________________________________________________________________________________________________  Health maintenance and recommendations      Low salt/ HTN/ Heart healthy  carbohydrate restricted cardiac diet   The patient is advised to reduce or avoid caffeine or other cardiac stimulants.     The patient was advised to avoid long term NSAIDS , use Tylenol PRN instead  Avoid cardiac stimulants including common drugs like Pseudoephedrine or excessive amounts of caffeine    Monitor for any signs of bleeding including red or dark stools. Fall precautions.        Advised staying uptodate with immunizations per established standard guidelines.      Offered to give patient  a copy      Questions were encouraged, asked and answered to the patient's  understanding and satisfaction. Questions if any regarding current medications and side effects, need for refills and importance of compliance to medications stressed.    Reviewed available prior notes, consults, prior visits, laboratory findings, radiology and cardiology relevant reports. Updated chart as applicable. I have reviewed the patient's medical history in detail and updated the computerized patient record as relevant.      Updated patient regarding any new or relevant abnormalities on review of records or any new findings on physical exam. Mentioned to patient about purpose of visit and desirable health short and long term goals and objectives.    Primary to monitor CBC CMP Lipid panel and TSH as applicable    ___________________________________________________________________________________________________________________________________________              Return in about 4 weeks (around 9/27/2019).

## 2019-09-09 ENCOUNTER — HOSPITAL ENCOUNTER (OUTPATIENT)
Dept: CARDIOLOGY | Facility: HOSPITAL | Age: 79
Discharge: HOME | End: 2019-09-09
Admitting: INTERNAL MEDICINE

## 2019-09-09 VITALS
BODY MASS INDEX: 28.16 KG/M2 | WEIGHT: 153 LBS | HEIGHT: 62 IN | HEART RATE: 65 BPM | DIASTOLIC BLOOD PRESSURE: 70 MMHG | SYSTOLIC BLOOD PRESSURE: 145 MMHG

## 2019-09-09 DIAGNOSIS — R07.2 PRECORDIAL CHEST PAIN: ICD-10-CM

## 2019-09-09 LAB
BH CV STRESS BP STAGE 1: NORMAL
BH CV STRESS BP STAGE 2: NORMAL
BH CV STRESS BP STAGE 3: NORMAL
BH CV STRESS DOSE DOBUTAMINE STAGE 1: 10
BH CV STRESS DOSE DOBUTAMINE STAGE 2: 20
BH CV STRESS DOSE DOBUTAMINE STAGE 3: 30
BH CV STRESS DURATION MIN STAGE 1: 3
BH CV STRESS DURATION MIN STAGE 2: 3
BH CV STRESS DURATION MIN STAGE 3: 2
BH CV STRESS DURATION SEC STAGE 1: 0
BH CV STRESS DURATION SEC STAGE 2: 0
BH CV STRESS DURATION SEC STAGE 3: 26
BH CV STRESS ECHO POST STRESS EJECTION FRACTION EF: 65 %
BH CV STRESS HR STAGE 1: 63
BH CV STRESS HR STAGE 2: 109
BH CV STRESS HR STAGE 3: 139
BH CV STRESS PROTOCOL 1: NORMAL
BH CV STRESS RECOVERY BP: NORMAL MMHG
BH CV STRESS RECOVERY HR: 78 BPM
BH CV STRESS STAGE 1: 1
BH CV STRESS STAGE 2: 2
BH CV STRESS STAGE 3: 3
LV EF 2D ECHO EST: 55 %
MAXIMAL PREDICTED HEART RATE: 143 BPM
PERCENT MAX PREDICTED HR: 97.2 %
STRESS BASELINE BP: NORMAL MMHG
STRESS BASELINE HR: 59 BPM
STRESS PERCENT HR: 114 %
STRESS POST EXERCISE DUR MIN: 8 MIN
STRESS POST EXERCISE DUR SEC: 26 SEC
STRESS POST PEAK BP: NORMAL MMHG
STRESS POST PEAK HR: 139 BPM
STRESS TARGET HR: 122 BPM

## 2019-09-09 PROCEDURE — 93017 CV STRESS TEST TRACING ONLY: CPT

## 2019-09-09 PROCEDURE — 93350 STRESS TTE ONLY: CPT | Performed by: INTERNAL MEDICINE

## 2019-09-09 PROCEDURE — 25010000003 DOBUTAMINE PER 250 MG: Performed by: INTERNAL MEDICINE

## 2019-09-09 PROCEDURE — 93352 ADMIN ECG CONTRAST AGENT: CPT | Performed by: INTERNAL MEDICINE

## 2019-09-09 PROCEDURE — 93018 CV STRESS TEST I&R ONLY: CPT | Performed by: INTERNAL MEDICINE

## 2019-09-09 PROCEDURE — 93350 STRESS TTE ONLY: CPT

## 2019-09-09 PROCEDURE — 25010000002 PERFLUTREN 6.52 MG/ML SUSPENSION: Performed by: INTERNAL MEDICINE

## 2019-09-09 RX ORDER — DOBUTAMINE HYDROCHLORIDE 100 MG/100ML
10 INJECTION INTRAVENOUS
Status: DISCONTINUED | OUTPATIENT
Start: 2019-09-09 | End: 2019-09-10 | Stop reason: HOSPADM

## 2019-09-09 RX ADMIN — PERFLUTREN 8.48 MG: 6.52 INJECTION, SUSPENSION INTRAVENOUS at 13:05

## 2019-09-09 RX ADMIN — Medication 10 MCG/KG/MIN: at 13:06

## 2019-09-13 ENCOUNTER — TELEPHONE (OUTPATIENT)
Dept: CARDIOLOGY | Facility: CLINIC | Age: 79
End: 2019-09-13

## 2019-09-17 ENCOUNTER — OFFICE VISIT (OUTPATIENT)
Dept: FAMILY MEDICINE CLINIC | Facility: CLINIC | Age: 79
End: 2019-09-17

## 2019-09-17 VITALS
HEART RATE: 67 BPM | TEMPERATURE: 97.9 F | HEIGHT: 62 IN | OXYGEN SATURATION: 94 % | WEIGHT: 153.8 LBS | BODY MASS INDEX: 28.3 KG/M2 | DIASTOLIC BLOOD PRESSURE: 78 MMHG | SYSTOLIC BLOOD PRESSURE: 154 MMHG

## 2019-09-17 DIAGNOSIS — M00.9 JOINT INFECTION (HCC): ICD-10-CM

## 2019-09-17 DIAGNOSIS — Z12.11 SCREENING FOR COLORECTAL CANCER: ICD-10-CM

## 2019-09-17 DIAGNOSIS — Z00.00 ANNUAL PHYSICAL EXAM: ICD-10-CM

## 2019-09-17 DIAGNOSIS — R53.83 FATIGUE, UNSPECIFIED TYPE: Primary | ICD-10-CM

## 2019-09-17 DIAGNOSIS — Z12.39 SCREENING FOR BREAST CANCER: ICD-10-CM

## 2019-09-17 DIAGNOSIS — Z23 NEED FOR IMMUNIZATION AGAINST INFLUENZA: ICD-10-CM

## 2019-09-17 DIAGNOSIS — I10 ESSENTIAL HYPERTENSION: ICD-10-CM

## 2019-09-17 DIAGNOSIS — Z12.12 SCREENING FOR COLORECTAL CANCER: ICD-10-CM

## 2019-09-17 DIAGNOSIS — I82.431 ACUTE DEEP VEIN THROMBOSIS (DVT) OF POPLITEAL VEIN OF RIGHT LOWER EXTREMITY (HCC): ICD-10-CM

## 2019-09-17 DIAGNOSIS — E78.2 MIXED HYPERLIPIDEMIA: ICD-10-CM

## 2019-09-17 DIAGNOSIS — E55.9 VITAMIN D DEFICIENCY: ICD-10-CM

## 2019-09-17 PROCEDURE — 81003 URINALYSIS AUTO W/O SCOPE: CPT | Performed by: FAMILY MEDICINE

## 2019-09-17 PROCEDURE — G0439 PPPS, SUBSEQ VISIT: HCPCS | Performed by: FAMILY MEDICINE

## 2019-09-17 RX ORDER — GABAPENTIN 800 MG/1
800 TABLET ORAL 3 TIMES DAILY
Qty: 270 TABLET | Refills: 1 | Status: SHIPPED | OUTPATIENT
Start: 2019-09-17 | End: 2020-05-11 | Stop reason: SDUPTHER

## 2019-09-17 RX ORDER — ASPIRIN 81 MG/1
81 TABLET, CHEWABLE ORAL DAILY
COMMUNITY
End: 2021-09-27 | Stop reason: ALTCHOICE

## 2019-09-17 RX ORDER — HYDROCODONE BITARTRATE AND ACETAMINOPHEN 10; 325 MG/1; MG/1
1 TABLET ORAL EVERY 6 HOURS PRN
Qty: 90 TABLET | Refills: 0 | Status: SHIPPED | OUTPATIENT
Start: 2019-09-17 | End: 2019-10-09 | Stop reason: SDUPTHER

## 2019-09-17 NOTE — PROGRESS NOTES
The ABCs of the Annual Wellness Visit  Subsequent Medicare Wellness Visit  CONTROLLED SUBSTANCE TRACKING 3/14/2018 6/5/2018 10/15/2018 2/12/2019 8/5/2019   Last Pro 3/14/2018 6/5/2018 10/15/2018 2/12/2019 8/5/2019   Report Number - - [id number]   Last UDS 3/14/2018 3/14/2018 3/14/2018 3/14/2018 8/5/2019   Last Controlled Substance Agreement 3/14/2018 3/14/2018 3/14/2018 3/16/2018 8/5/2019       Chief Complaint   Patient presents with   • Medicare Wellness-subsequent     fasting       Subjective   History of Present Illness:  Ariana Gutierrez is a 79 y.o. female who presents for a Subsequent Medicare Wellness Visit.    HEALTH RISK ASSESSMENT    Recent Hospitalizations:  No hospitalization(s) within the last year.    Current Medical Providers:  Patient Care Team:  Bill Schilling MD as PCP - General (Family Medicine)  Bill Schilling MD as PCP - Claims Attributed  Hawk Guerin MD as Cardiologist (Cardiology)  Janee Hall MD as Consulting Physician (Infectious Diseases)    Smoking Status:  Social History     Tobacco Use   Smoking Status Never Smoker   Smokeless Tobacco Never Used       Alcohol Consumption:  Social History     Substance and Sexual Activity   Alcohol Use No   • Frequency: Never       Depression Screen:   PHQ-2/PHQ-9 Depression Screening 9/17/2019   Little interest or pleasure in doing things 0   Feeling down, depressed, or hopeless 0   Trouble falling or staying asleep, or sleeping too much -   Feeling tired or having little energy -   Poor appetite or overeating -   Feeling bad about yourself - or that you are a failure or have let yourself or your family down -   Trouble concentrating on things, such as reading the newspaper or watching television -   Moving or speaking so slowly that other people could have noticed. Or the opposite - being so fidgety or restless that you have been moving around a lot more than usual -   Thoughts that you would be better  off dead, or of hurting yourself in some way -   Total Score 0   If you checked off any problems, how difficult have these problems made it for you to do your work, take care of things at home, or get along with other people? -       Fall Risk Screen:  JAIRO Fall Risk Assessment was completed, and patient is at LOW risk for falls.Assessment completed on:9/17/2019    Health Habits and Functional and Cognitive Screening:  Functional & Cognitive Status 9/17/2019   Do you have difficulty preparing food and eating? No   Do you have difficulty bathing yourself, getting dressed or grooming yourself? No   Do you have difficulty using the toilet? No   Do you have difficulty moving around from place to place? No   Do you have trouble with steps or getting out of a bed or a chair? No   Current Diet Well Balanced Diet   Dental Exam Up to date   Eye Exam Up to date   Exercise (times per week) 2 times per week   Current Exercise Activities Include Swimming   Do you need help using the phone?  No   Are you deaf or do you have serious difficulty hearing?  No   Do you need help with transportation? No   Do you need help shopping? No   Do you need help preparing meals?  No   Do you need help with housework?  No   Do you need help with laundry? No   Do you need help taking your medications? No   Do you need help managing money? No   Do you ever drive or ride in a car without wearing a seat belt? No   Have you felt unusual stress, anger or loneliness in the last month? No   Who do you live with? Spouse   If you need help, do you have trouble finding someone available to you? No   Have you been bothered in the last four weeks by sexual problems? No   Do you have difficulty concentrating, remembering or making decisions? No         Does the patient have evidence of cognitive impairment? Yes    Asprin use counseling:Taking ASA appropriately as indicated    Age-appropriate Screening Schedule:  Refer to the list below for future screening  recommendations based on patient's age, sex and/or medical conditions. Orders for these recommended tests are listed in the plan section. The patient has been provided with a written plan.    Health Maintenance   Topic Date Due   • INFLUENZA VACCINE  10/12/2019 (Originally 8/1/2019)   • LIPID PANEL  02/12/2020   • MAMMOGRAM  12/18/2020   • COLONOSCOPY  03/06/2028   • PNEUMOCOCCAL VACCINES (65+ LOW/MEDIUM RISK)  Completed   • TDAP/TD VACCINES  Discontinued   • ZOSTER VACCINE  Discontinued          The following portions of the patient's history were reviewed and updated as appropriate: allergies, current medications, past family history, past medical history, past social history, past surgical history and problem list.    Outpatient Medications Prior to Visit   Medication Sig Dispense Refill   • aspirin 81 MG chewable tablet Chew 81 mg Daily.     • benazepril (LOTENSIN) 20 MG tablet TAKE 1 TABLET EVERY DAY 90 tablet 1   • Docusate Calcium (STOOL SOFTENER PO) Take 1 tablet by mouth Daily.     • gabapentin (NEURONTIN) 800 MG tablet Take 1 tablet by mouth 3 (Three) Times a Day. 270 tablet 1   • HYDROcodone-acetaminophen (NORCO)  MG per tablet Take 1 tablet by mouth Every 6 (Six) Hours As Needed for Moderate Pain . 90 tablet 0   • metoprolol succinate XL (TOPROL-XL) 50 MG 24 hr tablet Take 1 tablet by mouth Daily. 90 tablet 3   • Multiple Vitamins-Minerals (PRESERVISION AREDS 2 PO) Take  by mouth.     • nitroglycerin (NITROSTAT) 0.4 MG SL tablet 1 under the tongue as needed for angina, may repeat q5mins for up three doses 100 tablet 11   • omeprazole (priLOSEC) 20 MG capsule TAKE 1 CAPSULE EVERY DAY 90 capsule 1   • polyethylene glycol (MIRALAX) powder Take 17 g by mouth Daily.     • aspirin 325 MG tablet Take 325 mg by mouth Daily.     • Probiotic Product (PROBIOTIC DAILY PO) Take 1 tablet by mouth Daily.       No facility-administered medications prior to visit.        Patient Active Problem List   Diagnosis   •  "Essential hypertension   • Gastroesophageal reflux disease without esophagitis   • Scoliosis   • Low back pain   • Perforated bowel (CMS/HCC)   • Paresthesia   • Palpitations   • Chronic back pain   • Deep vein thrombosis (CMS/HCC)   • Bacterial infection   • Joint infection (CMS/HCC)       Advanced Care Planning:  Patient does not have an advance directive - information provided to the patient today    Review of Systems   Respiratory:        Refused flu shot   Cardiovascular: Positive for chest pain. Negative for leg swelling.        Been evaluated by cardiologist by probable anginal episode   Gastrointestinal: Positive for constipation.   Genitourinary: Negative for difficulty urinating.   Musculoskeletal: Positive for arthralgias and back pain.   All other systems reviewed and are negative.      Compared to one year ago, the patient feels her physical health is worse.  Compared to one year ago, the patient feels her mental health is worse.    Reviewed chart for potential of high risk medication in the elderly: yes  Reviewed chart for potential of harmful drug interactions in the elderly:yes    Objective         Vitals:    09/17/19 0903   BP: 154/78   BP Location: Right arm   Patient Position: Sitting   Cuff Size: Adult   Pulse: 67   Temp: 97.9 °F (36.6 °C)   TempSrc: Temporal   SpO2: 94%   Weight: 69.8 kg (153 lb 12.8 oz)   Height: 157.5 cm (62.01\")   PainSc:   7   PainLoc: Back       Body mass index is 28.12 kg/m².  Discussed the patient's BMI with her. The BMI is above average; no BMI management plan is appropriate..    Physical Exam   Constitutional: She is oriented to person, place, and time.   HENT:   Right Ear: External ear normal.   Left Ear: External ear normal.   Mouth/Throat: Oropharynx is clear and moist.   Eyes: EOM are normal. Pupils are equal, round, and reactive to light.   Neck: No thyromegaly present.   Good carotid pulses no bruits   Cardiovascular: Normal rate and regular rhythm. "   Pulmonary/Chest: Effort normal and breath sounds normal.   Abdominal: Soft. Bowel sounds are normal. She exhibits no mass.   Musculoskeletal:   Right knee swollen   Lymphadenopathy:     She has no cervical adenopathy.   Neurological: She is alert and oriented to person, place, and time.   Skin: Skin is warm and dry. Capillary refill takes less than 2 seconds.   Psychiatric: She has a normal mood and affect. Her behavior is normal. Judgment and thought content normal.   Nursing note and vitals reviewed.            Assessment/Plan   Medicare Risks and Personalized Health Plan  CMS Preventative Services Quick Reference  Colon Cancer Screening  Fall Risk    The above risks/problems have been discussed with the patient.  Pertinent information has been shared with the patient in the After Visit Summary.  Follow up plans and orders are seen below in the Assessment/Plan Section.    Diagnoses and all orders for this visit:    1. Fatigue, unspecified type (Primary)  -     TSH  -     T4, free  -     CBC & Differential    2. Joint infection (CMS/HCC)    3. Acute deep vein thrombosis (DVT) of popliteal vein of right lower extremity (CMS/HCC)    4. Mixed hyperlipidemia  -     Comprehensive Metabolic Panel  -     Lipid Panel    5. Essential hypertension  -     POC Urinalysis Dipstick, Multipro    6. Vitamin D deficiency  -     Vitamin D 25 Hydroxy    7. Need for immunization against influenza  -     Cancel: Fluad Quad >65 years    8. Screening for colorectal cancer  -     Cologuard - Stool, Per Rectum; Future    9. Screening for breast cancer  -     Cancel: Mammo Screening Bilateral With CAD; Future    10. Annual physical exam    Other orders  -     HYDROcodone-acetaminophen (NORCO)  MG per tablet; Take 1 tablet by mouth Every 6 (Six) Hours As Needed for Moderate Pain .  Dispense: 90 tablet; Refill: 0  -     gabapentin (NEURONTIN) 800 MG tablet; Take 1 tablet by mouth 3 (Three) Times a Day.  Dispense: 270 tablet; Refill:  1      Follow Up:  Return in 6 months (on 3/17/2020).     An After Visit Summary and PPPS were given to the patient.       Plan strongly advised cardiac cath-see neurosurgeon for referral to local pain management

## 2019-09-17 NOTE — PATIENT INSTRUCTIONS
Fall Prevention in the Home, Adult  Falls can cause injuries and can affect people from all age groups. There are many simple things that you can do to make your home safe and to help prevent falls. Ask for help when making these changes, if needed.  What actions can I take to prevent falls?  General instructions  · Use good lighting in all rooms. Replace any light bulbs that burn out.  · Turn on lights if it is dark. Use night-lights.  · Place frequently used items in easy-to-reach places. Lower the shelves around your home if necessary.  · Set up furniture so that there are clear paths around it. Avoid moving your furniture around.  · Remove throw rugs and other tripping hazards from the floor.  · Avoid walking on wet floors.  · Fix any uneven floor surfaces.  · Add color or contrast paint or tape to grab bars and handrails in your home. Place contrasting color strips on the first and last steps of stairways.  · When you use a stepladder, make sure that it is completely opened and that the sides are firmly locked. Have someone hold the ladder while you are using it. Do not climb a closed stepladder.  · Be aware of any and all pets.  What can I do in the bathroom?    · Keep the floor dry. Immediately clean up any water that spills onto the floor.  · Remove soap buildup in the tub or shower on a regular basis.  · Use non-skid mats or decals on the floor of the tub or shower.  · Attach bath mats securely with double-sided, non-slip rug tape.  · If you need to sit down while you are in the shower, use a plastic, non-slip stool.  · Install grab bars by the toilet and in the tub and shower. Do not use towel bars as grab bars.  What can I do in the bedroom?  · Make sure that a bedside light is easy to reach.  · Do not use oversized bedding that drapes onto the floor.  · Have a firm chair that has side arms to use for getting dressed.  What can I do in the kitchen?  · Clean up any spills right away.  · If you need to  reach for something above you, use a sturdy step stool that has a grab bar.  · Keep electrical cables out of the way.  · Do not use floor polish or wax that makes floors slippery. If you must use wax, make sure that it is non-skid floor wax.  What can I do in the stairways?  · Do not leave any items on the stairs.  · Make sure that you have a light switch at the top of the stairs and the bottom of the stairs. Have them installed if you do not have them.  · Make sure that there are handrails on both sides of the stairs. Fix handrails that are broken or loose. Make sure that handrails are as long as the stairways.  · Install non-slip stair treads on all stairs in your home.  · Avoid having throw rugs at the top or bottom of stairways, or secure the rugs with carpet tape to prevent them from moving.  · Choose a carpet design that does not hide the edge of steps on the stairway.  · Check any carpeting to make sure that it is firmly attached to the stairs. Fix any carpet that is loose or worn.  What can I do on the outside of my home?  · Use bright outdoor lighting.  · Regularly repair the edges of walkways and driveways and fix any cracks.  · Remove high doorway thresholds.  · Trim any shrubbery on the main path into your home.  · Regularly check that handrails are securely fastened and in good repair. Both sides of any steps should have handrails.  · Install guardrails along the edges of any raised decks or porches.  · Clear walkways of debris and clutter, including tools and rocks.  · Have leaves, snow, and ice cleared regularly.  · Use sand or salt on walkways during winter months.  · In the garage, clean up any spills right away, including grease or oil spills.  What other actions can I take?  · Wear closed-toe shoes that fit well and support your feet. Wear shoes that have rubber soles or low heels.  · Use mobility aids as needed, such as canes, walkers, scooters, and crutches.  · Review your medicines with your  health care provider. Some medicines can cause dizziness or changes in blood pressure, which increase your risk of falling.  Talk with your health care provider about other ways that you can decrease your risk of falls. This may include working with a physical therapist or  to improve your strength, balance, and endurance.  Where to find more information  · Centers for Disease Control and Prevention, [place]: https://www.cdc.gov  · National [place] on Aging: https://zm7ngij.jamie.nih.gov  Contact a health care provider if:  · You are afraid of falling at home.  · You feel weak, drowsy, or dizzy at home.  · You fall at home.  Summary  · There are many simple things that you can do to make your home safe and to help prevent falls.  · Ways to make your home safe include removing tripping hazards and installing grab bars in the bathroom.  · Ask for help when making these changes in your home.  This information is not intended to replace advice given to you by your health care provider. Make sure you discuss any questions you have with your health care provider.  Document Released: 12/08/2003 Document Revised: 08/02/2018 Document Reviewed: 08/02/2018  [employer] Interactive Patient Education © 2019 [employer]      Fall Prevention in the Home, Adult  Falls can cause injuries and can affect people from all age groups. There are many simple things that you can do to make your home safe and to help prevent falls. Ask for help when making these changes, if needed.  What actions can I take to prevent falls?  General instructions  · Use good lighting in all rooms. Replace any light bulbs that burn out.  · Turn on lights if it is dark. Use night-lights.  · Place frequently used items in easy-to-reach places. Lower the shelves around your home if necessary.  · Set up furniture so that there are clear paths around it. Avoid moving your furniture around.  · Remove throw rugs and other tripping hazards from the  floor.  · Avoid walking on wet floors.  · Fix any uneven floor surfaces.  · Add color or contrast paint or tape to grab bars and handrails in your home. Place contrasting color strips on the first and last steps of stairways.  · When you use a stepladder, make sure that it is completely opened and that the sides are firmly locked. Have someone hold the ladder while you are using it. Do not climb a closed stepladder.  · Be aware of any and all pets.  What can I do in the bathroom?    · Keep the floor dry. Immediately clean up any water that spills onto the floor.  · Remove soap buildup in the tub or shower on a regular basis.  · Use non-skid mats or decals on the floor of the tub or shower.  · Attach bath mats securely with double-sided, non-slip rug tape.  · If you need to sit down while you are in the shower, use a plastic, non-slip stool.  · Install grab bars by the toilet and in the tub and shower. Do not use towel bars as grab bars.  What can I do in the bedroom?  · Make sure that a bedside light is easy to reach.  · Do not use oversized bedding that drapes onto the floor.  · Have a firm chair that has side arms to use for getting dressed.  What can I do in the kitchen?  · Clean up any spills right away.  · If you need to reach for something above you, use a sturdy step stool that has a grab bar.  · Keep electrical cables out of the way.  · Do not use floor polish or wax that makes floors slippery. If you must use wax, make sure that it is non-skid floor wax.  What can I do in the stairways?  · Do not leave any items on the stairs.  · Make sure that you have a light switch at the top of the stairs and the bottom of the stairs. Have them installed if you do not have them.  · Make sure that there are handrails on both sides of the stairs. Fix handrails that are broken or loose. Make sure that handrails are as long as the stairways.  · Install non-slip stair treads on all stairs in your home.  · Avoid having throw  rugs at the top or bottom of stairways, or secure the rugs with carpet tape to prevent them from moving.  · Choose a carpet design that does not hide the edge of steps on the stairway.  · Check any carpeting to make sure that it is firmly attached to the stairs. Fix any carpet that is loose or worn.  What can I do on the outside of my home?  · Use bright outdoor lighting.  · Regularly repair the edges of walkways and driveways and fix any cracks.  · Remove high doorway thresholds.  · Trim any shrubbery on the main path into your home.  · Regularly check that handrails are securely fastened and in good repair. Both sides of any steps should have handrails.  · Install guardrails along the edges of any raised decks or porches.  · Clear walkways of debris and clutter, including tools and rocks.  · Have leaves, snow, and ice cleared regularly.  · Use sand or salt on walkways during winter months.  · In the garage, clean up any spills right away, including grease or oil spills.  What other actions can I take?  · Wear closed-toe shoes that fit well and support your feet. Wear shoes that have rubber soles or low heels.  · Use mobility aids as needed, such as canes, walkers, scooters, and crutches.  · Review your medicines with your health care provider. Some medicines can cause dizziness or changes in blood pressure, which increase your risk of falling.  Talk with your health care provider about other ways that you can decrease your risk of falls. This may include working with a physical therapist or  to improve your strength, balance, and endurance.  Where to find more information  · Centers for Disease Control and Prevention, [place]: https://www.cdc.gov  · National [place] on Aging: https://id7poif.jamie.nih.gov  Contact a health care provider if:  · You are afraid of falling at home.  · You feel weak, drowsy, or dizzy at home.  · You fall at home.  Summary  · There are many simple things that you can do to make  your home safe and to help prevent falls.  Ways to make your home safe in    Medicare Wellness  Personal Prevention Plan of Service     Date of Office Visit:  2019  Encounter Provider:  Bill Schilling MD  Place of Service:  [facility]  Patient Name: Ariana Gutierrez  :  1940    As part of the Medicare Wellness portion of your visit today, we are providing you with this personalized preventive plan of services (PPPS). This plan is based upon recommendations of the [place] Preventive Services Task Force (USPSTF) and the Advisory Committee on Immunization Practices (ACIP).    This lists the preventive care services that should be considered, and provides dates of when you are due. Items listed as completed are up-to-date and do not require any further intervention.    Health Maintenance   Topic Date Due   • INFLUENZA VACCINE  10/12/2019 (Originally 2019)   • LIPID PANEL  2020   • MEDICARE ANNUAL WELLNESS  2020   • MAMMOGRAM  2020   • COLONOSCOPY  2028   • PNEUMOCOCCAL VACCINES (65+ LOW/MEDIUM RISK)  Completed   • TDAP/TD VACCINES  Discontinued   • ZOSTER VACCINE  Discontinued       Orders Placed This Encounter   Procedures   • Mammo Screening Bilateral With CAD     Standing Status:   Future     Standing Expiration Date:   2020     Order Specific Question:   Reason for Exam:     Answer:   screening for breast cancer   • Fluad Quad >65 years   • TSH   • T4, free   • Cologuard - Stool, Per Rectum     Standing Status:   Future     Standing Expiration Date:   2020   • Comprehensive Metabolic Panel   • Lipid Panel   • Vitamin D 25 Hydroxy   • POC Urinalysis Dipstick, Multipro   • CBC & Differential     Order Specific Question:   Manual Differential     Answer:   No       Return in 6 months (on 3/17/2020).      · clude removing tripping hazards and installing grab bars in the bathroom.  · Ask for help when making these changes  in your home.  This information is not intended to replace advice given to you by your health care provider. Make sure you discuss any questions you have with your health care provider.  Document Released: 12/08/2003 Document Revised: 08/02/2018 Document Reviewed: 08/02/2018  [employer] Interactive Patient Education © 2019 [employer]

## 2019-09-18 LAB
25(OH)D3+25(OH)D2 SERPL-MCNC: 57.2 NG/ML (ref 30–100)
ALBUMIN SERPL-MCNC: 4.8 G/DL (ref 3.5–5.2)
ALBUMIN/GLOB SERPL: 1.9 G/DL
ALP SERPL-CCNC: 96 U/L (ref 39–117)
ALT SERPL-CCNC: 12 U/L (ref 1–33)
AST SERPL-CCNC: 19 U/L (ref 1–32)
BASOPHILS # BLD AUTO: 0.1 X10E3/UL (ref 0–0.2)
BASOPHILS NFR BLD AUTO: 1 %
BILIRUB SERPL-MCNC: 0.5 MG/DL (ref 0.2–1.2)
BUN SERPL-MCNC: 18 MG/DL (ref 8–23)
BUN/CREAT SERPL: 17.1 (ref 7–25)
CALCIUM SERPL-MCNC: 9.6 MG/DL (ref 8.6–10.5)
CHLORIDE SERPL-SCNC: 97 MMOL/L (ref 98–107)
CHOLEST SERPL-MCNC: 204 MG/DL (ref 0–200)
CO2 SERPL-SCNC: 30.1 MMOL/L (ref 22–29)
CREAT SERPL-MCNC: 1.05 MG/DL (ref 0.57–1)
EOSINOPHIL # BLD AUTO: 0.3 X10E3/UL (ref 0–0.4)
EOSINOPHIL NFR BLD AUTO: 5 %
ERYTHROCYTE [DISTWIDTH] IN BLOOD BY AUTOMATED COUNT: 15 % (ref 12.3–15.4)
GLOBULIN SER CALC-MCNC: 2.5 GM/DL
GLUCOSE SERPL-MCNC: 85 MG/DL (ref 65–99)
HCT VFR BLD AUTO: 40.1 % (ref 34–46.6)
HDLC SERPL-MCNC: 48 MG/DL (ref 40–60)
HGB BLD-MCNC: 13.1 G/DL (ref 11.1–15.9)
IMM GRANULOCYTES # BLD AUTO: 0 X10E3/UL (ref 0–0.1)
IMM GRANULOCYTES NFR BLD AUTO: 0 %
LDLC SERPL CALC-MCNC: 133 MG/DL (ref 0–100)
LYMPHOCYTES # BLD AUTO: 1.2 X10E3/UL (ref 0.7–3.1)
LYMPHOCYTES NFR BLD AUTO: 16 %
MCH RBC QN AUTO: 29 PG (ref 26.6–33)
MCHC RBC AUTO-ENTMCNC: 32.7 G/DL (ref 31.5–35.7)
MCV RBC AUTO: 89 FL (ref 79–97)
MONOCYTES # BLD AUTO: 0.4 X10E3/UL (ref 0.1–0.9)
MONOCYTES NFR BLD AUTO: 5 %
NEUTROPHILS # BLD AUTO: 5.4 X10E3/UL (ref 1.4–7)
NEUTROPHILS NFR BLD AUTO: 73 %
PLATELET # BLD AUTO: 256 X10E3/UL (ref 150–450)
POTASSIUM SERPL-SCNC: 4.7 MMOL/L (ref 3.5–5.2)
PROT SERPL-MCNC: 7.3 G/DL (ref 6–8.5)
RBC # BLD AUTO: 4.51 X10E6/UL (ref 3.77–5.28)
SODIUM SERPL-SCNC: 138 MMOL/L (ref 136–145)
T4 FREE SERPL-MCNC: 0.94 NG/DL (ref 0.93–1.7)
TRIGL SERPL-MCNC: 117 MG/DL (ref 0–150)
TSH SERPL DL<=0.005 MIU/L-ACNC: 3.36 UIU/ML (ref 0.27–4.2)
VLDLC SERPL CALC-MCNC: 23.4 MG/DL (ref 5–40)
WBC # BLD AUTO: 7.4 X10E3/UL (ref 3.4–10.8)

## 2019-10-09 ENCOUNTER — FLU SHOT (OUTPATIENT)
Dept: FAMILY MEDICINE CLINIC | Facility: CLINIC | Age: 79
End: 2019-10-09

## 2019-10-09 DIAGNOSIS — Z23 NEED FOR INFLUENZA VACCINATION: ICD-10-CM

## 2019-10-09 PROCEDURE — 90653 IIV ADJUVANT VACCINE IM: CPT | Performed by: FAMILY MEDICINE

## 2019-10-09 PROCEDURE — G0008 ADMIN INFLUENZA VIRUS VAC: HCPCS | Performed by: FAMILY MEDICINE

## 2019-10-09 RX ORDER — HYDROCODONE BITARTRATE AND ACETAMINOPHEN 10; 325 MG/1; MG/1
1 TABLET ORAL EVERY 6 HOURS PRN
Qty: 90 TABLET | Refills: 0 | Status: SHIPPED | OUTPATIENT
Start: 2019-10-09 | End: 2019-11-11 | Stop reason: SDUPTHER

## 2019-10-11 ENCOUNTER — OFFICE VISIT (OUTPATIENT)
Dept: CARDIOLOGY | Facility: CLINIC | Age: 79
End: 2019-10-11

## 2019-10-11 VITALS
OXYGEN SATURATION: 95 % | HEIGHT: 65 IN | SYSTOLIC BLOOD PRESSURE: 122 MMHG | WEIGHT: 153 LBS | HEART RATE: 69 BPM | DIASTOLIC BLOOD PRESSURE: 62 MMHG | BODY MASS INDEX: 25.49 KG/M2

## 2019-10-11 DIAGNOSIS — R07.2 PRECORDIAL CHEST PAIN: ICD-10-CM

## 2019-10-11 DIAGNOSIS — R00.2 PALPITATIONS: Primary | ICD-10-CM

## 2019-10-11 DIAGNOSIS — G89.29 CHRONIC BACK PAIN, UNSPECIFIED BACK LOCATION, UNSPECIFIED BACK PAIN LATERALITY: ICD-10-CM

## 2019-10-11 DIAGNOSIS — M54.9 CHRONIC BACK PAIN, UNSPECIFIED BACK LOCATION, UNSPECIFIED BACK PAIN LATERALITY: ICD-10-CM

## 2019-10-11 DIAGNOSIS — I10 ESSENTIAL HYPERTENSION: ICD-10-CM

## 2019-10-11 DIAGNOSIS — M41.9 SCOLIOSIS OF LUMBAR SPINE, UNSPECIFIED SCOLIOSIS TYPE: ICD-10-CM

## 2019-10-11 PROCEDURE — 99214 OFFICE O/P EST MOD 30 MIN: CPT | Performed by: INTERNAL MEDICINE

## 2019-10-11 NOTE — PROGRESS NOTES
Ariana Gutierrez  [id number]  1940  79 y.o.  female    Referring Provider: Bill Schilling MD    Reason for Follow-up Visit:  Here for follow up after cardiac testing.    Routine follow up.  Chronic low back pain    Had MADELIN in 1/18 no clots or vegetation  Zio patch showed SVT 72 episodes longest 53 maximum rate 181 BPM   Low risk stress test for stress induced myocardial ischemia       Subjective     Chest pressure with exertion, moderate substernal, pressure like. Lasts less than 5 minutes  Started 1 weeks ago  Occurs once or twice a week  No associated diaphoresis    No associated nausea  Radiation to jaw and teeth    Had syncope   woke up on floor  No receeding palpitations, no incontinence of urine or stools, no preceeding chest pain. No aura.  No seizure like activity     Precipitated with exertion    Relieved with rest  Not positional    No change with intake of food or antacids  No change with breathing  Moderate associated dyspnea         History of present illness:  Ariana Gutierrez is a 79 y.o. yo female with history of hypertension who presents today for   Chief Complaint   Patient presents with   • Shortness of Breath     1 month follow up results of recent testing    .    History  Past Medical History:   Diagnosis Date   • Bacterial infection    • Chest pain    • Chronic back pain    • Deep vein thrombosis (CMS/HCC)     S/P KNEE SURGERY 10/2017   • Diverticulosis of intestine 8/16/2016   • Gastroesophageal reflux disease without esophagitis 5/26/2017   • Hypertension    • Irritable bowel syndrome 11/2/2011   • Low back pain    • Palpitations    • Paresthesia     toes   • Perforated bowel (CMS/HCC)    • Scoliosis    • Spinal stenosis    • Vascular disease, peripheral (CMS/HCC)    ,   Past Surgical History:   Procedure Laterality Date   • BACK SURGERY     • BREAST BIOPSY Right 25 yrs ago   • CATARACT EXTRACTION Bilateral    • COLON RESECTION SMALL BOWEL  2016    with colostomy for 6 months,  already revised.   • COLON SURGERY     • COLONOSCOPY     • LUMBAR LAMINECTOMY     • RENAL ARTERY STENT Right    • TOTAL KNEE ARTHROPLASTY Right    • TOTAL KNEE ARTHROPLASTY     • VASCULAR SURGERY      10/2017 - DR GARCIA   ,   Family History   Problem Relation Age of Onset   • Breast cancer Mother 80   • Heart disease Mother    • Coronary artery disease Mother    • Peripheral vascular disease Mother    • No Known Problems Sister    • Heart disease Brother    • No Known Problems Maternal Grandmother    • No Known Problems Maternal Grandfather    • No Known Problems Paternal Grandmother    • No Known Problems Paternal Grandfather    ,   Social History     Tobacco Use   • Smoking status: Never Smoker   • Smokeless tobacco: Never Used   Substance Use Topics   • Alcohol use: No     Frequency: Never   • Drug use: No   ,     Medications  Current Outpatient Medications   Medication Sig Dispense Refill   • aspirin 81 MG chewable tablet Chew 81 mg Daily.     • benazepril (LOTENSIN) 20 MG tablet TAKE 1 TABLET EVERY DAY 90 tablet 1   • Docusate Calcium (STOOL SOFTENER PO) Take 1 tablet by mouth Daily.     • gabapentin (NEURONTIN) 800 MG tablet Take 1 tablet by mouth 3 (Three) Times a Day. 270 tablet 1   • HYDROcodone-acetaminophen (NORCO)  MG per tablet Take 1 tablet by mouth Every 6 (Six) Hours As Needed for Moderate Pain . 90 tablet 0   • metoprolol succinate XL (TOPROL-XL) 50 MG 24 hr tablet Take 1 tablet by mouth Daily. 90 tablet 3   • Multiple Vitamins-Minerals (PRESERVISION AREDS 2 PO) Take  by mouth.     • nitroglycerin (NITROSTAT) 0.4 MG SL tablet 1 under the tongue as needed for angina, may repeat q5mins for up three doses 100 tablet 11   • omeprazole (priLOSEC) 20 MG capsule TAKE 1 CAPSULE EVERY DAY 90 capsule 1   • polyethylene glycol (MIRALAX) powder Take 17 g by mouth Daily.       No current facility-administered medications for this visit.        Allergies:  Patient has no known allergies.    Review of  "Systems  Review of Systems   Constitution: Positive for weakness and malaise/fatigue.   HENT: Negative.    Eyes: Negative.    Cardiovascular: Positive for chest pain, dyspnea on exertion, palpitations and syncope. Negative for claudication, cyanosis, irregular heartbeat, leg swelling, near-syncope, orthopnea and paroxysmal nocturnal dyspnea.   Respiratory: Negative.    Endocrine: Negative.    Hematologic/Lymphatic: Negative.    Skin: Negative.    Musculoskeletal: Positive for arthritis and joint pain.   Gastrointestinal: Positive for flatus. Negative for anorexia.   Genitourinary: Negative.    Psychiatric/Behavioral: Negative.        Objective     Physical Exam:  /62   Pulse 69   Ht 165.1 cm (65\")   Wt 69.4 kg (153 lb)   LMP  (LMP Unknown)   SpO2 95%   BMI 25.46 kg/m²   Physical Exam   Constitutional: She appears well-developed.   HENT:   Head: Normocephalic.   Neck: Normal carotid pulses and no JVD present. No tracheal tenderness present. Carotid bruit is not present. No tracheal deviation and no edema present.   Cardiovascular: Regular rhythm, normal heart sounds and normal pulses.   Pulmonary/Chest: Effort normal. No stridor.   Abdominal: Soft. She exhibits no distension. There is no hepatosplenomegaly. There is no tenderness.   Neurological: She is alert. She has normal strength. No cranial nerve deficit or sensory deficit.   Skin: Skin is warm.   Psychiatric: She has a normal mood and affect. Her speech is normal and behavior is normal.       Results Review:     Procedures    Assessment/Plan   Patient Active Problem List   Diagnosis   • Essential hypertension   • Gastroesophageal reflux disease without esophagitis   • Scoliosis   • Low back pain   • Perforated bowel (CMS/HCC)   • Paresthesia   • Palpitations   • Chronic back pain   • Deep vein thrombosis (CMS/HCC)   • Bacterial infection   • Joint infection (CMS/HCC)     Results for orders placed during the hospital encounter of 09/09/19   Adult " Stress Echo W/ Cont or Stress Agent if Necessary Per Protocol    Narrative · Estimated EF = 55%.  · Left ventricular systolic function is normal.  · Low risk stress test for stress induced myocardial ischemia             Plan      ROMY advised, she declined for now     Recommend cardiac catheterization, selective coronary angiography, left ventriculography and percutaneous coronary intervention with application of arteriotomy hemostatic closure device.    I discussed cardiac catheterization, the procedure, risks (including bleeding, infection, vascular damage [including minor oozing, bruising, bleeding, and up to and including but not limited to the need for vascular surgery, emergency cardiothoracic surgery, contrast reaction, renal failure, respiratory failure, heart attack, stroke, arrhythmia and even death), benefits, and alternatives and the patient has voiced understanding and is willing to proceed.    Adequate pre-hydration and post cardiac catheterization hydration.  Premedications as required and indicated for cardiac catheterization.    No contraindication to drug eluting stent placement if required  Further recommendations pending results of cardiac catheterization        Check BP and heart rates twice daily at least 3x / week at home and bring a recording for me to review next visit  IF BP >135/85 call sooner     Orders Placed This Encounter   Procedures   • XR chest pa and lateral     Standing Status:   Future     Standing Expiration Date:   10/10/2020     Order Specific Question:   Reason for Exam:     Answer:   cad   • XR chest pa and lateral     Standing Status:   Future     Standing Expiration Date:   10/10/2020     Order Specific Question:   Reason for Exam:     Answer:   syncope   • FL esophagram complete     Standing Status:   Future     Standing Expiration Date:   10/11/2020     Order Specific Question:   Reason for Exam:     Answer:   chest pain            ____________________________________________________________________________________________________________________________________________  Health maintenance and recommendations      Similar recommendations as last visit       Offered to give patient  a copy      Questions were encouraged, asked and answered to the patient's  understanding and satisfaction. Questions if any regarding current medications and side effects, need for refills and importance of compliance to medications stressed.    Reviewed available prior notes, consults, prior visits, laboratory findings, radiology and cardiology relevant reports. Updated chart as applicable. I have reviewed the patient's medical history in detail and updated the computerized patient record as relevant.      Updated patient regarding any new or relevant abnormalities on review of records or any new findings on physical exam. Mentioned to patient about purpose of visit and desirable health short and long term goals and objectives.    Primary to monitor CBC CMP Lipid panel and TSH as applicable    ___________________________________________________________________________________________________________________________________________              Return in about 3 months (around 1/11/2020).

## 2019-10-14 PROBLEM — R07.2 PRECORDIAL CHEST PAIN: Status: ACTIVE | Noted: 2019-10-14

## 2019-10-25 NOTE — PROGRESS NOTES
Subjective   Ariana Gutierrez is a 77 y.o. female.     URI    This is a new problem. The current episode started 1 to 4 weeks ago. The problem has been gradually improving. There has been no fever. Associated symptoms include coughing, sinus pain and sneezing. Treatments tried: Antibiotics. The treatment provided mild relief.       The following portions of the patient's history were reviewed and updated as appropriate: allergies, current medications, past family history, past medical history, past social history, past surgical history and problem list.    Review of Systems   HENT: Positive for sinus pain and sneezing.    Respiratory: Positive for cough.    Cardiovascular:        Getting ready for a MADELIN next week   Musculoskeletal:        Scoliotic back pain increasing       Objective   Physical Exam   Constitutional: She is oriented to person, place, and time.   HENT:   Right Ear: External ear normal.   Left Ear: External ear normal.   Mouth/Throat: Oropharynx is clear and moist.   Cardiovascular: Normal rate and regular rhythm.    Pulmonary/Chest: Effort normal. She has no wheezes. She has rales.   Lymphadenopathy:     She has no cervical adenopathy.   Neurological: She is alert and oriented to person, place, and time.   Psychiatric: She has a normal mood and affect. Her behavior is normal. Thought content normal.   Nursing note and vitals reviewed.      Assessment/Plan   Ariana was seen today for follow-up.    Diagnoses and all orders for this visit:    Cough  -     XR Chest PA & Lateral; Future    Bronchitis    Other orders  -     levoFLOXacin (LEVAQUIN) 500 MG tablet; Take 1 tablet by mouth Daily.         Plan above plus chest x-ray was proceed with MADELIN       
Clear

## 2019-11-06 ENCOUNTER — HOSPITAL ENCOUNTER (OUTPATIENT)
Facility: HOSPITAL | Age: 79
Setting detail: HOSPITAL OUTPATIENT SURGERY
Discharge: HOME | End: 2019-11-06
Attending: INTERNAL MEDICINE | Admitting: INTERNAL MEDICINE

## 2019-11-06 ENCOUNTER — APPOINTMENT (OUTPATIENT)
Dept: GENERAL RADIOLOGY | Facility: HOSPITAL | Age: 79
End: 2019-11-06

## 2019-11-06 VITALS
RESPIRATION RATE: 15 BRPM | DIASTOLIC BLOOD PRESSURE: 84 MMHG | SYSTOLIC BLOOD PRESSURE: 176 MMHG | BODY MASS INDEX: 27.82 KG/M2 | WEIGHT: 157 LBS | HEART RATE: 85 BPM | HEIGHT: 63 IN | OXYGEN SATURATION: 98 % | TEMPERATURE: 98 F

## 2019-11-06 DIAGNOSIS — R07.2 PRECORDIAL CHEST PAIN: ICD-10-CM

## 2019-11-06 LAB
ALBUMIN SERPL-MCNC: 4 G/DL (ref 3.5–5.2)
ALBUMIN/GLOB SERPL: 1.1 G/DL
ALP SERPL-CCNC: 75 U/L (ref 39–117)
ALT SERPL W P-5'-P-CCNC: 12 U/L (ref 1–33)
ANION GAP SERPL CALCULATED.3IONS-SCNC: 10 MMOL/L (ref 5–15)
AST SERPL-CCNC: 19 U/L (ref 1–32)
BASOPHILS # BLD AUTO: 0.07 10*3/MM3 (ref 0–0.2)
BASOPHILS NFR BLD AUTO: 0.8 % (ref 0–1.5)
BILIRUB SERPL-MCNC: 0.4 MG/DL (ref 0.2–1.2)
BUN BLD-MCNC: 25 MG/DL (ref 8–23)
BUN/CREAT SERPL: 28.7 (ref 7–25)
CALCIUM SPEC-SCNC: 9.3 MG/DL (ref 8.6–10.5)
CHLORIDE SERPL-SCNC: 102 MMOL/L (ref 98–107)
CO2 SERPL-SCNC: 30 MMOL/L (ref 22–29)
CREAT BLD-MCNC: 0.87 MG/DL (ref 0.57–1)
DEPRECATED RDW RBC AUTO: 48 FL (ref 37–54)
EOSINOPHIL # BLD AUTO: 0.2 10*3/MM3 (ref 0–0.4)
EOSINOPHIL NFR BLD AUTO: 2.4 % (ref 0.3–6.2)
ERYTHROCYTE [DISTWIDTH] IN BLOOD BY AUTOMATED COUNT: 14.4 % (ref 12.3–15.4)
GFR SERPL CREATININE-BSD FRML MDRD: 63 ML/MIN/1.73
GLOBULIN UR ELPH-MCNC: 3.7 GM/DL
GLUCOSE BLD-MCNC: 90 MG/DL (ref 65–99)
HCT VFR BLD AUTO: 37.6 % (ref 34–46.6)
HGB BLD-MCNC: 11.9 G/DL (ref 12–15.9)
IMM GRANULOCYTES # BLD AUTO: 0.03 10*3/MM3 (ref 0–0.05)
IMM GRANULOCYTES NFR BLD AUTO: 0.4 % (ref 0–0.5)
LYMPHOCYTES # BLD AUTO: 1.23 10*3/MM3 (ref 0.7–3.1)
LYMPHOCYTES NFR BLD AUTO: 14.6 % (ref 19.6–45.3)
MCH RBC QN AUTO: 28.5 PG (ref 26.6–33)
MCHC RBC AUTO-ENTMCNC: 31.6 G/DL (ref 31.5–35.7)
MCV RBC AUTO: 90.2 FL (ref 79–97)
MONOCYTES # BLD AUTO: 0.64 10*3/MM3 (ref 0.1–0.9)
MONOCYTES NFR BLD AUTO: 7.6 % (ref 5–12)
NEUTROPHILS # BLD AUTO: 6.27 10*3/MM3 (ref 1.7–7)
NEUTROPHILS NFR BLD AUTO: 74.2 % (ref 42.7–76)
NRBC BLD AUTO-RTO: 0 /100 WBC (ref 0–0.2)
PLATELET # BLD AUTO: 216 10*3/MM3 (ref 140–450)
PMV BLD AUTO: 10.5 FL (ref 6–12)
POTASSIUM BLD-SCNC: 4.5 MMOL/L (ref 3.5–5.2)
PROT SERPL-MCNC: 7.7 G/DL (ref 6–8.5)
RBC # BLD AUTO: 4.17 10*6/MM3 (ref 3.77–5.28)
SODIUM BLD-SCNC: 142 MMOL/L (ref 136–145)
WBC NRBC COR # BLD: 8.44 10*3/MM3 (ref 3.4–10.8)

## 2019-11-06 PROCEDURE — 99152 MOD SED SAME PHYS/QHP 5/>YRS: CPT | Performed by: INTERNAL MEDICINE

## 2019-11-06 PROCEDURE — 25010000002 HEPARIN (PORCINE): Performed by: INTERNAL MEDICINE

## 2019-11-06 PROCEDURE — 0 IOPAMIDOL PER 1 ML: Performed by: INTERNAL MEDICINE

## 2019-11-06 PROCEDURE — 93454 CORONARY ARTERY ANGIO S&I: CPT | Performed by: INTERNAL MEDICINE

## 2019-11-06 PROCEDURE — 71045 X-RAY EXAM CHEST 1 VIEW: CPT

## 2019-11-06 PROCEDURE — C1769 GUIDE WIRE: HCPCS | Performed by: INTERNAL MEDICINE

## 2019-11-06 PROCEDURE — C1894 INTRO/SHEATH, NON-LASER: HCPCS | Performed by: INTERNAL MEDICINE

## 2019-11-06 PROCEDURE — 25010000002 DIPHENHYDRAMINE PER 50 MG: Performed by: INTERNAL MEDICINE

## 2019-11-06 PROCEDURE — 80053 COMPREHEN METABOLIC PANEL: CPT | Performed by: INTERNAL MEDICINE

## 2019-11-06 PROCEDURE — 85025 COMPLETE CBC W/AUTO DIFF WBC: CPT | Performed by: INTERNAL MEDICINE

## 2019-11-06 PROCEDURE — 25010000002 MIDAZOLAM PER 1 MG: Performed by: INTERNAL MEDICINE

## 2019-11-06 PROCEDURE — 25010000002 FENTANYL CITRATE (PF) 100 MCG/2ML SOLUTION: Performed by: INTERNAL MEDICINE

## 2019-11-06 RX ORDER — SODIUM CHLORIDE 0.9 % (FLUSH) 0.9 %
10 SYRINGE (ML) INJECTION EVERY 12 HOURS SCHEDULED
Status: DISCONTINUED | OUTPATIENT
Start: 2019-11-06 | End: 2019-11-06 | Stop reason: HOSPADM

## 2019-11-06 RX ORDER — LIDOCAINE HYDROCHLORIDE 20 MG/ML
INJECTION, SOLUTION INFILTRATION; PERINEURAL AS NEEDED
Status: DISCONTINUED | OUTPATIENT
Start: 2019-11-06 | End: 2019-11-06 | Stop reason: HOSPADM

## 2019-11-06 RX ORDER — DIPHENHYDRAMINE HYDROCHLORIDE 50 MG/ML
INJECTION INTRAMUSCULAR; INTRAVENOUS AS NEEDED
Status: DISCONTINUED | OUTPATIENT
Start: 2019-11-06 | End: 2019-11-06 | Stop reason: HOSPADM

## 2019-11-06 RX ORDER — MIDAZOLAM HYDROCHLORIDE 1 MG/ML
INJECTION INTRAMUSCULAR; INTRAVENOUS AS NEEDED
Status: DISCONTINUED | OUTPATIENT
Start: 2019-11-06 | End: 2019-11-06 | Stop reason: HOSPADM

## 2019-11-06 RX ORDER — SODIUM CHLORIDE 9 MG/ML
75 INJECTION, SOLUTION INTRAVENOUS CONTINUOUS
Status: DISCONTINUED | OUTPATIENT
Start: 2019-11-06 | End: 2019-11-06 | Stop reason: HOSPADM

## 2019-11-06 RX ORDER — SODIUM CHLORIDE 0.9 % (FLUSH) 0.9 %
10 SYRINGE (ML) INJECTION AS NEEDED
Status: DISCONTINUED | OUTPATIENT
Start: 2019-11-06 | End: 2019-11-06 | Stop reason: HOSPADM

## 2019-11-06 RX ORDER — SODIUM CHLORIDE 9 MG/ML
100 INJECTION, SOLUTION INTRAVENOUS CONTINUOUS
Status: DISCONTINUED | OUTPATIENT
Start: 2019-11-06 | End: 2019-11-06 | Stop reason: HOSPADM

## 2019-11-06 RX ORDER — ISOSORBIDE MONONITRATE 30 MG/1
30 TABLET, EXTENDED RELEASE ORAL DAILY
Qty: 30 TABLET | Refills: 11 | Status: SHIPPED | OUTPATIENT
Start: 2019-11-06 | End: 2020-09-16

## 2019-11-06 RX ORDER — FENTANYL CITRATE 50 UG/ML
INJECTION, SOLUTION INTRAMUSCULAR; INTRAVENOUS AS NEEDED
Status: DISCONTINUED | OUTPATIENT
Start: 2019-11-06 | End: 2019-11-06 | Stop reason: HOSPADM

## 2019-11-06 RX ADMIN — SODIUM CHLORIDE 100 ML/HR: 9 INJECTION, SOLUTION INTRAVENOUS at 16:30

## 2019-11-11 ENCOUNTER — OFFICE VISIT (OUTPATIENT)
Dept: FAMILY MEDICINE CLINIC | Facility: CLINIC | Age: 79
End: 2019-11-11

## 2019-11-11 VITALS
OXYGEN SATURATION: 100 % | TEMPERATURE: 100.4 F | SYSTOLIC BLOOD PRESSURE: 138 MMHG | BODY MASS INDEX: 27.39 KG/M2 | HEART RATE: 94 BPM | WEIGHT: 154.6 LBS | DIASTOLIC BLOOD PRESSURE: 73 MMHG | HEIGHT: 63 IN

## 2019-11-11 DIAGNOSIS — J40 BRONCHITIS: Primary | ICD-10-CM

## 2019-11-11 PROCEDURE — 96372 THER/PROPH/DIAG INJ SC/IM: CPT | Performed by: FAMILY MEDICINE

## 2019-11-11 PROCEDURE — 99214 OFFICE O/P EST MOD 30 MIN: CPT | Performed by: FAMILY MEDICINE

## 2019-11-11 RX ORDER — HYDROCODONE BITARTRATE AND ACETAMINOPHEN 10; 325 MG/1; MG/1
1 TABLET ORAL EVERY 6 HOURS PRN
Qty: 90 TABLET | Refills: 0 | Status: SHIPPED | OUTPATIENT
Start: 2019-11-11 | End: 2020-05-11 | Stop reason: SDUPTHER

## 2019-11-11 RX ORDER — CEFTRIAXONE SODIUM 250 MG/1
500 INJECTION, POWDER, FOR SOLUTION INTRAMUSCULAR; INTRAVENOUS ONCE
Status: COMPLETED | OUTPATIENT
Start: 2019-11-11 | End: 2019-11-11

## 2019-11-11 RX ORDER — CEFDINIR 300 MG/1
300 CAPSULE ORAL 2 TIMES DAILY
Qty: 14 CAPSULE | Refills: 0 | Status: SHIPPED | OUTPATIENT
Start: 2019-11-11 | End: 2019-11-22

## 2019-11-11 RX ORDER — PROMETHAZINE HYDROCHLORIDE AND CODEINE PHOSPHATE 6.25; 1 MG/5ML; MG/5ML
5 SYRUP ORAL EVERY 4 HOURS PRN
Qty: 240 ML | Refills: 0 | Status: SHIPPED | OUTPATIENT
Start: 2019-11-11 | End: 2019-11-11

## 2019-11-11 RX ORDER — PROMETHAZINE HYDROCHLORIDE AND CODEINE PHOSPHATE 6.25; 1 MG/5ML; MG/5ML
5 SYRUP ORAL EVERY 4 HOURS PRN
Qty: 240 ML | Refills: 0 | Status: SHIPPED | OUTPATIENT
Start: 2019-11-11 | End: 2019-12-10

## 2019-11-11 RX ADMIN — CEFTRIAXONE SODIUM 500 MG: 250 INJECTION, POWDER, FOR SOLUTION INTRAMUSCULAR; INTRAVENOUS at 14:56

## 2019-11-11 NOTE — PROGRESS NOTES
Subjective   Ariana Gutierrez is a 79 y.o. female.     79-year-old female with recent coronary arteriogram showing minimal obstruction right coronary artery with dominance of left coronary artery circulation-fever cough bronchitis      Cough   This is a new problem. The current episode started in the past 7 days. The problem has been unchanged. The problem occurs constantly. The cough is non-productive. Associated symptoms include nasal congestion, postnasal drip and sweats. Pertinent negatives include no chest pain. Nothing aggravates the symptoms. She has tried nothing for the symptoms. Her past medical history is significant for bronchitis. Severe scoliosis thoracic spine     Vitals:    11/11/19 1417   BP: 138/73   Pulse: 94   Temp: 100.4 °F (38 °C)   SpO2: 100%       The following portions of the patient's history were reviewed and updated as appropriate: allergies, current medications, past family history, past medical history, past social history, past surgical history and problem list.    Review of Systems   HENT: Positive for postnasal drip.    Respiratory: Positive for cough.    Cardiovascular: Negative for chest pain.   Musculoskeletal: Positive for back pain.       Objective   Physical Exam   Constitutional: She is oriented to person, place, and time.   Slightly overweight   HENT:   Right Ear: External ear normal.   Left Ear: External ear normal.   Mouth/Throat: Oropharyngeal exudate present.   Cardiovascular: Normal rate and regular rhythm.   Pulmonary/Chest: Effort normal. She has rales.   Right-sided rales   Musculoskeletal: She exhibits no edema.   Neurological: She is alert and oriented to person, place, and time.   Psychiatric: She has a normal mood and affect. Her behavior is normal.   Nursing note and vitals reviewed.      Patient's Body mass index is 27.39 kg/m². BMI is above normal parameters. Recommendations include: none (medical contraindication).  CONTROLLED SUBSTANCE TRACKING 3/14/2018  6/5/2018 10/15/2018 2/12/2019 8/5/2019 11/11/2019   Last Pro 3/14/2018 6/5/2018 10/15/2018 2/12/2019 8/5/2019 11/11/2019   Report Number - - [id number]   Last UDS 3/14/2018 3/14/2018 3/14/2018 3/14/2018 8/5/2019 8/16/2019   Last Controlled Substance Agreement 3/14/2018 3/14/2018 3/14/2018 3/16/2018 8/5/2019 8/16/2019       Assessment/Plan   Patient Active Problem List   Diagnosis   • Essential hypertension   • Gastroesophageal reflux disease without esophagitis   • Scoliosis   • Low back pain   • Perforated bowel (CMS/HCC)   • Paresthesia   • Palpitations   • Chronic back pain   • Deep vein thrombosis (CMS/HCC)   • Bacterial infection   • Joint infection (CMS/HCC)   • Precordial chest pain     Ariana was seen today for cough, fever, generalized body aches and med refill.    Diagnoses and all orders for this visit:    Bronchitis  -     CBC & Differential  -     Basic Metabolic Panel  -     cefTRIAXone (ROCEPHIN) injection 500 mg    Other orders  -     HYDROcodone-acetaminophen (NORCO)  MG per tablet; Take 1 tablet by mouth Every 6 (Six) Hours As Needed for Moderate Pain .  -     cefdinir (OMNICEF) 300 MG capsule; Take 1 capsule by mouth 2 (Two) Times a Day.  -     Discontinue: promethazine-codeine (PHENERGAN with CODEINE) 6.25-10 MG/5ML syrup; Take 5 mL by mouth Every 4 (Four) Hours As Needed for Cough.  -     promethazine-codeine (PHENERGAN with CODEINE) 6.25-10 MG/5ML syrup; Take 5 mL by mouth Every 4 (Four) Hours As Needed for Cough.       Return if symptoms worsen or fail to improve.         Plan-follow-up with cardiologist #1- #2 above with Mucinex saline sprays lab-May need chest x-ray    Electronically signed by Bill Schilling MD 11/11/2019

## 2019-11-12 LAB
BASOPHILS # BLD AUTO: 0 X10E3/UL (ref 0–0.2)
BASOPHILS NFR BLD AUTO: 0 %
BUN SERPL-MCNC: 16 MG/DL (ref 8–27)
BUN/CREAT SERPL: 14 (ref 12–28)
CALCIUM SERPL-MCNC: 9 MG/DL (ref 8.7–10.3)
CHLORIDE SERPL-SCNC: 98 MMOL/L (ref 96–106)
CO2 SERPL-SCNC: 21 MMOL/L (ref 20–29)
CREAT SERPL-MCNC: 1.11 MG/DL (ref 0.57–1)
EOSINOPHIL # BLD AUTO: 0.1 X10E3/UL (ref 0–0.4)
EOSINOPHIL NFR BLD AUTO: 1 %
ERYTHROCYTE [DISTWIDTH] IN BLOOD BY AUTOMATED COUNT: 14.2 % (ref 12.3–15.4)
GLUCOSE SERPL-MCNC: 107 MG/DL (ref 65–99)
HCT VFR BLD AUTO: 32.6 % (ref 34–46.6)
HGB BLD-MCNC: 10.7 G/DL (ref 11.1–15.9)
IMM GRANULOCYTES # BLD AUTO: 0 X10E3/UL (ref 0–0.1)
IMM GRANULOCYTES NFR BLD AUTO: 0 %
LYMPHOCYTES # BLD AUTO: 1.4 X10E3/UL (ref 0.7–3.1)
LYMPHOCYTES NFR BLD AUTO: 10 %
MCH RBC QN AUTO: 28.4 PG (ref 26.6–33)
MCHC RBC AUTO-ENTMCNC: 32.8 G/DL (ref 31.5–35.7)
MCV RBC AUTO: 87 FL (ref 79–97)
MONOCYTES # BLD AUTO: 1.3 X10E3/UL (ref 0.1–0.9)
MONOCYTES NFR BLD AUTO: 9 %
NEUTROPHILS # BLD AUTO: 11.8 X10E3/UL (ref 1.4–7)
NEUTROPHILS NFR BLD AUTO: 80 %
PLATELET # BLD AUTO: 282 X10E3/UL (ref 150–450)
POTASSIUM SERPL-SCNC: 4.6 MMOL/L (ref 3.5–5.2)
RBC # BLD AUTO: 3.77 X10E6/UL (ref 3.77–5.28)
SODIUM SERPL-SCNC: 137 MMOL/L (ref 134–144)
WBC # BLD AUTO: 14.7 X10E3/UL (ref 3.4–10.8)

## 2019-11-13 ENCOUNTER — TELEPHONE (OUTPATIENT)
Dept: FAMILY MEDICINE CLINIC | Facility: CLINIC | Age: 79
End: 2019-11-13

## 2019-11-13 DIAGNOSIS — R05.9 COUGH: Primary | ICD-10-CM

## 2019-11-13 NOTE — TELEPHONE ENCOUNTER
PT WAS ADVISED OK FOR CXR-ADVISED [facility] &  GET PRIOR TO CARDIO APPT. VERBALIZED UNDERSTANDING.

## 2019-11-13 NOTE — TELEPHONE ENCOUNTER
PT STATES SHE IS SOME BETTER-FEVER IS CONTROLLED. STILL COUGH BUT NOW IS LOOSER.  IS GOING TO [place] TOMORROW FOR CARDIO APPT-DO YOU THINK SHE NEEDS TO HAVE A CXR?  WILL GET TOMORROW.  PLEASE ADVISE.

## 2019-11-13 NOTE — TELEPHONE ENCOUNTER
Set up but a chest x-ray at [facility] before she sees the heart doctors tomorrow put cough

## 2019-11-14 ENCOUNTER — HOSPITAL ENCOUNTER (OUTPATIENT)
Dept: GENERAL RADIOLOGY | Facility: HOSPITAL | Age: 79
Discharge: HOME | End: 2019-11-14
Admitting: INTERNAL MEDICINE

## 2019-11-14 ENCOUNTER — HOSPITAL ENCOUNTER (OUTPATIENT)
Dept: GENERAL RADIOLOGY | Facility: HOSPITAL | Age: 79
Discharge: HOME | End: 2019-11-14

## 2019-11-14 ENCOUNTER — OFFICE VISIT (OUTPATIENT)
Dept: CARDIOLOGY | Facility: CLINIC | Age: 79
End: 2019-11-14

## 2019-11-14 ENCOUNTER — HOSPITAL ENCOUNTER (INPATIENT)
Facility: HOSPITAL | Age: 79
LOS: 1 days | Discharge: HOME | End: 2019-11-16
Attending: FAMILY MEDICINE | Admitting: FAMILY MEDICINE

## 2019-11-14 ENCOUNTER — TELEPHONE (OUTPATIENT)
Dept: FAMILY MEDICINE CLINIC | Facility: CLINIC | Age: 79
End: 2019-11-14

## 2019-11-14 VITALS
HEIGHT: 63 IN | WEIGHT: 154.2 LBS | OXYGEN SATURATION: 94 % | DIASTOLIC BLOOD PRESSURE: 80 MMHG | HEART RATE: 102 BPM | SYSTOLIC BLOOD PRESSURE: 180 MMHG | BODY MASS INDEX: 27.32 KG/M2

## 2019-11-14 DIAGNOSIS — A49.9 BACTERIAL INFECTION: ICD-10-CM

## 2019-11-14 DIAGNOSIS — R07.2 PRECORDIAL CHEST PAIN: ICD-10-CM

## 2019-11-14 DIAGNOSIS — I82.4Y9 DEEP VEIN THROMBOSIS (DVT) OF PROXIMAL LOWER EXTREMITY, UNSPECIFIED CHRONICITY, UNSPECIFIED LATERALITY (HCC): ICD-10-CM

## 2019-11-14 DIAGNOSIS — R20.2 PARESTHESIA: ICD-10-CM

## 2019-11-14 DIAGNOSIS — R09.02 HYPOXIA: ICD-10-CM

## 2019-11-14 DIAGNOSIS — R00.2 PALPITATIONS: ICD-10-CM

## 2019-11-14 DIAGNOSIS — I25.10 CORONARY ARTERY DISEASE DUE TO LIPID RICH PLAQUE: ICD-10-CM

## 2019-11-14 DIAGNOSIS — M54.40 ACUTE RIGHT-SIDED LOW BACK PAIN WITH SCIATICA, SCIATICA LATERALITY UNSPECIFIED: ICD-10-CM

## 2019-11-14 DIAGNOSIS — R07.2 PRECORDIAL CHEST PAIN: Primary | ICD-10-CM

## 2019-11-14 DIAGNOSIS — I10 ESSENTIAL HYPERTENSION: ICD-10-CM

## 2019-11-14 DIAGNOSIS — K63.1 PERFORATED BOWEL (HCC): ICD-10-CM

## 2019-11-14 DIAGNOSIS — J18.9 PNEUMONITIS: Primary | ICD-10-CM

## 2019-11-14 DIAGNOSIS — M00.9 JOINT INFECTION (HCC): ICD-10-CM

## 2019-11-14 DIAGNOSIS — K21.9 GASTROESOPHAGEAL REFLUX DISEASE WITHOUT ESOPHAGITIS: ICD-10-CM

## 2019-11-14 DIAGNOSIS — G89.29 CHRONIC BACK PAIN, UNSPECIFIED BACK LOCATION, UNSPECIFIED BACK PAIN LATERALITY: ICD-10-CM

## 2019-11-14 DIAGNOSIS — M41.9 SCOLIOSIS OF LUMBAR SPINE, UNSPECIFIED SCOLIOSIS TYPE: ICD-10-CM

## 2019-11-14 DIAGNOSIS — I25.83 CORONARY ARTERY DISEASE DUE TO LIPID RICH PLAQUE: ICD-10-CM

## 2019-11-14 DIAGNOSIS — G89.29 OTHER CHRONIC PAIN: ICD-10-CM

## 2019-11-14 DIAGNOSIS — R05.9 COUGH: ICD-10-CM

## 2019-11-14 DIAGNOSIS — R77.8 ELEVATED TROPONIN: ICD-10-CM

## 2019-11-14 DIAGNOSIS — M54.9 CHRONIC BACK PAIN, UNSPECIFIED BACK LOCATION, UNSPECIFIED BACK PAIN LATERALITY: ICD-10-CM

## 2019-11-14 LAB
A-A DO2: 34 MMHG
ALBUMIN SERPL-MCNC: 3.5 G/DL (ref 3.5–5.2)
ALBUMIN/GLOB SERPL: 0.8 G/DL
ALP SERPL-CCNC: 90 U/L (ref 39–117)
ALT SERPL W P-5'-P-CCNC: 24 U/L (ref 1–33)
ANION GAP SERPL CALCULATED.3IONS-SCNC: 12 MMOL/L (ref 5–15)
ARTERIAL PATENCY WRIST A: ABNORMAL
AST SERPL-CCNC: 24 U/L (ref 1–32)
ATMOSPHERIC PRESS: 757 MMHG
BASE EXCESS BLDA CALC-SCNC: 2.6 MMOL/L (ref 0–2)
BASOPHILS # BLD AUTO: 0.06 10*3/MM3 (ref 0–0.2)
BASOPHILS NFR BLD AUTO: 0.7 % (ref 0–1.5)
BDY SITE: ABNORMAL
BILIRUB SERPL-MCNC: 0.2 MG/DL (ref 0.2–1.2)
BODY TEMPERATURE: 37 C
BUN BLD-MCNC: 12 MG/DL (ref 8–23)
BUN/CREAT SERPL: 12 (ref 7–25)
CALCIUM SPEC-SCNC: 9.7 MG/DL (ref 8.6–10.5)
CHLORIDE SERPL-SCNC: 103 MMOL/L (ref 98–107)
CO2 SERPL-SCNC: 28 MMOL/L (ref 22–29)
COHGB MFR BLD: 1 % (ref 0–5)
CREAT BLD-MCNC: 1 MG/DL (ref 0.57–1)
DEPRECATED RDW RBC AUTO: 46.5 FL (ref 37–54)
EOSINOPHIL # BLD AUTO: 0.31 10*3/MM3 (ref 0–0.4)
EOSINOPHIL NFR BLD AUTO: 3.9 % (ref 0.3–6.2)
ERYTHROCYTE [DISTWIDTH] IN BLOOD BY AUTOMATED COUNT: 13.9 % (ref 12.3–15.4)
FLUAV AG NPH QL: NEGATIVE
FLUBV AG NPH QL IA: NEGATIVE
GFR SERPL CREATININE-BSD FRML MDRD: 54 ML/MIN/1.73
GLOBULIN UR ELPH-MCNC: 4.4 GM/DL
GLUCOSE BLD-MCNC: 108 MG/DL (ref 65–99)
HCO3 BLDA-SCNC: 27.4 MMOL/L (ref 20–26)
HCT VFR BLD AUTO: 35.4 % (ref 34–46.6)
HCT VFR BLD CALC: 33.9 % (ref 38–51)
HGB BLD-MCNC: 11.3 G/DL (ref 12–15.9)
HGB BLDA-MCNC: 11.1 G/DL (ref 12–16)
IMM GRANULOCYTES # BLD AUTO: 0.1 10*3/MM3 (ref 0–0.05)
IMM GRANULOCYTES NFR BLD AUTO: 1.2 % (ref 0–0.5)
LYMPHOCYTES # BLD AUTO: 1.07 10*3/MM3 (ref 0.7–3.1)
LYMPHOCYTES NFR BLD AUTO: 13.3 % (ref 19.6–45.3)
Lab: ABNORMAL
MCH RBC QN AUTO: 28.7 PG (ref 26.6–33)
MCHC RBC AUTO-ENTMCNC: 31.9 G/DL (ref 31.5–35.7)
MCV RBC AUTO: 89.8 FL (ref 79–97)
METHGB BLD QL: 0.9 % (ref 0–3)
MODALITY: ABNORMAL
MONOCYTES # BLD AUTO: 0.55 10*3/MM3 (ref 0.1–0.9)
MONOCYTES NFR BLD AUTO: 6.8 % (ref 5–12)
NEUTROPHILS # BLD AUTO: 5.96 10*3/MM3 (ref 1.7–7)
NEUTROPHILS NFR BLD AUTO: 74.1 % (ref 42.7–76)
NOTE: ABNORMAL
NRBC BLD AUTO-RTO: 0 /100 WBC (ref 0–0.2)
NT-PROBNP SERPL-MCNC: 1250 PG/ML (ref 5–1800)
OXYHGB MFR BLDV: 92.7 % (ref 94–99)
PCO2 BLDA: 42.5 MM HG (ref 35–45)
PCO2 TEMP ADJ BLD: ABNORMAL MM[HG]
PH BLDA: 7.42 PH UNITS (ref 7.35–7.45)
PH, TEMP CORRECTED: ABNORMAL
PLATELET # BLD AUTO: 305 10*3/MM3 (ref 140–450)
PMV BLD AUTO: 10 FL (ref 6–12)
PO2 BLDA: 67.3 MM HG (ref 83–108)
PO2 TEMP ADJ BLD: ABNORMAL MM[HG]
POTASSIUM BLD-SCNC: 4.7 MMOL/L (ref 3.5–5.2)
POTASSIUM BLDA-SCNC: 4.4 MMOL/L (ref 3.5–5.2)
PROT SERPL-MCNC: 7.9 G/DL (ref 6–8.5)
RBC # BLD AUTO: 3.94 10*6/MM3 (ref 3.77–5.28)
SAO2 % BLDCOA: 94.5 % (ref 94–99)
SODIUM BLD-SCNC: 143 MMOL/L (ref 136–145)
SODIUM BLDA-SCNC: 142 MMOL/L (ref 136–145)
TROPONIN T SERPL-MCNC: 0.04 NG/ML (ref 0–0.03)
TROPONIN T SERPL-MCNC: 0.05 NG/ML (ref 0–0.03)
TROPONIN T SERPL-MCNC: 0.06 NG/ML (ref 0–0.03)
VENTILATOR MODE: ABNORMAL
WBC NRBC COR # BLD: 8.05 10*3/MM3 (ref 3.4–10.8)

## 2019-11-14 PROCEDURE — 36415 COLL VENOUS BLD VENIPUNCTURE: CPT

## 2019-11-14 PROCEDURE — 93005 ELECTROCARDIOGRAM TRACING: CPT | Performed by: NURSE PRACTITIONER

## 2019-11-14 PROCEDURE — 87040 BLOOD CULTURE FOR BACTERIA: CPT | Performed by: NURSE PRACTITIONER

## 2019-11-14 PROCEDURE — 80053 COMPREHEN METABOLIC PANEL: CPT | Performed by: NURSE PRACTITIONER

## 2019-11-14 PROCEDURE — 87804 INFLUENZA ASSAY W/OPTIC: CPT | Performed by: NURSE PRACTITIONER

## 2019-11-14 PROCEDURE — 84484 ASSAY OF TROPONIN QUANT: CPT | Performed by: FAMILY MEDICINE

## 2019-11-14 PROCEDURE — 25010000002 METHYLPREDNISOLONE PER 125 MG: Performed by: NURSE PRACTITIONER

## 2019-11-14 PROCEDURE — 99284 EMERGENCY DEPT VISIT MOD MDM: CPT

## 2019-11-14 PROCEDURE — 99214 OFFICE O/P EST MOD 30 MIN: CPT | Performed by: INTERNAL MEDICINE

## 2019-11-14 PROCEDURE — 83880 ASSAY OF NATRIURETIC PEPTIDE: CPT | Performed by: NURSE PRACTITIONER

## 2019-11-14 PROCEDURE — 94799 UNLISTED PULMONARY SVC/PX: CPT

## 2019-11-14 PROCEDURE — 93010 ELECTROCARDIOGRAM REPORT: CPT | Performed by: INTERNAL MEDICINE

## 2019-11-14 PROCEDURE — G0378 HOSPITAL OBSERVATION PER HR: HCPCS

## 2019-11-14 PROCEDURE — 36600 WITHDRAWAL OF ARTERIAL BLOOD: CPT

## 2019-11-14 PROCEDURE — 25010000002 CEFTRIAXONE PER 250 MG: Performed by: NURSE PRACTITIONER

## 2019-11-14 PROCEDURE — 71046 X-RAY EXAM CHEST 2 VIEWS: CPT

## 2019-11-14 PROCEDURE — 25010000002 METHYLPREDNISOLONE PER 125 MG: Performed by: FAMILY MEDICINE

## 2019-11-14 PROCEDURE — 83050 HGB METHEMOGLOBIN QUAN: CPT

## 2019-11-14 PROCEDURE — 94640 AIRWAY INHALATION TREATMENT: CPT

## 2019-11-14 PROCEDURE — 82805 BLOOD GASES W/O2 SATURATION: CPT

## 2019-11-14 PROCEDURE — 84484 ASSAY OF TROPONIN QUANT: CPT | Performed by: NURSE PRACTITIONER

## 2019-11-14 PROCEDURE — 82375 ASSAY CARBOXYHB QUANT: CPT

## 2019-11-14 PROCEDURE — 85025 COMPLETE CBC W/AUTO DIFF WBC: CPT | Performed by: NURSE PRACTITIONER

## 2019-11-14 PROCEDURE — 93000 ELECTROCARDIOGRAM COMPLETE: CPT | Performed by: INTERNAL MEDICINE

## 2019-11-14 RX ORDER — SODIUM CHLORIDE 0.9 % (FLUSH) 0.9 %
10 SYRINGE (ML) INJECTION AS NEEDED
Status: DISCONTINUED | OUTPATIENT
Start: 2019-11-14 | End: 2019-11-16 | Stop reason: HOSPADM

## 2019-11-14 RX ORDER — METHYLPREDNISOLONE SODIUM SUCCINATE 125 MG/2ML
125 INJECTION, POWDER, LYOPHILIZED, FOR SOLUTION INTRAMUSCULAR; INTRAVENOUS ONCE
Status: COMPLETED | OUTPATIENT
Start: 2019-11-14 | End: 2019-11-14

## 2019-11-14 RX ORDER — METHYLPREDNISOLONE SODIUM SUCCINATE 125 MG/2ML
60 INJECTION, POWDER, LYOPHILIZED, FOR SOLUTION INTRAMUSCULAR; INTRAVENOUS EVERY 6 HOURS
Status: DISCONTINUED | OUTPATIENT
Start: 2019-11-14 | End: 2019-11-15

## 2019-11-14 RX ORDER — PANTOPRAZOLE SODIUM 40 MG/1
40 TABLET, DELAYED RELEASE ORAL
Status: DISCONTINUED | OUTPATIENT
Start: 2019-11-15 | End: 2019-11-16 | Stop reason: HOSPADM

## 2019-11-14 RX ORDER — ONDANSETRON 2 MG/ML
4 INJECTION INTRAMUSCULAR; INTRAVENOUS EVERY 6 HOURS PRN
Status: DISCONTINUED | OUTPATIENT
Start: 2019-11-14 | End: 2019-11-16 | Stop reason: HOSPADM

## 2019-11-14 RX ORDER — LISINOPRIL 10 MG/1
10 TABLET ORAL
Status: DISCONTINUED | OUTPATIENT
Start: 2019-11-14 | End: 2019-11-16 | Stop reason: HOSPADM

## 2019-11-14 RX ORDER — ASPIRIN 81 MG/1
81 TABLET, CHEWABLE ORAL DAILY
Status: DISCONTINUED | OUTPATIENT
Start: 2019-11-15 | End: 2019-11-16 | Stop reason: HOSPADM

## 2019-11-14 RX ORDER — DOCUSATE SODIUM 100 MG/1
100 CAPSULE, LIQUID FILLED ORAL DAILY
Status: DISCONTINUED | OUTPATIENT
Start: 2019-11-15 | End: 2019-11-16 | Stop reason: HOSPADM

## 2019-11-14 RX ORDER — GUAIFENESIN 600 MG/1
1200 TABLET, EXTENDED RELEASE ORAL 2 TIMES DAILY
COMMUNITY
End: 2019-11-16 | Stop reason: HOSPADM

## 2019-11-14 RX ORDER — METOPROLOL SUCCINATE 50 MG/1
50 TABLET, EXTENDED RELEASE ORAL DAILY
Status: DISCONTINUED | OUTPATIENT
Start: 2019-11-14 | End: 2019-11-16

## 2019-11-14 RX ORDER — NITROGLYCERIN 0.4 MG/1
0.4 TABLET SUBLINGUAL
Status: DISCONTINUED | OUTPATIENT
Start: 2019-11-14 | End: 2019-11-16 | Stop reason: HOSPADM

## 2019-11-14 RX ORDER — IPRATROPIUM BROMIDE AND ALBUTEROL SULFATE 2.5; .5 MG/3ML; MG/3ML
3 SOLUTION RESPIRATORY (INHALATION) ONCE
Status: COMPLETED | OUTPATIENT
Start: 2019-11-14 | End: 2019-11-14

## 2019-11-14 RX ORDER — IPRATROPIUM BROMIDE AND ALBUTEROL SULFATE 2.5; .5 MG/3ML; MG/3ML
3 SOLUTION RESPIRATORY (INHALATION)
Status: DISCONTINUED | OUTPATIENT
Start: 2019-11-14 | End: 2019-11-16 | Stop reason: HOSPADM

## 2019-11-14 RX ORDER — PROMETHAZINE HYDROCHLORIDE AND CODEINE PHOSPHATE 6.25; 1 MG/5ML; MG/5ML
5 SYRUP ORAL EVERY 4 HOURS PRN
Status: DISCONTINUED | OUTPATIENT
Start: 2019-11-14 | End: 2019-11-16 | Stop reason: HOSPADM

## 2019-11-14 RX ORDER — ECHINACEA PURPUREA EXTRACT 125 MG
1 TABLET ORAL AS NEEDED
COMMUNITY
End: 2019-12-10

## 2019-11-14 RX ORDER — HYDROCODONE BITARTRATE AND ACETAMINOPHEN 7.5; 325 MG/1; MG/1
1 TABLET ORAL EVERY 6 HOURS PRN
Status: DISCONTINUED | OUTPATIENT
Start: 2019-11-14 | End: 2019-11-16 | Stop reason: HOSPADM

## 2019-11-14 RX ORDER — GABAPENTIN 300 MG/1
600 CAPSULE ORAL EVERY 8 HOURS SCHEDULED
Status: DISCONTINUED | OUTPATIENT
Start: 2019-11-14 | End: 2019-11-16 | Stop reason: HOSPADM

## 2019-11-14 RX ORDER — ISOSORBIDE MONONITRATE 30 MG/1
30 TABLET, EXTENDED RELEASE ORAL DAILY
Status: DISCONTINUED | OUTPATIENT
Start: 2019-11-14 | End: 2019-11-16 | Stop reason: HOSPADM

## 2019-11-14 RX ORDER — SODIUM CHLORIDE 0.9 % (FLUSH) 0.9 %
10 SYRINGE (ML) INJECTION EVERY 12 HOURS SCHEDULED
Status: DISCONTINUED | OUTPATIENT
Start: 2019-11-14 | End: 2019-11-16 | Stop reason: HOSPADM

## 2019-11-14 RX ADMIN — IPRATROPIUM BROMIDE AND ALBUTEROL SULFATE 3 ML: 2.5; .5 SOLUTION RESPIRATORY (INHALATION) at 22:04

## 2019-11-14 RX ADMIN — PROMETHAZINE HYDROCHLORIDE AND CODEINE PHOSPHATE 5 ML: 6.25; 1 SOLUTION ORAL at 23:19

## 2019-11-14 RX ADMIN — GABAPENTIN 600 MG: 300 CAPSULE ORAL at 21:02

## 2019-11-14 RX ADMIN — POLYETHYLENE GLYCOL 3350 17 G: 17 POWDER, FOR SOLUTION ORAL at 18:46

## 2019-11-14 RX ADMIN — METOPROLOL SUCCINATE 50 MG: 50 TABLET, FILM COATED, EXTENDED RELEASE ORAL at 18:46

## 2019-11-14 RX ADMIN — HYDROCODONE BITARTRATE AND ACETAMINOPHEN 1 TABLET: 7.5; 325 TABLET ORAL at 21:02

## 2019-11-14 RX ADMIN — LISINOPRIL 10 MG: 10 TABLET ORAL at 18:46

## 2019-11-14 RX ADMIN — METHYLPREDNISOLONE SODIUM SUCCINATE 60 MG: 125 INJECTION, POWDER, FOR SOLUTION INTRAMUSCULAR; INTRAVENOUS at 23:14

## 2019-11-14 RX ADMIN — ISOSORBIDE MONONITRATE 30 MG: 30 TABLET, EXTENDED RELEASE ORAL at 18:46

## 2019-11-14 RX ADMIN — METHYLPREDNISOLONE SODIUM SUCCINATE 125 MG: 125 INJECTION, POWDER, FOR SOLUTION INTRAMUSCULAR; INTRAVENOUS at 16:48

## 2019-11-14 RX ADMIN — CEFTRIAXONE SODIUM 1 G: 1 INJECTION, POWDER, FOR SOLUTION INTRAMUSCULAR; INTRAVENOUS at 16:48

## 2019-11-14 RX ADMIN — SODIUM CHLORIDE, PRESERVATIVE FREE 10 ML: 5 INJECTION INTRAVENOUS at 21:02

## 2019-11-14 RX ADMIN — IPRATROPIUM BROMIDE AND ALBUTEROL SULFATE 3 ML: 2.5; .5 SOLUTION RESPIRATORY (INHALATION) at 13:24

## 2019-11-14 NOTE — TELEPHONE ENCOUNTER
PT WAS ADVISED TO CONTACT OFFICE AFTER D/C'D FROM ED FOR 1 WK F/U APPT. VERBALIZED UNDERSTANDING.

## 2019-11-14 NOTE — PROGRESS NOTES
Ariana Gutierrez  [id number]  1940  79 y.o.  female    Referring Provider: Bill Schilling MD    Reason for Follow-up Visit:  Here for follow up after cardiac testing.    Routine follow up.  Chronic low back pain    Had MADELIN in 1/18 no clots or vegetation  Zio patch showed SVT 72 episodes longest 53 maximum rate 181 BPM   Low risk stress test for stress induced myocardial ischemia  Cardiac workup test results as below      Subjective       Feels same overall as during last visit  No new events or complaints since last visit   Overall the patient feels no major change from baseline symptoms   Similar symptoms as during last visit      Chest pressure with exertion, moderate substernal, pressure like. Lasts less than 5 minutes  Started several  weeks ago  Occurs once or twice a week  No associated diaphoresis    No associated nausea  Radiation to jaw and teeth    Had syncope   woke up on floor  No receeding palpitations, no incontinence of urine or stools, no preceeding chest pain. No aura.  No seizure like activity     Precipitated with exertion    Relieved with rest  Not positional    No change with intake of food or antacids  No change with breathing  Moderate associated dyspnea      Recent cough and  shortness of breath and started on Antibiotics   Repeat CXR this am pending        History of present illness:  Ariana Gutierrez is a 79 y.o. yo female with history of hypertension who presents today for   Chief Complaint   Patient presents with   • Chest Pain     1 week follow up s/p cath    .    History  Past Medical History:   Diagnosis Date   • Bacterial infection    • Chest pain    • Chronic back pain    • Deep vein thrombosis (CMS/HCC)     S/P KNEE SURGERY 10/2017   • Diverticulosis of intestine 8/16/2016   • Gastroesophageal reflux disease without esophagitis 5/26/2017   • Hypertension    • Irritable bowel syndrome 11/2/2011   • Low back pain    • Palpitations    • Paresthesia     toes   •  Perforated bowel (CMS/HCC)    • Scoliosis    • Spinal stenosis    • Vascular disease, peripheral (CMS/HCC)    ,   Past Surgical History:   Procedure Laterality Date   • BACK SURGERY     • BREAST BIOPSY Right 25 yrs ago   • CARDIAC CATHETERIZATION Left 11/6/2019    Procedure: Cardiac Catheterization/Vascular Study CARMEN OK;  Surgeon: Hawk Guerin MD;  Location:  [facility];  Service: Cardiology   • CATARACT EXTRACTION Bilateral    • COLON RESECTION SMALL BOWEL  2016    with colostomy for 6 months, already revised.   • COLON SURGERY     • COLONOSCOPY     • LUMBAR LAMINECTOMY     • RENAL ARTERY STENT Right    • TOTAL KNEE ARTHROPLASTY Right    • TOTAL KNEE ARTHROPLASTY     • VASCULAR SURGERY      10/2017 - DR GARCIA   ,   Family History   Problem Relation Age of Onset   • Breast cancer Mother 80   • Heart disease Mother    • Coronary artery disease Mother    • Peripheral vascular disease Mother    • No Known Problems Sister    • Heart disease Brother    • No Known Problems Maternal Grandmother    • No Known Problems Maternal Grandfather    • No Known Problems Paternal Grandmother    • No Known Problems Paternal Grandfather    ,   Social History     Tobacco Use   • Smoking status: Never Smoker   • Smokeless tobacco: Never Used   Substance Use Topics   • Alcohol use: No     Frequency: Never   • Drug use: No   ,     Medications  Current Outpatient Medications   Medication Sig Dispense Refill   • aspirin 81 MG chewable tablet Chew 81 mg Daily.     • benazepril (LOTENSIN) 20 MG tablet TAKE 1 TABLET EVERY DAY 90 tablet 1   • cefdinir (OMNICEF) 300 MG capsule Take 1 capsule by mouth 2 (Two) Times a Day. 14 capsule 0   • Docusate Calcium (STOOL SOFTENER PO) Take 1 tablet by mouth Daily.     • gabapentin (NEURONTIN) 800 MG tablet Take 1 tablet by mouth 3 (Three) Times a Day. 270 tablet 1   • HYDROcodone-acetaminophen (NORCO)  MG per tablet Take 1 tablet by mouth Every 6 (Six) Hours As Needed for Moderate  "Pain . 90 tablet 0   • isosorbide mononitrate (IMDUR) 30 MG 24 hr tablet Take 1 tablet by mouth Daily. 30 tablet 11   • metoprolol succinate XL (TOPROL-XL) 50 MG 24 hr tablet Take 1 tablet by mouth Daily. 90 tablet 3   • Multiple Vitamins-Minerals (PRESERVISION AREDS 2 PO) Take  by mouth.     • nitroglycerin (NITROSTAT) 0.4 MG SL tablet 1 under the tongue as needed for angina, may repeat q5mins for up three doses 100 tablet 11   • omeprazole (priLOSEC) 20 MG capsule TAKE 1 CAPSULE EVERY DAY 90 capsule 1   • polyethylene glycol (MIRALAX) powder Take 17 g by mouth Daily.     • promethazine-codeine (PHENERGAN with CODEINE) 6.25-10 MG/5ML syrup Take 5 mL by mouth Every 4 (Four) Hours As Needed for Cough. 240 mL 0     No current facility-administered medications for this visit.        Allergies:  Patient has no known allergies.    Review of Systems  Review of Systems   Constitution: Positive for fever, weakness and malaise/fatigue.   HENT: Negative.    Eyes: Negative.    Cardiovascular: Positive for chest pain, dyspnea on exertion, palpitations and syncope. Negative for claudication, cyanosis, irregular heartbeat, leg swelling, near-syncope, orthopnea and paroxysmal nocturnal dyspnea.   Respiratory: Positive for cough and shortness of breath.    Endocrine: Negative.    Hematologic/Lymphatic: Negative.    Skin: Negative.    Musculoskeletal: Positive for arthritis and joint pain.   Gastrointestinal: Positive for flatus. Negative for anorexia.   Genitourinary: Negative.    Psychiatric/Behavioral: Negative.        Objective     Physical Exam:  /80 (BP Location: Left arm, Patient Position: Sitting, Cuff Size: Adult)   Pulse 102   Ht 160 cm (63\")   Wt 69.9 kg (154 lb 3.2 oz)   LMP  (LMP Unknown)   SpO2 94%   BMI 27.32 kg/m²   Physical Exam   Constitutional: She appears well-developed.   HENT:   Head: Normocephalic.   Neck: Normal carotid pulses and no JVD present. No tracheal tenderness present. Carotid bruit is " not present. No tracheal deviation and no edema present.   Cardiovascular: Regular rhythm, normal heart sounds and normal pulses.   Pulmonary/Chest: Effort normal. No stridor. She has wheezes in the left upper field, the left middle field and the left lower field. She has rales in the right upper field, the right middle field, the right lower field, the left upper field, the left middle field and the left lower field.   Abdominal: Soft. She exhibits no distension. There is no hepatosplenomegaly. There is no tenderness.   Neurological: She is alert. She has normal strength. No cranial nerve deficit or sensory deficit.   Skin: Skin is warm.   Psychiatric: She has a normal mood and affect. Her speech is normal and behavior is normal.       Results Review:    Conclusion     No significant stenosis noted of left dominant epicardial coronary arterial tree.  95% ostial stenosis of small nondominant right coronary artery which is approximately 1.5 mm to 2 mm in diameter        Plan     Maximize medical therapy  If continues to have exertional jaw pain that is reflective of angina would consider intervention of small nondominant right coronary ostial stenosis.  Patient extremely restless and tries to either sit up or move both her legs during the procedure and therefore will require anesthesia to sedate her if this was considered in future     Hydration   Observation  Risk factor modifications  Workup for non cardiac causes of chest pain         Results for orders placed during the hospital encounter of 09/09/19   Adult Stress Echo W/ Cont or Stress Agent if Necessary Per Protocol    Narrative · Estimated EF = 55%.  · Left ventricular systolic function is normal.  · Low risk stress test for stress induced myocardial ischemia         Ariana [address]   Holter Monitor 72Hr-21Day (0296T/0298T)   Order# [id number]   Reading physician: Hawk Guerin MD Ordering physician: Hawk Guerin MD Study date: 9/9/19   Patient Information      Patient Name  Ariana Gutierrez MRN  [id number] Sex  Female  (Age)  1940 (79 y.o.)   Interpretation Summary        · Average HR: 73. Min HR: 46. Max HR: 136.     · ~14 day monitor ,entire report was reviewed  · The predominant rhythm noted during the testing period was sinus rhythm.  · Rare [3306] supraventricular ectopics with an APC burden of: < 1%  · Rare [132] premature ventricular contractions with a PVC burden of: < 1%  · 72 runs of supraventricular tachycardia longest 53 beats at a maximum rate of 181 bpm  · Single 3 beat run of nonsustained ventricular tachycardia at a rate of 200 bpm  · No correlated arrhythmia  · No significant pauses           Conclusion: Baseline rhythm is sinus.  No significant ectopy  72 runs of supraventricular tachycardia longest 53 beats at a maximum rate of 181 bpm  Single 3 beat run of nonsustained ventricular tachycardia at a rate of 200 bpm                 ECG 12 Lead  Date/Time: 2019 9:55 AM  Performed by: Hawk Guerin MD  Authorized by: Hawk Guerin MD   Comparison: compared with previous ECG from 2019  Similar to previous ECG  Rhythm: sinus rhythm  Rate: normal  Conduction: conduction normal  ST Segments: ST segments normal  T Waves: T waves normal  QRS axis: normal  Other: no other findings    Clinical impression: normal ECG            Assessment/Plan   Patient Active Problem List   Diagnosis   • Essential hypertension   • Gastroesophageal reflux disease without esophagitis   • Scoliosis   • Low back pain   • Perforated bowel (CMS/HCC)   • Paresthesia   • Palpitations   • Chronic back pain   • Deep vein thrombosis (CMS/HCC)   • Bacterial infection   • Joint infection (CMS/HCC)   • Precordial chest pain     Results for orders placed during the hospital encounter of 19   Adult Stress Echo W/ Cont or Stress Agent if Necessary Per Protocol    Narrative · Estimated EF = 55%.  · Left ventricular systolic function is normal.  · Low risk stress test  for stress induced myocardial ischemia             Plan      Recommend going to ER for pneumonia with fever and leukocytosis and exertional hypoxemia   Discussed results of latest cardiac testing with patient   Will recommend possible Lexiscan vs PCI higher risk of small non dominant RCA      ____________________________________________________________________________________________________________________________________________  Health maintenance and recommendations      Similar recommendations as last visit       Offered to give patient  a copy      Questions were encouraged, asked and answered to the patient's  understanding and satisfaction. Questions if any regarding current medications and side effects, need for refills and importance of compliance to medications stressed.    Reviewed available prior notes, consults, prior visits, laboratory findings, radiology and cardiology relevant reports. Updated chart as applicable. I have reviewed the patient's medical history in detail and updated the computerized patient record as relevant.      Updated patient regarding any new or relevant abnormalities on review of records or any new findings on physical exam. Mentioned to patient about purpose of visit and desirable health short and long term goals and objectives.    Primary to monitor CBC CMP Lipid panel and TSH as applicable    ___________________________________________________________________________________________________________________________________________              No Follow-up on file.

## 2019-11-15 ENCOUNTER — APPOINTMENT (OUTPATIENT)
Dept: CARDIOLOGY | Facility: HOSPITAL | Age: 79
End: 2019-11-15

## 2019-11-15 LAB
ALBUMIN SERPL-MCNC: 3.3 G/DL (ref 3.5–5.2)
ALBUMIN/GLOB SERPL: 0.9 G/DL
ALP SERPL-CCNC: 77 U/L (ref 39–117)
ALT SERPL W P-5'-P-CCNC: 20 U/L (ref 1–33)
ANION GAP SERPL CALCULATED.3IONS-SCNC: 7 MMOL/L (ref 5–15)
AST SERPL-CCNC: 17 U/L (ref 1–32)
BASOPHILS # BLD AUTO: 0.01 10*3/MM3 (ref 0–0.2)
BASOPHILS NFR BLD AUTO: 0.1 % (ref 0–1.5)
BH CV ECHO MEAS - AO MAX PG (FULL): 7.5 MMHG
BH CV ECHO MEAS - AO MAX PG: 15.2 MMHG
BH CV ECHO MEAS - AO MEAN PG (FULL): 4 MMHG
BH CV ECHO MEAS - AO MEAN PG: 8 MMHG
BH CV ECHO MEAS - AO ROOT AREA (BSA CORRECTED): 1.7
BH CV ECHO MEAS - AO ROOT AREA: 7.1 CM^2
BH CV ECHO MEAS - AO ROOT DIAM: 3 CM
BH CV ECHO MEAS - AO V2 MAX: 195 CM/SEC
BH CV ECHO MEAS - AO V2 MEAN: 133 CM/SEC
BH CV ECHO MEAS - AO V2 VTI: 36.3 CM
BH CV ECHO MEAS - ASC AORTA: 3 CM
BH CV ECHO MEAS - AVA(I,A): 2.4 CM^2
BH CV ECHO MEAS - AVA(I,D): 2.4 CM^2
BH CV ECHO MEAS - AVA(V,A): 2.2 CM^2
BH CV ECHO MEAS - AVA(V,D): 2.2 CM^2
BH CV ECHO MEAS - BSA(HAYCOCK): 1.8 M^2
BH CV ECHO MEAS - BSA: 1.7 M^2
BH CV ECHO MEAS - BZI_BMI: 27.6 KILOGRAMS/M^2
BH CV ECHO MEAS - BZI_METRIC_HEIGHT: 160 CM
BH CV ECHO MEAS - BZI_METRIC_WEIGHT: 70.8 KG
BH CV ECHO MEAS - EDV(CUBED): 81.2 ML
BH CV ECHO MEAS - EDV(MOD-SP4): 81.1 ML
BH CV ECHO MEAS - EDV(TEICH): 84.4 ML
BH CV ECHO MEAS - EF(CUBED): 88.9 %
BH CV ECHO MEAS - EF(MOD-SP4): 80.5 %
BH CV ECHO MEAS - EF(TEICH): 83.3 %
BH CV ECHO MEAS - ESV(CUBED): 9 ML
BH CV ECHO MEAS - ESV(MOD-SP4): 15.8 ML
BH CV ECHO MEAS - ESV(TEICH): 14.1 ML
BH CV ECHO MEAS - FS: 52 %
BH CV ECHO MEAS - IVS/LVPW: 0.97
BH CV ECHO MEAS - IVSD: 0.83 CM
BH CV ECHO MEAS - LA DIMENSION: 3 CM
BH CV ECHO MEAS - LA/AO: 1
BH CV ECHO MEAS - LAT PEAK E' VEL: 7 CM/SEC
BH CV ECHO MEAS - LV DIASTOLIC VOL/BSA (35-75): 46.6 ML/M^2
BH CV ECHO MEAS - LV MASS(C)D: 113.2 GRAMS
BH CV ECHO MEAS - LV MASS(C)DI: 65.1 GRAMS/M^2
BH CV ECHO MEAS - LV MAX PG: 7.7 MMHG
BH CV ECHO MEAS - LV MEAN PG: 4 MMHG
BH CV ECHO MEAS - LV SYSTOLIC VOL/BSA (12-30): 9.1 ML/M^2
BH CV ECHO MEAS - LV V1 MAX: 139 CM/SEC
BH CV ECHO MEAS - LV V1 MEAN: 92.4 CM/SEC
BH CV ECHO MEAS - LV V1 VTI: 27.6 CM
BH CV ECHO MEAS - LVIDD: 4.3 CM
BH CV ECHO MEAS - LVIDS: 2.1 CM
BH CV ECHO MEAS - LVLD AP4: 7.2 CM
BH CV ECHO MEAS - LVLS AP4: 5.6 CM
BH CV ECHO MEAS - LVOT AREA (M): 3.1 CM^2
BH CV ECHO MEAS - LVOT AREA: 3.1 CM^2
BH CV ECHO MEAS - LVOT DIAM: 2 CM
BH CV ECHO MEAS - LVPWD: 0.85 CM
BH CV ECHO MEAS - MED PEAK E' VEL: 5 CM/SEC
BH CV ECHO MEAS - MV A MAX VEL: 102 CM/SEC
BH CV ECHO MEAS - MV DEC TIME: 0.25 SEC
BH CV ECHO MEAS - MV E MAX VEL: 79.1 CM/SEC
BH CV ECHO MEAS - MV E/A: 0.78
BH CV ECHO MEAS - PA MAX PG: 1.3 MMHG
BH CV ECHO MEAS - PA V2 MAX: 57.7 CM/SEC
BH CV ECHO MEAS - PI END-D VEL: 109 CM/SEC
BH CV ECHO MEAS - RAP SYSTOLE: 5 MMHG
BH CV ECHO MEAS - RVSP: 41.2 MMHG
BH CV ECHO MEAS - SI(AO): 147.5 ML/M^2
BH CV ECHO MEAS - SI(CUBED): 41.5 ML/M^2
BH CV ECHO MEAS - SI(LVOT): 49.8 ML/M^2
BH CV ECHO MEAS - SI(MOD-SP4): 37.5 ML/M^2
BH CV ECHO MEAS - SI(TEICH): 40.4 ML/M^2
BH CV ECHO MEAS - SV(AO): 256.6 ML
BH CV ECHO MEAS - SV(CUBED): 72.2 ML
BH CV ECHO MEAS - SV(LVOT): 86.7 ML
BH CV ECHO MEAS - SV(MOD-SP4): 65.3 ML
BH CV ECHO MEAS - SV(TEICH): 70.4 ML
BH CV ECHO MEAS - TR MAX VEL: 301 CM/SEC
BH CV ECHO MEASUREMENTS AVERAGE E/E' RATIO: 13.18
BILIRUB SERPL-MCNC: 0.2 MG/DL (ref 0.2–1.2)
BUN BLD-MCNC: 18 MG/DL (ref 8–23)
BUN/CREAT SERPL: 21.7 (ref 7–25)
CALCIUM SPEC-SCNC: 9.4 MG/DL (ref 8.6–10.5)
CHLORIDE SERPL-SCNC: 102 MMOL/L (ref 98–107)
CHOLEST SERPL-MCNC: 140 MG/DL (ref 0–200)
CO2 SERPL-SCNC: 29 MMOL/L (ref 22–29)
CREAT BLD-MCNC: 0.83 MG/DL (ref 0.57–1)
DEPRECATED RDW RBC AUTO: 45.4 FL (ref 37–54)
EOSINOPHIL # BLD AUTO: 0 10*3/MM3 (ref 0–0.4)
EOSINOPHIL NFR BLD AUTO: 0 % (ref 0.3–6.2)
ERYTHROCYTE [DISTWIDTH] IN BLOOD BY AUTOMATED COUNT: 13.7 % (ref 12.3–15.4)
GFR SERPL CREATININE-BSD FRML MDRD: 67 ML/MIN/1.73
GLOBULIN UR ELPH-MCNC: 3.7 GM/DL
GLUCOSE BLD-MCNC: 155 MG/DL (ref 65–99)
HBA1C MFR BLD: 5.4 % (ref 4.8–5.6)
HCT VFR BLD AUTO: 33.1 % (ref 34–46.6)
HDLC SERPL-MCNC: 35 MG/DL (ref 40–60)
HGB BLD-MCNC: 10.5 G/DL (ref 12–15.9)
IMM GRANULOCYTES # BLD AUTO: 0.09 10*3/MM3 (ref 0–0.05)
IMM GRANULOCYTES NFR BLD AUTO: 1 % (ref 0–0.5)
LDLC SERPL CALC-MCNC: 93 MG/DL (ref 0–100)
LDLC/HDLC SERPL: 2.66 {RATIO}
LEFT ATRIUM VOLUME INDEX: 28.5 ML/M2
LEFT ATRIUM VOLUME: 49.6 CM3
LV EF 2D ECHO EST: 65 %
LYMPHOCYTES # BLD AUTO: 0.61 10*3/MM3 (ref 0.7–3.1)
LYMPHOCYTES NFR BLD AUTO: 6.6 % (ref 19.6–45.3)
MAXIMAL PREDICTED HEART RATE: 143 BPM
MCH RBC QN AUTO: 28.5 PG (ref 26.6–33)
MCHC RBC AUTO-ENTMCNC: 31.7 G/DL (ref 31.5–35.7)
MCV RBC AUTO: 89.7 FL (ref 79–97)
MONOCYTES # BLD AUTO: 0.09 10*3/MM3 (ref 0.1–0.9)
MONOCYTES NFR BLD AUTO: 1 % (ref 5–12)
NEUTROPHILS # BLD AUTO: 8.46 10*3/MM3 (ref 1.7–7)
NEUTROPHILS NFR BLD AUTO: 91.3 % (ref 42.7–76)
NRBC BLD AUTO-RTO: 0 /100 WBC (ref 0–0.2)
PLATELET # BLD AUTO: 303 10*3/MM3 (ref 140–450)
PMV BLD AUTO: 10 FL (ref 6–12)
POTASSIUM BLD-SCNC: 4.8 MMOL/L (ref 3.5–5.2)
PROT SERPL-MCNC: 7 G/DL (ref 6–8.5)
RBC # BLD AUTO: 3.69 10*6/MM3 (ref 3.77–5.28)
SODIUM BLD-SCNC: 138 MMOL/L (ref 136–145)
STRESS TARGET HR: 122 BPM
TRIGL SERPL-MCNC: 60 MG/DL (ref 0–150)
TROPONIN T SERPL-MCNC: 0.04 NG/ML (ref 0–0.03)
TSH SERPL DL<=0.05 MIU/L-ACNC: 0.56 UIU/ML (ref 0.27–4.2)
VLDLC SERPL-MCNC: 12 MG/DL
WBC NRBC COR # BLD: 9.26 10*3/MM3 (ref 3.4–10.8)

## 2019-11-15 PROCEDURE — 85025 COMPLETE CBC W/AUTO DIFF WBC: CPT | Performed by: FAMILY MEDICINE

## 2019-11-15 PROCEDURE — 25010000002 PERFLUTREN 6.52 MG/ML SUSPENSION: Performed by: FAMILY MEDICINE

## 2019-11-15 PROCEDURE — 94799 UNLISTED PULMONARY SVC/PX: CPT

## 2019-11-15 PROCEDURE — 84443 ASSAY THYROID STIM HORMONE: CPT | Performed by: FAMILY MEDICINE

## 2019-11-15 PROCEDURE — 94760 N-INVAS EAR/PLS OXIMETRY 1: CPT

## 2019-11-15 PROCEDURE — G0378 HOSPITAL OBSERVATION PER HR: HCPCS

## 2019-11-15 PROCEDURE — 25010000002 METHYLPREDNISOLONE PER 40 MG: Performed by: FAMILY MEDICINE

## 2019-11-15 PROCEDURE — 84484 ASSAY OF TROPONIN QUANT: CPT | Performed by: FAMILY MEDICINE

## 2019-11-15 PROCEDURE — 83036 HEMOGLOBIN GLYCOSYLATED A1C: CPT | Performed by: FAMILY MEDICINE

## 2019-11-15 PROCEDURE — 87205 SMEAR GRAM STAIN: CPT | Performed by: FAMILY MEDICINE

## 2019-11-15 PROCEDURE — 25010000002 CEFTRIAXONE PER 250 MG: Performed by: FAMILY MEDICINE

## 2019-11-15 PROCEDURE — 93306 TTE W/DOPPLER COMPLETE: CPT | Performed by: INTERNAL MEDICINE

## 2019-11-15 PROCEDURE — 87070 CULTURE OTHR SPECIMN AEROBIC: CPT | Performed by: FAMILY MEDICINE

## 2019-11-15 PROCEDURE — 80053 COMPREHEN METABOLIC PANEL: CPT | Performed by: FAMILY MEDICINE

## 2019-11-15 PROCEDURE — 80061 LIPID PANEL: CPT | Performed by: FAMILY MEDICINE

## 2019-11-15 PROCEDURE — 99214 OFFICE O/P EST MOD 30 MIN: CPT | Performed by: INTERNAL MEDICINE

## 2019-11-15 PROCEDURE — 93306 TTE W/DOPPLER COMPLETE: CPT

## 2019-11-15 PROCEDURE — 25010000002 METHYLPREDNISOLONE PER 125 MG: Performed by: FAMILY MEDICINE

## 2019-11-15 RX ORDER — GUAIFENESIN 600 MG/1
1200 TABLET, EXTENDED RELEASE ORAL EVERY 12 HOURS SCHEDULED
Status: DISCONTINUED | OUTPATIENT
Start: 2019-11-15 | End: 2019-11-16 | Stop reason: HOSPADM

## 2019-11-15 RX ORDER — METHYLPREDNISOLONE SODIUM SUCCINATE 40 MG/ML
40 INJECTION, POWDER, LYOPHILIZED, FOR SOLUTION INTRAMUSCULAR; INTRAVENOUS EVERY 6 HOURS
Status: DISCONTINUED | OUTPATIENT
Start: 2019-11-15 | End: 2019-11-16 | Stop reason: HOSPADM

## 2019-11-15 RX ORDER — SACCHAROMYCES BOULARDII 250 MG
250 CAPSULE ORAL 2 TIMES DAILY
Status: DISCONTINUED | OUTPATIENT
Start: 2019-11-15 | End: 2019-11-16 | Stop reason: HOSPADM

## 2019-11-15 RX ORDER — METHYLPREDNISOLONE SODIUM SUCCINATE 125 MG/2ML
60 INJECTION, POWDER, LYOPHILIZED, FOR SOLUTION INTRAMUSCULAR; INTRAVENOUS EVERY 8 HOURS
Status: DISCONTINUED | OUTPATIENT
Start: 2019-11-15 | End: 2019-11-15

## 2019-11-15 RX ADMIN — LISINOPRIL 10 MG: 10 TABLET ORAL at 10:02

## 2019-11-15 RX ADMIN — METHYLPREDNISOLONE SODIUM SUCCINATE 60 MG: 125 INJECTION, POWDER, FOR SOLUTION INTRAMUSCULAR; INTRAVENOUS at 10:04

## 2019-11-15 RX ADMIN — GUAIFENESIN 1200 MG: 600 TABLET, EXTENDED RELEASE ORAL at 14:15

## 2019-11-15 RX ADMIN — GABAPENTIN 600 MG: 300 CAPSULE ORAL at 14:15

## 2019-11-15 RX ADMIN — METHYLPREDNISOLONE SODIUM SUCCINATE 60 MG: 125 INJECTION, POWDER, FOR SOLUTION INTRAMUSCULAR; INTRAVENOUS at 06:06

## 2019-11-15 RX ADMIN — GABAPENTIN 600 MG: 300 CAPSULE ORAL at 20:59

## 2019-11-15 RX ADMIN — IPRATROPIUM BROMIDE AND ALBUTEROL SULFATE 3 ML: 2.5; .5 SOLUTION RESPIRATORY (INHALATION) at 20:47

## 2019-11-15 RX ADMIN — GABAPENTIN 600 MG: 300 CAPSULE ORAL at 06:05

## 2019-11-15 RX ADMIN — PERFLUTREN 9.78 MG: 6.52 INJECTION, SUSPENSION INTRAVENOUS at 09:57

## 2019-11-15 RX ADMIN — IPRATROPIUM BROMIDE AND ALBUTEROL SULFATE 3 ML: 2.5; .5 SOLUTION RESPIRATORY (INHALATION) at 14:00

## 2019-11-15 RX ADMIN — DOCUSATE SODIUM 100 MG: 100 CAPSULE, LIQUID FILLED ORAL at 10:02

## 2019-11-15 RX ADMIN — METOPROLOL SUCCINATE 50 MG: 50 TABLET, FILM COATED, EXTENDED RELEASE ORAL at 10:02

## 2019-11-15 RX ADMIN — ISOSORBIDE MONONITRATE 30 MG: 30 TABLET, EXTENDED RELEASE ORAL at 10:02

## 2019-11-15 RX ADMIN — IPRATROPIUM BROMIDE AND ALBUTEROL SULFATE 3 ML: 2.5; .5 SOLUTION RESPIRATORY (INHALATION) at 06:56

## 2019-11-15 RX ADMIN — SODIUM CHLORIDE, PRESERVATIVE FREE 10 ML: 5 INJECTION INTRAVENOUS at 10:03

## 2019-11-15 RX ADMIN — PROMETHAZINE HYDROCHLORIDE AND CODEINE PHOSPHATE 5 ML: 6.25; 1 SOLUTION ORAL at 07:25

## 2019-11-15 RX ADMIN — GUAIFENESIN 1200 MG: 600 TABLET, EXTENDED RELEASE ORAL at 20:57

## 2019-11-15 RX ADMIN — METHYLPREDNISOLONE SODIUM SUCCINATE 40 MG: 125 INJECTION, POWDER, FOR SOLUTION INTRAMUSCULAR; INTRAVENOUS at 17:35

## 2019-11-15 RX ADMIN — Medication 250 MG: at 20:57

## 2019-11-15 RX ADMIN — ASPIRIN 81 MG: 81 TABLET, CHEWABLE ORAL at 10:02

## 2019-11-15 RX ADMIN — SODIUM CHLORIDE, PRESERVATIVE FREE 10 ML: 5 INJECTION INTRAVENOUS at 21:00

## 2019-11-15 RX ADMIN — Medication 250 MG: at 10:05

## 2019-11-15 RX ADMIN — PANTOPRAZOLE SODIUM 40 MG: 40 TABLET, DELAYED RELEASE ORAL at 06:05

## 2019-11-15 RX ADMIN — METHYLPREDNISOLONE SODIUM SUCCINATE 40 MG: 125 INJECTION, POWDER, FOR SOLUTION INTRAMUSCULAR; INTRAVENOUS at 14:17

## 2019-11-15 RX ADMIN — CEFTRIAXONE SODIUM 1 G: 1 INJECTION, POWDER, FOR SOLUTION INTRAMUSCULAR; INTRAVENOUS at 16:43

## 2019-11-15 RX ADMIN — POLYETHYLENE GLYCOL 3350 17 G: 17 POWDER, FOR SOLUTION ORAL at 16:43

## 2019-11-16 VITALS
SYSTOLIC BLOOD PRESSURE: 109 MMHG | DIASTOLIC BLOOD PRESSURE: 60 MMHG | HEART RATE: 94 BPM | TEMPERATURE: 97.9 F | HEIGHT: 63 IN | BODY MASS INDEX: 27.7 KG/M2 | OXYGEN SATURATION: 97 % | WEIGHT: 156.3 LBS | RESPIRATION RATE: 12 BRPM

## 2019-11-16 LAB
ALBUMIN SERPL-MCNC: 3.3 G/DL (ref 3.5–5.2)
ALBUMIN/GLOB SERPL: 0.9 G/DL
ALP SERPL-CCNC: 72 U/L (ref 39–117)
ALT SERPL W P-5'-P-CCNC: 18 U/L (ref 1–33)
ANION GAP SERPL CALCULATED.3IONS-SCNC: 13 MMOL/L (ref 5–15)
AST SERPL-CCNC: 16 U/L (ref 1–32)
BASOPHILS # BLD AUTO: 0.05 10*3/MM3 (ref 0–0.2)
BASOPHILS NFR BLD AUTO: 0.2 % (ref 0–1.5)
BILIRUB SERPL-MCNC: <0.2 MG/DL (ref 0.2–1.2)
BUN BLD-MCNC: 26 MG/DL (ref 8–23)
BUN/CREAT SERPL: 27.7 (ref 7–25)
CALCIUM SPEC-SCNC: 9.3 MG/DL (ref 8.6–10.5)
CHLORIDE SERPL-SCNC: 98 MMOL/L (ref 98–107)
CO2 SERPL-SCNC: 26 MMOL/L (ref 22–29)
CREAT BLD-MCNC: 0.94 MG/DL (ref 0.57–1)
DEPRECATED RDW RBC AUTO: 45.3 FL (ref 37–54)
EOSINOPHIL # BLD AUTO: 0 10*3/MM3 (ref 0–0.4)
EOSINOPHIL NFR BLD AUTO: 0 % (ref 0.3–6.2)
ERYTHROCYTE [DISTWIDTH] IN BLOOD BY AUTOMATED COUNT: 13.9 % (ref 12.3–15.4)
GFR SERPL CREATININE-BSD FRML MDRD: 58 ML/MIN/1.73
GLOBULIN UR ELPH-MCNC: 3.5 GM/DL
GLUCOSE BLD-MCNC: 170 MG/DL (ref 65–99)
HCT VFR BLD AUTO: 31.5 % (ref 34–46.6)
HGB BLD-MCNC: 10.1 G/DL (ref 12–15.9)
IMM GRANULOCYTES # BLD AUTO: 0.28 10*3/MM3 (ref 0–0.05)
IMM GRANULOCYTES NFR BLD AUTO: 1.3 % (ref 0–0.5)
LYMPHOCYTES # BLD AUTO: 0.83 10*3/MM3 (ref 0.7–3.1)
LYMPHOCYTES NFR BLD AUTO: 3.8 % (ref 19.6–45.3)
MCH RBC QN AUTO: 28.8 PG (ref 26.6–33)
MCHC RBC AUTO-ENTMCNC: 32.1 G/DL (ref 31.5–35.7)
MCV RBC AUTO: 89.7 FL (ref 79–97)
MONOCYTES # BLD AUTO: 0.53 10*3/MM3 (ref 0.1–0.9)
MONOCYTES NFR BLD AUTO: 2.5 % (ref 5–12)
NEUTROPHILS # BLD AUTO: 19.93 10*3/MM3 (ref 1.7–7)
NEUTROPHILS NFR BLD AUTO: 92.2 % (ref 42.7–76)
NRBC BLD AUTO-RTO: 0 /100 WBC (ref 0–0.2)
PLATELET # BLD AUTO: 309 10*3/MM3 (ref 140–450)
PMV BLD AUTO: 9.9 FL (ref 6–12)
POTASSIUM BLD-SCNC: 4.3 MMOL/L (ref 3.5–5.2)
PROT SERPL-MCNC: 6.8 G/DL (ref 6–8.5)
RBC # BLD AUTO: 3.51 10*6/MM3 (ref 3.77–5.28)
SODIUM BLD-SCNC: 137 MMOL/L (ref 136–145)
WBC NRBC COR # BLD: 21.62 10*3/MM3 (ref 3.4–10.8)

## 2019-11-16 PROCEDURE — 85025 COMPLETE CBC W/AUTO DIFF WBC: CPT | Performed by: FAMILY MEDICINE

## 2019-11-16 PROCEDURE — 94760 N-INVAS EAR/PLS OXIMETRY 1: CPT

## 2019-11-16 PROCEDURE — 94799 UNLISTED PULMONARY SVC/PX: CPT

## 2019-11-16 PROCEDURE — 80053 COMPREHEN METABOLIC PANEL: CPT | Performed by: FAMILY MEDICINE

## 2019-11-16 PROCEDURE — 25010000002 METHYLPREDNISOLONE PER 40 MG: Performed by: FAMILY MEDICINE

## 2019-11-16 RX ORDER — IPRATROPIUM BROMIDE AND ALBUTEROL SULFATE 2.5; .5 MG/3ML; MG/3ML
3 SOLUTION RESPIRATORY (INHALATION)
Qty: 360 ML | Refills: 2 | Status: SHIPPED | OUTPATIENT
Start: 2019-11-16 | End: 2021-12-09

## 2019-11-16 RX ORDER — METHYLPREDNISOLONE 4 MG/1
TABLET ORAL
Qty: 21 TABLET | Refills: 0 | Status: SHIPPED | OUTPATIENT
Start: 2019-11-16 | End: 2019-11-22

## 2019-11-16 RX ORDER — IPRATROPIUM BROMIDE AND ALBUTEROL SULFATE 2.5; .5 MG/3ML; MG/3ML
3 SOLUTION RESPIRATORY (INHALATION)
Qty: 360 ML | Refills: 2 | Status: SHIPPED | OUTPATIENT
Start: 2019-11-16 | End: 2019-11-16

## 2019-11-16 RX ORDER — SACCHAROMYCES BOULARDII 250 MG
250 CAPSULE ORAL DAILY
Qty: 30 CAPSULE | Refills: 2 | Status: SHIPPED | OUTPATIENT
Start: 2019-11-16

## 2019-11-16 RX ORDER — GUAIFENESIN 600 MG/1
1200 TABLET, EXTENDED RELEASE ORAL 2 TIMES DAILY PRN
Qty: 60 TABLET | Refills: 0 | Status: SHIPPED | OUTPATIENT
Start: 2019-11-16 | End: 2021-06-01

## 2019-11-16 RX ORDER — METOPROLOL SUCCINATE 50 MG/1
50 TABLET, EXTENDED RELEASE ORAL NIGHTLY
Status: DISCONTINUED | OUTPATIENT
Start: 2019-11-16 | End: 2019-11-16 | Stop reason: HOSPADM

## 2019-11-16 RX ADMIN — NYSTATIN 500000 UNITS: 100000 SUSPENSION ORAL at 11:16

## 2019-11-16 RX ADMIN — PROMETHAZINE HYDROCHLORIDE AND CODEINE PHOSPHATE 5 ML: 6.25; 1 SOLUTION ORAL at 00:09

## 2019-11-16 RX ADMIN — IPRATROPIUM BROMIDE AND ALBUTEROL SULFATE 3 ML: 2.5; .5 SOLUTION RESPIRATORY (INHALATION) at 06:11

## 2019-11-16 RX ADMIN — PROMETHAZINE HYDROCHLORIDE AND CODEINE PHOSPHATE 5 ML: 6.25; 1 SOLUTION ORAL at 04:52

## 2019-11-16 RX ADMIN — IPRATROPIUM BROMIDE AND ALBUTEROL SULFATE 3 ML: 2.5; .5 SOLUTION RESPIRATORY (INHALATION) at 10:16

## 2019-11-16 RX ADMIN — METHYLPREDNISOLONE SODIUM SUCCINATE 40 MG: 125 INJECTION, POWDER, FOR SOLUTION INTRAMUSCULAR; INTRAVENOUS at 05:59

## 2019-11-16 RX ADMIN — ISOSORBIDE MONONITRATE 30 MG: 30 TABLET, EXTENDED RELEASE ORAL at 08:57

## 2019-11-16 RX ADMIN — Medication 250 MG: at 08:57

## 2019-11-16 RX ADMIN — LISINOPRIL 10 MG: 10 TABLET ORAL at 08:57

## 2019-11-16 RX ADMIN — GABAPENTIN 600 MG: 300 CAPSULE ORAL at 13:40

## 2019-11-16 RX ADMIN — METHYLPREDNISOLONE SODIUM SUCCINATE 40 MG: 125 INJECTION, POWDER, FOR SOLUTION INTRAMUSCULAR; INTRAVENOUS at 00:09

## 2019-11-16 RX ADMIN — PANTOPRAZOLE SODIUM 40 MG: 40 TABLET, DELAYED RELEASE ORAL at 06:00

## 2019-11-16 RX ADMIN — DOCUSATE SODIUM 100 MG: 100 CAPSULE, LIQUID FILLED ORAL at 08:57

## 2019-11-16 RX ADMIN — METHYLPREDNISOLONE SODIUM SUCCINATE 40 MG: 125 INJECTION, POWDER, FOR SOLUTION INTRAMUSCULAR; INTRAVENOUS at 11:16

## 2019-11-16 RX ADMIN — GUAIFENESIN 1200 MG: 600 TABLET, EXTENDED RELEASE ORAL at 08:57

## 2019-11-16 RX ADMIN — ASPIRIN 81 MG: 81 TABLET, CHEWABLE ORAL at 08:57

## 2019-11-16 RX ADMIN — GABAPENTIN 600 MG: 300 CAPSULE ORAL at 06:00

## 2019-11-16 RX ADMIN — SODIUM CHLORIDE, PRESERVATIVE FREE 10 ML: 5 INJECTION INTRAVENOUS at 08:58

## 2019-11-17 LAB
BACTERIA SPEC RESP CULT: NORMAL
GRAM STN SPEC: NORMAL

## 2019-11-18 ENCOUNTER — TRANSITIONAL CARE MANAGEMENT TELEPHONE ENCOUNTER (OUTPATIENT)
Dept: FAMILY MEDICINE CLINIC | Facility: CLINIC | Age: 79
End: 2019-11-18

## 2019-11-18 ENCOUNTER — TELEPHONE (OUTPATIENT)
Dept: FAMILY MEDICINE CLINIC | Facility: CLINIC | Age: 79
End: 2019-11-18

## 2019-11-19 LAB
BACTERIA SPEC AEROBE CULT: NORMAL
BACTERIA SPEC AEROBE CULT: NORMAL

## 2019-11-22 ENCOUNTER — OFFICE VISIT (OUTPATIENT)
Dept: FAMILY MEDICINE CLINIC | Facility: CLINIC | Age: 79
End: 2019-11-22

## 2019-11-22 VITALS
SYSTOLIC BLOOD PRESSURE: 117 MMHG | BODY MASS INDEX: 26.89 KG/M2 | OXYGEN SATURATION: 96 % | TEMPERATURE: 98 F | DIASTOLIC BLOOD PRESSURE: 69 MMHG | HEART RATE: 85 BPM | WEIGHT: 151.8 LBS

## 2019-11-22 DIAGNOSIS — J40 BRONCHITIS: Primary | ICD-10-CM

## 2019-11-22 PROCEDURE — 99213 OFFICE O/P EST LOW 20 MIN: CPT | Performed by: FAMILY MEDICINE

## 2019-11-22 RX ORDER — ATORVASTATIN CALCIUM 20 MG/1
20 TABLET, FILM COATED ORAL DAILY
Qty: 90 TABLET | Refills: 3 | Status: SHIPPED | OUTPATIENT
Start: 2019-11-22 | End: 2020-10-28 | Stop reason: SDUPTHER

## 2019-11-22 NOTE — PROGRESS NOTES
Transitional Care Follow Up Visit  Subjective     Ariana Gutierrez is a 79 y.o. female who presents for a transitional care management visit.    Within 48 business hours after discharge our office contacted her via telephone to coordinate her care and needs.      I reviewed and discussed the details of that call along with the discharge summary, hospital problems, inpatient lab results, inpatient diagnostic studies, and consultation reports with Ariana.     Current outpatient and discharge medications have been reconciled for the patient.  Reviewed by: Bill Schilling MD      Date of TCM Phone Call 10/9/2017 11/18/2019   [facility]   Date of Admission 10/5/2017 11/14/2019   Date of Discharge 10/8/2017 11/16/2019   Discharge Disposition [place] [facility] [place]     Risk for Readmission (LACE) Score: 2 (11/16/2019  5:00 AM)      Hospitalized at [facility] for asthmatic bronchitis/clinical pneumonia and respiratory distress      Shortness of Breath   This is a new problem. The current episode started 1 to 4 weeks ago. The problem occurs daily. The problem has been resolved. Associated symptoms include chest pain. The symptoms are aggravated by any activity. Treatments tried: Antibiotics and steroids. The treatment provided moderate relief. Her past medical history is significant for asthma, CAD and chronic lung disease.      Course During Hospital Stay: Hospitalized at [facility] for asthmatic bronchitis     The following portions of the patient's history were reviewed and updated as appropriate: allergies, current medications, past family history, past medical history, past social history, past surgical history and problem list.    Review of Systems   Respiratory: Positive for shortness of breath.    Cardiovascular: Positive for chest pain.        Known coronary artery disease- getting ready for nuclear scan-had coronary arteriogram       Objective   Physical  Exam   Constitutional: She is oriented to person, place, and time.   HENT:   Mouth/Throat: Oropharynx is clear and moist.   Cardiovascular: Normal rate and regular rhythm.   Pulmonary/Chest: Effort normal and breath sounds normal.   Musculoskeletal: She exhibits no edema.   Neurological: She is alert and oriented to person, place, and time.   Psychiatric: She has a normal mood and affect. Her behavior is normal. Judgment and thought content normal.   Nursing note and vitals reviewed.      Assessment/Plan   Problems Addressed this Visit     None      Visit Diagnoses     Bronchitis    -  Primary                 Plan-finished antibiotics-finish steroids-follow-up with Dr. Guerin for scan-reinstitute a atorvastatin even though 79-may help with anti-inflammatory benefits

## 2019-12-04 ENCOUNTER — OFFICE VISIT (OUTPATIENT)
Dept: CARDIOLOGY | Facility: CLINIC | Age: 79
End: 2019-12-04

## 2019-12-04 VITALS
BODY MASS INDEX: 27.82 KG/M2 | HEART RATE: 86 BPM | WEIGHT: 157 LBS | SYSTOLIC BLOOD PRESSURE: 130 MMHG | HEIGHT: 63 IN | OXYGEN SATURATION: 95 % | DIASTOLIC BLOOD PRESSURE: 70 MMHG

## 2019-12-04 VITALS — HEIGHT: 63 IN | BODY MASS INDEX: 26.89 KG/M2

## 2019-12-04 DIAGNOSIS — M54.40 ACUTE RIGHT-SIDED LOW BACK PAIN WITH SCIATICA, SCIATICA LATERALITY UNSPECIFIED: ICD-10-CM

## 2019-12-04 DIAGNOSIS — G89.29 CHRONIC BACK PAIN, UNSPECIFIED BACK LOCATION, UNSPECIFIED BACK PAIN LATERALITY: ICD-10-CM

## 2019-12-04 DIAGNOSIS — G89.29 OTHER CHRONIC PAIN: ICD-10-CM

## 2019-12-04 DIAGNOSIS — I10 ESSENTIAL HYPERTENSION: ICD-10-CM

## 2019-12-04 DIAGNOSIS — Z53.21 PATIENT LEFT WITHOUT BEING SEEN: ICD-10-CM

## 2019-12-04 DIAGNOSIS — R06.09 DOE (DYSPNEA ON EXERTION): ICD-10-CM

## 2019-12-04 DIAGNOSIS — I25.10 CORONARY ARTERY DISEASE INVOLVING NATIVE CORONARY ARTERY OF NATIVE HEART WITHOUT ANGINA PECTORIS: Primary | ICD-10-CM

## 2019-12-04 DIAGNOSIS — M41.9 SCOLIOSIS OF LUMBAR SPINE, UNSPECIFIED SCOLIOSIS TYPE: Primary | ICD-10-CM

## 2019-12-04 DIAGNOSIS — J18.9 PNEUMONITIS: ICD-10-CM

## 2019-12-04 DIAGNOSIS — I25.10 CORONARY ARTERY DISEASE INVOLVING NATIVE CORONARY ARTERY OF NATIVE HEART WITHOUT ANGINA PECTORIS: ICD-10-CM

## 2019-12-04 DIAGNOSIS — M54.9 CHRONIC BACK PAIN, UNSPECIFIED BACK LOCATION, UNSPECIFIED BACK PAIN LATERALITY: ICD-10-CM

## 2019-12-04 PROCEDURE — 99214 OFFICE O/P EST MOD 30 MIN: CPT | Performed by: INTERNAL MEDICINE

## 2019-12-04 NOTE — PROGRESS NOTES
Ariana Gutierrez  [id number]  1940  79 y.o.  female    Referring Provider: Bill Schilling MD    Reason for Follow-up Visit:  Here for follow up after cardiac testing.    Routine follow up.  Chronic low back pain    Had MADELIN in 1/18 no clots or vegetation  Zio patch showed SVT 72 episodes longest 53 maximum rate 181 BPM   Low risk stress test for stress induced myocardial ischemia  Cardiac workup test results as below   Cath small non dominant RCA severe stenosis  Recovering well     Subjective       Feels same overall as during last visit  No new events or complaints since last visit   Overall the patient feels no major change from baseline symptoms   Similar symptoms as during last visit      Occurs once or twice a week  No associated diaphoresis    No associated nausea  No syncope  woke up on floor  No receeding palpitations, no incontinence of urine or stools, no preceeding chest pain. No aura.  No seizure like activity     Precipitated with exertion    Relieved with rest  Not positional    No change with intake of food or antacids  No change with breathing  Mild associated dyspnea           History of present illness:  Ariana Gutierrez is a 79 y.o. yo female with history of hypertension who presents today for   Chief Complaint   Patient presents with   • Coronary Artery Disease     2 wk d/c f/u   .    History  Past Medical History:   Diagnosis Date   • Bacterial infection    • CAD (coronary artery disease)    • Chest pain    • Chronic back pain    • Deep vein thrombosis (CMS/HCC)     S/P KNEE SURGERY 10/2017   • Diverticulosis of intestine 8/16/2016   • Gastroesophageal reflux disease without esophagitis 5/26/2017   • Hypertension    • Irritable bowel syndrome 11/2/2011   • Low back pain    • Palpitations    • Paresthesia     toes   • Perforated bowel (CMS/HCC)    • Scoliosis    • Spinal stenosis    • Vascular disease, peripheral (CMS/HCC)    ,   Past Surgical History:   Procedure Laterality Date    • BACK SURGERY     • BREAST BIOPSY Right 25 yrs ago   • CARDIAC CATHETERIZATION Left 11/6/2019    Procedure: Cardiac Catheterization/Vascular Study CARMEN OK;  Surgeon: Hawk Guerin MD;  Location: [facility] CATH INVASIVE LOCATION;  Service: Cardiology   • CATARACT EXTRACTION Bilateral    • COLON RESECTION SMALL BOWEL  2016    with colostomy for 6 months, already revised.   • COLON SURGERY     • COLONOSCOPY     • LUMBAR LAMINECTOMY     • RENAL ARTERY STENT Right    • TOTAL KNEE ARTHROPLASTY Right    • TOTAL KNEE ARTHROPLASTY     • VASCULAR SURGERY      10/2017 - DR GARCIA   ,   Family History   Problem Relation Age of Onset   • Breast cancer Mother 80   • Heart disease Mother    • Coronary artery disease Mother    • Peripheral vascular disease Mother    • No Known Problems Sister    • Heart disease Brother    • No Known Problems Maternal Grandmother    • No Known Problems Maternal Grandfather    • No Known Problems Paternal Grandmother    • No Known Problems Paternal Grandfather    ,   Social History     Tobacco Use   • Smoking status: Never Smoker   • Smokeless tobacco: Never Used   Substance Use Topics   • Alcohol use: No     Frequency: Never   • Drug use: No   ,     Medications  Current Outpatient Medications   Medication Sig Dispense Refill   • aspirin 81 MG chewable tablet Chew 81 mg Daily.     • atorvastatin (LIPITOR) 20 MG tablet Take 1 tablet by mouth Daily. 90 tablet 3   • benazepril (LOTENSIN) 20 MG tablet TAKE 1 TABLET EVERY DAY 90 tablet 1   • Docusate Calcium (STOOL SOFTENER PO) Take 1 tablet by mouth Daily.     • gabapentin (NEURONTIN) 800 MG tablet Take 1 tablet by mouth 3 (Three) Times a Day. 270 tablet 1   • HYDROcodone-acetaminophen (NORCO)  MG per tablet Take 1 tablet by mouth Every 6 (Six) Hours As Needed for Moderate Pain . 90 tablet 0   • ipratropium-albuterol (DUO-NEB) 0.5-2.5 mg/3 ml nebulizer Take 3 mL by nebulization Every 6 (Six) Hours While Awake. Diagnosis of pneumonitis J69.8 360 mL  2   • isosorbide mononitrate (IMDUR) 30 MG 24 hr tablet Take 1 tablet by mouth Daily. 30 tablet 11   • metoprolol succinate XL (TOPROL-XL) 50 MG 24 hr tablet Take 1 tablet by mouth Daily. 90 tablet 3   • Multiple Vitamins-Minerals (PRESERVISION AREDS 2 PO) Take  by mouth.     • nitroglycerin (NITROSTAT) 0.4 MG SL tablet 1 under the tongue as needed for angina, may repeat q5mins for up three doses 100 tablet 11   • nystatin (MYCOSTATIN) 258985 UNIT/ML suspension Swish and swallow 5 mL 4 (Four) Times a Day. 473 mL 0   • omeprazole (priLOSEC) 20 MG capsule TAKE 1 CAPSULE EVERY DAY 90 capsule 1   • polyethylene glycol (MIRALAX) powder Take 17 g by mouth Daily.     • promethazine-codeine (PHENERGAN with CODEINE) 6.25-10 MG/5ML syrup Take 5 mL by mouth Every 4 (Four) Hours As Needed for Cough. 240 mL 0   • saccharomyces boulardii (FLORASTOR) 250 MG capsule Take 1 capsule by mouth Daily. 30 capsule 2   • guaiFENesin (MUCINEX) 600 MG 12 hr tablet Take 2 tablets by mouth 2 (Two) Times a Day As Needed for Cough. 60 tablet 0   • sodium chloride 0.65 % nasal spray 1 spray into the nostril(s) as directed by provider As Needed for Congestion.       No current facility-administered medications for this visit.        Allergies:  Patient has no known allergies.    Review of Systems  Review of Systems   Constitution: Positive for weakness and malaise/fatigue. Negative for fever.   HENT: Negative.    Eyes: Negative.    Cardiovascular: Positive for dyspnea on exertion and palpitations. Negative for chest pain, claudication, cyanosis, irregular heartbeat, leg swelling, near-syncope, orthopnea, paroxysmal nocturnal dyspnea and syncope.   Respiratory: Positive for cough and shortness of breath.    Endocrine: Negative.    Hematologic/Lymphatic: Negative.    Skin: Negative.    Musculoskeletal: Positive for arthritis and joint pain.   Gastrointestinal: Positive for flatus. Negative for anorexia.   Genitourinary: Negative.   "  Psychiatric/Behavioral: Negative.        Objective     Physical Exam:  /70   Pulse 86   Ht 160 cm (63\")   Wt 71.2 kg (157 lb)   LMP  (LMP Unknown)   SpO2 95%   BMI 27.81 kg/m²   Physical Exam   Constitutional: She appears well-developed.   HENT:   Head: Normocephalic.   Neck: Normal carotid pulses and no JVD present. No tracheal tenderness present. Carotid bruit is not present. No tracheal deviation and no edema present.   Cardiovascular: Regular rhythm, normal heart sounds and normal pulses.   Pulmonary/Chest: Effort normal. No stridor. She has no wheezes. She has no rales.   Abdominal: Soft. She exhibits no distension. There is no hepatosplenomegaly. There is no tenderness.   Neurological: She is alert. She has normal strength. No cranial nerve deficit or sensory deficit.   Skin: Skin is warm.   Psychiatric: She has a normal mood and affect. Her speech is normal and behavior is normal.       Results Review:      Results for orders placed during the hospital encounter of 11/14/19   Adult Transthoracic Echo Complete W/ Cont if Necessary Per Protocol    Narrative · Estimated EF = 65%.  · Left ventricular systolic function is normal.  · Left ventricular diastolic dysfunction.  · Mild pulmonary hypertension is present.           Conclusion     No significant stenosis noted of left dominant epicardial coronary arterial tree.  95% ostial stenosis of small nondominant right coronary artery which is approximately 1.5 mm to 2 mm in diameter        Plan     Maximize medical therapy  If continues to have exertional jaw pain that is reflective of angina would consider intervention of small nondominant right coronary ostial stenosis.  Patient extremely restless and tries to either sit up or move both her legs during the procedure and therefore will require anesthesia to sedate her if this was considered in future     Hydration   Observation  Risk factor modifications  Workup for non cardiac causes of chest " pain         Results for orders placed during the hospital encounter of 19   Adult Stress Echo W/ Cont or Stress Agent if Necessary Per Protocol    Narrative · Estimated EF = 55%.  · Left ventricular systolic function is normal.  · Low risk stress test for stress induced myocardial ischemia         Ariana Gutierrez   Holter Monitor 72Hr-21Day (0296T/0298T)   Order# [id number]   Reading physician: Hawk Guerin MD Ordering physician: Hawk Guerin MD Study date: 19   Patient Information     Patient Name  Ariana Gutierrez MRN  [id number] Sex  Female  (Age)  1940 (79 y.o.)   Interpretation Summary        · Average HR: 73. Min HR: 46. Max HR: 136.     · ~14 day monitor ,entire report was reviewed  · The predominant rhythm noted during the testing period was sinus rhythm.  · Rare [3306] supraventricular ectopics with an APC burden of: < 1%  · Rare [132] premature ventricular contractions with a PVC burden of: < 1%  · 72 runs of supraventricular tachycardia longest 53 beats at a maximum rate of 181 bpm  · Single 3 beat run of nonsustained ventricular tachycardia at a rate of 200 bpm  · No correlated arrhythmia  · No significant pauses           Conclusion: Baseline rhythm is sinus.  No significant ectopy  72 runs of supraventricular tachycardia longest 53 beats at a maximum rate of 181 bpm  Single 3 beat run of nonsustained ventricular tachycardia at a rate of 200 bpm               Procedures    Assessment/Plan   Patient Active Problem List   Diagnosis   • Essential hypertension   • Gastroesophageal reflux disease without esophagitis   • CAD (coronary artery disease)   • Scoliosis   • Low back pain   • Perforated bowel (CMS/HCC)   • Paresthesia   • Palpitations   • Chronic back pain   • Deep vein thrombosis (CMS/HCC)   • Bacterial infection   • Joint infection (CMS/HCC)   • Precordial chest pain   • Pneumonitis   • Chronic pain     Results for orders placed during the hospital encounter of 19    Adult Stress Echo W/ Cont or Stress Agent if Necessary Per Protocol    Narrative · Estimated EF = 55%.  · Left ventricular systolic function is normal.  · Low risk stress test for stress induced myocardial ischemia             Plan    Orders Placed This Encounter   Procedures   • Ambulatory Referral to Pulmonology     Referral Priority:   Routine     Referral Type:   Consultation     Referral Reason:   Specialty Services Required     Requested Specialty:   Pulmonary Disease     Number of Visits Requested:   1   • Full Pulmonary Function Test With Bronchodilator     Standing Status:   Future     Standing Expiration Date:   12/4/2020     Order Specific Question:   PFT Procedures to Be Performed     Answer:   Lung Volumes     Order Specific Question:   PFT Procedures to Be Performed     Answer:   Spirometry Pre & Post Bronchodilator     Order Specific Question:   With:     Answer:   DLCO     Order Specific Question:   Bronchodilator Type:     Answer:   albuterol (PROVENTIL) nebulizer solution 0.083% 2.5 mg/3 mL         ____________________________________________________________________________________________________________________________________________  Health maintenance and recommendations      Similar recommendations as last visit       Offered to give patient  a copy      Questions were encouraged, asked and answered to the patient's  understanding and satisfaction. Questions if any regarding current medications and side effects, need for refills and importance of compliance to medications stressed.    Reviewed available prior notes, consults, prior visits, laboratory findings, radiology and cardiology relevant reports. Updated chart as applicable. I have reviewed the patient's medical history in detail and updated the computerized patient record as relevant.      Updated patient regarding any new or relevant abnormalities on review of records or any new findings on physical exam. Mentioned to patient about  purpose of visit and desirable health short and long term goals and objectives.    Primary to monitor CBC CMP Lipid panel and TSH as applicable    ___________________________________________________________________________________________________________________________________________            Keep follow up as scheduled or PRN sooner if any new or worsening problem.   No Follow-up on file.

## 2019-12-04 NOTE — PROGRESS NOTES
Subjective:     Encounter Date:12/04/2019      Patient ID: Ariana Gutierrez is a 79 y.o. female.    Chief Complaint:  History of Present Illness    {Common H&P Review Areas:25441}    ROS    Procedures       Objective:     Physical Exam    Lab Review:       Assessment:          Diagnosis Plan   1. Coronary artery disease involving native coronary artery of native heart without angina pectoris      95% ostial stenosis of small, non-dominant right coronary artery on left heart cath 11/6/19   2. Essential hypertension            Plan:

## 2019-12-09 ENCOUNTER — HOSPITAL ENCOUNTER (OUTPATIENT)
Dept: PULMONOLOGY | Facility: HOSPITAL | Age: 79
Discharge: HOME | End: 2019-12-09
Admitting: INTERNAL MEDICINE

## 2019-12-09 DIAGNOSIS — R06.09 DOE (DYSPNEA ON EXERTION): ICD-10-CM

## 2019-12-09 PROCEDURE — 94060 EVALUATION OF WHEEZING: CPT | Performed by: INTERNAL MEDICINE

## 2019-12-09 PROCEDURE — 94727 GAS DIL/WSHOT DETER LNG VOL: CPT

## 2019-12-09 PROCEDURE — 94729 DIFFUSING CAPACITY: CPT

## 2019-12-09 PROCEDURE — 94727 GAS DIL/WSHOT DETER LNG VOL: CPT | Performed by: INTERNAL MEDICINE

## 2019-12-09 PROCEDURE — 94060 EVALUATION OF WHEEZING: CPT

## 2019-12-09 PROCEDURE — 94729 DIFFUSING CAPACITY: CPT | Performed by: INTERNAL MEDICINE

## 2019-12-09 RX ORDER — ALBUTEROL SULFATE 2.5 MG/3ML
2.5 SOLUTION RESPIRATORY (INHALATION) ONCE
Status: COMPLETED | OUTPATIENT
Start: 2019-12-09 | End: 2019-12-09

## 2019-12-09 RX ADMIN — ALBUTEROL SULFATE 2.5 MG: 2.5 SOLUTION RESPIRATORY (INHALATION) at 14:36

## 2019-12-10 ENCOUNTER — OFFICE VISIT (OUTPATIENT)
Dept: FAMILY MEDICINE CLINIC | Facility: CLINIC | Age: 79
End: 2019-12-10

## 2019-12-10 VITALS
HEART RATE: 70 BPM | TEMPERATURE: 97.8 F | SYSTOLIC BLOOD PRESSURE: 147 MMHG | BODY MASS INDEX: 27.99 KG/M2 | WEIGHT: 158 LBS | DIASTOLIC BLOOD PRESSURE: 72 MMHG | OXYGEN SATURATION: 95 %

## 2019-12-10 DIAGNOSIS — L98.9 SKIN LESION OF HAND: Primary | ICD-10-CM

## 2019-12-10 PROCEDURE — 17000 DESTRUCT PREMALG LESION: CPT | Performed by: FAMILY MEDICINE

## 2019-12-10 NOTE — PROGRESS NOTES
Procedure   Destruction of Lesion  Date/Time: 12/10/2019 3:09 PM  Performed by: Bill Schilling MD  Authorized by: Bill Schilling MD   Preparation: Patient was prepped and draped in the usual sterile fashion.  Local anesthesia used: yes    Anesthesia:  Local anesthesia used: yes  Local Anesthetic: lidocaine 1% with epinephrine  Anesthetic total: 2 mL    Sedation:  Patient sedated: no    Patient tolerance: Patient tolerated the procedure well with no immediate complications  Comments: Patient had a partially amputated raised 5 mm skin lesion in the palm of her left hand.  This is been there for a few months and would bleed commonly.  After the adequate local anesthesia curettement was accomplished and hot cautery was accomplished.  It had the appearance of a probable hemangioma.  Pathology pending-wound care instructions given

## 2019-12-11 ENCOUNTER — TELEPHONE (OUTPATIENT)
Dept: OBSTETRICS AND GYNECOLOGY | Facility: CLINIC | Age: 79
End: 2019-12-11

## 2019-12-11 DIAGNOSIS — Z12.31 ENCOUNTER FOR SCREENING MAMMOGRAM FOR MALIGNANT NEOPLASM OF BREAST: Primary | ICD-10-CM

## 2019-12-13 ENCOUNTER — DOCUMENTATION (OUTPATIENT)
Dept: CARDIOLOGY | Facility: CLINIC | Age: 79
End: 2019-12-13

## 2019-12-17 LAB
DX ICD CODE: NORMAL
PATH REPORT.FINAL DX SPEC: NORMAL
PATH REPORT.GROSS SPEC: NORMAL
PATH REPORT.SITE OF ORIGIN SPEC: NORMAL
PATHOLOGIST NAME: NORMAL
PAYMENT PROCEDURE: NORMAL

## 2019-12-26 ENCOUNTER — HOSPITAL ENCOUNTER (OUTPATIENT)
Dept: MAMMOGRAPHY | Facility: HOSPITAL | Age: 79
Discharge: HOME | End: 2019-12-26
Admitting: OBSTETRICS & GYNECOLOGY

## 2019-12-26 PROCEDURE — 77067 SCR MAMMO BI INCL CAD: CPT

## 2019-12-26 PROCEDURE — 77063 BREAST TOMOSYNTHESIS BI: CPT

## 2020-01-13 ENCOUNTER — TELEPHONE (OUTPATIENT)
Dept: FAMILY MEDICINE CLINIC | Facility: CLINIC | Age: 80
End: 2020-01-13

## 2020-01-13 PROBLEM — R06.02 SHORTNESS OF BREATH: Status: ACTIVE | Noted: 2017-05-26

## 2020-01-13 PROBLEM — J98.4 RESTRICTIVE LUNG DISEASE: Status: ACTIVE | Noted: 2020-01-13

## 2020-01-13 PROBLEM — J84.9 INTERSTITIAL LUNG DISEASE (HCC): Status: ACTIVE | Noted: 2020-01-13

## 2020-01-13 NOTE — TELEPHONE ENCOUNTER
HAS SEARCHED ON INTERNET AND FEELS THAT SHE HAS A ??BURSA.     RT ELBOW AREA (BETWEEN GOLF & TENNIS BALL SIZED) FILLEDWITH FLUID. NOT PAINFUL UNLESS BUMPED-PAIN WHEN PLACES ELBOW ON TABLE.  DOES SHE NEED TO BE SEEN OR DOES SHE NEED A REFERRAL?

## 2020-01-13 NOTE — TELEPHONE ENCOUNTER
PT WAS ADVISED TO WEAR ACE WRAP 24/7 EXCEPT TO SHOWER.  PT DOES NOT WANT TO BE [facility]ERRED TO [facility]-DO YOU HAVE ANOTHER SUGGESTION?

## 2020-01-14 ENCOUNTER — OFFICE VISIT (OUTPATIENT)
Dept: CARDIOLOGY | Facility: CLINIC | Age: 80
End: 2020-01-14

## 2020-01-14 ENCOUNTER — OFFICE VISIT (OUTPATIENT)
Dept: PULMONOLOGY | Facility: CLINIC | Age: 80
End: 2020-01-14

## 2020-01-14 ENCOUNTER — HOSPITAL ENCOUNTER (OUTPATIENT)
Dept: GENERAL RADIOLOGY | Facility: HOSPITAL | Age: 80
Discharge: HOME | End: 2020-01-14
Admitting: INTERNAL MEDICINE

## 2020-01-14 VITALS
OXYGEN SATURATION: 90 % | HEIGHT: 63 IN | SYSTOLIC BLOOD PRESSURE: 122 MMHG | WEIGHT: 155 LBS | DIASTOLIC BLOOD PRESSURE: 70 MMHG | HEART RATE: 66 BPM | BODY MASS INDEX: 27.46 KG/M2

## 2020-01-14 VITALS
HEIGHT: 63 IN | DIASTOLIC BLOOD PRESSURE: 68 MMHG | WEIGHT: 155 LBS | HEART RATE: 71 BPM | SYSTOLIC BLOOD PRESSURE: 126 MMHG | OXYGEN SATURATION: 94 % | BODY MASS INDEX: 27.46 KG/M2

## 2020-01-14 DIAGNOSIS — R06.02 SHORTNESS OF BREATH: Primary | ICD-10-CM

## 2020-01-14 DIAGNOSIS — R06.02 SHORTNESS OF BREATH: ICD-10-CM

## 2020-01-14 DIAGNOSIS — I82.4Y9 DEEP VEIN THROMBOSIS (DVT) OF PROXIMAL LOWER EXTREMITY, UNSPECIFIED CHRONICITY, UNSPECIFIED LATERALITY (HCC): ICD-10-CM

## 2020-01-14 DIAGNOSIS — J98.4 RESTRICTIVE LUNG DISEASE: ICD-10-CM

## 2020-01-14 DIAGNOSIS — I25.10 CORONARY ARTERY DISEASE INVOLVING NATIVE CORONARY ARTERY OF NATIVE HEART WITHOUT ANGINA PECTORIS: Primary | ICD-10-CM

## 2020-01-14 DIAGNOSIS — J84.9 INTERSTITIAL LUNG DISEASE (HCC): ICD-10-CM

## 2020-01-14 DIAGNOSIS — J18.9 PNEUMONITIS: ICD-10-CM

## 2020-01-14 DIAGNOSIS — J84.9 INTERSTITIAL LUNG DISEASE (HCC): Primary | ICD-10-CM

## 2020-01-14 DIAGNOSIS — M41.9 SCOLIOSIS OF THORACOLUMBAR SPINE, UNSPECIFIED SCOLIOSIS TYPE: ICD-10-CM

## 2020-01-14 DIAGNOSIS — I10 ESSENTIAL HYPERTENSION: ICD-10-CM

## 2020-01-14 DIAGNOSIS — R20.2 PARESTHESIA: ICD-10-CM

## 2020-01-14 PROBLEM — R07.2 PRECORDIAL CHEST PAIN: Status: RESOLVED | Noted: 2019-10-14 | Resolved: 2020-01-14

## 2020-01-14 PROCEDURE — 99214 OFFICE O/P EST MOD 30 MIN: CPT | Performed by: INTERNAL MEDICINE

## 2020-01-14 PROCEDURE — 99204 OFFICE O/P NEW MOD 45 MIN: CPT | Performed by: INTERNAL MEDICINE

## 2020-01-14 PROCEDURE — 71046 X-RAY EXAM CHEST 2 VIEWS: CPT

## 2020-01-14 NOTE — PATIENT INSTRUCTIONS
Beclomethasone respiratory inhalation aerosol  What is this medicine?  BECLOMETHASONE (be kloe METH a sone) is a corticosteroid. It helps decrease inflammation in your lungs. This medicine is used to treat the symptoms of asthma. Never use this medicine for an acute asthma attack.  This medicine may be used for other purposes; ask your health care provider or pharmacist if you have questions.  COMMON BRAND NAME(S): Beclovent, QVAR, Vancenase, Vancenase AQ, Vanceril  What should I tell my health care provider before I take this medicine?  They need to know if you have any of these conditions:  -bone problems  -eye disease, vision problems  -glaucoma  -immune system problems  -infection, like chickenpox, tuberculosis, herpes, or fungal infection  -recent surgery or injury of mouth or throat  -taking corticosteroids by mouth  -an unusual or allergic reaction to beclomethasone, other corticosteroids, other medicines, foods, dyes, or preservatives  -pregnant or trying to get pregnant  -breast-feeding  How should I use this medicine?  This medicine is for inhalation through the mouth. Rinse your mouth with water after use. Make sure not to swallow the water. Follow the directions on your prescription label. This medicine works best if used regularly. Do not use more than directed. Do not stop taking except on your doctor's advice. Make sure that you are using your inhaler correctly. Ask you doctor or health care provider if you have any questions.  Talk to your pediatrician regarding the use of this medicine in children. While this drug may be prescribed for children as young as 5 years old for selected conditions, precautions do apply.  Overdosage: If you think you have taken too much of this medicine contact a poison control center or emergency room at once.  NOTE: This medicine is only for you. Do not share this medicine with others.  What if I miss a dose?  If you miss a dose, use it as soon as you remember. If it is  almost time for your next dose, use only that dose and continue with your regular schedule, spacing doses evenly. Do not use double or extra doses.  What may interact with this medicine?  Interactions are not expected.  This list may not describe all possible interactions. Give your health care provider a list of all the medicines, herbs, non-prescription drugs, or dietary supplements you use. Also tell them if you smoke, drink alcohol, or use illegal drugs. Some items may interact with your medicine.  What should I watch for while using this medicine?  Visit your doctor or health care professional for regular check ups. Use this medicine regularly. Tell your doctor if your symptoms do not improve. Do not use extra medicine. If your asthma symptoms get worse while you are using this medicine, call your doctor right away.  Do not come in contact with people who have chickenpox or the measles while you are taking this medicine. If you do, call your doctor right away.  What side effects may I notice from receiving this medicine?  Side effects that you should report to your doctor or health care professional as soon as possible:  -allergic reactions like skin rash, itching or hives, swelling of the face, lips, or tongue  -changes in vision  -chest pain, tightness  -fever, infection  -trouble breathing, wheezing  -unusual swelling  -white patches or sores in the mouth or throat  Side effects that usually do not require medical attention (report to your doctor or health care professional if they continue or are bothersome):  -burning, irritation in throat  -cough  -dry mouth  -headache  -unusual taste or smell  This list may not describe all possible side effects. Call your doctor for medical advice about side effects. You may report side effects to FDA at 1-047-FDA-8413.  Where should I keep my medicine?  Keep out of the reach of children.  Store at room temperature between 15 and 30 degrees C (59 and 86 degrees F). Do  not puncture the canister or throw on a fire or incinerator. Throw away any unused medicine after the expiration date.  NOTE: This sheet is a summary. It may not cover all possible information. If you have questions about this medicine, talk to your doctor, pharmacist, or health care provider.  © 2019 [employer] (2019-05-08 14:44:44)

## 2020-01-14 NOTE — PROGRESS NOTES
Subjective   Ariana Gutierrez is a 79 y.o. female.     Background: Pt with dyspnea, scoliosis, mild restrictive physiology 2019, CAD htn. ILD on cxr.    Chief Complaint   Patient presents with   • dyspnea on exertion        History of Present Illness   She was found to have CAD and could not get a stent.  She is medically managed.  She was in the hospital in November with illness and infiltrates and fever, much better.  She used a nebulizer at home for a while with some improvement.  She has had problems with allergies and cough for the past 2 years.  Never smoker.  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  No industrial exposures.  She has scoliosis and back pain with that.  She has had a knee replacement that needed to be replaced due to infection.  She has had perforated bowel, bursitis and swelling around the right knee.  Dr. Schilling has asked me to see her.    Medical/Family/Social History   has a past medical history of Bacterial infection, CAD (coronary artery disease), Chest pain, Chronic back pain, Deep vein thrombosis (CMS/HCC), Diverticulosis of intestine (8/16/2016), Gastroesophageal reflux disease without esophagitis (5/26/2017), Hypertension, Irritable bowel syndrome (11/2/2011), Low back pain, Palpitations, Paresthesia, Perforated bowel (CMS/HCC), Scoliosis, Spinal stenosis, and Vascular disease, peripheral (CMS/HCC).   has a past surgical history that includes Total knee arthroplasty (Right); Cataract extraction (Bilateral); Colonoscopy; Back surgery; Small Bowel Resection (2016); Colon surgery; Renal artery stent (Right); Lumbar laminectomy; Total knee arthroplasty; Vascular surgery; Cardiac catheterization (Left, 11/6/2019); and Breast biopsy (Right, 25 yrs ago).  family history includes Breast cancer (age of onset: 80) in her mother; Coronary artery disease in her mother; Heart disease in her brother and mother; No Known Problems in her maternal grandfather, maternal grandmother,  paternal grandfather, paternal grandmother, and sister; Peripheral vascular disease in her mother.   reports that she has never smoked. She has never used smokeless tobacco. She reports that she does not drink alcohol or use drugs.  No Known Allergies  Medications    Current Outpatient Medications:   •  aspirin 81 MG chewable tablet, Chew 81 mg Daily., Disp: , Rfl:   •  atorvastatin (LIPITOR) 20 MG tablet, Take 1 tablet by mouth Daily., Disp: 90 tablet, Rfl: 3  •  benazepril (LOTENSIN) 20 MG tablet, TAKE 1 TABLET EVERY DAY, Disp: 90 tablet, Rfl: 1  •  Docusate Calcium (STOOL SOFTENER PO), Take 1 tablet by mouth Daily., Disp: , Rfl:   •  gabapentin (NEURONTIN) 800 MG tablet, Take 1 tablet by mouth 3 (Three) Times a Day., Disp: 270 tablet, Rfl: 1  •  guaiFENesin (MUCINEX) 600 MG 12 hr tablet, Take 2 tablets by mouth 2 (Two) Times a Day As Needed for Cough., Disp: 60 tablet, Rfl: 0  •  HYDROcodone-acetaminophen (NORCO)  MG per tablet, Take 1 tablet by mouth Every 6 (Six) Hours As Needed for Moderate Pain ., Disp: 90 tablet, Rfl: 0  •  ipratropium-albuterol (DUO-NEB) 0.5-2.5 mg/3 ml nebulizer, Take 3 mL by nebulization Every 6 (Six) Hours While Awake. Diagnosis of pneumonitis J69.8, Disp: 360 mL, Rfl: 2  •  isosorbide mononitrate (IMDUR) 30 MG 24 hr tablet, Take 1 tablet by mouth Daily., Disp: 30 tablet, Rfl: 11  •  metoprolol succinate XL (TOPROL-XL) 50 MG 24 hr tablet, Take 1 tablet by mouth Daily., Disp: 90 tablet, Rfl: 3  •  Multiple Vitamins-Minerals (PRESERVISION AREDS 2 PO), Take  by mouth., Disp: , Rfl:   •  nitroglycerin (NITROSTAT) 0.4 MG SL tablet, 1 under the tongue as needed for angina, may repeat q5mins for up three doses, Disp: 100 tablet, Rfl: 11  •  nystatin (MYCOSTATIN) 808679 UNIT/ML suspension, Swish and swallow 5 mL 4 (Four) Times a Day., Disp: 473 mL, Rfl: 0  •  omeprazole (priLOSEC) 20 MG capsule, TAKE 1 CAPSULE EVERY DAY, Disp: 90 capsule, Rfl: 1  •  polyethylene glycol (MIRALAX) powder,  "Take 17 g by mouth Daily., Disp: , Rfl:   •  saccharomyces boulardii (FLORASTOR) 250 MG capsule, Take 1 capsule by mouth Daily., Disp: 30 capsule, Rfl: 2    Review of Systems   Constitutional: Negative for chills, fever, unexpected weight gain and unexpected weight loss.   HENT: Negative for trouble swallowing and voice change.    Eyes: Negative for photophobia and visual disturbance.   Respiratory: Negative for shortness of breath and stridor.    Cardiovascular: Negative for chest pain.   Gastrointestinal: Negative for abdominal pain, nausea, vomiting and GERD.   Genitourinary: Negative for difficulty urinating and hematuria.   Musculoskeletal: Positive for back pain. Negative for gait problem and joint swelling.   Skin: Negative for pallor and skin lesions.   Neurological: Negative for tremors, weakness and confusion.   Hematological: Negative for adenopathy. Does not bruise/bleed easily.   Psychiatric/Behavioral: The patient is not nervous/anxious.        Objective   /70   Pulse 66   Ht 160 cm (63\")   Wt 70.3 kg (155 lb)   LMP  (LMP Unknown)   SpO2 90% Comment: 90 at rest and 86 with exertion  Breastfeeding No   BMI 27.46 kg/m²   Physical Exam   Constitutional: She appears well-developed. She does not appear ill. No distress.   HENT:   Head: Atraumatic.   Nose: Nose normal.   Eyes: Conjunctivae and EOM are normal. No scleral icterus.   Neck: Neck supple.   Cardiovascular: Normal rate, regular rhythm, S1 normal and S2 normal.   Pulmonary/Chest: Effort normal. She has wheezes. She has rales.   Abdominal: Soft. She exhibits no distension. There is no tenderness.   Musculoskeletal: She exhibits no deformity.   Lymphadenopathy:     She has no cervical adenopathy.   Neurological: She is alert.   Skin: Skin is warm. No rash noted.   Psychiatric: She has a normal mood and affect.     -----------------------------------------------------------------------------------------------  EXAMINATION: Chest 2 views " 11/14/2019  HISTORY: Congestion and cough  FINDINGS: Today's exam is compared to previous study of 11/06/2019.  There is persistent rather diffuse interstitial lung disease within both  lungs. There is no effusion or free air present.  IMPRESSION:  . Persistent rather diffuse interstitial lung disease. This  could represent some residual interstitial edema versus a interstitial  pneumonitis. There is no effusion or free air present.  This report was finalized on 11/14/2019 10:13 by Dr. Sebastian Chau MD.    My interpretation of CXR: bilateral interstitial infiltrates      Mammo Screening Digital Tomosynthesis Bilateral With Cad    Result Date: 12/26/2019  Impression: 1. Benign mammogram. BI-RADS 2. 2. Screening mammography recommended in one year. 3. Computer aided detection was utilized. 3-D Tomosynthesis was performed.   BI-RADS CATEGORY  0:  Needs additional imaging evaluation or needs                                               comparison to a previous exam. BI-RADS CATEGORY  1:  Negative. BI-RADS CATEGORY  2:  Benign. BI-RADS CATEGORY  3:  Probably benign finding-Short interval followup                                               recommended. BI-RADS CATEGORY  4:  Suspicious abnormality-Consider biopsy.                                        4a:  Low suspicion of malignancy.                                        4b:  Intermediate suspicion of malignancy.                                        4c:  Moderate suspicion of malignancy, but not classic                                                for malignancy. BI-RADS CATEGORY  5:  Highly suggestive of malignancy-Take appropriate                                               action. BI-RADS CATEGORY  6:  Known biopsy proven breast malignancy.    This report was finalized on 12/26/2019 14:45 by Dr. Simon Saavedra  MD.    -----------------------------------------------------------------------------------------------        -----------------------------------------------------------------------------------------------  Assessment/Plan   Problem List Items Addressed This Visit        Pulmonary Problems    Shortness of breath - Primary    Restrictive lung disease    Interstitial lung disease (CMS/HCC)       Other    Scoliosis        Patient's Body mass index is 27.46 kg/m². BMI is within normal parameters. No follow-up required..      Since she is wheezing, will add qvar trial 80 mcg bid for 2 mos; pt will let us know if it helps.  continue nebulizer as needed  Check cxr to follow up on interstitial infiltrates.  If persistent then HRCT will be indicated.  Follow up spirometry in a few months       Electronically signed by Fawad Bhat MD, 1/14/2020, 1:38 PM

## 2020-01-14 NOTE — PROGRESS NOTES
Please call the patient regarding her abnormal result.  It did show some persistent changes that I do want to check with the CT scan.  Did not look worse than before

## 2020-01-14 NOTE — PROGRESS NOTES
Ariana Gutierrez  [id number]  1940  79 y.o.  female    Referring Provider: Bill Schilling MD    Reason for Follow-up Visit:  Here for follow up after cardiac testing.    Routine follow up.  Chronic low back pain    Had MADELIN in 1/18 no clots or vegetation  Zio patch showed SVT 72 episodes longest 53 maximum rate 181 BPM     Low risk stress test for stress induced myocardial ischemia  Cardiac workup test results as below   Cath small non dominant RCA severe stenosis  Recovering well     Subjective       Feeling much better  Took a trip to [facility]     Mild chronic exertional shortness of breath on exertion relieved with rest  No significant cough or wheezing    No palpitations  No associated chest pain  No significant pedal edema    No fever or chills  No significant expectoration    No hemoptysis  No presyncope or syncope     Joint pain in small, medium and large joints   Chronic low back pain    Less anxious         History of present illness:  Ariana Gutierrez is a 79 y.o. yo female with history of hypertension who presents today for   Chief Complaint   Patient presents with   • Coronary Artery Disease     1 mo f/u   .    History  Past Medical History:   Diagnosis Date   • Bacterial infection    • CAD (coronary artery disease)    • Chest pain    • Chronic back pain    • Deep vein thrombosis (CMS/HCC)     S/P KNEE SURGERY 10/2017   • Diverticulosis of intestine 8/16/2016   • Gastroesophageal reflux disease without esophagitis 5/26/2017   • Hypertension    • Irritable bowel syndrome 11/2/2011   • Low back pain    • Palpitations    • Paresthesia     toes   • Perforated bowel (CMS/HCC)    • Scoliosis    • Spinal stenosis    • Vascular disease, peripheral (CMS/HCC)    ,   Past Surgical History:   Procedure Laterality Date   • BACK SURGERY     • BREAST BIOPSY Right 25 yrs ago   • CARDIAC CATHETERIZATION Left 11/6/2019    Procedure: Cardiac Catheterization/Vascular Study CARMEN OK;  Surgeon: Hawk Guerin MD;   Location: [facility] CATH INVASIVE LOC[facility];  Service: Cardiology   • CATARACT EXTRACTION Bilateral    • COLON RESECTION SMALL BOWEL  2016    with colostomy for 6 months, already revised.   • COLON SURGERY     • COLONOSCOPY     • LUMBAR LAMINECTOMY     • RENAL ARTERY STENT Right    • TOTAL KNEE ARTHROPLASTY Right    • TOTAL KNEE ARTHROPLASTY     • VASCULAR SURGERY      10/2017 - DR GARCIA   ,   Family History   Problem Relation Age of Onset   • Breast cancer Mother 80   • Heart disease Mother    • Coronary artery disease Mother    • Peripheral vascular disease Mother    • No Known Problems Sister    • Heart disease Brother    • No Known Problems Maternal Grandmother    • No Known Problems Maternal Grandfather    • No Known Problems Paternal Grandmother    • No Known Problems Paternal Grandfather    ,   Social History     Tobacco Use   • Smoking status: Never Smoker   • Smokeless tobacco: Never Used   Substance Use Topics   • Alcohol use: No     Frequency: Never   • Drug use: No   ,     Medications  Current Outpatient Medications   Medication Sig Dispense Refill   • aspirin 81 MG chewable tablet Chew 81 mg Daily.     • atorvastatin (LIPITOR) 20 MG tablet Take 1 tablet by mouth Daily. 90 tablet 3   • benazepril (LOTENSIN) 20 MG tablet TAKE 1 TABLET EVERY DAY 90 tablet 1   • Docusate Calcium (STOOL SOFTENER PO) Take 1 tablet by mouth Daily.     • gabapentin (NEURONTIN) 800 MG tablet Take 1 tablet by mouth 3 (Three) Times a Day. 270 tablet 1   • guaiFENesin (MUCINEX) 600 MG 12 hr tablet Take 2 tablets by mouth 2 (Two) Times a Day As Needed for Cough. 60 tablet 0   • HYDROcodone-acetaminophen (NORCO)  MG per tablet Take 1 tablet by mouth Every 6 (Six) Hours As Needed for Moderate Pain . 90 tablet 0   • ipratropium-albuterol (DUO-NEB) 0.5-2.5 mg/3 ml nebulizer Take 3 mL by nebulization Every 6 (Six) Hours While Awake. Diagnosis of pneumonitis J69.8 360 mL 2   • isosorbide mononitrate (IMDUR) 30 MG 24 hr tablet Take 1  "tablet by mouth Daily. 30 tablet 11   • metoprolol succinate XL (TOPROL-XL) 50 MG 24 hr tablet Take 1 tablet by mouth Daily. 90 tablet 3   • Multiple Vitamins-Minerals (PRESERVISION AREDS 2 PO) Take  by mouth.     • nitroglycerin (NITROSTAT) 0.4 MG SL tablet 1 under the tongue as needed for angina, may repeat q5mins for up three doses 100 tablet 11   • nystatin (MYCOSTATIN) 430516 UNIT/ML suspension Swish and swallow 5 mL 4 (Four) Times a Day. 473 mL 0   • omeprazole (priLOSEC) 20 MG capsule TAKE 1 CAPSULE EVERY DAY 90 capsule 1   • polyethylene glycol (MIRALAX) powder Take 17 g by mouth Daily.     • saccharomyces boulardii (FLORASTOR) 250 MG capsule Take 1 capsule by mouth Daily. 30 capsule 2     No current facility-administered medications for this visit.        Allergies:  Patient has no known allergies.    Review of Systems  Review of Systems   Constitution: Positive for malaise/fatigue. Negative for fever.   HENT: Negative.    Eyes: Negative.    Cardiovascular: Positive for dyspnea on exertion and palpitations. Negative for chest pain, claudication, cyanosis, irregular heartbeat, leg swelling, near-syncope, orthopnea, paroxysmal nocturnal dyspnea and syncope.   Respiratory: Positive for cough and shortness of breath.    Endocrine: Negative.    Hematologic/Lymphatic: Negative.    Skin: Negative.    Musculoskeletal: Positive for arthritis and joint pain.   Gastrointestinal: Positive for flatus. Negative for anorexia.   Genitourinary: Negative.    Neurological: Positive for weakness.   Psychiatric/Behavioral: Negative.        Objective     Physical Exam:  /68   Pulse 71   Ht 160 cm (63\")   Wt 70.3 kg (155 lb)   LMP  (LMP Unknown)   SpO2 94%   BMI 27.46 kg/m²   Physical Exam   Constitutional: She appears well-developed.   HENT:   Head: Normocephalic.   Neck: Normal carotid pulses and no JVD present. No tracheal tenderness present. Carotid bruit is not present. No tracheal deviation and no edema present. "   Cardiovascular: Regular rhythm, normal heart sounds and normal pulses.   Pulmonary/Chest: Effort normal. No stridor. She has no wheezes. She has no rales.   Abdominal: Soft. She exhibits no distension. There is no hepatosplenomegaly. There is no tenderness.   Neurological: She is alert. She has normal strength. No cranial nerve deficit or sensory deficit.   Skin: Skin is warm.   Psychiatric: She has a normal mood and affect. Her speech is normal and behavior is normal.       Results Review:      Results for orders placed during the hospital encounter of 11/14/19   Adult Transthoracic Echo Complete W/ Cont if Necessary Per Protocol    Narrative · Estimated EF = 65%.  · Left ventricular systolic function is normal.  · Left ventricular diastolic dysfunction.  · Mild pulmonary hypertension is present.           Conclusion     No significant stenosis noted of left dominant epicardial coronary arterial tree.  95% ostial stenosis of small nondominant right coronary artery which is approximately 1.5 mm to 2 mm in diameter        Plan     Maximize medical therapy  If continues to have exertional jaw pain that is reflective of angina would consider intervention of small nondominant right coronary ostial stenosis.  Patient extremely restless and tries to either sit up or move both her legs during the procedure and therefore will require anesthesia to sedate her if this was considered in future     Hydration   Observation  Risk factor modifications  Workup for non cardiac causes of chest pain         Results for orders placed during the hospital encounter of 09/09/19   Adult Stress Echo W/ Cont or Stress Agent if Necessary Per Protocol    Narrative · Estimated EF = 55%.  · Left ventricular systolic function is normal.  · Low risk stress test for stress induced myocardial ischemia         Ariana TURNER [facility]   Holter Monitor 72Hr-21Day (0296T/0298T)   Order# [id number]   Reading physician: Hawk Guerin MD Ordering physician:  Hawk Guerin MD Study date: 19   Patient Information     Patient Name  Ariana Gutierrez MRN  [id number] Sex  Female  (Age)  1940 (79 y.o.)   Interpretation Summary        · Average HR: 73. Min HR: 46. Max HR: 136.     · ~14 day monitor ,entire report was reviewed  · The predominant rhythm noted during the testing period was sinus rhythm.  · Rare [3306] supraventricular ectopics with an APC burden of: < 1%  · Rare [132] premature ventricular contractions with a PVC burden of: < 1%  · 72 runs of supraventricular tachycardia longest 53 beats at a maximum rate of 181 bpm  · Single 3 beat run of nonsustained ventricular tachycardia at a rate of 200 bpm  · No correlated arrhythmia  · No significant pauses           Conclusion: Baseline rhythm is sinus.  No significant ectopy  72 runs of supraventricular tachycardia longest 53 beats at a maximum rate of 181 bpm  Single 3 beat run of nonsustained ventricular tachycardia at a rate of 200 bpm               Procedures    Assessment/Plan   Patient Active Problem List   Diagnosis   • Essential hypertension   • Gastroesophageal reflux disease without esophagitis   • Shortness of breath   • CAD (coronary artery disease)   • Scoliosis   • Low back pain   • Perforated bowel (CMS/HCC)   • Paresthesia   • Palpitations   • Chronic back pain   • Deep vein thrombosis (CMS/HCC)   • Bacterial infection   • Joint infection (CMS/HCC)   • Pneumonitis   • Chronic pain   • Restrictive lung disease   • Interstitial lung disease (CMS/HCC)     Results for orders placed during the hospital encounter of 19   Adult Stress Echo W/ Cont or Stress Agent if Necessary Per Protocol    Narrative · Estimated EF = 55%.  · Left ventricular systolic function is normal.  · Low risk stress test for stress induced myocardial ischemia             Plan     Continue same medications    Discussed with the patient and all questioned fully answered. She will call me if any problems arise.        ____________________________________________________________________________________________________________________________________________  Health maintenance and recommendations      Similar recommendations as last visit       Offered to give patient  a copy      Questions were encouraged, asked and answered to the patient's  understanding and satisfaction. Questions if any regarding current medications and side effects, need for refills and importance of compliance to medications stressed.    Reviewed available prior notes, consults, prior visits, laboratory findings, radiology and cardiology relevant reports. Updated chart as applicable. I have reviewed the patient's medical history in detail and updated the computerized patient record as relevant.      Updated patient regarding any new or relevant abnormalities on review of records or any new findings on physical exam. Mentioned to patient about purpose of visit and desirable health short and long term goals and objectives.    Primary to monitor CBC CMP Lipid panel and TSH as applicable    ___________________________________________________________________________________________________________________________________________     Return in about 6 months (around 7/14/2020).

## 2020-01-15 DIAGNOSIS — J84.9 INTERSTITIAL LUNG DISEASE (HCC): Primary | ICD-10-CM

## 2020-01-24 ENCOUNTER — OFFICE VISIT (OUTPATIENT)
Dept: FAMILY MEDICINE CLINIC | Facility: CLINIC | Age: 80
End: 2020-01-24

## 2020-01-24 VITALS
SYSTOLIC BLOOD PRESSURE: 124 MMHG | HEART RATE: 70 BPM | TEMPERATURE: 97.9 F | DIASTOLIC BLOOD PRESSURE: 76 MMHG | WEIGHT: 155 LBS | BODY MASS INDEX: 27.46 KG/M2 | OXYGEN SATURATION: 95 %

## 2020-01-24 DIAGNOSIS — M00.9 JOINT INFECTION (HCC): ICD-10-CM

## 2020-01-24 DIAGNOSIS — R52 PAIN: Primary | ICD-10-CM

## 2020-01-24 PROCEDURE — 99213 OFFICE O/P EST LOW 20 MIN: CPT | Performed by: FAMILY MEDICINE

## 2020-01-24 NOTE — PROGRESS NOTES
Subjective   Ariana Gutierrez is a 79 y.o. female.     79-year-old with right elbow pain    Pain   This is a new problem. The current episode started 1 to 4 weeks ago. The problem occurs daily. The problem has been gradually improving. Associated symptoms include arthralgias. Pertinent negatives include no chest pain. Associated symptoms comments: Olecranon bursitis-right elbow. Nothing aggravates the symptoms. Treatments tried: Impression. The treatment provided moderate relief.       The following portions of the patient's history were reviewed and updated as appropriate: allergies, current medications, past family history, past medical history, past social history, past surgical history and problem list.    Review of Systems   Cardiovascular: Negative for chest pain.   Musculoskeletal: Positive for arthralgias.        Chronic effusion right knee--olecranon bursa right elbow       Objective   Physical Exam   Constitutional: She is oriented to person, place, and time.   Cardiovascular: Normal rate and regular rhythm.   Pulmonary/Chest: Effort normal and breath sounds normal.   Musculoskeletal: She exhibits deformity.   Swollen bursa right elbow consistent with olecranon bursitis-no evidence of infection--chronic effusion right knee   Neurological: She is alert and oriented to person, place, and time.   Psychiatric: She has a normal mood and affect. Her behavior is normal. Judgment and thought content normal.   Nursing note and vitals reviewed.      Assessment/Plan   Ariana was seen today for elbow pain.    Diagnoses and all orders for this visit:    Pain    Joint infection (CMS/HCC)    Other orders  -     Cancel: Basic Metabolic Panel  -     Cancel: Hemoglobin A1c  -     Cancel: CBC & Differential         Continue compression

## 2020-01-28 ENCOUNTER — HOSPITAL ENCOUNTER (OUTPATIENT)
Dept: CT IMAGING | Facility: HOSPITAL | Age: 80
Discharge: HOME | End: 2020-01-28
Admitting: INTERNAL MEDICINE

## 2020-01-28 DIAGNOSIS — J84.9 INTERSTITIAL LUNG DISEASE (HCC): ICD-10-CM

## 2020-01-28 PROCEDURE — 71250 CT THORAX DX C-: CPT

## 2020-01-28 NOTE — PROGRESS NOTES
Please call the patient regarding her abnormal result.  It showed a pattern called usual interstitial pneumonitis.  There are some medicines we can use to treat this but they have significant gastrointestinal side effects and we need to go over that before trying to rx them.  Have her come in sooner to see me or APRN when cancellation makes a spot available.

## 2020-01-30 ENCOUNTER — OFFICE VISIT (OUTPATIENT)
Dept: PULMONOLOGY | Facility: CLINIC | Age: 80
End: 2020-01-30

## 2020-01-30 VITALS
SYSTOLIC BLOOD PRESSURE: 130 MMHG | DIASTOLIC BLOOD PRESSURE: 72 MMHG | HEART RATE: 60 BPM | OXYGEN SATURATION: 97 % | BODY MASS INDEX: 27.64 KG/M2 | HEIGHT: 63 IN | WEIGHT: 156 LBS

## 2020-01-30 DIAGNOSIS — J98.4 RESTRICTIVE LUNG DISEASE: ICD-10-CM

## 2020-01-30 DIAGNOSIS — J84.9 INTERSTITIAL LUNG DISEASE (HCC): ICD-10-CM

## 2020-01-30 DIAGNOSIS — R06.09 DOE (DYSPNEA ON EXERTION): ICD-10-CM

## 2020-01-30 DIAGNOSIS — R06.02 SHORTNESS OF BREATH: ICD-10-CM

## 2020-01-30 DIAGNOSIS — J84.112 USUAL INTERSTITIAL PNEUMONITIS (HCC): Primary | ICD-10-CM

## 2020-01-30 DIAGNOSIS — M41.9 SCOLIOSIS OF THORACOLUMBAR SPINE, UNSPECIFIED SCOLIOSIS TYPE: ICD-10-CM

## 2020-01-30 PROCEDURE — 99214 OFFICE O/P EST MOD 30 MIN: CPT | Performed by: INTERNAL MEDICINE

## 2020-01-30 PROCEDURE — 36415 COLL VENOUS BLD VENIPUNCTURE: CPT | Performed by: INTERNAL MEDICINE

## 2020-01-30 NOTE — PROGRESS NOTES
Subjective   Ariana Gutierrez is a 79 y.o. female.     Background: Pt with dyspnea, scoliosis, mild restrictive physiology 2019, CAD htn. ILD on cxr.    Chief Complaint   Patient presents with   • Discuss CT    interstitial lung disease    History of Present Illness   She was not around asbestos as far as she knew.  No exposures to unusual organic compounds or animals.  She was given some rifamixin at one time.  She has been using the steroid inhaler and neb treatments.  We had seen her for restrictive lung disease.  She has hx scoliosis and severe bowel disease which was life threatening about 3 years ago.  No hx autoimmune disease.  She has moderate dyspnea in chest on exertion alleviated by rest for several months, unimproved    Medical/Family/Social History   has a past medical history of Bacterial infection, CAD (coronary artery disease), Chest pain, Chronic back pain, Deep vein thrombosis (CMS/HCC), Diverticulosis of intestine (8/16/2016), Gastroesophageal reflux disease without esophagitis (5/26/2017), Hypertension, Irritable bowel syndrome (11/2/2011), Low back pain, Palpitations, Paresthesia, Perforated bowel (CMS/HCC), Scoliosis, Spinal stenosis, and Vascular disease, peripheral (CMS/HCC).   has a past surgical history that includes Total knee arthroplasty (Right); Cataract extraction (Bilateral); Colonoscopy; Back surgery; Small Bowel Resection (2016); Colon surgery; Renal artery stent (Right); Lumbar laminectomy; Total knee arthroplasty; Vascular surgery; Cardiac catheterization (Left, 11/6/2019); and Breast biopsy (Right, 25 yrs ago).  family history includes Breast cancer (age of onset: 80) in her mother; Coronary artery disease in her mother; Heart disease in her brother and mother; No Known Problems in her maternal grandfather, maternal grandmother, paternal grandfather, paternal grandmother, and sister; Peripheral vascular disease in her mother.   reports that she has never smoked. She has never  used smokeless tobacco. She reports that she does not drink alcohol or use drugs.  No Known Allergies  Medications    Current Outpatient Medications:   •  aspirin 81 MG chewable tablet, Chew 81 mg Daily., Disp: , Rfl:   •  atorvastatin (LIPITOR) 20 MG tablet, Take 1 tablet by mouth Daily., Disp: 90 tablet, Rfl: 3  •  Beclomethasone Diprop HFA (QVAR REDIHALER) 80 MCG/ACT inhaler, Inhale 1 puff 2 (Two) Times a Day., Disp: 1 inhaler, Rfl: 0  •  benazepril (LOTENSIN) 20 MG tablet, TAKE 1 TABLET EVERY DAY, Disp: 90 tablet, Rfl: 1  •  Docusate Calcium (STOOL SOFTENER PO), Take 1 tablet by mouth Daily., Disp: , Rfl:   •  gabapentin (NEURONTIN) 800 MG tablet, Take 1 tablet by mouth 3 (Three) Times a Day., Disp: 270 tablet, Rfl: 1  •  guaiFENesin (MUCINEX) 600 MG 12 hr tablet, Take 2 tablets by mouth 2 (Two) Times a Day As Needed for Cough., Disp: 60 tablet, Rfl: 0  •  HYDROcodone-acetaminophen (NORCO)  MG per tablet, Take 1 tablet by mouth Every 6 (Six) Hours As Needed for Moderate Pain ., Disp: 90 tablet, Rfl: 0  •  ipratropium-albuterol (DUO-NEB) 0.5-2.5 mg/3 ml nebulizer, Take 3 mL by nebulization Every 6 (Six) Hours While Awake. Diagnosis of pneumonitis J69.8, Disp: 360 mL, Rfl: 2  •  isosorbide mononitrate (IMDUR) 30 MG 24 hr tablet, Take 1 tablet by mouth Daily., Disp: 30 tablet, Rfl: 11  •  metoprolol succinate XL (TOPROL-XL) 50 MG 24 hr tablet, Take 1 tablet by mouth Daily., Disp: 90 tablet, Rfl: 3  •  Multiple Vitamins-Minerals (PRESERVISION AREDS 2 PO), Take  by mouth., Disp: , Rfl:   •  nitroglycerin (NITROSTAT) 0.4 MG SL tablet, 1 under the tongue as needed for angina, may repeat q5mins for up three doses, Disp: 100 tablet, Rfl: 11  •  nystatin (MYCOSTATIN) 620980 UNIT/ML suspension, Swish and swallow 5 mL 4 (Four) Times a Day., Disp: 473 mL, Rfl: 0  •  omeprazole (priLOSEC) 20 MG capsule, TAKE 1 CAPSULE EVERY DAY, Disp: 90 capsule, Rfl: 1  •  polyethylene glycol (MIRALAX) powder, Take 17 g by mouth  "Daily., Disp: , Rfl:   •  saccharomyces boulardii (FLORASTOR) 250 MG capsule, Take 1 capsule by mouth Daily., Disp: 30 capsule, Rfl: 2    Review of Systems   Eyes: Negative for visual disturbance.   Respiratory: Negative for choking.    Gastrointestinal: Negative for vomiting and GERD.   Musculoskeletal: Negative for arthralgias.   Skin: Negative for color change and rash.         Objective   /72   Pulse 60   Ht 160 cm (63\")   Wt 70.8 kg (156 lb)   LMP  (LMP Unknown)   SpO2 97% Comment: RA  Breastfeeding No   BMI 27.63 kg/m²   Physical Exam   Constitutional: She appears well-developed. She does not appear ill. No distress.   HENT:   Head: Atraumatic.   Nose: Nose normal.   Eyes: Conjunctivae and EOM are normal. No scleral icterus.   Neck: Neck supple.   Cardiovascular: Normal rate, regular rhythm, S1 normal and S2 normal.   Pulmonary/Chest: Effort normal. She has rales (right base).   Abdominal: Soft. She exhibits no distension. There is no tenderness.   Musculoskeletal: She exhibits no deformity.   Lymphadenopathy:     She has no cervical adenopathy.   Neurological: She is alert.   Skin: Skin is warm. No rash noted. No cyanosis. Nails show no clubbing.   Psychiatric: She has a normal mood and affect.        -----------------------------------------------------------------------------------------------    Ct Chest Hi Resolution    Result Date: 1/28/2020  Impression: 1. Predominantly bibasilar honeycombing with some peripheral interstitial thickening and bronchiectasis. This is a typical pattern for usual interstitial pneumonia (UIP). 2. Coronary artery calcifications and trace pericardial fluid. 3. Stable subcentimeter low-density liver lesions compared to 2017. Stability favors benignity. This report was finalized on 01/28/2020 14:08 by Dr Ary Irving MD.    CT reviewed by me: uip pattern with lower lobe honeycombing and traction bronchiectasis.  Not clearly apparent on lung cuts of prior ct " abdomen in 2017  -----------------------------------------------------------------------------------------------       -----------------------------------------------------------------------------------------------  Assessment/Plan   Problem List Items Addressed This Visit        Pulmonary Problems    Shortness of breath    Restrictive lung disease    Interstitial lung disease (CMS/HCC)    Relevant Orders    CT Chest Hi Resolution    Usual interstitial pneumonitis (CMS/HCC) - Primary    Relevant Orders    CT Chest Hi Resolution    ANSHUL With / DsDNA, RNP, Sjogrens A / B, Smith    C-reactive Protein    Hypersensitivity Pneumonitis Profile    Rheumatoid Factor    Sedimentation Rate    Rheumatoid Factor    BERGERON (dyspnea on exertion)       Other    Scoliosis        Patient's Body mass index is 27.63 kg/m². BMI is within normal parameters. No follow-up required..      Will check labs for autoimmunity and HP.  Recheck ct and PFT in 3 mos  She can take the inhaled medicines prn  We had a lengthy visit counseling re nature of UIP, possible underlying diagnoses, variable and unpredictable prognosis given data we have at this point  She would be a poor candidate for esbriet or ofev.       Electronically signed by Fawad Bhat MD, 1/31/2020, 5:15 AM

## 2020-01-30 NOTE — PATIENT INSTRUCTIONS
Pulmonary Fibrosis    Pulmonary fibrosis is a type of lung disease that causes scarring. Over time, the scar tissue builds up in the air sacs of your lungs (alveoli). This makes it hard for you to breathe. Less oxygen can get into your blood.  Scarring from pulmonary fibrosis gets worse over time. This damage is permanent and may lead to other serious health problems.  What are the causes?  There are many different causes of pulmonary fibrosis. Sometimes the cause is not known. This is called idiopathic pulmonary fibrosis. Other causes include:  · Exposure to chemicals and substances found in agricultural, farm, construction, or factory work. These include mold, asbestos, silica, metal dusts, and toxic fumes.  · Sarcoidosis. In this disease, areas of inflammatory cells (granulomas) form and most often affect the lungs.  · Autoimmune diseases. These include diseases such as rheumatoid arthritis, systemic sclerosis, or connective tissue disease.  · Taking certain medicines. These include drugs used in radiation therapy or used to treat seizures, heart problems, and some infections.  What increases the risk?  You are more likely to develop this condition if:  · You have a family history of the disease.  · You are older. The condition is more common in older adults.  · You have a history of smoking.  · You have a job that exposes you to certain chemicals.  · You have gastroesophageal reflux disease (GERD).  What are the signs or symptoms?  Symptoms of this condition include:  · Difficulty breathing that gets worse with activity.  · Shortness of breath (dyspnea).  · Dry, hacking cough.  · Rapid, shallow breathing during exercise or while at rest.  · Bluish skin and lips.  · Loss of appetite.  · Weakness.  · Weight loss and fatigue.  · Rounded and enlarged fingertips (clubbing).  How is this diagnosed?  This condition may be diagnosed based on:  · Your symptoms and medical history.  · A physical exam.  You may also have  tests, including:  · A test that involves looking inside your lungs with an instrument (bronchoscopy).  · Imaging studies of your lungs and heart.  · Tests to measure how well you are breathing (pulmonary function tests).  · Blood tests.  · Tests to see how well your lungs work while you are walking (pulmonary stress test).  · A procedure to remove a lung tissue sample to look at it under a microscope (biopsy).  How is this treated?  There is no cure for pulmonary fibrosis. Treatment focuses on managing symptoms and preventing scarring from getting worse. This may include:  · Medicines, such as:  ? Steroids to prevent permanent lung changes.  ? Medicines to suppress your body's defense system (immune system).  ? Medicines to help with lung function by reducing inflammation or scarring.  · Ongoing monitoring with X-rays and lab work.  · Oxygen therapy.  · Pulmonary rehabilitation.  · Surgery. In some cases, a lung transplant is possible.  Follow these instructions at home:         Medicines  · Take over-the-counter and prescription medicines only as told by your health care provider.  · Keep your vaccinations up to date as recommended by your health care provider.  General instructions  · Do not use any products that contain nicotine or tobacco, such as cigarettes and e-cigarettes. If you need help quitting, ask your health care provider.  · Get regular exercise, but do not overexert yourself. Ask your health care provider to suggest some activities that are safe for you to do.  ? If you have physical limitations, you may get exercise by walking, using a stationary bike, or doing chair exercises.  ? Ask your health care provider about using oxygen while exercising.  · If you are exposed to chemicals and substances at work, make sure that you wear a mask or respirator at all times.  · Join a pulmonary rehabilitation program or a support group for people with pulmonary fibrosis.  · Eat small meals often so you do not  get too full. Overeating can make breathing trouble worse.  · Maintain a healthy weight. Lose weight if you need to.  · Do breathing exercises as directed by your health care provider.  · Keep all follow-up visits as told by your health care provider. This is important.  Contact a health care provider if you:  · Have symptoms that do not get better with medicines.  · Are not able to be as active as usual.  · Have trouble taking a deep breath.  · Have a fever or chills.  · Have blue lips or skin.  · Have clubbing of your fingers.  Get help right away if you:  · Have a sudden worsening of your symptoms.  · Have chest pain.  · Cough up mucus that is dark in color.  · Have a lot of headaches.  · Get very confused or sleepy.  Summary  · Pulmonary fibrosis is a type of lung disease that causes scar tissue to build up in the air sacs of your lungs (alveoli) over time. Less oxygen can get into your blood. This makes it hard for you to breathe.  · Scarring from pulmonary fibrosis gets worse over time. This damage is permanent and may lead to other serious health problems.  · You are more likely to develop this condition if you have a family history of the condition or a job that exposes you to certain chemicals.  · There is no cure for pulmonary fibrosis. Treatment focuses on managing symptoms and preventing scarring from getting worse.  This information is not intended to replace advice given to you by your health care provider. Make sure you discuss any questions you have with your health care provider.  Document Released: 03/09/2005 Document Revised: 01/23/2019 Document Reviewed: 01/23/2019  [employer] Interactive Patient Education © 2019 [employer]

## 2020-02-03 RX ORDER — OMEPRAZOLE 20 MG/1
CAPSULE, DELAYED RELEASE ORAL
Qty: 90 CAPSULE | Refills: 2 | Status: SHIPPED | OUTPATIENT
Start: 2020-02-03 | End: 2020-08-24

## 2020-02-04 ENCOUNTER — HOSPITAL ENCOUNTER (OUTPATIENT)
Dept: VASCULAR LAB | Age: 80
Discharge: HOME | End: 2020-02-04
Payer: MEDICARE

## 2020-02-04 ENCOUNTER — OFFICE VISIT (OUTPATIENT)
Dept: VASCULAR SURGERY | Age: 80
End: 2020-02-04
Payer: MEDICARE

## 2020-02-04 VITALS
HEIGHT: 63 IN | SYSTOLIC BLOOD PRESSURE: 122 MMHG | HEART RATE: 65 BPM | DIASTOLIC BLOOD PRESSURE: 84 MMHG | WEIGHT: 150 LBS | OXYGEN SATURATION: 95 % | TEMPERATURE: 97.4 F | RESPIRATION RATE: 18 BRPM | BODY MASS INDEX: 26.58 KG/M2

## 2020-02-04 PROCEDURE — G8419 CALC BMI OUT NRM PARAM NOF/U: HCPCS | Performed by: PHYSICIAN ASSISTANT

## 2020-02-04 PROCEDURE — 99212 OFFICE O/P EST SF 10 MIN: CPT | Performed by: PHYSICIAN ASSISTANT

## 2020-02-04 PROCEDURE — 93922 UPR/L XTREMITY ART 2 LEVELS: CPT

## 2020-02-04 PROCEDURE — 1036F TOBACCO NON-USER: CPT | Performed by: PHYSICIAN ASSISTANT

## 2020-02-04 PROCEDURE — G8400 PT W/DXA NO RESULTS DOC: HCPCS | Performed by: PHYSICIAN ASSISTANT

## 2020-02-04 PROCEDURE — 93926 LOWER EXTREMITY STUDY: CPT

## 2020-02-04 PROCEDURE — 1123F ACP DISCUSS/DSCN MKR DOCD: CPT | Performed by: PHYSICIAN ASSISTANT

## 2020-02-04 PROCEDURE — 4040F PNEUMOC VAC/ADMIN/RCVD: CPT | Performed by: PHYSICIAN ASSISTANT

## 2020-02-04 PROCEDURE — G8427 DOCREV CUR MEDS BY ELIG CLIN: HCPCS | Performed by: PHYSICIAN ASSISTANT

## 2020-02-04 PROCEDURE — 1090F PRES/ABSN URINE INCON ASSESS: CPT | Performed by: PHYSICIAN ASSISTANT

## 2020-02-04 PROCEDURE — G8484 FLU IMMUNIZE NO ADMIN: HCPCS | Performed by: PHYSICIAN ASSISTANT

## 2020-02-04 RX ORDER — ATORVASTATIN CALCIUM 20 MG/1
TABLET, FILM COATED ORAL
COMMUNITY
Start: 2019-11-22

## 2020-02-04 RX ORDER — ISOSORBIDE MONONITRATE 30 MG/1
30 TABLET, EXTENDED RELEASE ORAL DAILY
COMMUNITY

## 2020-02-04 RX ORDER — NITROGLYCERIN 0.4 MG/1
0.4 TABLET SUBLINGUAL EVERY 5 MIN PRN
COMMUNITY

## 2020-02-04 RX ORDER — METOPROLOL SUCCINATE 50 MG/1
TABLET, EXTENDED RELEASE ORAL
COMMUNITY
Start: 2019-11-19

## 2020-02-04 NOTE — PROGRESS NOTES
800 MG tablet Take 800 mg by mouth.  HYDROcodone-acetaminophen (NORCO)  MG per tablet       docusate calcium (SURFAK) 240 MG capsule Take 1 tablet by mouth      Saccharomyces boulardii (PROBIOTIC) 250 MG CAPS Take by mouth daily      omeprazole (PRILOSEC) 20 MG capsule TAKE 1 CAPSULE EVERY DAY 90 capsule 3    benazepril (LOTENSIN) 20 MG tablet Take 1 tablet by mouth daily 90 tablet 3    polyethylene glycol (MIRALAX) powder 1 scoop in 8 oz fluids bid 6 Bottle 3    Aspirin Buf,IjKbn-AuQky-JhEae, (BUFFERED ASPIRIN) 325 MG TABS Take by mouth      metoprolol succinate (TOPROL XL) 25 MG extended release tablet TAKE 1 TABLET BY MOUTH DAILY (Patient not taking: Reported on 2/4/2020) 90 tablet 3    hydrochlorothiazide (HYDRODIURIL) 25 MG tablet TAKE 1 TABLET EVERY DAY (Patient not taking: Reported on 2/4/2020) 90 tablet 3     No current facility-administered medications for this visit. Allergies: Patient has no known allergies.   Past Medical History:   Diagnosis Date    Arthritis     Chronic back pain     Constipation     GERD (gastroesophageal reflux disease)     Hernia, hiatal     Hypertension     Irritable bowel syndrome     Kidney stones     Klebsiella infection bacterial     infection right knee    Neuropathy     Perforated bowel ([address]     Schatzki's ring     Scoliosis     Scoliosis      Past Surgical History:   Procedure Laterality Date    ARTERIOGRAM Right 5/30/2017    RIGHT LOWER EXTREMITY ANGIOGRAM; REPAIR OF RIGHT POPLITEAL ARTERY WITH INTERPOSITIONAL LEFT GREATER SAPHENOUS VEIN GRAFT; REPAIR OF RIGHT POPLITEAL VEIN performed by Edin Tinsley MD at [facility] ARTERIOGRAM Right 5/30/2017    ARTERIOGRAM possible thrombectomy right lower extremity, possible bypass performed by Tracy Saleh MD at [address]       Biopsy    COLONOSCOPY  11/29/2011        COLONOSCOPY N/A 3/6/2018    Dr Alexandrea Johnson surgical changes, hematuria. Musculoskeletal - no back pain, gait disturbance, or myalgia. Skin - no color change, rash, pallor, or new wound. Neurologic - no dizziness, facial asymmetry, or light headedness. No seizures. No speech difficulty or lateralizing weakness. Hematologic - no easy bruising or excessive bleeding. Psychiatric - no severe anxiety or nervousness. No confusion. All other review of systems are negative. Physical Exam    /84 (Site: Left Upper Arm, Position: Sitting, Cuff Size: Medium Adult)   Pulse 65   Temp 97.4 °F (36.3 °C) (Temporal)   Resp 18   Ht 5' 3\" (1.6 m)   Wt 150 lb (68 kg)   SpO2 95%   BMI 26.57 kg/m²     Constitutional - well developed, well nourished. No diaphoresis or acute distress. HENT - head normocephalic. Right external ear canal appears normal.  Left external ear canal appears normal.  Septum appears midline. Eyes - conjunctiva normal.  EOMS normal.  No exudate. No icterus. Neck- ROM appears normal, no tracheal deviation. Cardiovascular - Regular rate and rhythm. Heart sounds are normal.  No murmur, rub, or gallop. Carotid pulses are 2+ to palpation bilaterally without bruit. Extremities - Radial and brachial pulses are 2+ to palpation bilaterally. Bilateral DP and PT pulses are palpable. Feet are warm and well perfused. No cyanosis, clubbing, or significant edema. No signs atheroembolic event. Pulmonary - effort appears normal.  No respiratory distress. Lungs - Breath sounds normal. No wheezes or rales. GI - Abdomen - soft, non tender, bowel sounds X 4 quadrants. No guarding or rebound tenderness. No distension or palpable mass. Genitourinary - deferred. Musculoskeletal - ROM appears normal.  No significant edema. Neurologic - alert and oriented X 3. Physiologic. Skin - warm, dry, and intact. No rash, erythema, or pallor.   Psychiatric - mood, affect, and behavior appear normal.  Judgment and thought processes appear normal.    Risk factors for atherosclerosis of all vascular beds have been reviewed with the patient including:  Family history, tobacco abuse in all forms, elevated cholesterol, hyperlipidemia, and diabetes. Lower extremity arterial scan:       Impression        No evidence of significant arterial occlusive disease in the right lower    extremity(ies).        Signature        ----------------------------------------------------------------    Electronically signed by Gennaro Levy MD(Interpreting    physician) on 02/04/2020 02:14 PM    ----------------------------------------------------------------       Velocities are measured in cm/s ; Diameters are measured in mm       LE Duplex Measurements       +---------------++-----+-----+----+-----------++---+-----+---+-------------+   !               ! ! Right!     !Left!           !!   !     !   !             !   +---------------++-----+-----+----+-----------++---+-----+---+-------------+   ! Location       !!PSV  !Ratio! EDV ! Wave Desc. !!PSV! Ratio! EDV! Wave Desc.   !   +---------------++-----+-----+----+-----------++---+-----+---+-------------+   ! Common Femoral !!112  !     !10. 2!           !!   !     !   !             !   +---------------++-----+-----+----+-----------++---+-----+---+-------------+   ! Prox PFA       !!77.8 !     !    !           !!   !     !   !             !   +---------------++-----+-----+----+-----------++---+-----+---+-------------+   ! Prox SFA       !!105  !     !13. 4!           !!   !     !   !             !   +---------------++-----+-----+----+-----------++---+-----+---+-------------+   ! Mid SFA        !!120  !1.14 !12. 6!           !!   !     !   !             !   +---------------++-----+-----+----+-----------++---+-----+---+-------------+   ! Dist SFA       !!144  !1.2  !22  !           !!   !     !   !             !   +---------------++-----+-----+----+-----------++---+-----+---+-------------+   ! Prox Popliteal !!111  !0.77 !8.64!           !!   !     !   !             !   +---------------++-----+-----+----+-----------++---+-----+---+-------------+   ! Dist Popliteal !!113  !1.02 !    !           !!   !     !   !             !   +---------------++-----+-----+----+-----------++---+-----+---+-------------+   ! Mid PTA        !!40.3 !0.36 !    !           !!   !     !   !             !   +---------------++-----+-----+----+-----------++---+-----+---+-------------+   ! Prox WINDY       !!74.5 !0.66 !    !           !!   !     !   !             !   +---------------++-----+-----+----+-----------++---+-----+---+-------------+   ! Mid Peroneal   !!61   !0.54 !    !           !!   !     !   !             !   +---------------++-----+-----+----+-----------++---+-----+---+-------------+     Right LINDA 0.97, Left LINDA 1.05  Individual films reviewed: Yes. Test results were reviewed with the patient. Assessment      1.  PVD (peripheral vascular disease) (HCC)          Plan      Continue ASA 81 mg daily  Recommend statin therapy daily   Good skin care/checks daily  We will follow up in 12 months per patient request with a repeat right A'scan with LINDA's or sooner if she claudication develops/worsens, patient develops wound or IRP

## 2020-02-05 LAB
A FUMIGATUS1 AB SER QL ID: NEGATIVE
A PULLULANS AB SER QL: NEGATIVE
ANA SER QL: NEGATIVE
CRP SERPL-MCNC: <1 MG/L (ref 0–10)
ERYTHROCYTE [SEDIMENTATION RATE] IN BLOOD BY WESTERGREN METHOD: 44 MM/HR (ref 0–40)
LACEYELLA SACCHARI AB SER QL: NEGATIVE
PIGEON SERUM AB QL ID: NEGATIVE
RHEUMATOID FACT SERPL-ACNC: 34.1 IU/ML (ref 0–13.9)
S RECTIVIRGULA AB SER QL ID: NEGATIVE
T VULGARIS AB SER QL ID: NEGATIVE

## 2020-02-06 ENCOUNTER — TELEPHONE (OUTPATIENT)
Dept: PULMONOLOGY | Facility: CLINIC | Age: 80
End: 2020-02-06

## 2020-02-06 NOTE — TELEPHONE ENCOUNTER
----- Message from Fawad Bhat MD sent at 2/5/2020  3:10 PM CST -----  Please call the patient regarding her abnormal result.  Tell her the rheumatoid arthritis test showed positive.  This is nonspecific and doesn't mean for certain that she has RA.  Other tests were ok.  We can try a course of prednisone to see if this makes any difference; we will need to continue to follow up on PFT down the road.

## 2020-02-06 NOTE — TELEPHONE ENCOUNTER
I spoke to the patient about her test results. She is wondering if you think she really needs the prednisone? She states she is not having any problems with her breathing. However she will take the prednisone if you think she will benefit from it.  She uses [facility] at [place].

## 2020-02-07 RX ORDER — PREDNISONE 10 MG/1
TABLET ORAL
Qty: 90 TABLET | Refills: 1 | Status: SHIPPED | OUTPATIENT
Start: 2020-02-07 | End: 2020-04-07

## 2020-02-07 NOTE — TELEPHONE ENCOUNTER
I would like her to try it for awhile now.  We may not keep her on it if it doesn't make a difference.  We want to try to keep the scarring from getting worse if possible and if it is destined to worsen without anything else.  We may not end up keeping her on it for very long though it it does not seem to make any difference.

## 2020-02-07 NOTE — TELEPHONE ENCOUNTER
Patient notified to move appointment to be seen in the next 6-7 weeks. She will begin on the prednisone today.

## 2020-02-11 NOTE — TELEPHONE ENCOUNTER
Patient notified both appointments will be moved up. Our appointment desk will be calling her to set up these appts.

## 2020-03-19 RX ORDER — BENAZEPRIL HYDROCHLORIDE 20 MG/1
20 TABLET ORAL DAILY
Qty: 90 TABLET | Refills: 1 | Status: SHIPPED | OUTPATIENT
Start: 2020-03-19 | End: 2020-07-29

## 2020-03-23 ENCOUNTER — TELEPHONE (OUTPATIENT)
Dept: PULMONOLOGY | Facility: CLINIC | Age: 80
End: 2020-03-23

## 2020-03-23 NOTE — TELEPHONE ENCOUNTER
SUBJECTIVE:  Pt is a 26 y.o.   at 27w2d  gestation. Presents today for follow-up prenatal care. Reports picking up child yesterday and felt big gush of water. [facility] and they told her she had cervical incompetence with funneling and needed to be on Alida. She then called her insurance company and they would not approve it.   Reports good  fetal movement. Denies cramping/contractions, bleeding or leaking of fluid. Denies dysuria, headaches, N/V, or other issues at this time. Generally feels well today. States had ultrasound with Oki with no additional follow up recommended.     OBJECTIVE:  - See prenatal vitals flow  -   Vitals:    18 1004   BP: 118/62   Weight: 79.4 kg (175 lb)      Labs - normal pnp, normal afp  US - called Oki's office for results.     Fern, nitrazine and valsalva negative.            ASSESSMENT:   - IUP at 27w2d   - S=D   -   Patient Active Problem List    Diagnosis Date Noted   • Encounter for supervision of other normal pregnancy, second trimester 2018   • GBS bacteriuria 2018   • History of  delivery 2018   • BV (bacterial vaginosis) 2018     Membranes intact.     PLAN:  - S/sx pregnancy and labor warning signs vs general discomforts discussed  - Fetal movements and kick counts reviewed   - Adequate hydration reinforced  - Nutrition/exercise/vitamin education; continued PNV  - Called Oki's office for ultrasound.   Due to hx of ptd x2 cervical length ordered. I explained to patient that [facility] is not diagnostic.    She can go ahead and stop the prednisone.  She should take 1 tab every other day for a couple of doses and quit.  Stay home.

## 2020-03-23 NOTE — TELEPHONE ENCOUNTER
Patient canceled her OV for tomorrow, but was wanting to know about tapering off of prednisone.  She is currently on 10mg daily.  She said she doesn't want to be on it anymore.

## 2020-04-28 ENCOUNTER — APPOINTMENT (OUTPATIENT)
Dept: PULMONOLOGY | Facility: HOSPITAL | Age: 80
End: 2020-04-28

## 2020-05-11 ENCOUNTER — OFFICE VISIT (OUTPATIENT)
Dept: FAMILY MEDICINE CLINIC | Facility: CLINIC | Age: 80
End: 2020-05-11

## 2020-05-11 VITALS
HEIGHT: 63 IN | BODY MASS INDEX: 28.28 KG/M2 | DIASTOLIC BLOOD PRESSURE: 81 MMHG | TEMPERATURE: 99.1 F | WEIGHT: 159.6 LBS | SYSTOLIC BLOOD PRESSURE: 131 MMHG | HEART RATE: 86 BPM | OXYGEN SATURATION: 97 %

## 2020-05-11 DIAGNOSIS — E78.2 MIXED HYPERLIPIDEMIA: ICD-10-CM

## 2020-05-11 DIAGNOSIS — K80.20 CALCULUS OF GALLBLADDER WITHOUT CHOLECYSTITIS WITHOUT OBSTRUCTION: ICD-10-CM

## 2020-05-11 DIAGNOSIS — R52 PAIN: ICD-10-CM

## 2020-05-11 DIAGNOSIS — R53.83 FATIGUE, UNSPECIFIED TYPE: Primary | ICD-10-CM

## 2020-05-11 PROCEDURE — 99214 OFFICE O/P EST MOD 30 MIN: CPT | Performed by: FAMILY MEDICINE

## 2020-05-11 RX ORDER — HYDROCODONE BITARTRATE AND ACETAMINOPHEN 10; 325 MG/1; MG/1
1 TABLET ORAL EVERY 6 HOURS PRN
Qty: 90 TABLET | Refills: 0 | Status: SHIPPED | OUTPATIENT
Start: 2020-05-11 | End: 2020-08-17 | Stop reason: SDUPTHER

## 2020-05-11 RX ORDER — GABAPENTIN 800 MG/1
800 TABLET ORAL 3 TIMES DAILY
Qty: 270 TABLET | Refills: 1 | Status: SHIPPED | OUTPATIENT
Start: 2020-05-11 | End: 2021-01-15 | Stop reason: SDUPTHER

## 2020-05-11 NOTE — PROGRESS NOTES
Subjective   Ariana Gutierrez is a 80 y.o. female.     80-year-old female with severe degenerative arthritis of her spine and diffusely.  Secondary degenerative scoliosis of spine intractable pain and cholelithiasis    Pain   This is a chronic problem. The current episode started more than 1 year ago. The problem occurs daily. The problem has been waxing and waning. Associated symptoms include abdominal pain. Exacerbated by: Motion. She has tried oral narcotics for the symptoms. The treatment provided moderate relief.     Vitals:    05/11/20 1040   BP: 131/81   Pulse: 86   Temp: 99.1 °F (37.3 °C)   SpO2: 97%       The following portions of the patient's history were reviewed and updated as appropriate: allergies, current medications, past family history, past medical history, past social history, past surgical history and problem list.    Review of Systems   Cardiovascular:        Known coronary artery disease and positive rheumatoid arthritis titer but not with any degree of specificity.  Needs the breakdown of the RA factor- on chest x-ray and on chest x-ray do not believe there is any evidence of pulmonary nodules   Gastrointestinal: Positive for abdominal pain.        3-month history of right upper quadrant pain radiation to back very suspicious for gallbladder disease in a patient with known cholelithiasis.  Declines further investigation at this time       Objective   Physical Exam   Constitutional: She is oriented to person, place, and time.   Overweight   Cardiovascular: Normal rate and regular rhythm.   Pulmonary/Chest: Effort normal and breath sounds normal.   Musculoskeletal: She exhibits tenderness. She exhibits no edema.   Severe degenerative scoliosis of thoracic and lumbar spine   Neurological: She is alert and oriented to person, place, and time.   Skin: Skin is warm and dry.   Psychiatric: She has a normal mood and affect. Her behavior is normal. Judgment and thought content normal.   Nursing note  and vitals reviewed.      Patient's Body mass index is 28.27 kg/m². BMI is above normal parameters. Recommendations include: none (medical contraindication).      Assessment/Plan   Patient Active Problem List   Diagnosis   • Essential hypertension   • Gastroesophageal reflux disease without esophagitis   • Shortness of breath   • CAD (coronary artery disease)   • Scoliosis   • Low back pain   • Perforated bowel (CMS/HCC)   • Paresthesia   • Palpitations   • Chronic back pain   • Deep vein thrombosis (CMS/HCC)   • Bacterial infection   • Joint infection (CMS/HCC)   • Pneumonitis   • Chronic pain   • Restrictive lung disease   • Interstitial lung disease (CMS/HCC)   • Usual interstitial pneumonitis (CMS/HCC)   • BERGERON (dyspnea on exertion)     Ariana was seen today for follow-up and med refill.    Diagnoses and all orders for this visit:    Fatigue, unspecified type  -     CBC & Differential    Mixed hyperlipidemia  -     Comprehensive Metabolic Panel  -     Lipid Panel    Calculus of gallbladder without cholecystitis without obstruction  -     Amylase  -     Lipase    Pain  -     gabapentin (NEURONTIN) 800 MG tablet; Take 1 tablet by mouth 3 (Three) Times a Day.  -     HYDROcodone-acetaminophen (NORCO)  MG per tablet; Take 1 tablet by mouth Every 6 (Six) Hours As Needed for Moderate Pain .       Return in about 3 months (around 8/11/2020).       Plan-above-encouraged to evaluate gallbladder but wants to put on hold      Electronically signed by Bill Schilling MD 05/11/2020

## 2020-05-12 LAB
ALBUMIN SERPL-MCNC: 4.3 G/DL (ref 3.5–5.2)
ALBUMIN/GLOB SERPL: 1.5 G/DL
ALP SERPL-CCNC: 79 U/L (ref 39–117)
ALT SERPL-CCNC: 16 U/L (ref 1–33)
AMYLASE SERPL-CCNC: 70 U/L (ref 28–100)
AST SERPL-CCNC: 21 U/L (ref 1–32)
BASOPHILS # BLD AUTO: 0.08 10*3/MM3 (ref 0–0.2)
BASOPHILS NFR BLD AUTO: 0.7 % (ref 0–1.5)
BILIRUB SERPL-MCNC: 0.6 MG/DL (ref 0.2–1.2)
BUN SERPL-MCNC: 18 MG/DL (ref 8–23)
BUN/CREAT SERPL: 17 (ref 7–25)
CALCIUM SERPL-MCNC: 9.5 MG/DL (ref 8.6–10.5)
CHLORIDE SERPL-SCNC: 99 MMOL/L (ref 98–107)
CHOLEST SERPL-MCNC: 125 MG/DL (ref 0–200)
CO2 SERPL-SCNC: 26.3 MMOL/L (ref 22–29)
CREAT SERPL-MCNC: 1.06 MG/DL (ref 0.57–1)
EOSINOPHIL # BLD AUTO: 0.29 10*3/MM3 (ref 0–0.4)
EOSINOPHIL NFR BLD AUTO: 2.7 % (ref 0.3–6.2)
ERYTHROCYTE [DISTWIDTH] IN BLOOD BY AUTOMATED COUNT: 14 % (ref 12.3–15.4)
GLOBULIN SER CALC-MCNC: 2.8 GM/DL
GLUCOSE SERPL-MCNC: 92 MG/DL (ref 65–99)
HCT VFR BLD AUTO: 41 % (ref 34–46.6)
HDLC SERPL-MCNC: 45 MG/DL (ref 40–60)
HGB BLD-MCNC: 13.5 G/DL (ref 12–15.9)
IMM GRANULOCYTES # BLD AUTO: 0.06 10*3/MM3 (ref 0–0.05)
IMM GRANULOCYTES NFR BLD AUTO: 0.6 % (ref 0–0.5)
LDLC SERPL CALC-MCNC: 60 MG/DL (ref 0–100)
LIPASE SERPL-CCNC: 20 U/L (ref 13–60)
LYMPHOCYTES # BLD AUTO: 1.24 10*3/MM3 (ref 0.7–3.1)
LYMPHOCYTES NFR BLD AUTO: 11.6 % (ref 19.6–45.3)
MCH RBC QN AUTO: 29.7 PG (ref 26.6–33)
MCHC RBC AUTO-ENTMCNC: 32.9 G/DL (ref 31.5–35.7)
MCV RBC AUTO: 90.1 FL (ref 79–97)
MONOCYTES # BLD AUTO: 0.78 10*3/MM3 (ref 0.1–0.9)
MONOCYTES NFR BLD AUTO: 7.3 % (ref 5–12)
NEUTROPHILS # BLD AUTO: 8.25 10*3/MM3 (ref 1.7–7)
NEUTROPHILS NFR BLD AUTO: 77.1 % (ref 42.7–76)
NRBC BLD AUTO-RTO: 0 /100 WBC (ref 0–0.2)
PLATELET # BLD AUTO: 236 10*3/MM3 (ref 140–450)
POTASSIUM SERPL-SCNC: 4.6 MMOL/L (ref 3.5–5.2)
PROT SERPL-MCNC: 7.1 G/DL (ref 6–8.5)
RBC # BLD AUTO: 4.55 10*6/MM3 (ref 3.77–5.28)
SODIUM SERPL-SCNC: 141 MMOL/L (ref 136–145)
TRIGL SERPL-MCNC: 100 MG/DL (ref 0–150)
VLDLC SERPL CALC-MCNC: 20 MG/DL
WBC # BLD AUTO: 10.7 10*3/MM3 (ref 3.4–10.8)

## 2020-05-14 ENCOUNTER — TELEPHONE (OUTPATIENT)
Dept: FAMILY MEDICINE CLINIC | Facility: CLINIC | Age: 80
End: 2020-05-14

## 2020-05-14 DIAGNOSIS — R10.9 ABDOMINAL PAIN, UNSPECIFIED ABDOMINAL LOCATION: Primary | ICD-10-CM

## 2020-05-14 NOTE — TELEPHONE ENCOUNTER
Patient is ready to pursue testing to have gallbladder evaluated.  Order at [facility]

## 2020-05-28 ENCOUNTER — HOSPITAL ENCOUNTER (OUTPATIENT)
Dept: ULTRASOUND IMAGING | Facility: HOSPITAL | Age: 80
Discharge: HOME | End: 2020-05-28
Admitting: FAMILY MEDICINE

## 2020-05-28 DIAGNOSIS — R10.9 ABDOMINAL PAIN, UNSPECIFIED ABDOMINAL LOCATION: ICD-10-CM

## 2020-05-28 PROCEDURE — 76700 US EXAM ABDOM COMPLETE: CPT

## 2020-06-02 ENCOUNTER — TELEPHONE (OUTPATIENT)
Dept: FAMILY MEDICINE CLINIC | Facility: CLINIC | Age: 80
End: 2020-06-02

## 2020-06-02 DIAGNOSIS — R10.9 ABDOMINAL PAIN, UNSPECIFIED ABDOMINAL LOCATION: Primary | ICD-10-CM

## 2020-06-02 NOTE — TELEPHONE ENCOUNTER
Patient had abdominal ultrasound on 5.28.2020 and has not been notified with results yet.  Patient called asking for test results.  Please advise.

## 2020-06-02 NOTE — TELEPHONE ENCOUNTER
Patient notified of abd u/s and was informed Dr. Schilling ordered a HIDA scan and [facility] in [place] would be calling with an appt.

## 2020-06-09 ENCOUNTER — APPOINTMENT (OUTPATIENT)
Dept: ULTRASOUND IMAGING | Facility: HOSPITAL | Age: 80
End: 2020-06-09

## 2020-06-15 ENCOUNTER — HOSPITAL ENCOUNTER (OUTPATIENT)
Dept: NUCLEAR MEDICINE | Facility: HOSPITAL | Age: 80
Discharge: HOME | End: 2020-06-15

## 2020-06-15 ENCOUNTER — TELEPHONE (OUTPATIENT)
Dept: FAMILY MEDICINE CLINIC | Facility: CLINIC | Age: 80
End: 2020-06-15

## 2020-06-15 DIAGNOSIS — R52 PAIN: Primary | ICD-10-CM

## 2020-06-15 DIAGNOSIS — R10.9 ABDOMINAL PAIN, UNSPECIFIED ABDOMINAL LOCATION: ICD-10-CM

## 2020-06-15 PROCEDURE — 78227 HEPATOBIL SYST IMAGE W/DRUG: CPT

## 2020-06-15 PROCEDURE — 0 TECHNETIUM TC 99M MEBROFENIN KIT: Performed by: FAMILY MEDICINE

## 2020-06-15 PROCEDURE — A9537 TC99M MEBROFENIN: HCPCS | Performed by: FAMILY MEDICINE

## 2020-06-15 RX ORDER — KIT FOR THE PREPARATION OF TECHNETIUM TC 99M MEBROFENIN 45 MG/10ML
1 INJECTION, POWDER, LYOPHILIZED, FOR SOLUTION INTRAVENOUS
Status: COMPLETED | OUTPATIENT
Start: 2020-06-15 | End: 2020-06-15

## 2020-06-15 RX ADMIN — MEBROFENIN 1 DOSE: 45 INJECTION, POWDER, LYOPHILIZED, FOR SOLUTION INTRAVENOUS at 10:13

## 2020-06-15 NOTE — TELEPHONE ENCOUNTER
Cancel referral to GI  
Patient called office.   gave her results of HIDA.  Patient states she does not want referral or any other testing done at this time.   
Referral cancelled.  
deferred

## 2020-06-22 ENCOUNTER — TELEPHONE (OUTPATIENT)
Dept: VASCULAR SURGERY | Age: 80
End: 2020-06-22

## 2020-06-22 NOTE — TELEPHONE ENCOUNTER
Joan Narvaez requests that office return their call. The best time to reach her is Anytime. Patient is needing to get a study with an office visit r/s. Thank you.

## 2020-06-23 ENCOUNTER — TELEPHONE (OUTPATIENT)
Dept: VASCULAR SURGERY | Age: 80
End: 2020-06-23

## 2020-06-23 NOTE — TELEPHONE ENCOUNTER
Patient called me back today, to let me know she wants to cancel everything till everything goes back to kirill

## 2020-07-29 RX ORDER — BENAZEPRIL HYDROCHLORIDE 20 MG/1
TABLET ORAL
Qty: 90 TABLET | Refills: 0 | Status: SHIPPED | OUTPATIENT
Start: 2020-07-29 | End: 2020-10-13

## 2020-08-17 ENCOUNTER — OFFICE VISIT (OUTPATIENT)
Dept: FAMILY MEDICINE CLINIC | Facility: CLINIC | Age: 80
End: 2020-08-17

## 2020-08-17 VITALS
TEMPERATURE: 97.8 F | RESPIRATION RATE: 16 BRPM | BODY MASS INDEX: 27.82 KG/M2 | SYSTOLIC BLOOD PRESSURE: 118 MMHG | WEIGHT: 157 LBS | OXYGEN SATURATION: 92 % | HEIGHT: 63 IN | HEART RATE: 63 BPM | DIASTOLIC BLOOD PRESSURE: 62 MMHG

## 2020-08-17 DIAGNOSIS — Z51.81 THERAPEUTIC DRUG MONITORING: Primary | ICD-10-CM

## 2020-08-17 DIAGNOSIS — R52 PAIN: ICD-10-CM

## 2020-08-17 PROCEDURE — 99213 OFFICE O/P EST LOW 20 MIN: CPT | Performed by: FAMILY MEDICINE

## 2020-08-17 RX ORDER — HYDROCODONE BITARTRATE AND ACETAMINOPHEN 10; 325 MG/1; MG/1
1 TABLET ORAL EVERY 6 HOURS PRN
Qty: 90 TABLET | Refills: 0 | Status: SHIPPED | OUTPATIENT
Start: 2020-08-17 | End: 2020-09-22 | Stop reason: SDUPTHER

## 2020-08-17 NOTE — PROGRESS NOTES
Subjective   Ariana Gutierrez is a 80 y.o. female.     80-year-old female with progressive back pain and severe scoliosis    Pain   This is a chronic problem. The current episode started more than 1 year ago. The problem occurs daily. The problem has been waxing and waning. Associated symptoms include arthralgias. Pertinent negatives include no chest pain. The symptoms are aggravated by standing, walking and twisting. She has tried oral narcotics for the symptoms. The treatment provided moderate relief.       The following portions of the patient's history were reviewed and updated as appropriate: allergies, current medications, past family history, past medical history, past social history, past surgical history and problem list.    Review of Systems   Cardiovascular: Negative for chest pain.   Musculoskeletal: Positive for arthralgias and back pain.        Severe degenerative scoliosis       Objective   Physical Exam   Constitutional: She is oriented to person, place, and time.   Overweight   Eyes: Pupils are equal, round, and reactive to light. EOM are normal.   Cardiovascular: Normal rate and regular rhythm.   Pulmonary/Chest: Effort normal. She has wheezes.   Musculoskeletal: She exhibits no edema.   Neurological: She is alert and oriented to person, place, and time.   Skin: Skin is warm and dry.   Psychiatric: She has a normal mood and affect. Her behavior is normal. Judgment and thought content normal.   Nursing note and vitals reviewed.      Assessment/Plan   Ariana was seen today for monitoring drug therapy.    Diagnoses and all orders for this visit:    Pain  -     HYDROcodone-acetaminophen (NORCO)  MG per tablet; Take 1 tablet by mouth Every 6 (Six) Hours As Needed for Moderate Pain .       Plan-COVID-19 safety yearly flu shot etc.  CONTROLLED SUBSTANCE TRACKING 3/14/2018 6/5/2018 10/15/2018 2/12/2019 8/5/2019 11/11/2019 5/11/2020   Last Pro 3/14/2018 6/5/2018 10/15/2018 2/12/2019 8/5/2019  11/11/2019 5/11/2020   Report Number - - [id number]   Last UDS 3/14/2018 3/14/2018 3/14/2018 3/14/2018 8/5/2019 8/16/2019 8/5/2019   Last Controlled Substance Agreement 3/14/2018 3/14/2018 3/14/2018 3/16/2018 8/5/2019 8/16/2019 8/5/2019

## 2020-08-19 ENCOUNTER — TRANSCRIBE ORDERS (OUTPATIENT)
Dept: ADMINISTRATIVE | Facility: HOSPITAL | Age: 80
End: 2020-08-19

## 2020-08-19 DIAGNOSIS — Z01.818 PRE-OP TESTING: Primary | ICD-10-CM

## 2020-08-19 LAB — GABAPENTIN UR-MCNC: 306 UG/ML

## 2020-08-20 ENCOUNTER — HOSPITAL ENCOUNTER (OUTPATIENT)
Dept: CT IMAGING | Facility: HOSPITAL | Age: 80
Discharge: HOME | End: 2020-08-20
Admitting: INTERNAL MEDICINE

## 2020-08-20 ENCOUNTER — HOSPITAL ENCOUNTER (OUTPATIENT)
Dept: PULMONOLOGY | Facility: HOSPITAL | Age: 80
Discharge: HOME | End: 2020-08-20

## 2020-08-20 ENCOUNTER — LAB (OUTPATIENT)
Dept: LAB | Facility: HOSPITAL | Age: 80
End: 2020-08-20

## 2020-08-20 DIAGNOSIS — J84.9 INTERSTITIAL LUNG DISEASE (HCC): ICD-10-CM

## 2020-08-20 DIAGNOSIS — J84.112 USUAL INTERSTITIAL PNEUMONITIS (HCC): ICD-10-CM

## 2020-08-20 LAB
DRUGS UR: NORMAL
SARS-COV-2 RDRP RESP QL NAA+PROBE: NOT DETECTED

## 2020-08-20 PROCEDURE — 71250 CT THORAX DX C-: CPT

## 2020-08-20 PROCEDURE — 94010 BREATHING CAPACITY TEST: CPT

## 2020-08-20 PROCEDURE — 94729 DIFFUSING CAPACITY: CPT

## 2020-08-20 PROCEDURE — 94726 PLETHYSMOGRAPHY LUNG VOLUMES: CPT | Performed by: INTERNAL MEDICINE

## 2020-08-20 PROCEDURE — 87635 SARS-COV-2 COVID-19 AMP PRB: CPT | Performed by: INTERNAL MEDICINE

## 2020-08-20 PROCEDURE — 94010 BREATHING CAPACITY TEST: CPT | Performed by: INTERNAL MEDICINE

## 2020-08-20 PROCEDURE — 94729 DIFFUSING CAPACITY: CPT | Performed by: INTERNAL MEDICINE

## 2020-08-20 PROCEDURE — 94726 PLETHYSMOGRAPHY LUNG VOLUMES: CPT

## 2020-08-20 PROCEDURE — C9803 HOPD COVID-19 SPEC COLLECT: HCPCS | Performed by: INTERNAL MEDICINE

## 2020-08-20 NOTE — PROGRESS NOTES
Let pt know this looked ok.  Stable findings with scarring.  Nothing else to do for now.  Keep follow up as planned

## 2020-08-25 RX ORDER — OMEPRAZOLE 20 MG/1
CAPSULE, DELAYED RELEASE ORAL
Qty: 90 CAPSULE | Refills: 3 | Status: SHIPPED | OUTPATIENT
Start: 2020-08-25 | End: 2021-11-18 | Stop reason: SDUPTHER

## 2020-08-26 ENCOUNTER — OFFICE VISIT (OUTPATIENT)
Dept: PULMONOLOGY | Facility: CLINIC | Age: 80
End: 2020-08-26

## 2020-08-26 ENCOUNTER — HOSPITAL ENCOUNTER (OUTPATIENT)
Dept: GENERAL RADIOLOGY | Facility: HOSPITAL | Age: 80
Discharge: HOME | End: 2020-08-26
Admitting: INTERNAL MEDICINE

## 2020-08-26 VITALS
DIASTOLIC BLOOD PRESSURE: 80 MMHG | HEIGHT: 63 IN | HEART RATE: 72 BPM | TEMPERATURE: 98.1 F | BODY MASS INDEX: 27.64 KG/M2 | WEIGHT: 156 LBS | SYSTOLIC BLOOD PRESSURE: 128 MMHG | RESPIRATION RATE: 16 BRPM | OXYGEN SATURATION: 96 %

## 2020-08-26 DIAGNOSIS — M05.79 RHEUMATOID ARTHRITIS INVOLVING MULTIPLE SITES WITH POSITIVE RHEUMATOID FACTOR (HCC): ICD-10-CM

## 2020-08-26 DIAGNOSIS — R06.09 DOE (DYSPNEA ON EXERTION): ICD-10-CM

## 2020-08-26 DIAGNOSIS — R06.02 SHORTNESS OF BREATH: ICD-10-CM

## 2020-08-26 DIAGNOSIS — J84.112 USUAL INTERSTITIAL PNEUMONITIS (HCC): Primary | ICD-10-CM

## 2020-08-26 DIAGNOSIS — J98.4 RESTRICTIVE LUNG DISEASE: ICD-10-CM

## 2020-08-26 PROCEDURE — 99214 OFFICE O/P EST MOD 30 MIN: CPT | Performed by: INTERNAL MEDICINE

## 2020-08-26 PROCEDURE — 73120 X-RAY EXAM OF HAND: CPT

## 2020-08-26 RX ORDER — BENZONATATE 200 MG/1
200 CAPSULE ORAL 3 TIMES DAILY PRN
Qty: 90 CAPSULE | Refills: 11 | Status: SHIPPED | OUTPATIENT
Start: 2020-08-26 | End: 2021-06-01

## 2020-08-26 NOTE — PROGRESS NOTES
Subjective   Ariana Gutierrez is a 80 y.o. female.     Background: Pt with dyspnea, scoliosis, mild restrictive physiology 2019, CAD htn. ILD on cxr.    Chief Complaint   Patient presents with   • Cough        History of Present Illness   She has chronic cough and hx honeycombing on imaging.  I have been concerned with her having usual interstitial pneumonitis versus other causes of pulmonary fibrosis hypersensitivity pneumonitis or connective tissue disorder.  The pattern of scarring has involved upper lobe and lower lobe.  Radiologist have deemed this to be consistent with UIP.  I am not totally convinced of that given the upper lobe components.  Patient denies any extensive exposures to organic substances that would raise concern for hypersensitivity pneumonitis.  She denies asbestos or other pneumoconiosis type exposures.  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  We have found her to have an elevated rheumatoid factor.  She has had problems with arthritis in her knees and in her hands with changes in the structure of her hands and she also recalls her mother had a similar problem..  She reports prior bowel problems and knee arthritis with infection.  She has noticed some physical changes in her hands but no pain.  Her knees are painful chronically.  One knee is swollen all the time after knee replacement and subsequent infection.  She is taking care of her 90 year old mother.  She has moderate intermittent dyspnea on exertion in chest that has been unchanged over the past few months and has had some heart disease.    Medical/Family/Social History   has a past medical history of Bacterial infection, CAD (coronary artery disease), Chest pain, Chronic back pain, Deep vein thrombosis (CMS/HCC), Diverticulosis of intestine (8/16/2016), Gastroesophageal reflux disease without esophagitis (5/26/2017), Hypertension, Irritable bowel syndrome (11/2/2011), Low back pain, Palpitations, Paresthesia, Perforated bowel  (CMS/HCC), Rheumatoid arteritis (CMS/HCC), Scoliosis, Spinal stenosis, and Vascular disease, peripheral (CMS/HCC).   has a past surgical history that includes Total knee arthroplasty (Right); Cataract extraction (Bilateral); Colonoscopy; Back surgery; Small Bowel Resection (2016); Colon surgery; Renal artery stent (Right); Lumbar laminectomy; Total knee arthroplasty; Vascular surgery; Cardiac catheterization (Left, 11/6/2019); and Breast biopsy (Right, 25 yrs ago).  family history includes Breast cancer (age of onset: 80) in her mother; Coronary artery disease in her mother; Heart disease in her brother and mother; No Known Problems in her maternal grandfather, maternal grandmother, paternal grandfather, paternal grandmother, and sister; Peripheral vascular disease in her mother.   reports that she has never smoked. She has never used smokeless tobacco. She reports that she does not drink alcohol or use drugs.  No Known Allergies  Medications    Current Outpatient Medications:   •  aspirin 81 MG chewable tablet, Chew 81 mg Daily., Disp: , Rfl:   •  atorvastatin (LIPITOR) 20 MG tablet, Take 1 tablet by mouth Daily., Disp: 90 tablet, Rfl: 3  •  benazepril (LOTENSIN) 20 MG tablet, TAKE 1 TABLET EVERY DAY, Disp: 90 tablet, Rfl: 0  •  Docusate Calcium (STOOL SOFTENER PO), Take 1 tablet by mouth Daily., Disp: , Rfl:   •  gabapentin (NEURONTIN) 800 MG tablet, Take 1 tablet by mouth 3 (Three) Times a Day., Disp: 270 tablet, Rfl: 1  •  HYDROcodone-acetaminophen (NORCO)  MG per tablet, Take 1 tablet by mouth Every 6 (Six) Hours As Needed for Moderate Pain ., Disp: 90 tablet, Rfl: 0  •  isosorbide mononitrate (IMDUR) 30 MG 24 hr tablet, Take 1 tablet by mouth Daily., Disp: 30 tablet, Rfl: 11  •  metoprolol succinate XL (TOPROL-XL) 50 MG 24 hr tablet, Take 1 tablet by mouth Daily., Disp: 90 tablet, Rfl: 3  •  Multiple Vitamins-Minerals (PRESERVISION AREDS 2 PO), Take  by mouth., Disp: , Rfl:   •  nitroglycerin (NITROSTAT)  "0.4 MG SL tablet, 1 under the tongue as needed for angina, may repeat q5mins for up three doses, Disp: 100 tablet, Rfl: 11  •  omeprazole (priLOSEC) 20 MG capsule, TAKE 1 CAPSULE EVERY DAY, Disp: 90 capsule, Rfl: 3  •  polyethylene glycol (MIRALAX) powder, Take 17 g by mouth Daily., Disp: , Rfl:   •  saccharomyces boulardii (FLORASTOR) 250 MG capsule, Take 1 capsule by mouth Daily., Disp: 30 capsule, Rfl: 2  •  Beclomethasone Diprop HFA (QVAR REDIHALER) 80 MCG/ACT inhaler, Inhale 1 puff 2 (Two) Times a Day., Disp: 1 inhaler, Rfl: 0  •  benzonatate (TESSALON) 200 MG capsule, Take 1 capsule by mouth 3 (Three) Times a Day As Needed for Cough., Disp: 90 capsule, Rfl: 11  •  guaiFENesin (MUCINEX) 600 MG 12 hr tablet, Take 2 tablets by mouth 2 (Two) Times a Day As Needed for Cough., Disp: 60 tablet, Rfl: 0  •  ipratropium-albuterol (DUO-NEB) 0.5-2.5 mg/3 ml nebulizer, Take 3 mL by nebulization Every 6 (Six) Hours While Awake. Diagnosis of pneumonitis J69.8, Disp: 360 mL, Rfl: 2    Review of Systems   Respiratory: Positive for cough and shortness of breath.    Cardiovascular: Negative for chest pain, palpitations and leg swelling.   Musculoskeletal: Positive for arthralgias.       Objective   /80   Pulse 72   Temp 98.1 °F (36.7 °C)   Resp 16   Ht 160 cm (63\")   Wt 70.8 kg (156 lb)   LMP  (LMP Unknown)   SpO2 96% Comment: RA  Breastfeeding No   BMI 27.63 kg/m²   Physical Exam   Constitutional: She appears well-developed. She does not appear ill. No distress.   HENT:   Head: Atraumatic.   Eyes: Conjunctivae and EOM are normal. No scleral icterus.   Neck: Neck supple.   Cardiovascular: Normal rate, regular rhythm, S1 normal and S2 normal.   Pulmonary/Chest: Effort normal. She has rales.   Abdominal: Soft. She exhibits no distension. There is no tenderness.   Musculoskeletal: She exhibits no deformity.   Mild hand deformity    Lymphadenopathy:     She has no cervical adenopathy.   Neurological: She is alert. "   Skin: Skin is warm. No rash noted.   Psychiatric: She has a normal mood and affect.     [facility] - Pulmonary Function Test     [facility]  [id number]  [phone]     Patient : Ariana Gutierrez   MRN : [id number]  YOB: 1940   :  Fawad Bhat MD   Date : 8/21/2020  ______________________________________________________________________     Interpretation :  1. Spirometry shows moderate restrictive physiology.  2. Mid flow is normal.  3. Lung volume measurements show increased expiratory reserve volume, but are otherwise all decreased suggesting restriction.  4. Diffusion capacity is reduced, but when corrected for alveolar volume it is borderline lower limits of normal.  5. Airway resistance is normal and conductance is decreased.  Fawad Bhat MD  -----------------------------------------------------------------------------------------------    Ct Chest Hi Resolution    Result Date: 8/20/2020  Impression: 1. Redemonstration of honeycombing, characteristic of usual interstitial pneumonia (UIP). No new suspicious pulmonary finding. 2. Scattered coronary artery calcifications. 3. Stable hypodense liver lesion compared to 2017. Stability favors benignity. This report was finalized on 08/20/2020 12:31 by Dr Ary Irving MD.    -----------------------------------------------------------------------------------------------         -----------------------------------------------------------------------------------------------  Assessment/Plan   Problem List Items Addressed This Visit        Pulmonary Problems    Shortness of breath    Restrictive lung disease    Relevant Medications    benzonatate (TESSALON) 200 MG capsule    Usual interstitial pneumonitis (CMS/HCC) - Primary    Relevant Medications    benzonatate (TESSALON) 200 MG capsule    BERGERON (dyspnea on exertion)      Other Visit Diagnoses     Rheumatoid arthritis involving multiple sites with  positive rheumatoid factor (CMS/HCC)        Relevant Orders    XR Hand 2 View Bilateral    Ambulatory Referral to Rheumatology (Completed)        Patient's Body mass index is 27.63 kg/m². BMI is above normal parameters. Recommendations include: referral to primary care.      Will plan PFT once we can do PFT testing easily   Scoliosis is aggravating the restriction on PFTs, makes it more difficult to assess pulmonary disease  Review of the CT scans shows stability of findings of honeycombing on my personal review of the 2 CT scans from this year.  Follow up in 3 mos; she is going to have dental work and may need to be recovered from that first.  Cough is refractory.  Can try tessalon, mucinex dm, and the inhaler I gave her; resume that  Rheum eval for possible RA, elevated RF with arthritis and lung disease  Could be candidate for ofev if she has progression  Check hand films to evaluate for possible RA       Electronically signed by Fawad Bhat MD, 8/26/2020, 10:21

## 2020-08-26 NOTE — PATIENT INSTRUCTIONS
Pulmonary Fibrosis    Pulmonary fibrosis is a type of lung disease that causes scarring. Over time, the scar tissue builds up in the air sacs of your lungs (alveoli). This makes it hard for you to breathe. Less oxygen can get into your blood.  Scarring from pulmonary fibrosis gets worse over time. This damage is permanent and may lead to other serious health problems.    Pulmonary Function Tests  Pulmonary function tests (PFTs) are used to measure how well your lungs work, find out what is causing your lung problems, and figure out the best treatment for you. You may have PFTs:  · When you have an illness involving the lungs.  · To follow changes in your lung function over time if you have a chronic lung disease.  · If you are an industrial . This checks the effects of being exposed to chemicals over a long period of time.  · To check lung function before having surgery or other procedures.  · To check your lungs if you smoke.  · To check if prescribed medicines or treatments are helping your lungs.  Your results will be compared to the expected lung function of someone with healthy lungs who is similar to you in:  · Age.  · Gender.  · Height.  · Weight.  · Race or ethnicity.  This is done to show how your lungs compare to normal lung function (percent predicted). This is how your health care provider knows if your lung function is normal or not. If you have had PFTs done before, your health care provider will compare your current results with past results. This shows if your lung function is better, worse, or the same as before.  Tell a health care provider about:  · Any allergies you have.  · All medicines you are taking, including inhaler or nebulizer medicines, vitamins, herbs, eye drops, creams, and over-the-counter medicines.  · Any blood disorders you have.  · Any surgeries you have had, especially recent eye surgery, abdominal surgery, or chest surgery. These can make PFTs difficult or  unsafe.  · Any medical conditions you have, including chest pain or heart problems, tuberculosis, or respiratory infections such as pneumonia, a cold, or the flu.  · Any fear of being in closed spaces (claustrophobia). Some of your tests may be in a closed space.  What are the risks?  Generally, this is a safe procedure. However, problems may occur, including:  · Light-headedness due to over-breathing (hyperventilation).  · An asthma attack from deep breathing.  · A collapsed lung.  What happens before the procedure?  · Take over-the-counter and prescription medicines only as told by your health care provider. If you take inhaler or nebulizer medicines, ask your health care provider which medicines you should take on the day of your testing. Some inhaler medicines may interfere with PFTs if they are taken shortly before the tests.  · Follow your health care provider's instructions on eating and drinking restrictions. This may include avoiding eating large meals and drinking alcohol before the testing.  · Do not use any products that contain nicotine or tobacco, such as cigarettes and e-cigarettes. If you need help quitting, ask your health care provider.  · Wear comfortable clothing that will not interfere with breathing.  What happens during the procedure?    · You will be given a soft nose clip to wear. This is done so all of your breaths will go through your mouth instead of your nose.  · You will be given a germ-free (sterile) mouthpiece. It will be attached to a machine that measures your breathing (spirometer).  · You will be asked to do various breathing maneuvers. The maneuvers will be done by breathing in (inhaling) and breathing out (exhaling). You may be asked to repeat the maneuvers several times before the testing is done.  · It is important to follow the instructions exactly to get accurate results. Make sure to blow as hard and as fast as you can when you are told to do so.  · You may be given a  medicine that makes the small air passages in your lungs larger (bronchodilator) after testing has been done. This medicine will make it easier for you to breathe.  · The tests will be repeated after the bronchodilator has taken effect.  · You will be monitored carefully during the procedure for faintness, dizziness, trouble breathing, or any other problems.  The procedure may vary among health care providers and hospitals.  What happens after the procedure?  · It is up to you to get your test results. Ask your health care provider, or the department that is doing the tests, when your results will be ready. After you have received your test results, talk with your health care provider about treatment options, if necessary.  Summary  · Pulmonary function tests (PFTs) are used to measure how well your lungs work, find out what is causing your lung problems, and figure out the best treatment for you.  · Wear comfortable clothing that will not interfere with breathing.  · It is up to you to get your test results. After you have received them, talk with your health care provider about treatment options, if necessary.  This information is not intended to replace advice given to you by your health care provider. Make sure you discuss any questions you have with your health care provider.  Document Released: 08/10/2005 Document Revised: 12/15/2017 Document Reviewed: 11/09/2017  [employer] Patient Education © 2020 [employer]  What are the causes?  There are many different causes of pulmonary fibrosis. Sometimes the cause is not known. This is called idiopathic pulmonary fibrosis. Other causes include:  · Exposure to chemicals and substances found in agricultural, farm, construction, or factory work. These include mold, asbestos, silica, metal dusts, and toxic fumes.  · Sarcoidosis. In this disease, areas of inflammatory cells (granulomas) form and most often affect the lungs.  · Autoimmune diseases. These include diseases  such as rheumatoid arthritis, systemic sclerosis, or connective tissue disease.  · Taking certain medicines. These include drugs used in radiation therapy or used to treat seizures, heart problems, and some infections.  What increases the risk?  You are more likely to develop this condition if:  · You have a family history of the disease.  · You are older. The condition is more common in older adults.  · You have a history of smoking.  · You have a job that exposes you to certain chemicals.  · You have gastroesophageal reflux disease (GERD).  What are the signs or symptoms?  Symptoms of this condition include:  · Difficulty breathing that gets worse with activity.  · Shortness of breath (dyspnea).  · Dry, hacking cough.  · Rapid, shallow breathing during exercise or while at rest.  · Bluish skin and lips.  · Loss of appetite.  · Weakness.  · Weight loss and fatigue.  · Rounded and enlarged fingertips (clubbing).  How is this diagnosed?  This condition may be diagnosed based on:  · Your symptoms and medical history.  · A physical exam.  You may also have tests, including:  · A test that involves looking inside your lungs with an instrument (bronchoscopy).  · Imaging studies of your lungs and heart.  · Tests to measure how well you are breathing (pulmonary function tests).  · Blood tests.  · Tests to see how well your lungs work while you are walking (pulmonary stress test).  · A procedure to remove a lung tissue sample to look at it under a microscope (biopsy).  How is this treated?  There is no cure for pulmonary fibrosis. Treatment focuses on managing symptoms and preventing scarring from getting worse. This may include:  · Medicines, such as:  ? Steroids to prevent permanent lung changes.  ? Medicines to suppress your body's defense system (immune system).  ? Medicines to help with lung function by reducing inflammation or scarring.  · Ongoing monitoring with X-rays and lab work.  · Oxygen therapy.  · Pulmonary  rehabilitation.  · Surgery. In some cases, a lung transplant is possible.  Follow these instructions at home:         Medicines  · Take over-the-counter and prescription medicines only as told by your health care provider.  · Keep your vaccinations up to date as recommended by your health care provider.  General instructions  · Do not use any products that contain nicotine or tobacco, such as cigarettes and e-cigarettes. If you need help quitting, ask your health care provider.  · Get regular exercise, but do not overexert yourself. Ask your health care provider to suggest some activities that are safe for you to do.  ? If you have physical limitations, you may get exercise by walking, using a stationary bike, or doing chair exercises.  ? Ask your health care provider about using oxygen while exercising.  · If you are exposed to chemicals and substances at work, make sure that you wear a mask or respirator at all times.  · Join a pulmonary rehabilitation program or a support group for people with pulmonary fibrosis.  · Eat small meals often so you do not get too full. Overeating can make breathing trouble worse.  · Maintain a healthy weight. Lose weight if you need to.  · Do breathing exercises as directed by your health care provider.  · Keep all follow-up visits as told by your health care provider. This is important.  Contact a health care provider if you:  · Have symptoms that do not get better with medicines.  · Are not able to be as active as usual.  · Have trouble taking a deep breath.  · Have a fever or chills.  · Have blue lips or skin.  · Have clubbing of your fingers.  Get help right away if you:  · Have a sudden worsening of your symptoms.  · Have chest pain.  · Cough up mucus that is dark in color.  · Have a lot of headaches.  · Get very confused or sleepy.  Summary  · Pulmonary fibrosis is a type of lung disease that causes scar tissue to build up in the air sacs of your lungs (alveoli) over time. Less  oxygen can get into your blood. This makes it hard for you to breathe.  · Scarring from pulmonary fibrosis gets worse over time. This damage is permanent and may lead to other serious health problems.  · You are more likely to develop this condition if you have a family history of the condition or a job that exposes you to certain chemicals.  · There is no cure for pulmonary fibrosis. Treatment focuses on managing symptoms and preventing scarring from getting worse.  This information is not intended to replace advice given to you by your health care provider. Make sure you discuss any questions you have with your health care provider.  Document Released: 03/09/2005 Document Revised: 01/23/2019 Document Reviewed: 01/23/2019  [employer] Patient Education © 2020 [employer].  Dextromethorphan; Guaifenesin capsules and ER tablets  What is this medicine?  DEXTROMETHORPHAN; GUAIFENESIN (dex troe meth OR fan; gwye FEN e sin) is a cough suppressant, expectorant combination. It is used to provide relief from cough. This medicine will not treat an infection.  This medicine may be used for other purposes; ask your health care provider or pharmacist if you have questions.  COMMON BRAND NAME(S): Meagan-[place]ber[place] Plus Max Cough, Mucus & Congestion, Meagan-[place] Plus Mucus and Congestion, AllFen DM, Ambi, Amibid DM, Aquabid DM, Aquatab DM, Bidex-A, Bidex-DM, Cofex-DM, Coricidin HBP Chest Congetion and Cough, Duradex, Duradex Forte, Extuss LA, Fenesin DM, G-Bid DM TR, /DM 30, Guaidrine DM, Guaifenex DM, Mamie-D, Humibid DM, Iobid DM, Mindal DM, Mucinex DM, Muco-Fen DM, Mucus Relief DM, Phlemex, Q-Bid DM, Relacon LAX, Respa-DM, Robitussin Cough + Congestion DM Maximum, Ru-Tuss-DM, Sudal DM, Touro DM, Tussi-Bid, Z-Cof LA, Z-Cof LAX  What should I tell my health care provider before I take this medicine?  They need to know if you have any of these conditions:  · asthma  · chronic bronchitis  · emphysema  · if you have taken an MAOI  like Carbex, Eldepryl, Marplan, Nardil, or Parnate in last 14 days  · kidney or liver disease  · unable to sit up  · an unusual or allergic reaction to dextromethorphan, guaifenesin, other medicines, foods, dyes, bromides, or preservatives  · pregnant or trying to get pregnant  · breast-feeding  How should I use this medicine?  Take this medicine by mouth with a full glass of water. Follow the directions on the prescription label. Do not crush or chew. Take your medicine at regular intervals. Do not take your medicine more often than directed.  Talk to your pediatrician regarding the use of this medicine in children. While this drug may be prescribed for children as young as 6 years of age for selected conditions, precautions do apply.  Overdosage: If you think you have taken too much of this medicine contact a poison control center or emergency room at once.  NOTE: This medicine is only for you. Do not share this medicine with others.  What if I miss a dose?  If you miss a dose, take it as soon as you can. If it is almost time for your next dose, take only that dose. Do not take double or extra doses.  What may interact with this medicine?  Do not take this medicine with any of the following medications:  · MAOIs like Carbex, Eldepryl, Marplan, Nardil, and Parnate  · procarbazine  This medicine may also interact with the following medications:  · medicines for depression or other mental disturbances  · other medicines for colds or allergy  This list may not describe all possible interactions. Give your health care provider a list of all the medicines, herbs, non-prescription drugs, or dietary supplements you use. Also tell them if you smoke, drink alcohol, or use illegal drugs. Some items may interact with your medicine.  What should I watch for while using this medicine?  Tell your doctor if your symptoms do not improve within 5 days or if they get worse. If you have a high fever, skin rash, lasting headache, or  sore throat, see your doctor.  Drink 6 to 8 glasses of water daily while you are taking this medicine to help loosen mucus.  You may get drowsy or dizzy. Do not drive, use machinery, or do anything that needs mental alertness until you know how this medicine affects you. Do not stand or sit up quickly, especially if you are an older patient. This reduces the risk of dizzy or fainting spells. Alcohol may interfere with the effect of this medicine. Avoid alcoholic drinks.  What side effects may I notice from receiving this medicine?  Side effects that you should report to your doctor or health care professional as soon as possible:  · allergic reactions like skin rash, itching or hives, swelling of the face, lips, or tongue  · confusion  · excitement, nervousness, restlessness, or irritability  · slow or troubled breathing  Side effects that usually do not require medical attention (report to your doctor or health care professional if they continue or are bothersome):  · headache  · stomach upset  This list may not describe all possible side effects. Call your doctor for medical advice about side effects. You may report side effects to FDA at 2-736-FDA-1828.  Where should I keep my medicine?  Keep out of the reach of children.  Store at room temperature between 15 and 30 degrees C (59 and 86 degrees F). Protect from light and moisture. Throw away any unused medicine after the expiration date.  NOTE: This sheet is a summary. It may not cover all possible information. If you have questions about this medicine, talk to your doctor, pharmacist, or health care provider.  © 2020 [employer] (2009-04-09 17:31:08)  Dextromethorphan capsule  What is this medicine?  DEXTROMETHORPHAN (dex troe meth OR fan) is used to help relieve cough.  This medicine may be used for other purposes; ask your health care provider or pharmacist if you have questions.  COMMON BRAND NAME(S): Dexalone, Robafen Cough, Robitussin Cough,  Robitussin Lingering Cold, Robitussin Lingering Cold Long-Acting Cough  What should I tell my health care provider before I take this medicine?  They need to know if you have any of these conditions:  · asthma  · emphysema  · large amount of mucus  · liver disease  · smoker  · an unusual or allergic reaction to dextromethorphan, other medicines, bromides, foods, dyes, or preservatives  · pregnant or trying to get pregnant  · breast-feeding  How should I use this medicine?  Take this medicine by mouth with a glass of water. Follow the directions on the prescription label. Take your medicine at regular intervals. Do not take it more often than directed.  Talk to your pediatrician regarding the use of this medicine in children. While this drug may be prescribed for children as young as 12 years old for selected conditions, precautions do apply.  Overdosage: If you think you have taken too much of this medicine contact a poison control center or emergency room at once.  NOTE: This medicine is only for you. Do not share this medicine with others.  What if I miss a dose?  If you miss a dose, take it as soon as you can. If it is almost time for your next dose, take only that dose. Do not take double or extra doses.  What may interact with this medicine?  Do not take this medicine with any of the following medications:  · MAOIs like Carbex, Eldepryl, Marplan, Nardil, and Parnate  This medicine may also interact with the following medications:  · medicines for depression, anxiety, or psychotic disturbances  · other medicines for allergies or cold  · procarbazine  This list may not describe all possible interactions. Give your health care provider a list of all the medicines, herbs, non-prescription drugs, or dietary supplements you use. Also tell them if you smoke, drink alcohol, or use illegal drugs. Some items may interact with your medicine.  What should I watch for while using this medicine?  Do not treat yourself for a  cough for more than 1 week without consulting your doctor or health care professional. If you have a high fever, skin rash, lasting headache, or sore throat, see your doctor.  You may get drowsy or dizzy. Do not drive, use machinery, or do anything that needs mental alertness until you know how this medicine affects you. Do not stand or sit up quickly, especially if you are an older patient. This reduces the risk of dizzy or fainting spells. Alcohol may interfere with the effect of this medicine. Avoid alcoholic drinks.  What side effects may I notice from receiving this medicine?  Side effects that you should report to your doctor or health care professional as soon as possible:  · allergic reactions like skin rash, itching or hives, swelling of the face, lips, or tongue  · breathing problems  · confusion  · excitement, nervousness, restlessness, or irritability  · seizure  · slurred speech  Side effects that usually do not require medical attention (report to your doctor or health care professional if they continue or are bothersome):  · headache  · stomach upset  · tiredness  This list may not describe all possible side effects. Call your doctor for medical advice about side effects. You may report side effects to FDA at 2-020-FDA-5665.  Where should I keep my medicine?  Keep out of the reach of children.  Store at room temperature between 20 and 25 degrees C (68 and 77 degrees F) unless otherwise directed. Avoid heat over 40 degrees C (104 degrees F). Protect from light. Throw away any unused medicine after the expiration date.  NOTE: This sheet is a summary. It may not cover all possible information. If you have questions about this medicine, talk to your doctor, pharmacist, or health care provider.  © 2020 Elsevier/Gold Standard (2009-04-17 15:46:10)  Rheumatoid Arthritis  Rheumatoid arthritis (RA) is a long-term (chronic) disease that causes inflammation in your joints. RA may start slowly. It most often  affects the small joints of the hands and feet. Usually, the same joints are affected on both sides of your body. Inflammation from RA can also affect other parts of your body, including your heart, eyes, or lungs.  There is no cure for RA, but medicines can help your symptoms and halt or slow down the progression of the disease.  What are the causes?  RA is an autoimmune disease. When you have an autoimmune disease, your body's defense system (immune system) mistakenly attacks healthy body tissues. The exact cause of RA is not known.  What increases the risk?  You are more likely to develop this condition if you:  · Are a woman.  · Have a family history of RA or other autoimmune diseases.  · Have a history of smoking.  · Are obese.  · Have been exposed to pollutants or chemicals.  What are the signs or symptoms?  The first symptom of this condition may be morning stiffness that lasts longer than 30 minutes.  · Symptoms usually start gradually. They are often worse in the morning.  As RA progresses, symptoms may include:  · Pain, stiffness, swelling, warmth, and tenderness in joints on both sides of your body.  · Loss of energy.  · Loss of appetite.  · Weight loss.  · Low-grade fever.  · Dry eyes and dry mouth.  · Firm lumps (rheumatoid nodules) that grow beneath your skin in areas such as your forearm bones near your elbows and on your hands.  · Changes in the appearance of joints (deformity) and loss of joint function.  Symptoms of this condition vary from person to person.  · Symptoms of RA often come and go.  · Sometimes, symptoms get worse for a period of time. These are called flares.  How is this diagnosed?  This condition is diagnosed based on your symptoms, medical history, and physical exam.  · You may have X-rays or an MRI to check for the type of joint changes that are caused by RA.  You may also have blood tests to look for:  · Proteins (antibodies) that your immune system may make if you have RA. These  "include rheumatoid factor (RF) and anti-CCP.  ? When blood tests show these proteins, you are said to have \"seropositive RA.\"  ? When blood tests do not show these proteins, you may have \"seronegative RA.\"  · Inflammation in your blood.  · A low number of red blood cells (anemia).  How is this treated?  The goals of treatment are to relieve pain, reduce inflammation, and slow down or stop joint damage and disability. Treatment may include:  · Lifestyle changes. It is important to rest as needed, eat a healthy diet, and exercise.  · Medicines. Your health care provider may adjust your medicines every 3 months until treatment goals are reached. Common medicines include:  ? Pain relievers (analgesics).  ? Corticosteroids and NSAIDs to reduce inflammation.  ? Disease-modifying antirheumatic drugs (DMARDs) to try to slow the course of the disease.  ? Biologic response modifiers to reduce inflammation and damage.  · Physical therapy and occupational therapy.  · Surgery, if you have severe joint damage. Joint replacement or fusing of joints may be needed.  Your health care provider will work with you to identify the best treatment option for you based on assessment of the overall disease activity in your body.  Follow these instructions at home:  Activity  · Return to your normal activities as told by your health care provider. Ask your health care provider what activities are safe for you.  · Rest when you are having a flare.  · Start an exercise program as told by your health care provider.  General instructions  · Keep all follow-up visits as told by your health care provider. This is important.  · Take over-the-counter and prescription medicines only as told by your health care provider.  Where to find more information  · American College [employer] Rheumatology: www.rheumatology.org  · Arthritis Foundation: www.arthritis.org  Contact a health care provider if:  · You have a flare-up of RA symptoms.  · You have a fever.  · You " have side effects from your medicines.  Get help right away if:  · You have chest pain.  · You have trouble breathing.  · You quickly develop a hot, painful joint that is more severe than your usual joint aches.  Summary  · Rheumatoid arthritis (RA) is a long-term (chronic) disease that causes inflammation in your joints.  · RA is an autoimmune disease.  · The goals of treatment are to relieve pain, reduce inflammation, and slow down or stop joint damage and disability.  This information is not intended to replace advice given to you by your health care provider. Make sure you discuss any questions you have with your health care provider.  Document Released: 12/15/2001 Document Revised: 06/30/2020 Document Reviewed: 08/20/2019  [employer] Patient Education © 2020 [employer] Inc.  Benzonatate capsules  What is this medicine?  BENZONATATE (samuel LIBBY na bravo) is used to treat cough.  This medicine may be used for other purposes; ask your health care provider or pharmacist if you have questions.  COMMON BRAND NAME(S): Mandeep Kay  What should I tell my health care provider before I take this medicine?  They need to know if you have any of these conditions:  · kidney or liver disease  · an unusual or allergic reaction to benzonatate, anesthetics, other medicines, foods, dyes, or preservatives  · pregnant or trying to get pregnant  · breast-feeding  How should I use this medicine?  Take this medicine by mouth with a glass of water. Follow the directions on the prescription label. Avoid breaking, chewing, or sucking the capsule, as this can cause serious side effects. Take your medicine at regular intervals. Do not take your medicine more often than directed.  Talk to your pediatrician regarding the use of this medicine in children. While this drug may be prescribed for children as young as 10 years old for selected conditions, precautions do apply.  Overdosage: If you think you have taken too much of this medicine  contact a poison control center or emergency room at once.  NOTE: This medicine is only for you. Do not share this medicine with others.  What if I miss a dose?  If you miss a dose, take it as soon as you can. If it is almost time for your next dose, take only that dose. Do not take double or extra doses.  What may interact with this medicine?  Do not take this medicine with any of the following medications:  · MAOIs like Carbex, Eldepryl, Marplan, Nardil, and Parnate  This list may not describe all possible interactions. Give your health care provider a list of all the medicines, herbs, non-prescription drugs, or dietary supplements you use. Also tell them if you smoke, drink alcohol, or use illegal drugs. Some items may interact with your medicine.  What should I watch for while using this medicine?  Tell your doctor if your symptoms do not improve or if they get worse. If you have a high fever, skin rash, or headache, see your health care professional.  You may get drowsy or dizzy. Do not drive, use machinery, or do anything that needs mental alertness until you know how this medicine affects you. Do not sit or stand up quickly, especially if you are an older patient. This reduces the risk of dizzy or fainting spells.  What side effects may I notice from receiving this medicine?  Side effects that you should report to your doctor or health care professional as soon as possible:  · allergic reactions like skin rash, itching or hives, swelling of the face, lips, or tongue  · breathing problems  · chest pain  · confusion or hallucinations  · irregular heartbeat  · numbness of mouth or throat  · seizures  Side effects that usually do not require medical attention (report to your doctor or health care professional if they continue or are bothersome):  · burning feeling in the eyes  · constipation  · headache  · nasal congestion  · stomach upset  This list may not describe all possible side effects. Call your doctor  for medical advice about side effects. You may report side effects to FDA at 2-403-ANR-2220.  Where should I keep my medicine?  Keep out of the reach of children.  Store at room temperature between 15 and 30 degrees C (59 and 86 degrees F). Keep tightly closed. Protect from light and moisture. Throw away any unused medicine after the expiration date.  NOTE: This sheet is a summary. It may not cover all possible information. If you have questions about this medicine, talk to your doctor, pharmacist, or health care provider.  © 2020 Elsevier/Gold Standard (2009-03-18 14:52:56)

## 2020-09-14 RX ORDER — METOPROLOL SUCCINATE 50 MG/1
TABLET, EXTENDED RELEASE ORAL
Qty: 90 TABLET | Refills: 3 | Status: SHIPPED | OUTPATIENT
Start: 2020-09-14 | End: 2021-09-14

## 2020-09-16 RX ORDER — ISOSORBIDE MONONITRATE 30 MG/1
TABLET, EXTENDED RELEASE ORAL
Qty: 90 TABLET | Refills: 4 | Status: SHIPPED | OUTPATIENT
Start: 2020-09-16 | End: 2021-12-03

## 2020-09-22 ENCOUNTER — OFFICE VISIT (OUTPATIENT)
Dept: FAMILY MEDICINE CLINIC | Facility: CLINIC | Age: 80
End: 2020-09-22

## 2020-09-22 VITALS
SYSTOLIC BLOOD PRESSURE: 130 MMHG | BODY MASS INDEX: 26.93 KG/M2 | WEIGHT: 152 LBS | HEIGHT: 63 IN | RESPIRATION RATE: 16 BRPM | OXYGEN SATURATION: 94 % | DIASTOLIC BLOOD PRESSURE: 78 MMHG | HEART RATE: 76 BPM | TEMPERATURE: 97.1 F

## 2020-09-22 DIAGNOSIS — Z23 NEED FOR INFLUENZA VACCINATION: ICD-10-CM

## 2020-09-22 DIAGNOSIS — Z11.59 NEED FOR HEPATITIS C SCREENING TEST: ICD-10-CM

## 2020-09-22 DIAGNOSIS — E78.2 MIXED HYPERLIPIDEMIA: ICD-10-CM

## 2020-09-22 DIAGNOSIS — Z00.00 ANNUAL PHYSICAL EXAM: ICD-10-CM

## 2020-09-22 DIAGNOSIS — Z11.59 ENCOUNTER FOR HEPATITIS C SCREENING TEST FOR LOW RISK PATIENT: Primary | ICD-10-CM

## 2020-09-22 DIAGNOSIS — R53.83 FATIGUE, UNSPECIFIED TYPE: ICD-10-CM

## 2020-09-22 DIAGNOSIS — I10 ESSENTIAL HYPERTENSION: ICD-10-CM

## 2020-09-22 DIAGNOSIS — R52 PAIN: ICD-10-CM

## 2020-09-22 PROCEDURE — 81003 URINALYSIS AUTO W/O SCOPE: CPT | Performed by: FAMILY MEDICINE

## 2020-09-22 PROCEDURE — G0008 ADMIN INFLUENZA VIRUS VAC: HCPCS | Performed by: FAMILY MEDICINE

## 2020-09-22 PROCEDURE — 90694 VACC AIIV4 NO PRSRV 0.5ML IM: CPT | Performed by: FAMILY MEDICINE

## 2020-09-22 PROCEDURE — G0439 PPPS, SUBSEQ VISIT: HCPCS | Performed by: FAMILY MEDICINE

## 2020-09-22 RX ORDER — HYDROCODONE BITARTRATE AND ACETAMINOPHEN 10; 325 MG/1; MG/1
1 TABLET ORAL EVERY 6 HOURS PRN
Qty: 90 TABLET | Refills: 0 | Status: SHIPPED | OUTPATIENT
Start: 2020-09-22 | End: 2020-11-30 | Stop reason: SDUPTHER

## 2020-09-22 NOTE — PROGRESS NOTES
The ABCs of the Annual Wellness Visit  Subsequent Medicare Wellness Visit    Chief Complaint   Patient presents with   • Medicare Wellness-subsequent     Fasting, no pap, Mammos scheduled through GYN.       Subjective   History of Present Illness:  Ariana Gutierrez is a 80 y.o. female who presents for a Subsequent Medicare Wellness Visit.    HEALTH RISK ASSESSMENT    Recent Hospitalizations:  No hospitalization(s) within the last year.    Current Medical Providers:  Patient Care Team:  Bill Schilling MD as PCP - General (Family Medicine)  Bill Schilling MD as PCP - Claims Attributed  Hawk Guerin MD as Cardiologist (Cardiology)  Janee Hall MD as Consulting Physician (Infectious Diseases)  Fawad Bhat MD as Consulting Physician (Pulmonary Disease)    Smoking Status:  Social History     Tobacco Use   Smoking Status Never Smoker   Smokeless Tobacco Never Used       Alcohol Consumption:  Social History     Substance and Sexual Activity   Alcohol Use No   • Frequency: Never       Depression Screen:   PHQ-2/PHQ-9 Depression Screening 9/22/2020   Little interest or pleasure in doing things 0   Feeling down, depressed, or hopeless 1   Trouble falling or staying asleep, or sleeping too much -   Feeling tired or having little energy -   Poor appetite or overeating -   Feeling bad about yourself - or that you are a failure or have let yourself or your family down -   Trouble concentrating on things, such as reading the newspaper or watching television -   Moving or speaking so slowly that other people could have noticed. Or the opposite - being so fidgety or restless that you have been moving around a lot more than usual -   Thoughts that you would be better off dead, or of hurting yourself in some way -   Total Score 1   If you checked off any problems, how difficult have these problems made it for you to do your work, take care of things at home, or get along with other people? -        Fall Risk Screen:  JAIRO Fall Risk Assessment was completed, and patient is at LOW risk for falls.Assessment completed on:9/22/2020    Health Habits and Functional and Cognitive Screening:  Functional & Cognitive Status 9/22/2020   Do you have difficulty preparing food and eating? No   Do you have difficulty bathing yourself, getting dressed or grooming yourself? No   Do you have difficulty using the toilet? No   Do you have difficulty moving around from place to place? No   Do you have trouble with steps or getting out of a bed or a chair? Yes   Current Diet Well Balanced Diet   Dental Exam Up to date   Eye Exam Up to date   Exercise (times per week) 7 times per week   Current Exercise Activities Include No Regular Exercise   Do you need help using the phone?  No   Are you deaf or do you have serious difficulty hearing?  No   Do you need help with transportation? Yes   Do you need help shopping? No   Do you need help preparing meals?  No   Do you need help with housework?  No   Do you need help with laundry? No   Do you need help taking your medications? No   Do you need help managing money? No   Do you ever drive or ride in a car without wearing a seat belt? No   Have you felt unusual stress, anger or loneliness in the last month? Yes   Who do you live with? Spouse   If you need help, do you have trouble finding someone available to you? No   Have you been bothered in the last four weeks by sexual problems? No   Do you have difficulty concentrating, remembering or making decisions? No         Does the patient have evidence of cognitive impairment? Yes    Asprin use counseling:Taking ASA appropriately as indicated    Age-appropriate Screening Schedule:  Refer to the list below for future screening recommendations based on patient's age, sex and/or medical conditions. Orders for these recommended tests are listed in the plan section. The patient has been provided with a written plan.    Health Maintenance    Topic Date Due   • LIPID PANEL  05/11/2021   • MAMMOGRAM  12/26/2021   • COLONOSCOPY  03/06/2028   • INFLUENZA VACCINE  Completed   • TDAP/TD VACCINES  Discontinued   • ZOSTER VACCINE  Discontinued          The following portions of the patient's history were reviewed and updated as appropriate: allergies, current medications, past family history, past medical history, past social history, past surgical history and problem list.    Outpatient Medications Prior to Visit   Medication Sig Dispense Refill   • aspirin 81 MG chewable tablet Chew 81 mg Daily.     • atorvastatin (LIPITOR) 20 MG tablet Take 1 tablet by mouth Daily. 90 tablet 3   • Beclomethasone Diprop HFA (QVAR REDIHALER) 80 MCG/ACT inhaler Inhale 1 puff 2 (Two) Times a Day. 1 inhaler 0   • benazepril (LOTENSIN) 20 MG tablet TAKE 1 TABLET EVERY DAY 90 tablet 0   • benzonatate (TESSALON) 200 MG capsule Take 1 capsule by mouth 3 (Three) Times a Day As Needed for Cough. 90 capsule 11   • Docusate Calcium (STOOL SOFTENER PO) Take 1 tablet by mouth Daily.     • gabapentin (NEURONTIN) 800 MG tablet Take 1 tablet by mouth 3 (Three) Times a Day. 270 tablet 1   • guaiFENesin (MUCINEX) 600 MG 12 hr tablet Take 2 tablets by mouth 2 (Two) Times a Day As Needed for Cough. 60 tablet 0   • isosorbide mononitrate (IMDUR) 30 MG 24 hr tablet Take 1 tablet by mouth once daily 90 tablet 4   • metoprolol succinate XL (TOPROL-XL) 50 MG 24 hr tablet TAKE 1 TABLET EVERY DAY 90 tablet 3   • Multiple Vitamins-Minerals (PRESERVISION AREDS 2 PO) Take  by mouth.     • nitroglycerin (NITROSTAT) 0.4 MG SL tablet 1 under the tongue as needed for angina, may repeat q5mins for up three doses 100 tablet 11   • omeprazole (priLOSEC) 20 MG capsule TAKE 1 CAPSULE EVERY DAY 90 capsule 3   • polyethylene glycol (MIRALAX) powder Take 17 g by mouth Daily.     • saccharomyces boulardii (FLORASTOR) 250 MG capsule Take 1 capsule by mouth Daily. 30 capsule 2   • HYDROcodone-acetaminophen (NORCO)   MG per tablet Take 1 tablet by mouth Every 6 (Six) Hours As Needed for Moderate Pain . 90 tablet 0   • ipratropium-albuterol (DUO-NEB) 0.5-2.5 mg/3 ml nebulizer Take 3 mL by nebulization Every 6 (Six) Hours While Awake. Diagnosis of pneumonitis J69.8 360 mL 2     No facility-administered medications prior to visit.        Patient Active Problem List   Diagnosis   • Essential hypertension   • Gastroesophageal reflux disease without esophagitis   • Shortness of breath   • CAD (coronary artery disease)   • Scoliosis   • Low back pain   • Perforated bowel (CMS/HCC)   • Paresthesia   • Palpitations   • Chronic back pain   • Deep vein thrombosis (CMS/HCC)   • Bacterial infection   • Joint infection (CMS/HCC)   • Pneumonitis   • Chronic pain   • Restrictive lung disease   • Interstitial lung disease (CMS/HCC)   • Usual interstitial pneumonitis (CMS/HCC)   • BERGERON (dyspnea on exertion)       Advanced Care Planning:  ACP discussion was declined by the patient. Patient does not have an advance directive, declines further assistance.    Review of Systems   Cardiovascular: Positive for palpitations. Negative for chest pain.        Near appointment for cardiology consult   Gastrointestinal:        Cologuard -2019   Genitourinary:        Breast exam and gynecologic exam by another provider   Musculoskeletal: Positive for arthralgias and back pain.        Ongoing right knee problem   All other systems reviewed and are negative.      Compared to one year ago, the patient feels her physical health is worse.  Compared to one year ago, the patient feels her mental health is worse.    Reviewed chart for potential of high risk medication in the elderly: yes  Reviewed chart for potential of harmful drug interactions in the elderly:yes    Objective         Vitals:    09/22/20 0907   BP: 130/78   BP Location: Left arm   Patient Position: Sitting   Cuff Size: Large Adult   Pulse: 76   Resp: 16   Temp: 97.1 °F (36.2 °C)   TempSrc:  "Temporal   SpO2: 94%   Weight: 68.9 kg (152 lb)   Height: 159.4 cm (62.75\")   PainSc:   8       Body mass index is 27.14 kg/m².  Discussed the patient's BMI with her. The BMI is above average; no BMI management plan is appropriate..    Physical Exam  Vitals signs and nursing note reviewed.   Constitutional:       Appearance: Normal appearance.   HENT:      Head: Normocephalic.      Right Ear: Tympanic membrane and ear canal normal.      Left Ear: Tympanic membrane and ear canal normal.   Eyes:      Extraocular Movements: Extraocular movements intact.      Pupils: Pupils are equal, round, and reactive to light.   Neck:      Vascular: No carotid bruit.   Cardiovascular:      Rate and Rhythm: Normal rate and regular rhythm.      Pulses: Normal pulses.      Heart sounds: Normal heart sounds.   Pulmonary:      Effort: Pulmonary effort is normal.      Breath sounds: Normal breath sounds.      Comments: Breast exam by another provider--severe scoliosis thoracic spine  Abdominal:      General: Abdomen is flat. Bowel sounds are normal.      Palpations: Abdomen is soft. There is no mass.   Genitourinary:     Comments: GYN exam by another provider  Musculoskeletal:      Right lower leg: No edema.      Left lower leg: No edema.   Lymphadenopathy:      Cervical: No cervical adenopathy.   Skin:     General: Skin is warm and dry.      Capillary Refill: Capillary refill takes less than 2 seconds.   Neurological:      General: No focal deficit present.      Mental Status: She is alert and oriented to person, place, and time.   Psychiatric:         Mood and Affect: Mood normal.         Behavior: Behavior normal.         Thought Content: Thought content normal.         Judgment: Judgment normal.       CONTROLLED SUBSTANCE TRACKING 6/5/2018 10/15/2018 2/12/2019 8/5/2019 11/11/2019 5/11/2020 8/17/2020   Last Pro 6/5/2018 10/15/2018 2/12/2019 8/5/2019 11/11/2019 5/11/2020 8/17/2020   Report Number - [id number] [id number]   Last UDS 3/14/2018 3/14/2018 3/14/2018 8/5/2019 8/16/2019 8/5/2019 8/5/2019   Last Controlled Substance Agreement 3/14/2018 3/14/2018 3/16/2018 8/5/2019 8/16/2019 8/5/2019 8/17/2020             Assessment/Plan   Medicare Risks and Personalized Health Plan  CMS Preventative Services Quick Reference  Fall Risk    The above risks/problems have been discussed with the patient.  Pertinent information has been shared with the patient in the After Visit Summary.  Follow up plans and orders are seen below in the Assessment/Plan Section.    Diagnoses and all orders for this visit:    1. Encounter for hepatitis C screening test for low risk patient (Primary)    2. Mixed hyperlipidemia  -     Comprehensive Metabolic Panel  -     Lipid Panel    3. Fatigue, unspecified type  -     TSH  -     T4, free  -     CBC & Differential    4. Need for influenza vaccination  -     Fluad Quad >65 years    5. Essential hypertension  -     POC Urinalysis Dipstick, Multipro    6. Pain  -     Vitamin B12  -     Folate  -     HYDROcodone-acetaminophen (NORCO)  MG per tablet; Take 1 tablet by mouth Every 6 (Six) Hours As Needed for Moderate Pain .  Dispense: 90 tablet; Refill: 0    7. Need for hepatitis C screening test  -     Hepatitis C Antibody    8. Annual physical exam    Other orders  -     Cancel: Mammo Screening Digital Tomosynthesis Bilateral With CAD; Future  -     Cancel: DEXA Bone Density Axial; Future      Follow Up:  Return in 6 months (on 3/22/2021).     An After Visit Summary and PPPS were given to the patient.         Plan COVID-19 safety, above, offered referral to [facility] knee replacement specialist

## 2020-09-22 NOTE — PATIENT INSTRUCTIONS
Fall Prevention in the Home, Adult  Falls can cause injuries and can affect people from all age groups. There are many simple things that you can do to make your home safe and to help prevent falls. Ask for help when making these changes, if needed.  What actions can I take to prevent falls?  General instructions  · Use good lighting in all rooms. Replace any light bulbs that burn out.  · Turn on lights if it is dark. Use night-lights.  · Place frequently used items in easy-to-reach places. Lower the shelves around your home if necessary.  · Set up furniture so that there are clear paths around it. Avoid moving your furniture around.  · Remove throw rugs and other tripping hazards from the floor.  · Avoid walking on wet floors.  · Fix any uneven floor surfaces.  · Add color or contrast paint or tape to grab bars and handrails in your home. Place contrasting color strips on the first and last steps of stairways.  · When you use a stepladder, make sure that it is completely opened and that the sides are firmly locked. Have someone hold the ladder while you are using it. Do not climb a closed stepladder.  · Be aware of any and all pets.  What can I do in the bathroom?         · Keep the floor dry. Immediately clean up any water that spills onto the floor.  · Remove soap buildup in the tub or shower on a regular basis.  · Use non-skid mats or decals on the floor of the tub or shower.  · Attach bath mats securely with double-sided, non-slip rug tape.  · If you need to sit down while you are in the shower, use a plastic, non-slip stool.  · Install grab bars by the toilet and in the tub and shower. Do not use towel bars as grab bars.  What can I do in the bedroom?  · Make sure that a bedside light is easy to reach.  · Do not use oversized bedding that drapes onto the floor.  · Have a firm chair that has side arms to use for getting dressed.  What can I do in the kitchen?  · Clean up any spills right away.  · If you  need to reach for something above you, use a sturdy step stool that has a grab bar.  · Keep electrical cables out of the way.  · Do not use floor polish or wax that makes floors slippery. If you must use wax, make sure that it is non-skid floor wax.  What can I do in the stairways?  · Do not leave any items on the stairs.  · Make sure that you have a light switch at the top of the stairs and the bottom of the stairs. Have them installed if you do not have them.  · Make sure that there are handrails on both sides of the stairs. Fix handrails that are broken or loose. Make sure that handrails are as long as the stairways.  · Install non-slip stair treads on all stairs in your home.  · Avoid having throw rugs at the top or bottom of stairways, or secure the rugs with carpet tape to prevent them from moving.  · Choose a carpet design that does not hide the edge of steps on the stairway.  · Check any carpeting to make sure that it is firmly attached to the stairs. Fix any carpet that is loose or worn.  What can I do on the outside of my home?  · Use bright outdoor lighting.  · Regularly repair the edges of walkways and driveways and fix any cracks.  · Remove high doorway thresholds.  · Trim any shrubbery on the main path into your home.  · Regularly check that handrails are securely fastened and in good repair. Both sides of any steps should have handrails.  · Install guardrails along the edges of any raised decks or porches.  · Clear walkways of debris and clutter, including tools and rocks.  · Have leaves, snow, and ice cleared regularly.  · Use sand or salt on walkways during winter months.  · In the garage, clean up any spills right away, including grease or oil spills.  What other actions can I take?  · Wear closed-toe shoes that fit well and support your feet. Wear shoes that have rubber soles or low heels.  · Use mobility aids as needed, such as canes, walkers, scooters, and crutches.  · Review your medicines with  your health care provider. Some medicines can cause dizziness or changes in blood pressure, which increase your risk of falling.  Talk with your health care provider about other ways that you can decrease your risk of falls. This may include working with a physical therapist or  to improve your strength, balance, and endurance.  Where to find more information  · Centers for Disease Control and Prevention, [place]: https://www.cdc.gov  · National [place] on Aging: https://er6cnmn.jamie.nih.gov  Contact a health care provider if:  · You are afraid of falling at home.  · You feel weak, drowsy, or dizzy at home.  · You fall at home.  Summary  · There are many simple things that you can do to make your home safe and to help prevent falls.  · Ways to make your home safe include removing tripping hazards and installing grab bars in the bathroom.  · Ask for help when making these changes in your home.  This information is not intended to replace advice given to you by your health care provider. Make sure you discuss any questions you have with your health care provider.  Document Released: 2003 Document Revised: 2018 Document Reviewed: 2018  [employer] Patient Education ©  [employer]vier Inc.    Medicare Wellness  Personal Prevention Plan of Service     Date of Office Visit:  2020  Encounter Provider:  Bill Schilling MD  Place of Service:  [facility]  Patient Name: Ariana Gutierrez  :  1940    As part of the Medicare Wellness portion of your visit today, we are providing you with this personalized preventive plan of services (PPPS). This plan is based upon recommendations of the [place] Preventive Services Task Force (USPSTF) and the Advisory Committee on Immunization Practices (ACIP).    This lists the preventive care services that should be considered, and provides dates of when you are due. Items listed as completed are up-to-date and do  not require any further intervention.    Health Maintenance   Topic Date Due   • HEPATITIS C SCREENING  10/24/2016   • LIPID PANEL  05/11/2021   • MEDICARE ANNUAL WELLNESS  09/22/2021   • MAMMOGRAM  12/26/2021   • COLONOSCOPY  03/06/2028   • Pneumococcal Vaccine Once at 65 Years Old  Completed   • INFLUENZA VACCINE  Completed   • TDAP/TD VACCINES  Discontinued   • ZOSTER VACCINE  Discontinued       No orders of the defined types were placed in this encounter.      Return in 6 months (on 3/22/2021).

## 2020-09-24 LAB
ALBUMIN SERPL-MCNC: 4.2 G/DL (ref 3.5–5.2)
ALBUMIN/GLOB SERPL: 1.4 G/DL
ALP SERPL-CCNC: 93 U/L (ref 39–117)
ALT SERPL-CCNC: 18 U/L (ref 1–33)
AST SERPL-CCNC: 26 U/L (ref 1–32)
BASOPHILS # BLD AUTO: 0.07 10*3/MM3 (ref 0–0.2)
BASOPHILS NFR BLD AUTO: 0.9 % (ref 0–1.5)
BILIRUB SERPL-MCNC: 0.5 MG/DL (ref 0–1.2)
BUN SERPL-MCNC: 18 MG/DL (ref 8–23)
BUN/CREAT SERPL: 20 (ref 7–25)
CALCIUM SERPL-MCNC: 9.6 MG/DL (ref 8.6–10.5)
CHLORIDE SERPL-SCNC: 98 MMOL/L (ref 98–107)
CHOLEST SERPL-MCNC: 129 MG/DL (ref 0–200)
CO2 SERPL-SCNC: 29.7 MMOL/L (ref 22–29)
CREAT SERPL-MCNC: 0.9 MG/DL (ref 0.57–1)
EOSINOPHIL # BLD AUTO: 0.29 10*3/MM3 (ref 0–0.4)
EOSINOPHIL NFR BLD AUTO: 3.7 % (ref 0.3–6.2)
ERYTHROCYTE [DISTWIDTH] IN BLOOD BY AUTOMATED COUNT: 13.6 % (ref 12.3–15.4)
FOLATE SERPL-MCNC: 15.2 NG/ML (ref 4.78–24.2)
GLOBULIN SER CALC-MCNC: 2.9 GM/DL
GLUCOSE SERPL-MCNC: 96 MG/DL (ref 65–99)
HCT VFR BLD AUTO: 39.6 % (ref 34–46.6)
HCV AB S/CO SERPL IA: <0.1 S/CO RATIO (ref 0–0.9)
HDLC SERPL-MCNC: 49 MG/DL (ref 40–60)
HGB BLD-MCNC: 13.1 G/DL (ref 12–15.9)
IMM GRANULOCYTES # BLD AUTO: 0.03 10*3/MM3 (ref 0–0.05)
IMM GRANULOCYTES NFR BLD AUTO: 0.4 % (ref 0–0.5)
LDLC SERPL CALC-MCNC: 66 MG/DL (ref 0–100)
LYMPHOCYTES # BLD AUTO: 0.83 10*3/MM3 (ref 0.7–3.1)
LYMPHOCYTES NFR BLD AUTO: 10.6 % (ref 19.6–45.3)
MCH RBC QN AUTO: 28.5 PG (ref 26.6–33)
MCHC RBC AUTO-ENTMCNC: 33.1 G/DL (ref 31.5–35.7)
MCV RBC AUTO: 86.3 FL (ref 79–97)
MONOCYTES # BLD AUTO: 0.58 10*3/MM3 (ref 0.1–0.9)
MONOCYTES NFR BLD AUTO: 7.4 % (ref 5–12)
NEUTROPHILS # BLD AUTO: 6.01 10*3/MM3 (ref 1.7–7)
NEUTROPHILS NFR BLD AUTO: 77 % (ref 42.7–76)
NRBC BLD AUTO-RTO: 0 /100 WBC (ref 0–0.2)
PLATELET # BLD AUTO: 253 10*3/MM3 (ref 140–450)
POTASSIUM SERPL-SCNC: 4.6 MMOL/L (ref 3.5–5.2)
PROT SERPL-MCNC: 7.1 G/DL (ref 6–8.5)
RBC # BLD AUTO: 4.59 10*6/MM3 (ref 3.77–5.28)
SODIUM SERPL-SCNC: 138 MMOL/L (ref 136–145)
T4 FREE SERPL-MCNC: 1.09 NG/DL (ref 0.93–1.7)
TRIGL SERPL-MCNC: 71 MG/DL (ref 0–150)
TSH SERPL DL<=0.005 MIU/L-ACNC: 1.69 UIU/ML (ref 0.27–4.2)
VIT B12 SERPL-MCNC: 801 PG/ML (ref 211–946)
VLDLC SERPL CALC-MCNC: 14.2 MG/DL
WBC # BLD AUTO: 7.81 10*3/MM3 (ref 3.4–10.8)

## 2020-09-25 ENCOUNTER — TELEPHONE (OUTPATIENT)
Dept: PULMONOLOGY | Facility: CLINIC | Age: 80
End: 2020-09-25

## 2020-09-25 NOTE — TELEPHONE ENCOUNTER
Please move patient's appointment up to my next available. If she asks why, let her know that Dr. Bhat reviewed records sent by Rheumatology and wanted her seen sooner.

## 2020-09-29 ENCOUNTER — OFFICE VISIT (OUTPATIENT)
Dept: CARDIOLOGY | Facility: CLINIC | Age: 80
End: 2020-09-29

## 2020-09-29 VITALS
HEART RATE: 74 BPM | DIASTOLIC BLOOD PRESSURE: 86 MMHG | HEIGHT: 63 IN | OXYGEN SATURATION: 96 % | SYSTOLIC BLOOD PRESSURE: 160 MMHG | WEIGHT: 153 LBS | BODY MASS INDEX: 27.11 KG/M2

## 2020-09-29 DIAGNOSIS — M54.9 CHRONIC BACK PAIN, UNSPECIFIED BACK LOCATION, UNSPECIFIED BACK PAIN LATERALITY: ICD-10-CM

## 2020-09-29 DIAGNOSIS — J84.9 INTERSTITIAL LUNG DISEASE (HCC): ICD-10-CM

## 2020-09-29 DIAGNOSIS — R00.2 PALPITATIONS: ICD-10-CM

## 2020-09-29 DIAGNOSIS — J98.4 RESTRICTIVE LUNG DISEASE: ICD-10-CM

## 2020-09-29 DIAGNOSIS — K21.9 GASTROESOPHAGEAL REFLUX DISEASE WITHOUT ESOPHAGITIS: ICD-10-CM

## 2020-09-29 DIAGNOSIS — R06.09 DOE (DYSPNEA ON EXERTION): ICD-10-CM

## 2020-09-29 DIAGNOSIS — I25.10 CORONARY ARTERY DISEASE INVOLVING NATIVE CORONARY ARTERY OF NATIVE HEART WITHOUT ANGINA PECTORIS: Primary | ICD-10-CM

## 2020-09-29 DIAGNOSIS — I10 ESSENTIAL HYPERTENSION: ICD-10-CM

## 2020-09-29 DIAGNOSIS — G89.29 CHRONIC BACK PAIN, UNSPECIFIED BACK LOCATION, UNSPECIFIED BACK PAIN LATERALITY: ICD-10-CM

## 2020-09-29 PROCEDURE — 99214 OFFICE O/P EST MOD 30 MIN: CPT | Performed by: INTERNAL MEDICINE

## 2020-09-29 PROCEDURE — 93000 ELECTROCARDIOGRAM COMPLETE: CPT | Performed by: INTERNAL MEDICINE

## 2020-09-29 NOTE — PROGRESS NOTES
Ariana Gutierrez  [id number]  1940  80 y.o.  female    Referring Provider: Bill Schilling MD    Reason for Follow-up Visit:  Here for follow up after cardiac testing.    Routine follow up.  Chronic low back pain    Had MADELIN in 1/18 no clots or vegetation  Zio patch showed SVT 72 episodes longest 53 maximum rate 181 BPM     Low risk stress test for stress induced myocardial ischemia  Cardiac workup test results as below   Cath small non dominant RCA severe stenosis          Subjective    Persistent moderate chronic exertional shortness of breath on exertion relieved with rest  No significant cough or wheezing    No palpitations  No associated chest pain  No significant pedal edema    No fever or chills  No significant expectoration    No hemoptysis  No presyncope or syncope    Tolerating current medications well with no untoward side effects   Compliant with prescribed medication regimen. Tries to adhere to cardiac diet.     Honey combing of lung on HRCT   Recent palpitations after episode of pain in right foot in her heel  Lasted for for approximately 30 mins   Heart rate was 160 BPM   That he resolved spontaneously       History of present illness:  Ariana Gutierrez is a 80 y.o. yo female with history of hypertension who presents today for   Chief Complaint   Patient presents with   • Coronary Artery Disease     8 MON FU    • Palpitations     WHEN GONING UP STAIRS/ AND LAYING IN BED    • Shortness of Breath     WHEN GONING UP STAIRS    • Rapid Heart Rate     WHEN GONING UP STAIRS    • Edema     IN RIGHT FOOT    .    History  Past Medical History:   Diagnosis Date   • Bacterial infection    • CAD (coronary artery disease)    • Chest pain    • Chronic back pain    • Deep vein thrombosis (CMS/HCC)     S/P KNEE SURGERY 10/2017   • Diverticulosis of intestine 8/16/2016   • Gastroesophageal reflux disease without esophagitis 5/26/2017   • Hypertension    • Irritable bowel syndrome 11/2/2011   • Low back  pain    • Palpitations    • Paresthesia     toes   • Perforated bowel (CMS/HCC)    • Rheumatoid arteritis (CMS/HCC)    • Scoliosis    • Spinal stenosis    • Vascular disease, peripheral (CMS/HCC)    ,   Past Surgical History:   Procedure Laterality Date   • BACK SURGERY     • BREAST BIOPSY Right 25 yrs ago   • CARDIAC CATHETERIZATION Left 11/6/2019    Procedure: Cardiac Catheterization/Vascular Study CARMEN OK;  Surgeon: Hawk Guerin MD;  Location: [facility] CATH INVASIVE LOCATION;  Service: Cardiology   • CATARACT EXTRACTION Bilateral    • COLON RESECTION SMALL BOWEL  2016    with colostomy for 6 months, already revised.   • COLON SURGERY     • COLONOSCOPY     • LUMBAR LAMINECTOMY     • RENAL ARTERY STENT Right    • TOTAL KNEE ARTHROPLASTY Right    • TOTAL KNEE ARTHROPLASTY     • VASCULAR SURGERY      10/2017 - DR GARCIA   ,   Family History   Problem Relation Age of Onset   • Breast cancer Mother 80   • Heart disease Mother    • Coronary artery disease Mother    • Peripheral vascular disease Mother    • No Known Problems Sister    • Heart disease Brother    • No Known Problems Maternal Grandmother    • No Known Problems Maternal Grandfather    • No Known Problems Paternal Grandmother    • No Known Problems Paternal Grandfather    ,   Social History     Tobacco Use   • Smoking status: Never Smoker   • Smokeless tobacco: Never Used   Substance Use Topics   • Alcohol use: No     Frequency: Never   • Drug use: No   ,     Medications  Current Outpatient Medications   Medication Sig Dispense Refill   • aspirin 81 MG chewable tablet Chew 81 mg Daily.     • atorvastatin (LIPITOR) 20 MG tablet Take 1 tablet by mouth Daily. 90 tablet 3   • Beclomethasone Diprop HFA (QVAR REDIHALER) 80 MCG/ACT inhaler Inhale 1 puff 2 (Two) Times a Day. 1 inhaler 0   • benazepril (LOTENSIN) 20 MG tablet TAKE 1 TABLET EVERY DAY 90 tablet 0   • benzonatate (TESSALON) 200 MG capsule Take 1 capsule by mouth 3 (Three) Times a Day As Needed for Cough.  90 capsule 11   • Docusate Calcium (STOOL SOFTENER PO) Take 1 tablet by mouth Daily.     • gabapentin (NEURONTIN) 800 MG tablet Take 1 tablet by mouth 3 (Three) Times a Day. 270 tablet 1   • guaiFENesin (MUCINEX) 600 MG 12 hr tablet Take 2 tablets by mouth 2 (Two) Times a Day As Needed for Cough. 60 tablet 0   • HYDROcodone-acetaminophen (NORCO)  MG per tablet Take 1 tablet by mouth Every 6 (Six) Hours As Needed for Moderate Pain . 90 tablet 0   • ipratropium-albuterol (DUO-NEB) 0.5-2.5 mg/3 ml nebulizer Take 3 mL by nebulization Every 6 (Six) Hours While Awake. Diagnosis of pneumonitis J69.8 360 mL 2   • isosorbide mononitrate (IMDUR) 30 MG 24 hr tablet Take 1 tablet by mouth once daily 90 tablet 4   • metoprolol succinate XL (TOPROL-XL) 50 MG 24 hr tablet TAKE 1 TABLET EVERY DAY 90 tablet 3   • Multiple Vitamins-Minerals (PRESERVISION AREDS 2 PO) Take  by mouth.     • nitroglycerin (NITROSTAT) 0.4 MG SL tablet 1 under the tongue as needed for angina, may repeat q5mins for up three doses 100 tablet 11   • omeprazole (priLOSEC) 20 MG capsule TAKE 1 CAPSULE EVERY DAY 90 capsule 3   • polyethylene glycol (MIRALAX) powder Take 17 g by mouth Daily.     • saccharomyces boulardii (FLORASTOR) 250 MG capsule Take 1 capsule by mouth Daily. 30 capsule 2     No current facility-administered medications for this visit.        Allergies:  Patient has no known allergies.    Review of Systems  Review of Systems   Constitution: Positive for malaise/fatigue. Negative for fever.   HENT: Negative.    Eyes: Negative.    Cardiovascular: Positive for dyspnea on exertion and palpitations. Negative for chest pain, claudication, cyanosis, irregular heartbeat, leg swelling, near-syncope, orthopnea, paroxysmal nocturnal dyspnea and syncope.   Respiratory: Positive for cough and shortness of breath.    Endocrine: Negative.    Hematologic/Lymphatic: Negative.    Skin: Negative.    Musculoskeletal: Positive for arthritis and joint pain.  "  Gastrointestinal: Positive for flatus. Negative for anorexia.   Genitourinary: Negative.    Neurological: Positive for weakness.   Psychiatric/Behavioral: Negative.        Objective     Physical Exam:  /86   Pulse 74   Ht 159.4 cm (62.76\")   Wt 69.4 kg (153 lb)   LMP  (LMP Unknown)   SpO2 96%   BMI 27.31 kg/m²   Physical Exam   Constitutional: She appears well-developed.   HENT:   Head: Normocephalic.   Neck: Normal carotid pulses and no JVD present. No tracheal tenderness present. Carotid bruit is not present. No tracheal deviation and no edema present.   Cardiovascular: Regular rhythm, normal heart sounds and normal pulses.   Pulmonary/Chest: Effort normal. No stridor. She has no wheezes.   Abdominal: Soft. She exhibits no distension. There is no abdominal tenderness.   Neurological: She is alert. No cranial nerve deficit or sensory deficit.   Skin: Skin is warm.   Psychiatric: Her speech is normal and behavior is normal.     Velcro like crackles     Results Review:         IMPRESSION:  1. Redemonstration of honeycombing, characteristic of usual interstitial  pneumonia (UIP). No new suspicious pulmonary finding.  2. Scattered coronary artery calcifications.  3. Stable hypodense liver lesion compared to 2017. Stability favors  benignity.  This report was finalized on 08/20/2020 12:31 by Dr Ary Irving MD.      Results for orders placed during the hospital encounter of 11/14/19   Adult Transthoracic Echo Complete W/ Cont if Necessary Per Protocol    Narrative · Estimated EF = 65%.  · Left ventricular systolic function is normal.  · Left ventricular diastolic dysfunction.  · Mild pulmonary hypertension is present.           Conclusion     No significant stenosis noted of left dominant epicardial coronary arterial tree.  95% ostial stenosis of small nondominant right coronary artery which is approximately 1.5 mm to 2 mm in diameter        Plan     Maximize medical therapy  If continues to have " exertional jaw pain that is reflective of angina would consider intervention of small nondominant right coronary ostial stenosis.  Patient extremely restless and tries to either sit up or move both her legs during the procedure and therefore will require anesthesia to sedate her if this was considered in future     Hydration   Observation  Risk factor modifications  Workup for non cardiac causes of chest pain         Results for orders placed during the hospital encounter of 19   Adult Stress Echo W/ Cont or Stress Agent if Necessary Per Protocol    Narrative · Estimated EF = 55%.  · Left ventricular systolic function is normal.  · Low risk stress test for stress induced myocardial ischemia         Ariana Gutierrez   Holter Monitor 72Hr-21Day (0296T/0298T)   Order# [id number]   Reading physician: Hawk Guerin MD Ordering physician: Hawk Guerin MD Study date: 19   Patient Information     Patient Name  Ariana Gutierrez MRN  [id number] Sex  Female  (Age)  1940 (79 y.o.)   Interpretation Summary        · Average HR: 73. Min HR: 46. Max HR: 136.     · ~14 day monitor ,entire report was reviewed  · The predominant rhythm noted during the testing period was sinus rhythm.  · Rare [3306] supraventricular ectopics with an APC burden of: < 1%  · Rare [132] premature ventricular contractions with a PVC burden of: < 1%  · 72 runs of supraventricular tachycardia longest 53 beats at a maximum rate of 181 bpm  · Single 3 beat run of nonsustained ventricular tachycardia at a rate of 200 bpm  · No correlated arrhythmia  · No significant pauses           Conclusion: Baseline rhythm is sinus.  No significant ectopy  72 runs of supraventricular tachycardia longest 53 beats at a maximum rate of 181 bpm  Single 3 beat run of nonsustained ventricular tachycardia at a rate of 200 bpm                 ECG 12 Lead    Date/Time: 2020 2:44 PM  Performed by: Hawk Guerin MD  Authorized by: Hawk Guerin MD    Comparison: compared with previous ECG from 11/14/2019  Similar to previous ECG  Rhythm: sinus rhythm  Rate: normal  Conduction: conduction normal  ST Segments: ST segments normal  T Waves: T waves normal  QRS axis: normal  Other: no other findings    Clinical impression: normal ECG            Assessment/Plan   Patient Active Problem List   Diagnosis   • Essential hypertension   • Gastroesophageal reflux disease without esophagitis   • Shortness of breath   • CAD (coronary artery disease)   • Scoliosis   • Low back pain   • Perforated bowel (CMS/HCC)   • Paresthesia   • Palpitations   • Chronic back pain   • Deep vein thrombosis (CMS/HCC)   • Bacterial infection   • Joint infection (CMS/HCC)   • Pneumonitis   • Chronic pain   • Restrictive lung disease   • Interstitial lung disease (CMS/HCC)   • Usual interstitial pneumonitis (CMS/HCC)   • BERGERON (dyspnea on exertion)     Results for orders placed during the hospital encounter of 09/09/19   Adult Stress Echo W/ Cont or Stress Agent if Necessary Per Protocol    Narrative · Estimated EF = 55%.  · Left ventricular systolic function is normal.  · Low risk stress test for stress induced myocardial ischemia            ____________________________________________________________________________________________________________________________________________  Health maintenance and recommendations  Similar recommendations as last visit     Offered to give patient  a copy      Questions were encouraged, asked and answered to the patient's  understanding and satisfaction. Questions if any regarding current medications and side effects, need for refills and importance of compliance to medications stressed.    Reviewed available prior notes, consults, prior visits, laboratory findings, radiology and cardiology relevant reports. Updated chart as applicable. I have reviewed the patient's medical history in detail and updated the computerized patient record as relevant.       Updated patient regarding any new or relevant abnormalities on review of records or any new findings on physical exam. Mentioned to patient about purpose of visit and desirable health short and long term goals and objectives.    Primary to monitor CBC CMP Lipid panel and TSH as applicable    ___________________________________________________________________________________________________________________________________________         Plan     Continue same medications    Discussed with the patient and all questioned fully answered. She will call me if any problems arise.     Continue beta blocker therapy   She does not want to see EP for now and I agree       Orders Placed This Encounter   Procedures   • ECG 12 Lead     This order was created via procedure documentation   • Adult Transthoracic Echo Complete W/ Cont if Necessary Per Protocol     Myocardial strain to be performed during echocardiogram as long as technically feasible     Standing Status:   Future     Standing Expiration Date:   9/29/2021     Scheduling Instructions:      Myocardial strain to be performed during echocardiogram as long as technically feasible     Order Specific Question:   Reason for exam?     Answer:   Heart Failure, Cardiomyopathy, or Sytemic or Pulmonary Hypertension          Return in about 3 months (around 12/29/2020).

## 2020-09-30 ENCOUNTER — TELEPHONE (OUTPATIENT)
Dept: PULMONOLOGY | Facility: CLINIC | Age: 80
End: 2020-09-30

## 2020-09-30 NOTE — TELEPHONE ENCOUNTER
No. Looks like we need to discuss possibly starting Ofev as mentioned in Rheumatology note. If that is a medication we recommend and she agrees to start we would need to check her liver enzymes and she has already had recent lab that shows normal enzymes. You could send her some information on Ofev so she can read about it.

## 2020-09-30 NOTE — TELEPHONE ENCOUNTER
Relayed message to the patient. She states she would rather see Dr. Bhat. Rescheduled her appointment for November 2nd per her request.

## 2020-10-13 RX ORDER — BENAZEPRIL HYDROCHLORIDE 20 MG/1
TABLET ORAL
Qty: 90 TABLET | Refills: 3 | Status: SHIPPED | OUTPATIENT
Start: 2020-10-13 | End: 2021-09-20

## 2020-10-28 RX ORDER — ATORVASTATIN CALCIUM 20 MG/1
20 TABLET, FILM COATED ORAL DAILY
Qty: 90 TABLET | Refills: 3 | Status: SHIPPED | OUTPATIENT
Start: 2020-10-28 | End: 2021-10-27

## 2020-11-01 PROBLEM — K63.1 PERFORATED BOWEL: Status: RESOLVED | Noted: 2018-06-05 | Resolved: 2020-11-01

## 2020-11-01 PROBLEM — M00.9 JOINT INFECTION (HCC): Status: RESOLVED | Noted: 2018-07-24 | Resolved: 2020-11-01

## 2020-11-01 PROBLEM — A49.9 BACTERIAL INFECTION: Status: RESOLVED | Noted: 2018-06-05 | Resolved: 2020-11-01

## 2020-11-01 PROBLEM — I82.409 DEEP VEIN THROMBOSIS (HCC): Status: RESOLVED | Noted: 2018-06-05 | Resolved: 2020-11-01

## 2020-11-02 ENCOUNTER — OFFICE VISIT (OUTPATIENT)
Dept: PULMONOLOGY | Facility: CLINIC | Age: 80
End: 2020-11-02

## 2020-11-02 VITALS
HEIGHT: 63 IN | HEART RATE: 64 BPM | SYSTOLIC BLOOD PRESSURE: 154 MMHG | WEIGHT: 154.6 LBS | OXYGEN SATURATION: 98 % | BODY MASS INDEX: 27.39 KG/M2 | DIASTOLIC BLOOD PRESSURE: 100 MMHG

## 2020-11-02 DIAGNOSIS — J98.4 RESTRICTIVE LUNG DISEASE: ICD-10-CM

## 2020-11-02 DIAGNOSIS — R06.09 DOE (DYSPNEA ON EXERTION): ICD-10-CM

## 2020-11-02 DIAGNOSIS — R05.9 COUGH: ICD-10-CM

## 2020-11-02 DIAGNOSIS — J84.112 USUAL INTERSTITIAL PNEUMONITIS (HCC): Primary | ICD-10-CM

## 2020-11-02 PROCEDURE — 99215 OFFICE O/P EST HI 40 MIN: CPT | Performed by: INTERNAL MEDICINE

## 2020-11-02 NOTE — PROGRESS NOTES
Subjective   Ariana Gutierrez is a 80 y.o. female.     Background: Pt with dyspnea, scoliosis, mild restrictive physiology 2019, CAD htn. ILD on cxr.    Chief Complaint   Patient presents with   • Usual interstitial pneumonitis        History of Present Illness   She follows up with a very lengthy appointment today with her  on the phone and her mother also in the car.  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  She is having a lot of trouble.  We discussed her current symptoms and have her come back and reviewed outside records and old records including referral records from Dr. Guerin from prior to the last visit.  She reports an ongoing cough for the past year which will not go away, intermittent, moderate severity, in the chest, not associated with sputum production and not clearly alleviated by anything.  She has been out of medications including Tessalon and Mucinex for the past 2 weeks.  She is not sure how well these may have been helping her.  We had a CT of the chest which had shown pulmonary fibrosis with some areas of honeycombing.  We did a serologic work-up of this.  We did find her to have a positive rheumatoid factor.  She has not had synovitis in her joints and rheumatology has felt not needing to treat her joints at this time.  She reports that her father  at age 50 and brother  at age 70 with a lung disease.  She personally reports bad problems with knee infections requiring removal of a knee prosthetic.  She is also had perforated bowel heart disease blood clots.  We called her with the results of her chest CT prior to this visit.  She is done a lot of research and has brought in several papers discussing pulmonary fibrosis and idiopathic pulmonary fibrosis.  She has many questions about whether she might have that problem.  We also had plan to bring her in to discuss possibly treatment with Ofev which is now approved for fibrotic disease with progressive course, not necessarily IPF.  We  discussed that her case is not clear-cut and that people can get pulmonary fibrosis and rheumatoid arthritis and also people with UIP/IPF may also have positive rheumatoid factor studies.  Other than the cough she has not noticed any decline in status with any decrease in exercise tolerance or increasing dyspnea since the last visit.  She is very very concerned about the possibility of taking medication with potential vascular cardiac and bowel toxicities.  Her additional concern is teeth that are going to need to be dealt with in a series of procedures over the coming months.    Medical/Family/Social History   has a past medical history of Bacterial infection, CAD (coronary artery disease), Chest pain, Chronic back pain, Deep vein thrombosis (CMS/HCC), Diverticulosis of intestine (8/16/2016), Gastroesophageal reflux disease without esophagitis (5/26/2017), Hypertension, Irritable bowel syndrome (11/2/2011), Joint infection (CMS/HCC) (7/24/2018), Low back pain, Palpitations, Paresthesia, Perforated bowel (CMS/HCC), Rheumatoid arteritis (CMS/HCC), Scoliosis, Spinal stenosis, and Vascular disease, peripheral (CMS/HCC).   has a past surgical history that includes Total knee arthroplasty (Right); Cataract extraction (Bilateral); Colonoscopy; Back surgery; Small Bowel Resection (2016); Colon surgery; Renal artery stent (Right); Lumbar laminectomy; Total knee arthroplasty; Vascular surgery; Cardiac catheterization (Left, 11/6/2019); and Breast biopsy (Right, 25 yrs ago).  family history includes Breast cancer (age of onset: 80) in her mother; Coronary artery disease in her mother; Heart disease in her brother and mother; No Known Problems in her maternal grandfather, maternal grandmother, paternal grandfather, paternal grandmother, and sister; Peripheral vascular disease in her mother.   reports that she has never smoked. She has never used smokeless tobacco. She reports that she does not drink alcohol or use drugs.  No  Known Allergies  Medications    Current Outpatient Medications:   •  aspirin 81 MG chewable tablet, Chew 81 mg Daily., Disp: , Rfl:   •  atorvastatin (LIPITOR) 20 MG tablet, Take 1 tablet by mouth Daily., Disp: 90 tablet, Rfl: 3  •  Beclomethasone Diprop HFA (QVAR REDIHALER) 80 MCG/ACT inhaler, Inhale 1 puff 2 (Two) Times a Day., Disp: 1 inhaler, Rfl: 0  •  benazepril (LOTENSIN) 20 MG tablet, TAKE 1 TABLET EVERY DAY, Disp: 90 tablet, Rfl: 3  •  benzonatate (TESSALON) 200 MG capsule, Take 1 capsule by mouth 3 (Three) Times a Day As Needed for Cough., Disp: 90 capsule, Rfl: 11  •  Docusate Calcium (STOOL SOFTENER PO), Take 1 tablet by mouth Daily., Disp: , Rfl:   •  gabapentin (NEURONTIN) 800 MG tablet, Take 1 tablet by mouth 3 (Three) Times a Day., Disp: 270 tablet, Rfl: 1  •  guaiFENesin (MUCINEX) 600 MG 12 hr tablet, Take 2 tablets by mouth 2 (Two) Times a Day As Needed for Cough., Disp: 60 tablet, Rfl: 0  •  HYDROcodone-acetaminophen (NORCO)  MG per tablet, Take 1 tablet by mouth Every 6 (Six) Hours As Needed for Moderate Pain ., Disp: 90 tablet, Rfl: 0  •  ipratropium-albuterol (DUO-NEB) 0.5-2.5 mg/3 ml nebulizer, Take 3 mL by nebulization Every 6 (Six) Hours While Awake. Diagnosis of pneumonitis J69.8, Disp: 360 mL, Rfl: 2  •  isosorbide mononitrate (IMDUR) 30 MG 24 hr tablet, Take 1 tablet by mouth once daily, Disp: 90 tablet, Rfl: 4  •  metoprolol succinate XL (TOPROL-XL) 50 MG 24 hr tablet, TAKE 1 TABLET EVERY DAY, Disp: 90 tablet, Rfl: 3  •  Multiple Vitamins-Minerals (PRESERVISION AREDS 2 PO), Take  by mouth., Disp: , Rfl:   •  nitroglycerin (NITROSTAT) 0.4 MG SL tablet, 1 under the tongue as needed for angina, may repeat q5mins for up three doses, Disp: 100 tablet, Rfl: 11  •  omeprazole (priLOSEC) 20 MG capsule, TAKE 1 CAPSULE EVERY DAY, Disp: 90 capsule, Rfl: 3  •  polyethylene glycol (MIRALAX) powder, Take 17 g by mouth Daily., Disp: , Rfl:   •  saccharomyces boulardii (FLORASTOR) 250 MG capsule,  "Take 1 capsule by mouth Daily., Disp: 30 capsule, Rfl: 2    Review of Systems   Constitutional: Negative for chills and fever.   HENT: Negative for trouble swallowing and voice change.    Eyes: Negative for photophobia and visual disturbance.   Respiratory: Negative for shortness of breath.    Cardiovascular: Negative for chest pain and palpitations.   Gastrointestinal: Positive for GERD. Negative for abdominal pain, nausea, vomiting and indigestion.   Musculoskeletal: Positive for arthralgias. Negative for gait problem and joint swelling.   Skin: Negative for pallor, rash and skin lesions.   Neurological: Negative for tremors, weakness and confusion.   Psychiatric/Behavioral: The patient is not nervous/anxious.        Objective   /100 (BP Location: Left arm, Patient Position: Sitting)   Pulse 64   Ht 159.4 cm (62.76\")   Wt 70.1 kg (154 lb 9.6 oz)   LMP  (LMP Unknown)   SpO2 98%   Breastfeeding No   BMI 27.60 kg/m²   Physical Exam  Constitutional:       General: She is not in acute distress.     Appearance: She is well-developed. She is not ill-appearing.   HENT:      Head: Atraumatic.   Eyes:      General: No scleral icterus.     Conjunctiva/sclera: Conjunctivae normal.   Neck:      Musculoskeletal: Neck supple.   Cardiovascular:      Rate and Rhythm: Normal rate and regular rhythm.      Heart sounds: S1 normal and S2 normal.   Pulmonary:      Effort: Pulmonary effort is normal.      Breath sounds: Rales (bases) present. No rhonchi.   Abdominal:      General: There is no distension.      Palpations: Abdomen is soft.      Tenderness: There is no abdominal tenderness.   Musculoskeletal:         General: No deformity.   Lymphadenopathy:      Cervical: No cervical adenopathy.   Skin:     General: Skin is warm.      Findings: No rash.   Neurological:      Mental Status: She is alert and oriented to person, place, and time.   Psychiatric:         Behavior: Behavior normal.       PFT 12/2019  Interpretation " ":  1.  Spirometry is consistent with a mild restrictive ventilatory defect.  2.  Other than some improvement in small airways function postbronchodilator there is no significant change in spirometry postbronchodilator and a mild restrictive ventilatory defect still appears to be present.  3.  Lung volumes confirm a mild restrictive ventilatory defect.  4.  There is a moderate diffusion impairment which when corrected for alveolar volume is normalized.        Grady White MD    PFT 8/2020 Moderate restriction fev1/fvc 75/59, tlc 61, dl/va 79.  2019 study: fev1/fvc 87/72, tlc 65, dl/va 116  -----------------------------------------------------------------------------------------------  EXAMINATION: XR HAND 2 VW BILATERAL- 8/26/2020 12:25 PM CDT     HISTORY: Bilateral hand pain, arthritis, positive rheumatoid factor     COMPARISON: None     FINDINGS:  Right hand: There is marked loss of distal interphalangeal joint space,  particularly of the fourth and fifth digits. This has somewhat of an  erosive appearance with \"gold wheezing\" deformity. The proximal  interphalangeal joints are more well preserved. No erosive changes are  seen of the distal ulna. There is osseous overgrowth at the distal  interphalangeal joints.     Left hand: Similar to the right hand, there is diffuse joint space  narrowing of the distal interphalangeal joints with is most pronounced  in the third and fifth digits with the same goal wheezing deformity.  There is more pronounced proximal interphalangeal joint space narrowing  on the left hand. Arthritic changes are also evident at the first  carpometacarpal joint. The patient appears osteopenic. The ulnar styloid  is intact without resorptive or erosive changes. No soft tissue  abnormality is seen.     IMPRESSION:  Arthritic changes predominantly involve the distal  interphalangeal joints and have an erosive osteoarthritis appearance.  This report was finalized on 08/26/2020 12:28 by Dr." Salas Salamanca MD     -----------------------------------------------------------------------------------------------   Results for TONA MO (MRN [id number]) as of 11/2/2020 17:48   Ref. Range 11/14/2019 12:26 1/30/2020 15:07 9/22/2020 12:27   ANSHUL Direct Latest Ref Range: Negative   Negative    RA Latex Turbid Latest Ref Range: 0.0 - 13.9 IU/mL  34.1 (H)         Outside records from Dr. Roman are reviewed. In summary, these indicate ccp+ c/w RA but no synovitis.       -----------------------------------------------------------------------------------------------  Assessment/Plan   Problem List Items Addressed This Visit        Pulmonary Problems    Restrictive lung disease    Relevant Orders    Pulmonary Function Test    Usual interstitial pneumonitis (CMS/HCC) - Primary    Relevant Orders    Pulmonary Function Test    BERGERON (dyspnea on exertion)    Relevant Orders    Pulmonary Function Test      Other Visit Diagnoses     Cough        Relevant Orders    Pulmonary Function Test        Patient's Body mass index is 27.6 kg/m². BMI is within normal parameters. No follow-up required..      This is a patient with a complex situation.  She has restrictive lung disease.  She has a UIP pattern on her CT scan.  She has dyspnea on exertion.  She has serologic evidence for rheumatoid arthritis but without synovitis.  She has the cough.  The cough is almost certainly due to her underlying interstitial disease.  We discussed this today.  Not much specifically we can do about that other than continue the dextromethorphan and Tessalon Perles inasmuch as those may help.  We further discussed potential for use of Ofev which is indicated for progressive fibrotic lung disease regardless of whether it is IPF.  The patient has done quite a bit of research on all of this already.  She has about 2 or 3 articles with her.  1 of these specifically refers to IPF and the other one is a more general article about pulmonary fibrosis  but misleadingly directs the reviewer to think that all forms of pulmonary fibrosis are progressive and fatal with poor long-term survival.  I discussed with the patient that the latter article seems to be misleading is overly generalizing and those mortality references are specifically for idiopathic pulmonary fibrosis and not all forms of pulmonary fibrosis in general.  We discussed that that having been said it is unclear what the future behavior of her disease is going to be.  If this is a rheumatoid arthritis related interstitial disease with UIP pattern then her prognosis is better than if it is idiopathic.  Should she have other manifestations for rheumatoid arthritis I had be a little more confident in calling that.  We further discussed use of OFEV.  We discussed that this is a very expensive medicine, and we also discussed the adverse effects.  The patient is particularly concerned about the intestinal effects the cardiovascular effects and the thrombotic effects because she has had problems with all of these types of problems in the past.  She has had a bowel perforation and cardiovascular disease.  She is not enthusiastic about taking that medication.  We discussed the alternative for UIP due to idiopathic pulmonary fibrosis being Esbriet, although it is not indicated for these more broad list of diagnoses.  We further discussed the potential for novel therapy under investigation currently.  We discussed that would require evaluation at a referral/academic center doing this sort of research.  The patient is somewhat keen on that due to what she read in that second article that discussed such a bad mortality for all forms of pulmonary fibrosis and recommended that all patients be referred to an academic facility.  After our discussion I think she is fairly aware of the ambiguity of all of this, the uncertainty of prognosis and uncertainty of the etiology of her fibrosis, whether it is connective tissue  disorder related or idiopathic.  We will plan to go ahead and get a new set of pulmonary function tests.  We will compare these to the prior studies.  I am concerned about the drop that was seen on the previous 2 studies from December to August.  If current studies are unimproved then we will look into additional treatment options with either Esbriet, or referral to interstitial lung disease clinic at [facility] institution for possible novel therapies.  Risk is high for potential progression and threat to pulmonary system function.       Electronically signed by Fawad Bhat MD, 11/2/2020, 14:25 CST

## 2020-11-13 ENCOUNTER — TRANSCRIBE ORDERS (OUTPATIENT)
Dept: ADMINISTRATIVE | Facility: HOSPITAL | Age: 80
End: 2020-11-13

## 2020-11-13 DIAGNOSIS — Z01.818 PREOPERATIVE TESTING: Primary | ICD-10-CM

## 2020-11-16 ENCOUNTER — TRANSCRIBE ORDERS (OUTPATIENT)
Dept: ADMINISTRATIVE | Facility: HOSPITAL | Age: 80
End: 2020-11-16

## 2020-11-16 DIAGNOSIS — Z12.31 ENCOUNTER FOR SCREENING MAMMOGRAM FOR MALIGNANT NEOPLASM OF BREAST: Primary | ICD-10-CM

## 2020-11-24 ENCOUNTER — LAB (OUTPATIENT)
Dept: LAB | Facility: HOSPITAL | Age: 80
End: 2020-11-24

## 2020-11-24 DIAGNOSIS — Z01.818 PREOPERATIVE TESTING: ICD-10-CM

## 2020-11-24 PROCEDURE — C9803 HOPD COVID-19 SPEC COLLECT: HCPCS

## 2020-11-24 PROCEDURE — U0003 INFECTIOUS AGENT DETECTION BY NUCLEIC ACID (DNA OR RNA); SEVERE ACUTE RESPIRATORY SYNDROME CORONAVIRUS 2 (SARS-COV-2) (CORONAVIRUS DISEASE [COVID-19]), AMPLIFIED PROBE TECHNIQUE, MAKING USE OF HIGH THROUGHPUT TECHNOLOGIES AS DESCRIBED BY CMS-2020-01-R: HCPCS

## 2020-11-25 LAB
COVID LABCORP PRIORITY: NORMAL
SARS-COV-2 RNA RESP QL NAA+PROBE: NOT DETECTED

## 2020-11-27 ENCOUNTER — HOSPITAL ENCOUNTER (OUTPATIENT)
Dept: PULMONOLOGY | Facility: HOSPITAL | Age: 80
Discharge: HOME | End: 2020-11-27
Admitting: INTERNAL MEDICINE

## 2020-11-27 PROCEDURE — 94729 DIFFUSING CAPACITY: CPT | Performed by: INTERNAL MEDICINE

## 2020-11-27 PROCEDURE — 94726 PLETHYSMOGRAPHY LUNG VOLUMES: CPT

## 2020-11-27 PROCEDURE — 94060 EVALUATION OF WHEEZING: CPT

## 2020-11-27 PROCEDURE — 94060 EVALUATION OF WHEEZING: CPT | Performed by: INTERNAL MEDICINE

## 2020-11-27 PROCEDURE — 94726 PLETHYSMOGRAPHY LUNG VOLUMES: CPT | Performed by: INTERNAL MEDICINE

## 2020-11-27 PROCEDURE — 94729 DIFFUSING CAPACITY: CPT

## 2020-11-27 RX ORDER — ALBUTEROL SULFATE 2.5 MG/3ML
2.5 SOLUTION RESPIRATORY (INHALATION) ONCE
Status: COMPLETED | OUTPATIENT
Start: 2020-11-27 | End: 2020-11-27

## 2020-11-27 RX ADMIN — ALBUTEROL SULFATE 2.5 MG: 2.5 SOLUTION RESPIRATORY (INHALATION) at 13:27

## 2020-11-30 ENCOUNTER — OFFICE VISIT (OUTPATIENT)
Dept: FAMILY MEDICINE CLINIC | Facility: CLINIC | Age: 80
End: 2020-11-30

## 2020-11-30 VITALS
HEART RATE: 78 BPM | OXYGEN SATURATION: 94 % | DIASTOLIC BLOOD PRESSURE: 75 MMHG | HEIGHT: 62 IN | RESPIRATION RATE: 18 BRPM | TEMPERATURE: 97.4 F | SYSTOLIC BLOOD PRESSURE: 140 MMHG | WEIGHT: 159 LBS | BODY MASS INDEX: 29.26 KG/M2

## 2020-11-30 DIAGNOSIS — R52 PAIN: ICD-10-CM

## 2020-11-30 PROCEDURE — 99214 OFFICE O/P EST MOD 30 MIN: CPT | Performed by: FAMILY MEDICINE

## 2020-11-30 RX ORDER — HYDROCODONE BITARTRATE AND ACETAMINOPHEN 10; 325 MG/1; MG/1
1 TABLET ORAL EVERY 6 HOURS PRN
Qty: 90 TABLET | Refills: 0 | Status: SHIPPED | OUTPATIENT
Start: 2020-11-30 | End: 2020-12-30 | Stop reason: SDUPTHER

## 2020-11-30 NOTE — PROGRESS NOTES
Subjective   Ariana Gutierrez is a 80 y.o. female.     80-year-old female with severe scoliosis and chronic back pain and radiculopathy    Pain  This is a chronic problem. The current episode started more than 1 year ago. The problem occurs daily. The problem has been gradually worsening. Associated symptoms include arthralgias and chest pain. Exacerbated by: Movement. She has tried oral narcotics for the symptoms. The treatment provided moderate relief.     Vitals:    11/30/20 1348   BP: 140/75   Pulse: 78   Resp: 18   Temp: 97.4 °F (36.3 °C)   SpO2: 94%       The following portions of the patient's history were reviewed and updated as appropriate: allergies, current medications, past family history, past medical history, past social history, past surgical history and problem list.    Review of Systems   Respiratory:        Pulmonary fibrosis   Cardiovascular: Positive for chest pain.        Coronary artery disease   Musculoskeletal: Positive for arthralgias and back pain.       Objective   Physical Exam  Vitals signs and nursing note reviewed.   Constitutional:       Appearance: Normal appearance.   Cardiovascular:      Rate and Rhythm: Normal rate and regular rhythm.      Pulses: Normal pulses.      Heart sounds: Normal heart sounds.   Pulmonary:      Effort: Pulmonary effort is normal.      Breath sounds: Wheezing and rhonchi present.   Musculoskeletal:      Right lower leg: No edema.      Left lower leg: No edema.      Comments: Severe scoliosis of thoracic spine   Skin:     General: Skin is warm and dry.   Neurological:      General: No focal deficit present.      Mental Status: She is alert and oriented to person, place, and time.   Psychiatric:         Mood and Affect: Mood normal.         Behavior: Behavior normal.         Thought Content: Thought content normal.         Judgment: Judgment normal.       CONTROLLED SUBSTANCE TRACKING 6/5/2018 10/15/2018 2/12/2019 8/5/2019 11/11/2019 5/11/2020 8/17/2020    Last Pro 6/5/2018 10/15/2018 2/12/2019 8/5/2019 11/11/2019 5/11/2020 8/17/2020   Report Number - [id number]   Last UDS 3/14/2018 3/14/2018 3/14/2018 8/5/2019 8/16/2019 8/5/2019 8/5/2019   Last Controlled Substance Agreement 3/14/2018 3/14/2018 3/16/2018 8/5/2019 8/16/2019 8/5/2019 8/17/2020       Patient's Body mass index is 29.08 kg/m². BMI is above normal parameters. Recommendations include: none (medical contraindication).      Assessment/Plan   Patient Active Problem List   Diagnosis   • Essential hypertension   • Gastroesophageal reflux disease without esophagitis   • Shortness of breath   • CAD (coronary artery disease)   • Scoliosis   • Low back pain   • Paresthesia   • Palpitations   • Chronic back pain   • Pneumonitis   • Chronic pain   • Restrictive lung disease   • Usual interstitial pneumonitis (CMS/HCC)   • BERGERON (dyspnea on exertion)     Diagnoses and all orders for this visit:    1. Pain  -     HYDROcodone-acetaminophen (NORCO)  MG per tablet; Take 1 tablet by mouth Every 6 (Six) Hours As Needed for Moderate Pain .  Dispense: 90 tablet; Refill: 0       Return in about 3 months (around 2/28/2021).       Plan above, COVID-19 protection      Electronically signed by Bill Schilling MD 11/30/2020

## 2020-12-01 ENCOUNTER — APPOINTMENT (OUTPATIENT)
Dept: CARDIOLOGY | Facility: HOSPITAL | Age: 80
End: 2020-12-01

## 2020-12-07 ENCOUNTER — TELEPHONE (OUTPATIENT)
Dept: PULMONOLOGY | Facility: CLINIC | Age: 80
End: 2020-12-07

## 2020-12-08 DIAGNOSIS — J84.112 USUAL INTERSTITIAL PNEUMONITIS (HCC): Primary | ICD-10-CM

## 2020-12-08 NOTE — TELEPHONE ENCOUNTER
PFT series reviewed with patient.  Definite drop between 2019 and 2020 August.  Pt had had lower respiratory tract infection in time between those tests.  Repeat test currently is stable compared with August.  Poor candidate for ofev due to GI comorbidity. Esbriet indicated for IPF but not UIP due to other causes.  Pt RA seropositive.  Uncertain whether to consider rheumatoid or IPF.  If progression going forward then I will recommend esbriet.  Pt wishes to hold off any additional medicines for now.  Will plan follow up PFT with next visit and HRCT around that time for comparison.

## 2020-12-15 ENCOUNTER — TELEPHONE (OUTPATIENT)
Dept: OBSTETRICS AND GYNECOLOGY | Facility: CLINIC | Age: 80
End: 2020-12-15

## 2020-12-15 NOTE — TELEPHONE ENCOUNTER
Called and spoke with pt regarding todays appointment. Told her that she was not due until 1/2021 and because of her insurance she would need to sign a form stating that she was responsible for payment for anything done today. Pt voices that she is extremely aggravated that no one tried getting in touch with her until now. I apologized to pt and asked her if she wanted to reschedule her appointment. Pt voiced that she did not want to reschedule. Dr Schroeder aware and was going to speak with Inocencia HOOVER regarding pt. BS

## 2020-12-28 ENCOUNTER — HOSPITAL ENCOUNTER (OUTPATIENT)
Dept: MAMMOGRAPHY | Facility: HOSPITAL | Age: 80
Discharge: HOME | End: 2020-12-28

## 2020-12-28 ENCOUNTER — HOSPITAL ENCOUNTER (OUTPATIENT)
Dept: CARDIOLOGY | Facility: HOSPITAL | Age: 80
Discharge: HOME | End: 2020-12-28

## 2020-12-28 VITALS
BODY MASS INDEX: 29.25 KG/M2 | WEIGHT: 158.95 LBS | HEIGHT: 62 IN | DIASTOLIC BLOOD PRESSURE: 67 MMHG | SYSTOLIC BLOOD PRESSURE: 148 MMHG

## 2020-12-28 DIAGNOSIS — R06.09 DOE (DYSPNEA ON EXERTION): ICD-10-CM

## 2020-12-28 DIAGNOSIS — Z12.31 ENCOUNTER FOR SCREENING MAMMOGRAM FOR MALIGNANT NEOPLASM OF BREAST: ICD-10-CM

## 2020-12-28 PROCEDURE — 77067 SCR MAMMO BI INCL CAD: CPT

## 2020-12-28 PROCEDURE — 93356 MYOCRD STRAIN IMG SPCKL TRCK: CPT

## 2020-12-28 PROCEDURE — 93306 TTE W/DOPPLER COMPLETE: CPT | Performed by: INTERNAL MEDICINE

## 2020-12-28 PROCEDURE — 93306 TTE W/DOPPLER COMPLETE: CPT

## 2020-12-28 PROCEDURE — 93356 MYOCRD STRAIN IMG SPCKL TRCK: CPT | Performed by: INTERNAL MEDICINE

## 2020-12-28 PROCEDURE — 77063 BREAST TOMOSYNTHESIS BI: CPT

## 2020-12-29 LAB
BH CV ECHO MEAS - AO MAX PG (FULL): 6.3 MMHG
BH CV ECHO MEAS - AO MAX PG: 10.5 MMHG
BH CV ECHO MEAS - AO MEAN PG (FULL): 4 MMHG
BH CV ECHO MEAS - AO MEAN PG: 6 MMHG
BH CV ECHO MEAS - AO ROOT AREA (BSA CORRECTED): 1.7
BH CV ECHO MEAS - AO ROOT AREA: 6.6 CM^2
BH CV ECHO MEAS - AO ROOT DIAM: 2.9 CM
BH CV ECHO MEAS - AO V2 MAX: 162 CM/SEC
BH CV ECHO MEAS - AO V2 MEAN: 109 CM/SEC
BH CV ECHO MEAS - AO V2 VTI: 38.3 CM
BH CV ECHO MEAS - AVA(I,A): 2.1 CM^2
BH CV ECHO MEAS - AVA(I,D): 2.1 CM^2
BH CV ECHO MEAS - AVA(V,A): 2 CM^2
BH CV ECHO MEAS - AVA(V,D): 2 CM^2
BH CV ECHO MEAS - BSA(HAYCOCK): 1.8 M^2
BH CV ECHO MEAS - BSA: 1.7 M^2
BH CV ECHO MEAS - BZI_BMI: 29.1 KILOGRAMS/M^2
BH CV ECHO MEAS - BZI_METRIC_HEIGHT: 157.5 CM
BH CV ECHO MEAS - BZI_METRIC_WEIGHT: 72.1 KG
BH CV ECHO MEAS - EDV(CUBED): 82.3 ML
BH CV ECHO MEAS - EDV(MOD-SP4): 70.9 ML
BH CV ECHO MEAS - EDV(TEICH): 85.4 ML
BH CV ECHO MEAS - EF(CUBED): 80.3 %
BH CV ECHO MEAS - EF(MOD-SP4): 67.7 %
BH CV ECHO MEAS - EF(TEICH): 73.1 %
BH CV ECHO MEAS - ESV(CUBED): 16.2 ML
BH CV ECHO MEAS - ESV(MOD-SP4): 22.9 ML
BH CV ECHO MEAS - ESV(TEICH): 23 ML
BH CV ECHO MEAS - FS: 41.8 %
BH CV ECHO MEAS - IVS/LVPW: 0.79
BH CV ECHO MEAS - IVSD: 0.65 CM
BH CV ECHO MEAS - LA DIMENSION: 3.1 CM
BH CV ECHO MEAS - LA/AO: 1.1
BH CV ECHO MEAS - LAT PEAK E' VEL: 6.3 CM/SEC
BH CV ECHO MEAS - LV DIASTOLIC VOL/BSA (35-75): 40.9 ML/M^2
BH CV ECHO MEAS - LV MASS(C)D: 96.2 GRAMS
BH CV ECHO MEAS - LV MASS(C)DI: 55.5 GRAMS/M^2
BH CV ECHO MEAS - LV MAX PG: 4.2 MMHG
BH CV ECHO MEAS - LV MEAN PG: 2 MMHG
BH CV ECHO MEAS - LV SYSTOLIC VOL/BSA (12-30): 13.2 ML/M^2
BH CV ECHO MEAS - LV V1 MAX: 103 CM/SEC
BH CV ECHO MEAS - LV V1 MEAN: 68.5 CM/SEC
BH CV ECHO MEAS - LV V1 VTI: 25.5 CM
BH CV ECHO MEAS - LVIDD: 4.4 CM
BH CV ECHO MEAS - LVIDS: 2.5 CM
BH CV ECHO MEAS - LVLD AP4: 7.7 CM
BH CV ECHO MEAS - LVLS AP4: 6.3 CM
BH CV ECHO MEAS - LVOT AREA (M): 3.1 CM^2
BH CV ECHO MEAS - LVOT AREA: 3.1 CM^2
BH CV ECHO MEAS - LVOT DIAM: 2 CM
BH CV ECHO MEAS - LVPWD: 0.82 CM
BH CV ECHO MEAS - MED PEAK E' VEL: 3.59 CM/SEC
BH CV ECHO MEAS - MV A MAX VEL: 90.6 CM/SEC
BH CV ECHO MEAS - MV DEC TIME: 0.34 SEC
BH CV ECHO MEAS - MV E MAX VEL: 60.6 CM/SEC
BH CV ECHO MEAS - MV E/A: 0.67
BH CV ECHO MEAS - RAP SYSTOLE: 5 MMHG
BH CV ECHO MEAS - RVSP: 31.4 MMHG
BH CV ECHO MEAS - SI(AO): 145.9 ML/M^2
BH CV ECHO MEAS - SI(CUBED): 38.1 ML/M^2
BH CV ECHO MEAS - SI(LVOT): 46.2 ML/M^2
BH CV ECHO MEAS - SI(MOD-SP4): 27.7 ML/M^2
BH CV ECHO MEAS - SI(TEICH): 36 ML/M^2
BH CV ECHO MEAS - SV(AO): 253 ML
BH CV ECHO MEAS - SV(CUBED): 66.1 ML
BH CV ECHO MEAS - SV(LVOT): 80.1 ML
BH CV ECHO MEAS - SV(MOD-SP4): 48 ML
BH CV ECHO MEAS - SV(TEICH): 62.4 ML
BH CV ECHO MEAS - TR MAX VEL: 257 CM/SEC
BH CV ECHO MEASUREMENTS AVERAGE E/E' RATIO: 12.25
LEFT ATRIUM VOLUME INDEX: 29 ML/M2
LEFT ATRIUM VOLUME: 50.2 CM3
MAXIMAL PREDICTED HEART RATE: 142 BPM
STRESS TARGET HR: 121 BPM

## 2020-12-30 ENCOUNTER — APPOINTMENT (OUTPATIENT)
Dept: MAMMOGRAPHY | Facility: HOSPITAL | Age: 80
End: 2020-12-30

## 2020-12-30 DIAGNOSIS — R52 PAIN: ICD-10-CM

## 2020-12-30 RX ORDER — HYDROCODONE BITARTRATE AND ACETAMINOPHEN 10; 325 MG/1; MG/1
1 TABLET ORAL EVERY 6 HOURS PRN
Qty: 90 TABLET | Refills: 0 | Status: SHIPPED | OUTPATIENT
Start: 2020-12-30 | End: 2021-02-05 | Stop reason: SDUPTHER

## 2020-12-30 NOTE — TELEPHONE ENCOUNTER
MED REFILL REQUEST      PT WAS SEEN ON 11.30.20 FOR PAIN CONTROL    NORCO 10/325MG (1) Q 6 HRS    [facility]    ADVISED MD OUT OFFICE-COULD SEND RX TO ANOTHER CLINIC TO HAVE TODAY-PT STATES SHE WILL WAIT UNTIL Monday WHEN DR GREWAL IS AVAILABLE-PREFERRED HIS NAME ON THE BOTTLE.

## 2021-01-11 ENCOUNTER — OFFICE VISIT (OUTPATIENT)
Dept: CARDIOLOGY | Facility: CLINIC | Age: 81
End: 2021-01-11

## 2021-01-11 VITALS
DIASTOLIC BLOOD PRESSURE: 60 MMHG | OXYGEN SATURATION: 94 % | HEART RATE: 82 BPM | WEIGHT: 152 LBS | SYSTOLIC BLOOD PRESSURE: 136 MMHG | BODY MASS INDEX: 26.93 KG/M2 | HEIGHT: 63 IN

## 2021-01-11 DIAGNOSIS — I10 ESSENTIAL HYPERTENSION: ICD-10-CM

## 2021-01-11 DIAGNOSIS — R06.09 DOE (DYSPNEA ON EXERTION): ICD-10-CM

## 2021-01-11 DIAGNOSIS — R00.2 PALPITATIONS: ICD-10-CM

## 2021-01-11 DIAGNOSIS — R06.02 SHORTNESS OF BREATH: ICD-10-CM

## 2021-01-11 DIAGNOSIS — I25.10 CORONARY ARTERY DISEASE INVOLVING NATIVE CORONARY ARTERY OF NATIVE HEART WITHOUT ANGINA PECTORIS: ICD-10-CM

## 2021-01-11 DIAGNOSIS — J98.4 RESTRICTIVE LUNG DISEASE: Primary | ICD-10-CM

## 2021-01-11 PROCEDURE — 99214 OFFICE O/P EST MOD 30 MIN: CPT | Performed by: INTERNAL MEDICINE

## 2021-01-11 PROCEDURE — 93000 ELECTROCARDIOGRAM COMPLETE: CPT | Performed by: INTERNAL MEDICINE

## 2021-01-11 NOTE — PROGRESS NOTES
Ariana Gutierrez  [id number]  1940  80 y.o.  female    Referring Provider: Bill Schilling MD    Reason for Follow-up Visit:  Here for follow up after cardiac testing.    Routine follow up.  Chronic low back pain    Had MADELIN in 1/18 no clots or vegetation  Zio patch showed SVT 72 episodes longest 53 maximum rate 181 BPM     Low risk stress test for stress induced myocardial ischemia  Cardiac workup test results as below   Cath small non dominant RCA severe stenosis    Subjective    Overall feels the same   No new events or complaints since last visit   Overall the patient feels no major change from baseline symptoms   Similar symptoms as during last visit     Tolerating current medications well with no untoward side effects   Compliant with prescribed medication regimen. Tries to adhere to cardiac diet.      Persistent moderate chronic exertional shortness of breath on exertion relieved with rest  No significant cough or wheezing    Intermittent palpitations, once every several days to several weeks lasting for less than 1 minute  No associated symptoms of dizziness, weakness, chest pain,  shortness of breath    Hears heart beating in her ears   Prior SVT and NSVT     No associated chest pain  No significant pedal edema    No fever or chills  No significant expectoration    No hemoptysis  No presyncope or syncope    Tolerating current medications well with no untoward side effects   Compliant with prescribed medication regimen. Tries to adhere to cardiac diet.   Honey combing of lung on HRCT          History of present illness:  Arinaa Gutierrez is a 80 y.o. yo female with history of hypertension who presents today for   Chief Complaint   Patient presents with   • Coronary Artery Disease     3 month follow up - results    .    History  Past Medical History:   Diagnosis Date   • Bacterial infection    • CAD (coronary artery disease)    • Chest pain    • Chronic back pain    • Deep vein thrombosis  (CMS/[facility])     S/P KNEE SURGERY 10/2017   • Diverticulosis of intestine 8/16/2016   • Gastroesophageal reflux disease without esophagitis 5/26/2017   • Hypertension    • Irritable bowel syndrome 11/2/2011   • Joint infection (CMS/HCC) 7/24/2018   • Low back pain    • Palpitations    • Paresthesia     toes   • Perforated bowel (CMS/HCC)    • Rheumatoid arteritis (CMS/HCC)    • Scoliosis    • Spinal stenosis    • Vascular disease, peripheral (CMS/HCC)    ,   Past Surgical History:   Procedure Laterality Date   • BACK SURGERY     • BREAST BIOPSY Right 25 yrs ago   • CARDIAC CATHETERIZATION Left 11/6/2019    Procedure: Cardiac Catheterization/Vascular Study CARMEN OK;  Surgeon: Hawk Guerin MD;  Location: [facility] CATH INVASIVE LOCATION;  Service: Cardiology   • CATARACT EXTRACTION Bilateral    • COLON RESECTION SMALL BOWEL  2016    with colostomy for 6 months, already revised.   • COLON SURGERY     • COLONOSCOPY     • LUMBAR LAMINECTOMY     • RENAL ARTERY STENT Right    • TOTAL KNEE ARTHROPLASTY Right    • TOTAL KNEE ARTHROPLASTY     • VASCULAR SURGERY      10/2017 - DR GARCIA   ,   Family History   Problem Relation Age of Onset   • Breast cancer Mother 80   • Heart disease Mother    • Coronary artery disease Mother    • Peripheral vascular disease Mother    • No Known Problems Sister    • Heart disease Brother    • No Known Problems Maternal Grandmother    • No Known Problems Maternal Grandfather    • No Known Problems Paternal Grandmother    • No Known Problems Paternal Grandfather    ,   Social History     Tobacco Use   • Smoking status: Never Smoker   • Smokeless tobacco: Never Used   Substance Use Topics   • Alcohol use: No     Frequency: Never   • Drug use: No   ,     Medications  Current Outpatient Medications   Medication Sig Dispense Refill   • aspirin 81 MG chewable tablet Chew 81 mg Daily.     • atorvastatin (LIPITOR) 20 MG tablet Take 1 tablet by mouth Daily. 90 tablet 3   • Beclomethasone Diprop HFA (QVAR  REDIHALER) 80 MCG/ACT inhaler Inhale 1 puff 2 (Two) Times a Day. 1 inhaler 0   • benazepril (LOTENSIN) 20 MG tablet TAKE 1 TABLET EVERY DAY 90 tablet 3   • benzonatate (TESSALON) 200 MG capsule Take 1 capsule by mouth 3 (Three) Times a Day As Needed for Cough. 90 capsule 11   • Docusate Calcium (STOOL SOFTENER PO) Take 1 tablet by mouth Daily.     • gabapentin (NEURONTIN) 800 MG tablet Take 1 tablet by mouth 3 (Three) Times a Day. 270 tablet 1   • guaiFENesin (MUCINEX) 600 MG 12 hr tablet Take 2 tablets by mouth 2 (Two) Times a Day As Needed for Cough. 60 tablet 0   • HYDROcodone-acetaminophen (NORCO)  MG per tablet Take 1 tablet by mouth Every 6 (Six) Hours As Needed for Moderate Pain . 90 tablet 0   • ipratropium-albuterol (DUO-NEB) 0.5-2.5 mg/3 ml nebulizer Take 3 mL by nebulization Every 6 (Six) Hours While Awake. Diagnosis of pneumonitis J69.8 360 mL 2   • isosorbide mononitrate (IMDUR) 30 MG 24 hr tablet Take 1 tablet by mouth once daily 90 tablet 4   • metoprolol succinate XL (TOPROL-XL) 50 MG 24 hr tablet TAKE 1 TABLET EVERY DAY 90 tablet 3   • Multiple Vitamins-Minerals (PRESERVISION AREDS 2 PO) Take  by mouth.     • nitroglycerin (NITROSTAT) 0.4 MG SL tablet 1 under the tongue as needed for angina, may repeat q5mins for up three doses 100 tablet 11   • omeprazole (priLOSEC) 20 MG capsule TAKE 1 CAPSULE EVERY DAY 90 capsule 3   • polyethylene glycol (MIRALAX) powder Take 17 g by mouth Daily.     • saccharomyces boulardii (FLORASTOR) 250 MG capsule Take 1 capsule by mouth Daily. 30 capsule 2     No current facility-administered medications for this visit.        Allergies:  Patient has no known allergies.    Review of Systems  Review of Systems   Constitution: Positive for malaise/fatigue. Negative for fever.   HENT: Negative.    Eyes: Negative.    Cardiovascular: Positive for dyspnea on exertion and palpitations. Negative for chest pain, claudication, cyanosis, irregular heartbeat, leg swelling,  "near-syncope, orthopnea, paroxysmal nocturnal dyspnea and syncope.   Respiratory: Positive for cough and shortness of breath.    Endocrine: Negative.    Hematologic/Lymphatic: Negative.    Skin: Negative.    Musculoskeletal: Positive for arthritis and joint pain.   Gastrointestinal: Positive for flatus. Negative for anorexia.   Genitourinary: Negative.    Neurological: Positive for weakness.   Psychiatric/Behavioral: Negative.        Objective     Physical Exam:  /60   Pulse 82   Ht 160 cm (63\")   Wt 68.9 kg (152 lb)   LMP  (LMP Unknown)   SpO2 94%   BMI 26.93 kg/m²   Physical Exam   Constitutional: She appears well-developed.   HENT:   Head: Normocephalic.   Neck: Normal carotid pulses and no JVD present. No tracheal tenderness present. Carotid bruit is not present. No tracheal deviation and no edema present.   Cardiovascular: Regular rhythm and normal pulses.   Murmur heard.   Systolic murmur is present with a grade of 2/6.  Pulmonary/Chest: Effort normal. No stridor. She has no wheezes. She has rales in the right upper field, the right middle field, the right lower field, the left upper field, the left middle field and the left lower field.   Abdominal: Soft. She exhibits no distension. There is no abdominal tenderness.   Neurological: She is alert. No cranial nerve deficit or sensory deficit.   Skin: Skin is warm.   Psychiatric: Her speech is normal and behavior is normal.     Velcro like crackles     Results Review:         IMPRESSION:  1. Redemonstration of honeycombing, characteristic of usual interstitial  pneumonia (UIP). No new suspicious pulmonary finding.  2. Scattered coronary artery calcifications.  3. Stable hypodense liver lesion compared to 2017. Stability favors  benignity.  This report was finalized on 08/20/2020 12:31 by Dr Ary Irving MD.      Results for orders placed during the hospital encounter of 12/28/20   Adult Transthoracic Echo Complete W/ Cont if Necessary Per Protocol "    Narrative · Left ventricular ejection fraction appears to be 61 - 65%. Left   ventricular systolic function is normal.  · Normal global longitudinal LV strain (GLS) = -16%.  · Left ventricular diastolic function was normal.  · No evidence of pulmonary hypertension is present.           Conclusion     No significant stenosis noted of left dominant epicardial coronary arterial tree.  95% ostial stenosis of small nondominant right coronary artery which is approximately 1.5 mm to 2 mm in diameter        Plan     Maximize medical therapy  If continues to have exertional jaw pain that is reflective of angina would consider intervention of small nondominant right coronary ostial stenosis.  Patient extremely restless and tries to either sit up or move both her legs during the procedure and therefore will require anesthesia to sedate her if this was considered in future     Hydration   Observation  Risk factor modifications  Workup for non cardiac causes of chest pain         Results for orders placed during the hospital encounter of 19   Adult Stress Echo W/ Cont or Stress Agent if Necessary Per Protocol    Narrative · Estimated EF = 55%.  · Left ventricular systolic function is normal.  · Low risk stress test for stress induced myocardial ischemia         Ariana Gutierrez   Holter Monitor 72Hr-21Day (0296T/0298T)   Order# [id number]   Reading physician: Hawk Guerin MD Ordering physician: Hawk Guerin MD Study date: 19   Patient Information     Patient Name  Ariana Gutierrez MRN  [id number] Sex  Female  (Age)  1940 (79 y.o.)   Interpretation Summary        · Average HR: 73. Min HR: 46. Max HR: 136.     · ~14 day monitor ,entire report was reviewed  · The predominant rhythm noted during the testing period was sinus rhythm.  · Rare [3306] supraventricular ectopics with an APC burden of: < 1%  · Rare [132] premature ventricular contractions with a PVC burden of: < 1%  · 72 runs of supraventricular  tachycardia longest 53 beats at a maximum rate of 181 bpm  · Single 3 beat run of nonsustained ventricular tachycardia at a rate of 200 bpm  · No correlated arrhythmia  · No significant pauses           Conclusion: Baseline rhythm is sinus.  No significant ectopy  72 runs of supraventricular tachycardia longest 53 beats at a maximum rate of 181 bpm  Single 3 beat run of nonsustained ventricular tachycardia at a rate of 200 bpm                 ECG 12 Lead    Date/Time: 1/11/2021 1:02 PM  Performed by: Hawk Guerin MD  Authorized by: Hawk Guerin MD   Comparison: compared with previous ECG from 9/29/2020  Similar to previous ECG  Rhythm: sinus rhythm  Rate: normal  Conduction: conduction normal  ST Segments: ST segments normal  T Waves: T waves normal  QRS axis: normal  Other: no other findings    Clinical impression: normal ECG          ____________________________________________________________________________________________________________________________________________  Health maintenance and recommendations  Similar recommendations as last visit     Offered to give patient  a copy      Questions were encouraged, asked and answered to the patient's  understanding and satisfaction. Questions if any regarding current medications and side effects, need for refills and importance of compliance to medications stressed.    Reviewed available prior notes, consults, prior visits, laboratory findings, radiology and cardiology relevant reports. Updated chart as applicable. I have reviewed the patient's medical history in detail and updated the computerized patient record as relevant.      Updated patient regarding any new or relevant abnormalities on review of records or any new findings on physical exam. Mentioned to patient about purpose of visit and desirable health short and long term goals and objectives.    Primary to monitor CBC CMP Lipid panel and TSH as  applicable    ___________________________________________________________________________________________________________________________________________        Assessment/Plan   Patient Active Problem List   Diagnosis   • Essential hypertension   • Gastroesophageal reflux disease without esophagitis   • Shortness of breath   • CAD (coronary artery disease)   • Scoliosis   • Low back pain   • Paresthesia   • Palpitations   • Chronic back pain   • Pneumonitis   • Chronic pain   • Restrictive lung disease   • Usual interstitial pneumonitis (CMS/HCC)   • BERGERON (dyspnea on exertion)        Plan      Discussed results of prior testing with patient: echo  Patient expressed understanding  Encouraged and answered all questions   Discussed with the patient and all questioned fully answered. She will call me if any problems arise.     Due to palpitations will reorder Zio patch   Orders Placed This Encounter   Procedures   • Holter Monitor - 72 Hour Up To 21 Days     Standing Status:   Future     Standing Expiration Date:   1/11/2022     Order Specific Question:   Reason for exam?     Answer:   Palpitations   • ECG 12 Lead     This order was created via procedure documentation         Keep LDL below 70 mg/dl. Monitor liver and renal functions.   Monitor CBC, CMP, TSH (as indicated) and Lipid Panel by primary   Last LDL 66    I support the patient's decision to take the Covid -19 vaccine             Return in about 3 months (around 4/11/2021).

## 2021-01-15 DIAGNOSIS — R52 PAIN: ICD-10-CM

## 2021-01-15 RX ORDER — GABAPENTIN 800 MG/1
800 TABLET ORAL 3 TIMES DAILY
Qty: 270 TABLET | Refills: 1 | Status: SHIPPED | OUTPATIENT
Start: 2021-01-15 | End: 2021-06-01 | Stop reason: SDUPTHER

## 2021-01-15 NOTE — TELEPHONE ENCOUNTER
Caller: Ariana Gutierrez     Relationship: Self    Best call back number: [phone]       Medication needed:   Requested Prescriptions     Pending Prescriptions Disp Refills   • gabapentin (NEURONTIN) 800 MG tablet 270 tablet 1     Sig: Take 1 tablet by mouth 3 (Three) Times a Day.     Does the patient have less than a 3 day supply:  [] Yes  [x] No    What is the patient's preferred pharmacy: [facility] - [facility] [address] - [phone] [facility] [phone] FX

## 2021-02-04 ENCOUNTER — TELEPHONE (OUTPATIENT)
Dept: CARDIOLOGY | Facility: CLINIC | Age: 81
End: 2021-02-04

## 2021-02-04 NOTE — TELEPHONE ENCOUNTER
Patient is wanting to know about the results of her Heart Monitor she wore on 1/11/21. Please advise

## 2021-02-05 DIAGNOSIS — R52 PAIN: ICD-10-CM

## 2021-02-05 RX ORDER — HYDROCODONE BITARTRATE AND ACETAMINOPHEN 10; 325 MG/1; MG/1
1 TABLET ORAL EVERY 6 HOURS PRN
Qty: 90 TABLET | Refills: 0 | Status: SHIPPED | OUTPATIENT
Start: 2021-02-05 | End: 2021-03-01 | Stop reason: SDUPTHER

## 2021-02-05 NOTE — TELEPHONE ENCOUNTER
2017  Jose Marquez is a 50 y.o., female.    LIMB ALERT: NO BP, IV OR LAB DRAW FROM LEFT ARM    Pre-op Assessment    I have reviewed the Patient Summary Reports.      I have reviewed the Medications.     Review of Systems  Anesthesia Hx:  No problems with previous Anesthesia Denies Hx of Anesthetic complications  History of prior surgery of interest to airway management or planning: Previous anesthesia: Epidural, General creation of AV fistula  with general anesthesia.  Procedure performed at an [facility].   6 yrs ago with epidural.  Procedure performed at an [facility]. Denies Family Hx of Anesthesia complications.   Denies Personal Hx of Anesthesia complications.   Social:  Non-Smoker, No Alcohol Use    Hematology/Oncology:     Oncology Normal    -- Anemia:   EENT/Dental:   Glasses; limited vision of R eye   Cardiovascular:   Hypertension hyperlipidemia KINNEY PET stress done 3/1/16 Functional Capacity 3 METS, walks with cane; housework; reports KINNEY when walking long distances; denied CP    Pulmonary:   Denies Asthma. Sleep Apnea (not using CPAP)    Renal/:   Chronic Renal Disease, ESRD, Dialysis Since ; MWF [facility], AV fistula LUE; Dr Riojas, nephrologist   Hepatic/GI:   Denies GERD.    Musculoskeletal:  Spine Disorders: lumbar Chronic Pain    Neurological:   Denies CVA. 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  Pain , onset is chronic , location of LBP , quality of aching/dull , severity is a 7    Endocrine:   Diabetes (A1C 7.9 on 17), type 2, using insulin Morbid obesity   Psych:  Psychiatric Normal           Physical Exam  General:  Morbid Obesity    Airway/Jaw/Neck:  Airway Findings: Mouth Opening: Normal Tongue: Normal  General Airway Assessment: Adult  Mallampati: II  TM Distance: Normal, at least  MED REFILL REQUEST    NORCO 10/325MG (1)  Q 6 HRS MOD PAIN # 90      SAUNDRA Mast 11/30/20  Contract and UDS 8/17/20     6 cm  Jaw/Neck Findings:     Neck ROM: Normal ROM      Dental:  Dental Findings:    Chest/Lungs:  Chest/Lungs Findings: Clear to auscultation, Normal Respiratory Rate     Heart/Vascular:  Heart Findings: Rate: Normal  Rhythm: Regular Rhythm  Sounds: Normal     Abdomen:  Abdomen Findings: Normal    Musculoskeletal:  Musculoskeletal Findings: Normal   Skin:  Skin Findings: Normal    Mental Status:  Mental Status Findings:  Cooperative, Alert and Oriented       Pt was seen in POC 2/14/17; cleared by cardiology, Dr Shahid, outside nephrologist, Dr Riojas, and PCP, Dr Riggins. Acute dialysis unit notification faxed./Krzysztof Berger MD      Anesthesia Plan  Type of Anesthesia, risks & benefits discussed:  Anesthesia Type:  regional  Patient's Preference:   Intra-op Monitoring Plan: standard ASA monitors  Intra-op Monitoring Plan Comments:   Post Op Pain Control Plan: per primary service following discharge from PACU  Post Op Pain Control Plan Comments:   Induction:   IV  Beta Blocker:  Patient is on a Beta-Blocker and has received one dose within the past 24 hours (No further documentation required).       Informed Consent: Patient understands risks and agrees with Anesthesia plan.  Questions answered. Anesthesia consent signed with patient.  ASA Score: 3     Day of Surgery Review of History & Physical: I have interviewed and examined the patient. I have reviewed the patient's H&P dated:    H&P update referred to the surgeon.         Ready For Surgery From Anesthesia Perspective.         Jose Marquez is a 50 y.o. female with PMHx of HTN, HLD, DM II, anemia, diastolic dysfunction, ESRD, and morbid obesity (s/p gastric sleeve) who presents for AV fistula creation    LDA:        Hemodialysis Catheter Arteriovenous fistula Left Arm   Hemodialysis Properties Placement Date: (c)   Access Type: Arteriovenous fistula  Orientation: Left  Access Location: Arm         Hemodialysis AV Fistula Left upper arm   Hemodialysis  AV Fistula - Properties Group No Placement Date or Time found.   Present Prior to Hospital Arrival?: Yes  Hand Hygiene: Performed  Location: Left upper arm        Prev airway: None documented    Patient Active Problem List   Diagnosis    ESRD (end stage renal disease) on dialysis    Type 2 diabetes mellitus, uncontrolled, with renal complications    Anemia in ESRD (end-stage renal disease)    Diastolic dysfunction without heart failure    Pure hypercholesterolemia    Vitamin D deficiency    S/P laparoscopic sleeve gastrectomy    Morbid obesity with BMI of 45.0-49.9, adult    Malfunction of arteriovenous dialysis fistula    Pseudoaneurysm of arteriovenous dialysis fistula    ESRD (end stage renal disease)       Review of patient's allergies indicates:  No Known Allergies     Current Facility-Administered Medications on File Prior to Visit   Medication Dose Route Frequency Provider Last Rate Last Dose    0.9%  NaCl infusion   Intravenous Continuous Krzysztof Berger MD 10 mL/hr at 11/27/17 0851      CEFAZOLIN 2G/20 ML SYRINGE (PYXIS) IV syringe 2 g 20 mL  2 g Intravenous On Call Procedure Cesia Cooney MD        mupirocin 2 % ointment   Nasal On Call Procedure Cesia Cooney MD         Current Outpatient Prescriptions on File Prior to Visit   Medication Sig Dispense Refill    aspirin (ECOTRIN) 81 MG EC tablet Take 81 mg by mouth every morning.      atorvastatin (LIPITOR) 40 MG tablet Take 1 tablet (40 mg total) by mouth once daily. 90 tablet 3    blood sugar diagnostic (FREESTYLE LITE STRIPS) Strp 1 each by Misc.(Non-Drug; Combo Route) route 4 (four) times daily. 150 each 11    calcium acetate (PHOSLO) 667 mg capsule Take by mouth. 1 Capsule Oral Before meals      cinacalcet (SENSIPAR) 30 MG Tab Take 30 mg by mouth every morning.       lancets Misc 1 each by Misc.(Non-Drug; Combo Route) route 4 (four) times daily. 150 each 11    multivitamin capsule Take 1 capsule by mouth every  morning.          Past Surgical History:   Procedure Laterality Date     SECTION, CLASSIC      x2    CHOLECYSTECTOMY      GASTRECTOMY      gastric sleeve      TUBAL LIGATION  2010    VASCULAR SURGERY      fistula construction L upper arm       Social History     Social History    Marital status:      Spouse name: N/A    Number of children: N/A    Years of education: N/A     Occupational History    Not on file.     Social History Main Topics    Smoking status: Never Smoker    Smokeless tobacco: Never Used    Alcohol use No    Drug use: No    Sexual activity: Yes     Partners: Male     Birth control/ protection: See Surgical Hx     Other Topics Concern    Not on file     Social History Narrative    No narrative on file         Vital Signs Range (Last 24H):  Temp:  [36.4 °C (97.5 °F)]   Pulse:  [72]   Resp:  [20]   BP: (135)/(78)   SpO2:  [100 %]       CBC: No results for input(s): WBC, RBC, HGB, HCT, PLT, MCV, MCH, MCHC in the last 72 hours.    CMP: No results for input(s): NA, K, CL, CO2, BUN, CREATININE, GLU, MG, PHOS, CALCIUM, ALBUMIN, PROT, ALKPHOS, ALT, AST, BILITOT in the last 72 hours.    INR  No results for input(s): INR, PROTIME, APTT in the last 72 hours.    Invalid input(s): PT        Diagnostic Studies:    EKG (2016)  Normal sinus rhythm  Septal infarct (cited on or before 2011)  Prolonged QT  Abnormal ECG    Exercise Stress Echo (2016)  GRADED EXERCISE SUMMARY  Dipyridamole Total Exercise Time:7:02  25mm/s  10mm/mV  150Hz  Max HR: 68 bpm 39% of Max Predicted 174 bpm  Max BP: 141/65 Maximum Workload: 1.0

## 2021-02-08 NOTE — TELEPHONE ENCOUNTER
Patient notified and with verbal understanding. Stated she would just keep her next appointment with you.

## 2021-02-09 ENCOUNTER — TRANSCRIBE ORDERS (OUTPATIENT)
Dept: ADMINISTRATIVE | Facility: HOSPITAL | Age: 81
End: 2021-02-09

## 2021-02-09 DIAGNOSIS — Z01.818 PREOP TESTING: Primary | ICD-10-CM

## 2021-02-15 ENCOUNTER — TELEPHONE (OUTPATIENT)
Dept: PULMONOLOGY | Facility: CLINIC | Age: 81
End: 2021-02-15

## 2021-02-15 ENCOUNTER — APPOINTMENT (OUTPATIENT)
Dept: LAB | Facility: HOSPITAL | Age: 81
End: 2021-02-15

## 2021-02-16 ENCOUNTER — APPOINTMENT (OUTPATIENT)
Dept: PULMONOLOGY | Facility: HOSPITAL | Age: 81
End: 2021-02-16

## 2021-02-18 ENCOUNTER — APPOINTMENT (OUTPATIENT)
Dept: PULMONOLOGY | Facility: HOSPITAL | Age: 81
End: 2021-02-18

## 2021-02-24 ENCOUNTER — HOSPITAL ENCOUNTER (OUTPATIENT)
Dept: VASCULAR LAB | Age: 81
Discharge: HOME | End: 2021-02-24
Payer: MEDICARE

## 2021-02-24 ENCOUNTER — OFFICE VISIT (OUTPATIENT)
Dept: VASCULAR SURGERY | Age: 81
End: 2021-02-24
Payer: MEDICARE

## 2021-02-24 VITALS
BODY MASS INDEX: 26.58 KG/M2 | HEART RATE: 77 BPM | WEIGHT: 150 LBS | HEIGHT: 63 IN | SYSTOLIC BLOOD PRESSURE: 144 MMHG | RESPIRATION RATE: 16 BRPM | OXYGEN SATURATION: 98 % | TEMPERATURE: 97.9 F | DIASTOLIC BLOOD PRESSURE: 76 MMHG

## 2021-02-24 DIAGNOSIS — I73.9 PVD (PERIPHERAL VASCULAR DISEASE) (HCC): ICD-10-CM

## 2021-02-24 DIAGNOSIS — I73.9 PVD (PERIPHERAL VASCULAR DISEASE) (HCC): Primary | ICD-10-CM

## 2021-02-24 PROCEDURE — G8427 DOCREV CUR MEDS BY ELIG CLIN: HCPCS | Performed by: PHYSICIAN ASSISTANT

## 2021-02-24 PROCEDURE — 99213 OFFICE O/P EST LOW 20 MIN: CPT | Performed by: PHYSICIAN ASSISTANT

## 2021-02-24 PROCEDURE — 4040F PNEUMOC VAC/ADMIN/RCVD: CPT | Performed by: PHYSICIAN ASSISTANT

## 2021-02-24 PROCEDURE — G8400 PT W/DXA NO RESULTS DOC: HCPCS | Performed by: PHYSICIAN ASSISTANT

## 2021-02-24 PROCEDURE — G8484 FLU IMMUNIZE NO ADMIN: HCPCS | Performed by: PHYSICIAN ASSISTANT

## 2021-02-24 PROCEDURE — 1090F PRES/ABSN URINE INCON ASSESS: CPT | Performed by: PHYSICIAN ASSISTANT

## 2021-02-24 PROCEDURE — 1036F TOBACCO NON-USER: CPT | Performed by: PHYSICIAN ASSISTANT

## 2021-02-24 PROCEDURE — 93922 UPR/L XTREMITY ART 2 LEVELS: CPT

## 2021-02-24 PROCEDURE — G8419 CALC BMI OUT NRM PARAM NOF/U: HCPCS | Performed by: PHYSICIAN ASSISTANT

## 2021-02-24 PROCEDURE — 1123F ACP DISCUSS/DSCN MKR DOCD: CPT | Performed by: PHYSICIAN ASSISTANT

## 2021-02-24 PROCEDURE — 93926 LOWER EXTREMITY STUDY: CPT

## 2021-02-24 RX ORDER — BENZONATATE 200 MG/1
200 CAPSULE ORAL 3 TIMES DAILY PRN
COMMUNITY

## 2021-02-24 RX ORDER — GUAIFENESIN 600 MG/1
1200 TABLET, EXTENDED RELEASE ORAL 2 TIMES DAILY
COMMUNITY

## 2021-02-24 NOTE — PROGRESS NOTES
Patient Care Team:  Leonila Mckeon MD as PCP - General  Clarke Nichols as Audiologist (Audiology)  Jose Juan Beltran MD as Consulting Physician (Vascular Surgery)      Aakash Abarca (:  1940) is a 80 y.o. female,Established patient, here for evaluation of the following chief complaint(s):  Follow-up (A'scan & LINDA's)      SUBJECTIVE/OBJECTIVE:  Nuria Mari has a history of peripheral vascular disease of the lower extremities. She has had this for > 5 years. Current treatment includes ASA 81 mg po qd, statin therapy. She  denies any claudication, IRP or new or non healing wounds. She has peripheral neuropathy in her feet. Right worse than the left. She reports that she has been diagnosed with Pumonary hypertension and is seeing Pulmonology. She also reports that she was recently diagnosed with A-fib and is going to see Cardiology about anticoagulation.      Aakash Abarca is a 80 y.o. female with the following history as recorded in [facility]:  Patient Active Problem List    Diagnosis Date Noted    PVD (peripheral vascular disease) ([facility] 2018    Personal history of DVT (deep vein thrombosis) 10/18/2017    Atheromatous plaque 2017    Iron deficiency anemia 2017    Gastroesophageal reflux disease without esophagitis 2017    Constipation due to opioid therapy 2017    Popliteal artery thrombosis, right ([facility]     History of diverticulosis 2016    Small intestinal bacterial overgrowth 2014    Irritable bowel syndrome with constipation 2014    Bloating 2014    Gas pain 2014    Spinal stenosis 2014    IBS (irritable bowel syndrome) 2011    HTN (hypertension) 2011     Current Outpatient Medications   Medication Sig Dispense Refill    beclomethasone (QVAR) 80 MCG/ACT inhaler Inhale 1 puff into the lungs 2 times daily      benzonatate (TESSALON) 200 MG capsule Take 200 mg by mouth 3 times daily as needed for Cough  guaiFENesin (MUCINEX) 600 MG extended release tablet Take 1,200 mg by mouth 2 times daily      aspirin 81 MG tablet Take 81 mg by mouth daily      atorvastatin (LIPITOR) 20 MG tablet TAKE 1 TABLET BY MOUTH ONCE DAILY      metoprolol succinate (TOPROL XL) 50 MG extended release tablet       nitroGLYCERIN (NITROSTAT) 0.4 MG SL tablet Place 0.4 mg under the tongue every 5 minutes as needed for Chest pain up to max of 3 total doses. If no relief after 1 dose, call 911.  isosorbide mononitrate (IMDUR) 30 MG extended release tablet Take 30 mg by mouth daily      Multiple Vitamins-Minerals (ICAPS AREDS 2 PO) Take by mouth daily      gabapentin (NEURONTIN) 800 MG tablet Take 800 mg by mouth.  HYDROcodone-acetaminophen (NORCO)  MG per tablet       Saccharomyces boulardii (PROBIOTIC) 250 MG CAPS Take by mouth daily      metoprolol succinate (TOPROL XL) 25 MG extended release tablet TAKE 1 TABLET BY MOUTH DAILY 90 tablet 3    omeprazole (PRILOSEC) 20 MG capsule TAKE 1 CAPSULE EVERY DAY 90 capsule 3    benazepril (LOTENSIN) 20 MG tablet Take 1 tablet by mouth daily 90 tablet 3    polyethylene glycol (MIRALAX) powder 1 scoop in 8 oz fluids bid 6 Bottle 3     No current facility-administered medications for this visit. Allergies: Patient has no known allergies.   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 Pulmonary  effort appears normal.  No respiratory distress. Lungs - Breath sounds normal. No wheezes or rales. Extremities - DP and PT pulses are palpable. No cyanosis, clubbing, or significant edema. No signs atheroembolic event. Neurologic  alert and oriented X 3. Physiologic. Face symmetric. Skin  warm, dry, and intact. No wounds. Psychiatric  mood, affect, and behavior appear normal.  Judgment and thought processes appear normal.    Risk factors for atherosclerosis of all vascular beds have been reviewed with the patient including:  Family history, tobacco abuse in all forms, elevated cholesterol, hyperlipidemia, and diabetes. Right Lower extremity arterial scan:       Impression        Right lower extremity arterial duplex exam performed. There is mild plaque    throughout the arterial system. There are no focal stenosis within the    right CFA, SFA or popliteal artery. Arterial waveforms in the Anterior    tibial and peroneal artery are minimally disturbed. The waveforms in the    posterior tibial artery are damped and diminished.        Signature        ----------------------------------------------------------------    Electronically signed by Karel Neville MD(Interpreting    physician) on 02/24/2021 03:55 PM    ----------------------------------------------------------------       Velocities are measured in cm/s ; Diameters are measured in mm       LE Duplex Measurements       +---------------++-----+-----+----+-----------++---+-----+---+-------------+   !               ! ! Right!     !Left!           !!   !     !   !             !   +---------------++-----+-----+----+-----------++---+-----+---+-------------+ !Location       !!PSV  !Ratio! EDV ! Wave Desc. !!PSV! Ratio! EDV! Wave Desc.   !   +---------------++-----+-----+----+-----------++---+-----+---+-------------+   ! Common Femoral !!108  !     !16. 4!           !!   !     !   !             !   +---------------++-----+-----+----+-----------++---+-----+---+-------------+   ! Prox PFA       !!81.5 !     !    !           !!   !     !   !             !   +---------------++-----+-----+----+-----------++---+-----+---+-------------+   ! Prox SFA       !!108  !     !15. 7!           !!   !     !   !             !   +---------------++-----+-----+----+-----------++---+-----+---+-------------+   ! Mid SFA        !!122  !1.13 !14. 9!           !!   !     !   !             !   +---------------++-----+-----+----+-----------++---+-----+---+-------------+   ! Dist SFA       !!136  !1.11 !13. 4!           !!   !     !   !             !   +---------------++-----+-----+----+-----------++---+-----+---+-------------+   ! Prox Popliteal !!108  !0.79 !13. 4!           !!   !     !   !             !   +---------------++-----+-----+----+-----------++---+-----+---+-------------+   ! Dist Popliteal !!117  !1.08 !7.86!           !!   !     !   !             !   +---------------++-----+-----+----+-----------++---+-----+---+-------------+   ! Mid PTA        !!39.3 !0.34 !    !           !!   !     !   !             !   +---------------++-----+-----+----+-----------++---+-----+---+-------------+   ! Mid WINDY        !!85.2 !0.73 !7.86!           !!   !     !   !             !   +---------------++-----+-----+----+-----------++---+-----+---+-------------+   ! Mid Peroneal   !!60.9 !0.52 !    !           !!   !     !   !             !   +---------------++-----+-----+----+-----------++---+-----+---+-------------+         Impression        Based on ankle-brachial indices and doppler waveforms, the patient has    relatively normal flow to the bilateral lower extremity arterial system at   [facility].  There is no significant drop in the bilateral ankle-brachial indices after    exercise. So if the patient has leg pain, then etiologies other than    PVOD/PAD should be considered.        Signature        ----------------------------------------------------------------    Electronically signed by Haja SHIPLEYInterpreting    physician) on 02/24/2021 03:51 PM    ----------------------------------------------------------------       Velocities are measured in cm/s ; Diameters are measured in mm       Pressures   +--------------------------------------++--------+-----+----+--------+-----+   !                                      ! ! Right   !     !Left!        !     !   +--------------------------------------++--------+-----+----+--------+-----+   ! Location                              ! ! Pressure! Ratio!    !Pressure! Ratio! +--------------------------------------++--------+-----+----+--------+-----+   ! Ankle PT                              !!131     !0.91 !    !143     !0.99 !   +--------------------------------------++--------+-----+----+--------+-----+   ! DP                                    !!135     !0.94 !    !147     !1.02 ! +--------------------------------------++--------+-----+----+--------+-----+   ! Great Toe                             !!68      !0.51 !    !114     !0.79 !   +--------------------------------------++--------+-----+----+--------+-----+         - Brachial Pressure:Right: 134. Left:144.         - LINDA:Right: 0.94. Left: 1.02.       Plethysmographic Digit Evaluation   +---------++--------+-----+---------------++--------+-----+----------------+   !         ! ! Right   !     ! Left           !!        !     !                !   +---------++--------+-----+---------------++--------+-----+----------------+   ! Location ! !Pressure! Ratio! PPG Wave Form  !!Pressure! Ratio! PPG Wave Form   !   +---------++--------+-----+---------------++--------+-----+----------------+ !Great Toe!!73      !0.51 !               !!114     !0.79 !                !   +---------++--------+-----+---------------++--------+-----+----------------+       Post Exercise   Exercise Time: 300 sec.       +------------+----+------------+---------+--------+------------+-----------+   !            !    ! Right       !         ! Left    !            !           !   +------------+----+------------+---------+--------+------------+-----------+   ! Location    !    !Pressure    !Ratio    !        !Pressure    !Ratio      !   +------------+----+------------+---------+--------+------------+-----------+   ! DP          !    !127         !0.92     !        !153         !1.11       !   +------------+----+------------+---------+--------+------------+-----------+     - Brachial Pressure:Left:138.         - LINDA:Right: 0.92. Left: 1.11. Individual films reviewed: Yes. These results were reviewed with the patient. Disease process is stable      Reviewed on this visit: prior studies including A'scan with LINDA's for comparison      ASSESSMENT/PLAN:      1. PVD      Recommend he continue ASA and a statin daily  Recommend no smoking  Recommend good BP control  Recommend low fat, low cholesterol diet  Follow up in 12 months with a repeat right  A'scan with LINDA   Patient instructed to walk as much as possible. Call our office with any progressive pain in leg(s) or hip(s) with walking. Take good care of your feet. Let us know right away if you develop a wound on your foot. An electronic signature was used to authenticate this note.     --Jayson Shrestha PA-C

## 2021-02-26 ENCOUNTER — TRANSCRIBE ORDERS (OUTPATIENT)
Dept: ADMINISTRATIVE | Facility: HOSPITAL | Age: 81
End: 2021-02-26

## 2021-02-26 DIAGNOSIS — Z01.818 PREOP TESTING: Primary | ICD-10-CM

## 2021-02-27 ENCOUNTER — OFFICE VISIT (OUTPATIENT)
Dept: CARDIOLOGY | Facility: CLINIC | Age: 81
End: 2021-02-27

## 2021-02-27 VITALS
BODY MASS INDEX: 26.58 KG/M2 | DIASTOLIC BLOOD PRESSURE: 65 MMHG | HEIGHT: 63 IN | SYSTOLIC BLOOD PRESSURE: 141 MMHG | HEART RATE: 87 BPM | WEIGHT: 150 LBS

## 2021-02-27 DIAGNOSIS — R06.09 DOE (DYSPNEA ON EXERTION): ICD-10-CM

## 2021-02-27 DIAGNOSIS — R06.02 SHORTNESS OF BREATH: Primary | ICD-10-CM

## 2021-02-27 DIAGNOSIS — M54.40 ACUTE RIGHT-SIDED LOW BACK PAIN WITH SCIATICA, SCIATICA LATERALITY UNSPECIFIED: ICD-10-CM

## 2021-02-27 DIAGNOSIS — R00.2 PALPITATIONS: ICD-10-CM

## 2021-02-27 DIAGNOSIS — I48.0 PAF (PAROXYSMAL ATRIAL FIBRILLATION) (HCC): ICD-10-CM

## 2021-02-27 DIAGNOSIS — J98.4 RESTRICTIVE LUNG DISEASE: ICD-10-CM

## 2021-02-27 DIAGNOSIS — I25.10 CORONARY ARTERY DISEASE INVOLVING NATIVE CORONARY ARTERY OF NATIVE HEART WITHOUT ANGINA PECTORIS: ICD-10-CM

## 2021-02-27 DIAGNOSIS — I10 ESSENTIAL HYPERTENSION: ICD-10-CM

## 2021-02-27 PROCEDURE — 99214 OFFICE O/P EST MOD 30 MIN: CPT | Performed by: INTERNAL MEDICINE

## 2021-02-27 NOTE — PROGRESS NOTES
"Patient [id number]. Patient VS WNL. Patient cleared for discharge. Pt's family stating, "we feel comfortable bringing this patient home."   " Ariana Gutierrez  [id number]  1940  81 y.o.  female    You have chosen to receive care through a telehealth visit.  Do you consent to use a video/audio connection for your medical care today? Yes      Referring Provider: Bill Schilling MD    Reason for Follow-up Visit:     short term follow up after recent encounter   Routine follow up.  Chronic low back pain    Had MADELIN in 1/18 no clots or vegetation  Zio patch showed SVT 72 episodes longest 53 maximum rate 181 BPM     Low risk stress test for stress induced myocardial ischemia  Cardiac workup test results as below   Cath small non dominant RCA severe stenosis    Subjective    Results of cardiac outpatient telemetry available  This shows paroxysmal atrial fibrillation with a burden of 1% with longest episode of 2 hours and 48 minutes with heart rates between  bpm with an average of 138 bpm  Continues to have intermittent palpitations    Overall otherwise feels unchanged  Taking care of her 97-year-old mother    No new events or complaints since last visit   Overall the patient feels no major change from baseline symptoms   Similar symptoms as during last visit     Tolerating current medications well with no untoward side effects   Compliant with prescribed medication regimen. Tries to adhere to cardiac diet.      Persistent moderate chronic exertional shortness of breath on exertion relieved with rest  No significant cough or wheezing     No  chest pain  No significant pedal edema    No fever or chills  No significant expectoration    No hemoptysis  No presyncope or syncope    Tolerating current medications well with no untoward side effects   Compliant with prescribed medication regimen. Tries to adhere to cardiac diet.   Honey combing of lung on HRCT      No bleeding, excessive bruising, gait instability or fall risks      History of present illness:  Ariana Gutierrez is a 81 y.o. yo female with history of hypertension who presents today for    Chief Complaint   Patient presents with   • Follow-up   .    History  Past Medical History:   Diagnosis Date   • Bacterial infection    • CAD (coronary artery disease)    • Chest pain    • Chronic back pain    • Deep vein thrombosis (CMS/HCC)     S/P KNEE SURGERY 10/2017   • Diverticulosis of intestine 8/16/2016   • Gastroesophageal reflux disease without esophagitis 5/26/2017   • Hypertension    • Irritable bowel syndrome 11/2/2011   • Joint infection (CMS/HCC) 7/24/2018   • Low back pain    • Palpitations    • Paresthesia     toes   • Perforated bowel (CMS/HCC)    • Rheumatoid arteritis (CMS/HCC)    • Scoliosis    • Spinal stenosis    • Vascular disease, peripheral (CMS/HCC)    ,   Past Surgical History:   Procedure Laterality Date   • BACK SURGERY     • BREAST BIOPSY Right 25 yrs ago   • CARDIAC CATHETERIZATION Left 11/6/2019    Procedure: Cardiac Catheterization/Vascular Study CARMEN OK;  Surgeon: Hawk Guerin MD;  [facility];  Service: Cardiology   • CATARACT EXTRACTION Bilateral    • COLON RESECTION SMALL BOWEL  2016    with colostomy for 6 months, already revised.   • COLON SURGERY     • COLONOSCOPY     • LUMBAR LAMINECTOMY     • RENAL ARTERY STENT Right    • TOTAL KNEE ARTHROPLASTY Right    • TOTAL KNEE ARTHROPLASTY     • VASCULAR SURGERY      10/2017 - DR GARCIA   ,   Family History   Problem Relation Age of Onset   • Breast cancer Mother 80   • Heart disease Mother    • Coronary artery disease Mother    • Peripheral vascular disease Mother    • No Known Problems Sister    • Heart disease Brother    • No Known Problems Maternal Grandmother    • No Known Problems Maternal Grandfather    • No Known Problems Paternal Grandmother    • No Known Problems Paternal Grandfather    ,   Social History     Tobacco Use   • Smoking status: Never Smoker   • Smokeless tobacco: Never Used   Substance Use Topics   • Alcohol use: No     Frequency: Never   • Drug use: No   ,      Medications  Current Outpatient Medications   Medication Sig Dispense Refill   • apixaban (ELIQUIS) 5 MG tablet tablet Take 1 tablet by mouth Every 12 (Twelve) Hours. 60 tablet 11   • aspirin 81 MG chewable tablet Chew 81 mg Daily.     • atorvastatin (LIPITOR) 20 MG tablet Take 1 tablet by mouth Daily. 90 tablet 3   • Beclomethasone Diprop HFA (QVAR REDIHALER) 80 MCG/ACT inhaler Inhale 1 puff 2 (Two) Times a Day. 1 inhaler 0   • benazepril (LOTENSIN) 20 MG tablet TAKE 1 TABLET EVERY DAY 90 tablet 3   • benzonatate (TESSALON) 200 MG capsule Take 1 capsule by mouth 3 (Three) Times a Day As Needed for Cough. 90 capsule 11   • Docusate Calcium (STOOL SOFTENER PO) Take 1 tablet by mouth Daily.     • gabapentin (NEURONTIN) 800 MG tablet Take 1 tablet by mouth 3 (Three) Times a Day. 270 tablet 1   • guaiFENesin (MUCINEX) 600 MG 12 hr tablet Take 2 tablets by mouth 2 (Two) Times a Day As Needed for Cough. 60 tablet 0   • HYDROcodone-acetaminophen (NORCO)  MG per tablet Take 1 tablet by mouth Every 6 (Six) Hours As Needed for Moderate Pain . 90 tablet 0   • ipratropium-albuterol (DUO-NEB) 0.5-2.5 mg/3 ml nebulizer Take 3 mL by nebulization Every 6 (Six) Hours While Awake. Diagnosis of pneumonitis J69.8 360 mL 2   • isosorbide mononitrate (IMDUR) 30 MG 24 hr tablet Take 1 tablet by mouth once daily 90 tablet 4   • metoprolol succinate XL (TOPROL-XL) 50 MG 24 hr tablet TAKE 1 TABLET EVERY DAY 90 tablet 3   • Multiple Vitamins-Minerals (PRESERVISION AREDS 2 PO) Take  by mouth.     • nitroglycerin (NITROSTAT) 0.4 MG SL tablet 1 under the tongue as needed for angina, may repeat q5mins for up three doses 100 tablet 11   • omeprazole (priLOSEC) 20 MG capsule TAKE 1 CAPSULE EVERY DAY 90 capsule 3   • polyethylene glycol (MIRALAX) powder Take 17 g by mouth Daily.     • saccharomyces boulardii (FLORASTOR) 250 MG capsule Take 1 capsule by mouth Daily. 30 capsule 2     No current facility-administered medications for this  "visit.        Allergies:  Patient has no known allergies.    Review of Systems  Review of Systems   Constitution: Positive for malaise/fatigue. Negative for fever.   HENT: Negative.    Eyes: Negative.    Cardiovascular: Positive for dyspnea on exertion and palpitations. Negative for chest pain, claudication, cyanosis, irregular heartbeat, leg swelling, near-syncope, orthopnea, paroxysmal nocturnal dyspnea and syncope.   Respiratory: Positive for cough and shortness of breath.    Endocrine: Negative.    Hematologic/Lymphatic: Negative.    Skin: Negative.    Musculoskeletal: Positive for arthritis and joint pain.   Gastrointestinal: Positive for flatus. Negative for anorexia.   Genitourinary: Negative.    Neurological: Positive for weakness.   Psychiatric/Behavioral: Negative.        Objective     Physical Exam:  /65   Pulse 87   Ht 160 cm (63\")   Wt 68 kg (150 lb)   LMP  (LMP Unknown)   BMI 26.57 kg/m²     Self reported vitals above as available      • General appearance is normal  • Normal judgment and insight  • Normal assessment of mental status, which may include:  - Orientation to time, place, and person  - Recent and remote memory  - Mood and affect  • Normal external appearance of the ears and nose  • Normal assessment of hearing  • Normal external appearance of lips  • Normal external appearance of anterior surface of neck  • Normal respiratory effort  • No reported lower extremity edema  • No obvious skin abnormalities of visualized skin surfaces    • Normal examination of digits and nails (clubbing, cyanosis ,ischemia)  • No obvious abnormalities of visualized joint or musculature  • Normalized range of motion of visualized muscle groups            Results Review:    Ariana Gutierrez MRN   [id number] Sex   Female  (Age)   1940 (81 y.o.)   Interpretation Summary       · Average HR: 77. Min HR: 44. Max HR: 218.     · Entire report was reviewed.  Monitoring in days: ~14   · The predominant " rhythm noted during the testing period was sinus rhythm.     · Rare supraventricular ectopics with an APC burden of: < 1%    · Rare premature ventricular contractions with a PVC burden of: < 1%     · No correlated arrhythmia  · No significant pauses                  Conclusion:      Baseline rhythm is sinus.  No significant ectopy   Single 5 beat run of nonsustained ventricular tachycardia at a maximum rate of 162 bpm and an average of 131 bpm  171 runs of supraventricular tachycardia longest 2 minutes and 42 seconds at a maximum rate of 218 bpm and an average of 138 bpm  Paroxysmal atrial fibrillation with a burden of less than 1% with rates between  bpm and an average of 138 bpm             IMPRESSION:  1. Redemonstration of honeycombing, characteristic of usual interstitial  pneumonia (UIP). No new suspicious pulmonary finding.  2. Scattered coronary artery calcifications.  3. Stable hypodense liver lesion compared to 2017. Stability favors  benignity.  This report was finalized on 08/20/2020 12:31 by Dr Ary Irving MD.      Results for orders placed during the hospital encounter of 12/28/20   Adult Transthoracic Echo Complete W/ Cont if Necessary Per Protocol    Narrative · Left ventricular ejection fraction appears to be 61 - 65%. Left   ventricular systolic function is normal.  · Normal global longitudinal LV strain (GLS) = -16%.  · Left ventricular diastolic function was normal.  · No evidence of pulmonary hypertension is present.           Conclusion     No significant stenosis noted of left dominant epicardial coronary arterial tree.  95% ostial stenosis of small nondominant right coronary artery which is approximately 1.5 mm to 2 mm in diameter        Plan     Maximize medical therapy  If continues to have exertional jaw pain that is reflective of angina would consider intervention of small nondominant right coronary ostial stenosis.  Patient extremely restless and tries to either sit up or move  both her legs during the procedure and therefore will require anesthesia to sedate her if this was considered in future     Hydration   Observation  Risk factor modifications  Workup for non cardiac causes of chest pain         Results for orders placed during the hospital encounter of 19   Adult Stress Echo W/ Cont or Stress Agent if Necessary Per Protocol    Narrative · Estimated EF = 55%.  · Left ventricular systolic function is normal.  · Low risk stress test for stress induced myocardial ischemia         Ariana Gutierrez   Holter Monitor 72Hr-21Day (0296T/0298T)   Order# [id number]   Reading physician: Hawk Guerin MD Ordering physician: Hawk Guerin MD Study date: 19   Patient Information     Patient Name  Ariana Gutierrez MRN  [id number] Sex  Female  (Age)  1940 (79 y.o.)   Interpretation Summary        · Average HR: 73. Min HR: 46. Max HR: 136.     · ~14 day monitor ,entire report was reviewed  · The predominant rhythm noted during the testing period was sinus rhythm.  · Rare [3306] supraventricular ectopics with an APC burden of: < 1%  · Rare [132] premature ventricular contractions with a PVC burden of: < 1%  · 72 runs of supraventricular tachycardia longest 53 beats at a maximum rate of 181 bpm  · Single 3 beat run of nonsustained ventricular tachycardia at a rate of 200 bpm  · No correlated arrhythmia  · No significant pauses           Conclusion: Baseline rhythm is sinus.  No significant ectopy  72 runs of supraventricular tachycardia longest 53 beats at a maximum rate of 181 bpm  Single 3 beat run of nonsustained ventricular tachycardia at a rate of 200 bpm               Procedures  ____________________________________________________________________________________________________________________________________________  Health maintenance and recommendations  Similar recommendations as last visit     Offered to give patient  a copy      Questions were encouraged, asked and  answered to the patient's  understanding and satisfaction. Questions if any regarding current medications and side effects, need for refills and importance of compliance to medications stressed.    Reviewed available prior notes, consults, prior visits, laboratory findings, radiology and cardiology relevant reports. Updated chart as applicable. I have reviewed the patient's medical history in detail and updated the computerized patient record as relevant.      Updated patient regarding any new or relevant abnormalities on review of records or any new findings on physical exam. Mentioned to patient about purpose of visit and desirable health short and long term goals and objectives.    Primary to monitor CBC CMP Lipid panel and TSH as applicable    ___________________________________________________________________________________________________________________________________________        Assessment/Plan   Patient Active Problem List   Diagnosis   • Essential hypertension   • Gastroesophageal reflux disease without esophagitis   • Shortness of breath   • CAD (coronary artery disease)   • Scoliosis   • Low back pain   • Paresthesia   • Palpitations   • Chronic back pain   • Pneumonitis   • Chronic pain   • Restrictive lung disease   • Usual interstitial pneumonitis (CMS/HCC)   • BERGERON (dyspnea on exertion)   • PAF (paroxysmal atrial fibrillation) (CMS/[facility])        Plan         Discussed results of prior testing with patient:  outpatient cardiac telemetry  This shows paroxysmal atrial fibrillation    Requested Prescriptions     Signed Prescriptions Disp Refills   • apixaban (ELIQUIS) 5 MG tablet tablet 60 tablet 11     Sig: Take 1 tablet by mouth Every 12 (Twelve) Hours.      Patient expressed understanding  Encouraged and answered all questions   Discussed with the patient and all questioned fully answered. She will call me if any problems arise.      Keep LDL below 70 mg/dl. Monitor liver and renal functions.    Monitor CBC, CMP, TSH (as indicated) and Lipid Panel by primary      I support the patient's decision to take the Covid -19 vaccine    She is waiting on her booster shot    Monitor for any signs of bleeding including red or dark stools as well as easy bruisabilty. Fall precautions.   This is especially important due to concomitant use of aspirin 81 mg p.o. daily along with full dose Eliquis 5 mg p.o. twice daily  She has known coronary artery disease with significant stenosis of a small nondominant right coronary artery  In future if she has bruising or bleeding I am okay with her discontinuing aspirin completely and continuing on Eliquis with the hopes that even if she has a myocardial infarction and a small nondominant right coronary artery it should not affect significantly her LV function.    Check BP and heart rates twice daily at least 3x / week, week a month  at home and bring a recording for me to review next visit  If BP >130/85 or < 100/60 persistently over 3 reading 30 mins apart call sooner       High complexity decision making  Total time in this visit was 20 minutes          Return in about 6 months (around 8/27/2021).

## 2021-03-01 ENCOUNTER — OFFICE VISIT (OUTPATIENT)
Dept: FAMILY MEDICINE CLINIC | Facility: CLINIC | Age: 81
End: 2021-03-01

## 2021-03-01 ENCOUNTER — TELEPHONE (OUTPATIENT)
Dept: CARDIOLOGY | Facility: CLINIC | Age: 81
End: 2021-03-01

## 2021-03-01 VITALS
HEART RATE: 68 BPM | HEIGHT: 63 IN | OXYGEN SATURATION: 98 % | BODY MASS INDEX: 27.11 KG/M2 | SYSTOLIC BLOOD PRESSURE: 160 MMHG | DIASTOLIC BLOOD PRESSURE: 82 MMHG | WEIGHT: 153 LBS | RESPIRATION RATE: 16 BRPM | TEMPERATURE: 97.8 F

## 2021-03-01 DIAGNOSIS — I11.0 HYPERTENSIVE HEART DISEASE WITH HEART FAILURE (HCC): ICD-10-CM

## 2021-03-01 DIAGNOSIS — R52 PAIN: ICD-10-CM

## 2021-03-01 DIAGNOSIS — J84.112 USUAL INTERSTITIAL PNEUMONITIS (HCC): ICD-10-CM

## 2021-03-01 DIAGNOSIS — M05.79 RHEUMATOID ARTHRITIS INVOLVING MULTIPLE SITES WITH POSITIVE RHEUMATOID FACTOR (HCC): ICD-10-CM

## 2021-03-01 PROCEDURE — 99214 OFFICE O/P EST MOD 30 MIN: CPT | Performed by: FAMILY MEDICINE

## 2021-03-01 RX ORDER — HYDROCODONE BITARTRATE AND ACETAMINOPHEN 10; 325 MG/1; MG/1
1 TABLET ORAL EVERY 6 HOURS PRN
Qty: 90 TABLET | Refills: 0 | Status: SHIPPED | OUTPATIENT
Start: 2021-03-01 | End: 2021-05-04 | Stop reason: SDUPTHER

## 2021-03-01 NOTE — PROGRESS NOTES
Subjective   Ariana Gutierrez is a 81 y.o. female.     81-year-old female with chronic back pain from severe scoliosis and DJD of the spine    Pain  This is a chronic problem. The current episode started more than 1 year ago. The problem occurs daily. The problem has been gradually worsening. Associated symptoms include arthralgias, congestion and coughing. Pertinent negatives include no chest pain. Associated symptoms comments: Restrictive lung disease. The symptoms are aggravated by bending, standing and walking. She has tried oral narcotics for the symptoms. The treatment provided moderate relief.       The following portions of the patient's history were reviewed and updated as appropriate: allergies, current medications, past family history, past medical history, past social history, past surgical history and problem list.    Review of Systems   HENT: Positive for congestion.    Respiratory: Positive for cough and shortness of breath.    Cardiovascular: Negative for chest pain.        Recent onset A. fib low burden but recommending Xarelto or Eliquis-I concur   Musculoskeletal: Positive for arthralgias and back pain.        History of rheumatoid arthritis       Objective   Physical Exam  Vitals signs and nursing note reviewed.   Constitutional:       Appearance: Normal appearance.   Cardiovascular:      Rate and Rhythm: Normal rate and regular rhythm.      Pulses: Normal pulses.      Heart sounds: Normal heart sounds.   Pulmonary:      Effort: Pulmonary effort is normal.      Breath sounds: Normal breath sounds.   Musculoskeletal:         General: Deformity present.      Comments: Severe scoliosis of spine   Neurological:      General: No focal deficit present.      Mental Status: She is alert and oriented to person, place, and time.   Psychiatric:         Mood and Affect: Mood normal.         Behavior: Behavior normal.         Thought Content: Thought content normal.         Judgment: Judgment normal.        CONTROLLED SUBSTANCE TRACKING 6/5/2018 10/15/2018 2/12/2019 8/5/2019 11/11/2019 5/11/2020 8/17/2020   Last Pro 6/5/2018 10/15/2018 2/12/2019 8/5/2019 11/11/2019 5/11/2020 8/17/2020   Report Number - [id number]   Last UDS 3/14/2018 3/14/2018 3/14/2018 8/5/2019 8/16/2019 8/5/2019 8/5/2019   Last Controlled Substance Agreement 3/14/2018 3/14/2018 3/16/2018 8/5/2019 8/16/2019 8/5/2019 8/17/2020       Assessment/Plan   Diagnoses and all orders for this visit:    1. Pain  -     HYDROcodone-acetaminophen (NORCO)  MG per tablet; Take 1 tablet by mouth Every 6 (Six) Hours As Needed for Moderate Pain .  Dispense: 90 tablet; Refill: 0    2. Rheumatoid arthritis involving multiple sites with positive rheumatoid factor (CMS/HCC)    3. Hypertensive heart disease with heart failure (CMS/HCC)    4. Usual interstitial pneumonitis (CMS/HCC)       Plan above -getting second Covid shot

## 2021-03-01 NOTE — TELEPHONE ENCOUNTER
PT WAS PRESCRIBED ELIQUIS 2/27/21 - HER INSURANCE CONSIDERS ELIQUIS NON-FORMULARY & DID NOT ALLOW HER TO FILL IT.  THEY WILL, HOWEVER. PAY FOR XARELTO.    PLEASE ADVISE

## 2021-03-09 ENCOUNTER — LAB (OUTPATIENT)
Dept: LAB | Facility: HOSPITAL | Age: 81
End: 2021-03-09

## 2021-03-09 LAB — SARS-COV-2 RNA RESP QL NAA+PROBE: NOT DETECTED

## 2021-03-09 PROCEDURE — C9803 HOPD COVID-19 SPEC COLLECT: HCPCS | Performed by: INTERNAL MEDICINE

## 2021-03-09 PROCEDURE — U0005 INFEC AGEN DETEC AMPLI PROBE: HCPCS | Performed by: INTERNAL MEDICINE

## 2021-03-09 PROCEDURE — U0003 INFECTIOUS AGENT DETECTION BY NUCLEIC ACID (DNA OR RNA); SEVERE ACUTE RESPIRATORY SYNDROME CORONAVIRUS 2 (SARS-COV-2) (CORONAVIRUS DISEASE [COVID-19]), AMPLIFIED PROBE TECHNIQUE, MAKING USE OF HIGH THROUGHPUT TECHNOLOGIES AS DESCRIBED BY CMS-2020-01-R: HCPCS | Performed by: INTERNAL MEDICINE

## 2021-03-11 ENCOUNTER — HOSPITAL ENCOUNTER (OUTPATIENT)
Dept: PULMONOLOGY | Facility: HOSPITAL | Age: 81
Discharge: HOME | End: 2021-03-11
Admitting: INTERNAL MEDICINE

## 2021-03-11 DIAGNOSIS — J84.112 USUAL INTERSTITIAL PNEUMONITIS (HCC): ICD-10-CM

## 2021-03-11 PROCEDURE — 94729 DIFFUSING CAPACITY: CPT

## 2021-03-11 PROCEDURE — 94726 PLETHYSMOGRAPHY LUNG VOLUMES: CPT

## 2021-03-11 PROCEDURE — 94010 BREATHING CAPACITY TEST: CPT | Performed by: INTERNAL MEDICINE

## 2021-03-11 PROCEDURE — 94726 PLETHYSMOGRAPHY LUNG VOLUMES: CPT | Performed by: INTERNAL MEDICINE

## 2021-03-11 PROCEDURE — 94010 BREATHING CAPACITY TEST: CPT

## 2021-03-11 PROCEDURE — 94729 DIFFUSING CAPACITY: CPT | Performed by: INTERNAL MEDICINE

## 2021-03-12 NOTE — PROGRESS NOTES
"Chief Complaint  usual interstitial pneumonitis    Subjective    History of Present Illness      Ariana Gutierrez presents to [facility]   Ms. Gutierrez is a pleasant 81 year old female patient of Dr. Fawad Bhat with known Scoliosis, CAD, HTN, ILD on CXR, Chronic cough present > 1year, restrictive lung disease. Patient reports today she has a new diagnosis of Paroxysmal atrial fib and is on Xarelto now. She denies any bleeding problems. She states she has a history of DVTs as well. She continues to use her Qvar, mucinex, Duonebs, and prilosec. Her last HRCT of the chest in August showed pulmonary fibrosis with some areas of honeycombing. She had a positive RF and was seen by Rheumatology. Without synovitis in her joints, rheumatology did not treat at this time. It was determined that this could be Rheumatoid lung however her case is not clear cut. She has known vascular, cardiac, and bowel disease. She has been cautious about medications at this time. Dr. Bhat noted this patient has a complex situation. He felt her risk for potential progression and threat to pulmonary system function was high. He discussed OFev, Esbriet and potential novel therapy under investigation currently and would require referral to academic center doing research. She has done a lot of self research and very aware of her options. She currently still does not wish to do medication options such as OFev or Esbriet. She has had a friend that passed away on Ofev.  I have reviewed her PFTs from Aug 202, Nove 2020, and Dec 2020 and compared to her March 2021 with shows that her FEV1/FVC ration, TLC and dl/va have remained stable.        Objective   Vital Signs:   /86   Pulse 78   Temp 96.9 °F (36.1 °C)   Ht 160 cm (63\")   Wt 70.1 kg (154 lb 9.6 oz)   SpO2 97% Comment: ra  BMI 27.39 kg/m²     Physical Exam  Vitals and nursing note reviewed.   Constitutional:       Appearance: Normal " appearance.   Cardiovascular:      Rate and Rhythm: Normal rate and regular rhythm.      Pulses: Normal pulses.      Heart sounds: Normal heart sounds.   Pulmonary:      Effort: Pulmonary effort is normal.      Breath sounds: Normal breath sounds.   Neurological:      General: No focal deficit present.      Mental Status: She is alert and oriented to person, place, and time.   Psychiatric:         Mood and Affect: Mood normal.         Behavior: Behavior normal.          Result Review :   The following data was reviewed by: BELINDA Bentley on 03/18/2021:  CMP    CMP 5/11/20 9/22/20   Glucose 92 96   BUN 18 18   Creatinine 1.06 (A) 0.90   eGFR Non  Am 50 (A) 61   eGFR  Am 61 73   Sodium 141 138   Potassium 4.6 4.6   Chloride 99 98   Calcium 9.5 9.6   Total Protein 7.1 7.1   Albumin 4.30 4.20   Globulin 2.8 2.9   Total Bilirubin 0.6 0.5   Alkaline Phosphatase 79 93   AST (SGOT) 21 26   ALT (SGPT) 16 18   (A) Abnormal value       Comments are available for some flowsheets but are not being displayed.           CBC w/diff    CBC w/Diff 5/11/20 9/22/20   WBC 10.70 7.81   RBC 4.55 4.59   Hemoglobin 13.5 13.1   Hematocrit 41.0 39.6   MCV 90.1 86.3   MCH 29.7 28.5   MCHC 32.9 33.1   RDW 14.0 13.6   Platelets 236 253   Neutrophil Rel % 77.1 (A) 77.0 (A)   Lymphocyte Rel % 11.6 (A) 10.6 (A)   Monocyte Rel % 7.3 7.4   Eosinophil Rel % 2.7 3.7   Basophil Rel % 0.7 0.9   (A) Abnormal value            Data reviewed: Radiologic studies Last HRCT with fibrosis and honeycombing in some areas   CT Chest Hi Resolution (08/20/2020 11:51)      My interpretation of the PFT: Moderate restrictive lung defect, lung volumes restrictive, normal diffuion capacity     Results for orders placed during the hospital encounter of 03/11/21    Pulmonary Function Test    Narrative  [facility] - Pulmonary Function Test    [address] [place] [address]  [id number]  [phone]    Patient : Ariana Gutierrez  MRN :  [id number]  [facility] : [id number]  Pulmonologist : Grady White MD  Date : 3/11/2021    ______________________________________________________________________    Interpretation :  1.  Spirometry is most consistent with a moderate restrictive ventilatory defect although the decrease in the patient's forced vital capacity is just into the moderate range at 68% of predicted.  2.  Lung volumes confirm a moderate restrictive ventilatory defect of mild severity.  3.  There is a mild bordering on moderate diffusion impairment which when corrected for alveolar volume is normalized.      Grady White MD      Results for orders placed in visit on 11/02/20    Pulmonary Function Test    [facility]  [facility] PULMONARY FUNCTION TEST REPORT    Flow-volume curve:    Flow-volume curve is restrictive in configuration.    Spirometry:  FVC was 1.63 L or 64% of predicted with +7% change after bronchodilator challenge.  FEV1 was 1.51 L or 80% of predicted with +8% change after bronchodilator challenge.  FVC:FEV1 ratio was 93  FEF:25-75% was 213% of predicted with -27% change after bronchodilator challenge.    Spirometry values shows restrictive pattern.    Lung volumes:  TLC was 3.53 L or 73% of predicted  RV was 1.72 L or 85% of predicted    Lung volumes are below normal limit.    Diffusing capacity:  Uncorrected for single breath was 52% of predicted    Diffusing capacity adjusted for alveolar volume 79% of predicted.    Overall impression:    Above test results were acceptable and reproducible by ATS criteria.  From analysis of the above procedures the patient showed evidence of moderate restrictive airway dysfunction  There is no response to bronchodilator treatment.  Lung volumes are decreased supporting restrictive dysfunction  Diffusion capacity is moderately decreased when corrected for alveolar volume.    There is no prior test results available for comparison.clinical correlation is indicated.    Josie  Dona BECKMAN,  [facility]  11/28/2020  13:04 CST      Results for orders placed in visit on 01/15/20    Pulmonary Function Test    Narrative  [facility] - Pulmonary Function Test    [address]  [place]  [place]  [address]  [phone]    Patient : Ariana Gutierrez  MRN : [id number]  YOB: 1940  :  Fawad Bhat MD  Date : 8/21/2020    ______________________________________________________________________    Interpretation :  1. Spirometry shows moderate restrictive physiology.  2. Mid flow is normal.  3. Lung volume measurements show increased expiratory reserve volume, but are otherwise all decreased suggesting restriction.  4. Diffusion capacity is reduced, but when corrected for alveolar volume it is borderline lower limits of normal.  5. Airway resistance is normal and conductance is decreased.      Fawad Bhat MD      Patient's Body mass index is 27.39 kg/m². BMI is within normal parameters. No follow-up required..      Assessment and Plan    Diagnoses and all orders for this visit:    1. Restrictive lung disease (Primary)  -     CT Chest Hi Resolution Diagnostic; Future    2. Usual interstitial pneumonitis (CMS/HCC)  -     CT Chest Hi Resolution Diagnostic; Future    3. Chronic cough      Problem List Items Addressed This Visit        Pulmonary and Pneumonias    Restrictive lung disease - Primary (Chronic)    Relevant Orders    CT Chest Hi Resolution Diagnostic    Usual interstitial pneumonitis (CMS/HCC) (Chronic)    Relevant Orders    CT Chest Hi Resolution Diagnostic    Chronic cough (Chronic)        Reviewed Dr. Bhat's work up with patient as well options. Discussed work up she had with Rheumatology. She still does not wish to go on expensive medications as this time. Again, she has done her own research. She has a new diagnosis of atrial fib which places her at risk for clot/ stroke and is currently on Xarelto.  We reviewed her PFTs and note they are stable. She has agreed to have a repeat HRCT and follow up PFTs in the fall.     Health maintenance:   Influenza vaccine: 09/22/20  Prevnar 13: 07/2016  Covid-19 02/02/21, 03/03/21    Follow Up   Return in about 3 months (around 6/18/2021) for CT.  Patient was given instructions and counseling regarding her condition or for health maintenance advice. Please see specific information pulled into the AVS if appropriate.     Corrine Koch, BELINDA  3/22/2021  12:06 CDT

## 2021-03-18 ENCOUNTER — OFFICE VISIT (OUTPATIENT)
Dept: PULMONOLOGY | Facility: CLINIC | Age: 81
End: 2021-03-18

## 2021-03-18 VITALS
TEMPERATURE: 96.9 F | HEIGHT: 63 IN | SYSTOLIC BLOOD PRESSURE: 164 MMHG | OXYGEN SATURATION: 97 % | DIASTOLIC BLOOD PRESSURE: 86 MMHG | BODY MASS INDEX: 27.39 KG/M2 | HEART RATE: 78 BPM | WEIGHT: 154.6 LBS

## 2021-03-18 DIAGNOSIS — R05.3 CHRONIC COUGH: Chronic | ICD-10-CM

## 2021-03-18 DIAGNOSIS — J84.112 USUAL INTERSTITIAL PNEUMONITIS (HCC): ICD-10-CM

## 2021-03-18 DIAGNOSIS — J98.4 RESTRICTIVE LUNG DISEASE: Primary | ICD-10-CM

## 2021-03-18 PROCEDURE — 99214 OFFICE O/P EST MOD 30 MIN: CPT | Performed by: NURSE PRACTITIONER

## 2021-03-22 PROBLEM — R05.3 CHRONIC COUGH: Status: ACTIVE | Noted: 2021-03-22

## 2021-03-22 PROBLEM — J98.4 RESTRICTIVE LUNG DISEASE: Chronic | Status: ACTIVE | Noted: 2020-01-13

## 2021-03-22 PROBLEM — R05.3 CHRONIC COUGH: Chronic | Status: ACTIVE | Noted: 2021-03-22

## 2021-03-22 PROBLEM — J84.112: Chronic | Status: ACTIVE | Noted: 2020-01-30

## 2021-04-15 ENCOUNTER — TELEPHONE (OUTPATIENT)
Dept: CARDIOLOGY | Facility: CLINIC | Age: 81
End: 2021-04-15

## 2021-04-15 NOTE — TELEPHONE ENCOUNTER
Patient is needing SX CLEARANCE to hold her Xarelto for a tooth Extraction procedure. Patient is scheduled for 4/27/21 at 930a.     LOV: 2/27/21      Please advise

## 2021-04-19 ENCOUNTER — OFFICE VISIT (OUTPATIENT)
Dept: CARDIOLOGY | Facility: CLINIC | Age: 81
End: 2021-04-19

## 2021-04-19 ENCOUNTER — TELEPHONE (OUTPATIENT)
Dept: CARDIOLOGY | Facility: CLINIC | Age: 81
End: 2021-04-19

## 2021-04-19 VITALS
DIASTOLIC BLOOD PRESSURE: 70 MMHG | BODY MASS INDEX: 26.93 KG/M2 | OXYGEN SATURATION: 95 % | WEIGHT: 152 LBS | SYSTOLIC BLOOD PRESSURE: 130 MMHG | HEIGHT: 63 IN | HEART RATE: 81 BPM

## 2021-04-19 DIAGNOSIS — I47.1 PAROXYSMAL SVT (SUPRAVENTRICULAR TACHYCARDIA) (HCC): ICD-10-CM

## 2021-04-19 DIAGNOSIS — I11.0 HYPERTENSIVE HEART DISEASE WITH HEART FAILURE (HCC): ICD-10-CM

## 2021-04-19 DIAGNOSIS — I48.0 PAF (PAROXYSMAL ATRIAL FIBRILLATION) (HCC): Primary | ICD-10-CM

## 2021-04-19 DIAGNOSIS — R00.2 PALPITATIONS: ICD-10-CM

## 2021-04-19 DIAGNOSIS — J98.4 RESTRICTIVE LUNG DISEASE: Chronic | ICD-10-CM

## 2021-04-19 DIAGNOSIS — I25.10 CORONARY ARTERY DISEASE INVOLVING NATIVE CORONARY ARTERY OF NATIVE HEART WITHOUT ANGINA PECTORIS: ICD-10-CM

## 2021-04-19 PROCEDURE — 99214 OFFICE O/P EST MOD 30 MIN: CPT | Performed by: INTERNAL MEDICINE

## 2021-04-19 RX ORDER — NITROGLYCERIN 0.4 MG/1
TABLET SUBLINGUAL
Qty: 25 TABLET | Refills: 11 | Status: SHIPPED | OUTPATIENT
Start: 2021-04-19

## 2021-04-19 NOTE — PROGRESS NOTES
Ariana Gutierrez  [id number]  1940  81 y.o.  female     Referring Provider: Bill Schilling MD    Reason for Follow-up Visit:     short term follow up after recent encounter   Routine follow up.  Chronic low back pain    Had MADELIN in 1/18 no clots or vegetation  Zio patch showed SVT 72 episodes longest 53 maximum rate 181 BPM     Low risk stress test for stress induced myocardial ischemia  Cardiac workup test results as below   Cath small non dominant RCA severe stenosis    Subjective    Called to be seen due to palpitations   Occurred 4 times in 2 weeks   Lasts for 5 mins   Does not feel good   Gets dizzy spells    No bleeding, excessive bruising, gait instability or fall risks    Now with intermittent palpitations      This shows paroxysmal atrial fibrillation with a burden of 1% with longest episode of 2 hours and 48 minutes with heart rates between  bpm with an average of 138 bpm  Continues to have intermittent palpitations    Overall otherwise feels unchanged  Taking care of her 97-year-old mother  Similar symptoms as during last visit     Tolerating current medications well with no untoward side effects   Compliant with prescribed medication regimen. Tries to adhere to cardiac diet.      Persistent moderate chronic exertional shortness of breath on exertion relieved with rest  No significant cough or wheezing     No  chest pain  No significant pedal edema    No fever or chills  No significant expectoration    No hemoptysis  No presyncope or syncope    Tolerating current medications well with no untoward side effects   Compliant with prescribed medication regimen. Tries to adhere to cardiac diet.   Honey combing of lung on HRCT      No bleeding, excessive bruising, gait instability or fall risks      History of present illness:  Ariana Gutierrez is a 81 y.o. yo female with history of hypertension who presents today for   Chief Complaint   Patient presents with   • Coronary Artery Disease      2 mo f/u   • Atrial Fibrillation   • Med Refill     NITRO tablets/     .    History  Past Medical History:   Diagnosis Date   • Bacterial infection    • CAD (coronary artery disease)    • Chest pain    • Chronic back pain    • Deep vein thrombosis (CMS/HCC)     S/P KNEE SURGERY 10/2017   • Diverticulosis of intestine 2016   • Gastroesophageal reflux disease without esophagitis 2017   • Hypertension    • Irritable bowel syndrome 2011   • Joint infection (CMS/HCC) 2018   • Low back pain    • Palpitations    • Paresthesia     toes   • Perforated bowel (CMS/HCC)    • Rheumatoid arteritis (CMS/HCC)    • Scoliosis    • Spinal stenosis    • Vascular disease, peripheral (CMS/HCC)    ,   Past Surgical History:   Procedure Laterality Date   • BACK SURGERY     • BREAST BIOPSY Right 25 yrs ago   • CARDIAC CATHETERIZATION Left 2019    Procedure: Cardiac Catheterization/Vascular Study CARMEN OK;  Surgeon: Hawk Guerin MD;  Location: [facility] CATH INVASIVE LOCATION;  Service: Cardiology   • CATARACT EXTRACTION Bilateral    • COLON RESECTION SMALL BOWEL  2016    with colostomy for 6 months, already revised.   • COLON SURGERY     • COLONOSCOPY     • LUMBAR LAMINECTOMY     • RENAL ARTERY STENT Right    • TOTAL KNEE ARTHROPLASTY Right    • TOTAL KNEE ARTHROPLASTY     • VASCULAR SURGERY      10/2017 - DR GARCIA   ,   Family History   Problem Relation Age of Onset   • Breast cancer Mother 80   • Heart disease Mother    • Coronary artery disease Mother    • Peripheral vascular disease Mother    • No Known Problems Sister    • Heart disease Brother    • No Known Problems Maternal Grandmother    • No Known Problems Maternal Grandfather    • No Known Problems Paternal Grandmother    • No Known Problems Paternal Grandfather    ,   Social History     Tobacco Use   • Smoking status: Never Smoker   • Smokeless tobacco: Never Used   Vaping Use   • Vaping Use: Never used   Substance Use Topics   • Alcohol use: No   •  Drug use: No   ,     Medications  Current Outpatient Medications   Medication Sig Dispense Refill   • aspirin 81 MG chewable tablet Chew 81 mg Daily.     • atorvastatin (LIPITOR) 20 MG tablet Take 1 tablet by mouth Daily. 90 tablet 3   • Beclomethasone Diprop HFA (QVAR REDIHALER) 80 MCG/ACT inhaler Inhale 1 puff 2 (Two) Times a Day. 1 inhaler 0   • benazepril (LOTENSIN) 20 MG tablet TAKE 1 TABLET EVERY DAY 90 tablet 3   • benzonatate (TESSALON) 200 MG capsule Take 1 capsule by mouth 3 (Three) Times a Day As Needed for Cough. 90 capsule 11   • Docusate Calcium (STOOL SOFTENER PO) Take 1 tablet by mouth Daily.     • dronedarone (MULTAQ) 400 MG tablet Take 1 tablet by mouth 2 (Two) Times a Day With Meals. 60 tablet 11   • gabapentin (NEURONTIN) 800 MG tablet Take 1 tablet by mouth 3 (Three) Times a Day. 270 tablet 1   • guaiFENesin (MUCINEX) 600 MG 12 hr tablet Take 2 tablets by mouth 2 (Two) Times a Day As Needed for Cough. 60 tablet 0   • HYDROcodone-acetaminophen (NORCO)  MG per tablet Take 1 tablet by mouth Every 6 (Six) Hours As Needed for Moderate Pain . 90 tablet 0   • ipratropium-albuterol (DUO-NEB) 0.5-2.5 mg/3 ml nebulizer Take 3 mL by nebulization Every 6 (Six) Hours While Awake. Diagnosis of pneumonitis J69.8 360 mL 2   • isosorbide mononitrate (IMDUR) 30 MG 24 hr tablet Take 1 tablet by mouth once daily 90 tablet 4   • metoprolol succinate XL (TOPROL-XL) 50 MG 24 hr tablet TAKE 1 TABLET EVERY DAY 90 tablet 3   • Multiple Vitamins-Minerals (PRESERVISION AREDS 2 PO) Take  by mouth.     • nitroglycerin (NITROSTAT) 0.4 MG SL tablet 1 under the tongue as needed for angina, may repeat q5mins for up three doses 25 tablet 11   • omeprazole (priLOSEC) 20 MG capsule TAKE 1 CAPSULE EVERY DAY 90 capsule 3   • polyethylene glycol (MIRALAX) powder Take 17 g by mouth Daily.     • rivaroxaban (Xarelto) 20 MG tablet Take 1 tablet by mouth Daily. 30 tablet 11   • saccharomyces boulardii (FLORASTOR) 250 MG capsule Take  "1 capsule by mouth Daily. 30 capsule 2     No current facility-administered medications for this visit.       Allergies:  Patient has no known allergies.    Review of Systems  Review of Systems   Constitutional: Positive for malaise/fatigue. Negative for fever.   HENT: Negative.    Eyes: Negative.    Cardiovascular: Positive for dyspnea on exertion and palpitations. Negative for chest pain, claudication, cyanosis, irregular heartbeat, leg swelling, near-syncope, orthopnea, paroxysmal nocturnal dyspnea and syncope.   Respiratory: Positive for cough and shortness of breath.    Endocrine: Negative.    Hematologic/Lymphatic: Negative.    Skin: Negative.    Musculoskeletal: Positive for arthritis and joint pain.   Gastrointestinal: Positive for flatus. Negative for anorexia.   Genitourinary: Negative.    Neurological: Positive for weakness.   Psychiatric/Behavioral: Negative.        Objective     Physical Exam:  /70   Pulse 81   Ht 160 cm (63\")   Wt 68.9 kg (152 lb)   LMP  (LMP Unknown)   SpO2 95%   BMI 26.93 kg/m²     Physical Exam  Constitutional:       Appearance: Normal appearance. She is well-developed.   HENT:      Head: Normocephalic.   Eyes:      General: Lids are normal.   Neck:      Vascular: Normal carotid pulses. No carotid bruit or JVD.      Trachea: No tracheal tenderness or tracheal deviation.   Cardiovascular:      Rate and Rhythm: Regular rhythm.      Pulses: Normal pulses.      Heart sounds: Normal heart sounds, S1 normal and S2 normal.    No systolic murmur is present.     Pulmonary:      Effort: Pulmonary effort is normal.      Breath sounds: No stridor.   Abdominal:      General: There is no distension.      Palpations: Abdomen is soft.      Tenderness: There is no abdominal tenderness.   Musculoskeletal:      Cervical back: No edema.   Skin:     General: Skin is warm.   Neurological:      Mental Status: She is alert.      Cranial Nerves: No cranial nerve deficit.      Sensory: No sensory " deficit.   Psychiatric:         Speech: Speech normal.         Behavior: Behavior normal.         Thought Content: Thought content normal.             Results Review:    Ariana Gutierrez  HOLTER MONITOR >48HOUR UP TO 7DAYS  Order# [id number]  Reading physician: Hawk Guerin MD Ordering physician: Hawk Guerin MD Study date: 21   Patient Information    Patient Name   Ariana Gutierrez MRN   [id number] Legal Sex   Female  (Age)   1940 (81 y.o.)   Interpretation Summary       · Average HR: 77. Min HR: 44. Max HR: 218.     · Entire report was reviewed.  Monitoring in days: ~14   · The predominant rhythm noted during the testing period was sinus rhythm.     · Rare supraventricular ectopics with an APC burden of: < 1%    · Rare premature ventricular contractions with a PVC burden of: < 1%     · No correlated arrhythmia  · No significant pauses                  Conclusion:      Baseline rhythm is sinus.  No significant ectopy   Single 5 beat run of nonsustained ventricular tachycardia at a maximum rate of 162 bpm and an average of 131 bpm  171 runs of supraventricular tachycardia longest 2 minutes and 42 seconds at a maximum rate of 218 bpm and an average of 138 bpm  Paroxysmal atrial fibrillation with a burden of less than 1% with rates between  bpm and an average of 138 bpm              IMPRESSION:  1. Redemonstration of honeycombing, characteristic of usual interstitial  pneumonia (UIP). No new suspicious pulmonary finding.  2. Scattered coronary artery calcifications.  3. Stable hypodense liver lesion compared to 2017. Stability favors  benignity.  This report was finalized on 2020 12:31 by Dr Ary Irving MD.      Results for orders placed during the hospital encounter of 20    Adult Transthoracic Echo Complete W/ Cont if Necessary Per Protocol    Interpretation Summary  · Left ventricular ejection fraction appears to be 61 - 65%. Left ventricular systolic function is normal.  ·  Normal global longitudinal LV strain (GLS) = -16%.  · Left ventricular diastolic function was normal.  · No evidence of pulmonary hypertension is present.       Conclusion     No significant stenosis noted of left dominant epicardial coronary arterial tree.  95% ostial stenosis of small nondominant right coronary artery which is approximately 1.5 mm to 2 mm in diameter     Maximize medical therapy  If continues to have exertional jaw pain that is reflective of angina would consider intervention of small nondominant right coronary ostial stenosis.  Patient extremely restless and tries to either sit up or move both her legs during the procedure and therefore will require anesthesia to sedate her if this was considered in future     Hydration   Observation  Risk factor modifications  Workup for non cardiac causes of chest pain         Results for orders placed during the hospital encounter of 19   Adult Stress Echo W/ Cont or Stress Agent if Necessary Per Protocol    Narrative · Estimated EF = 55%.  · Left ventricular systolic function is normal.  · Low risk stress test for stress induced myocardial ischemia         Ariana Thomasus   Holter Monitor 72Hr-21Day (0296T/0298T)   Order# [id number]   Reading physician: Hawk Guerin MD Ordering physician: Hawk Guerin MD Study date: 19   Patient Information     Patient Name  Ariana Gutierrez MRN  [id number] Sex  Female  (Age)  1940 (79 y.o.)   Interpretation Summary        · Average HR: 73. Min HR: 46. Max HR: 136.     · ~14 day monitor ,entire report was reviewed  · The predominant rhythm noted during the testing period was sinus rhythm.  · Rare [3306] supraventricular ectopics with an APC burden of: < 1%  · Rare [132] premature ventricular contractions with a PVC burden of: < 1%  · 72 runs of supraventricular tachycardia longest 53 beats at a maximum rate of 181 bpm  · Single 3 beat run of nonsustained ventricular tachycardia at a rate of 200 bpm  · No  correlated arrhythmia  · No significant pauses           Conclusion: Baseline rhythm is sinus.  No significant ectopy  72 runs of supraventricular tachycardia longest 53 beats at a maximum rate of 181 bpm  Single 3 beat run of nonsustained ventricular tachycardia at a rate of 200 bpm               Procedures  ____________________________________________________________________________________________________________________________________________  Health maintenance and recommendations  Similar recommendations as last visit     Offered to give patient  a copy      Questions were encouraged, asked and answered to the patient's  understanding and satisfaction. Questions if any regarding current medications and side effects, need for refills and importance of compliance to medications stressed.    Reviewed available prior notes, consults, prior visits, laboratory findings, radiology and cardiology relevant reports. Updated chart as applicable. I have reviewed the patient's medical history in detail and updated the computerized patient record as relevant.      Updated patient regarding any new or relevant abnormalities on review of records or any new findings on physical exam. Mentioned to patient about purpose of visit and desirable health short and long term goals and objectives.    Primary to monitor CBC CMP Lipid panel and TSH as applicable    ___________________________________________________________________________________________________________________________________________        Assessment/Plan   Patient Active Problem List   Diagnosis   • Essential hypertension   • Gastroesophageal reflux disease without esophagitis   • Shortness of breath   • CAD (coronary artery disease)   • Scoliosis   • Low back pain   • Paresthesia   • Palpitations   • Chronic back pain   • Pneumonitis   • Chronic pain   • Restrictive lung disease   • Usual interstitial pneumonitis (CMS/HCC)   • BERGERON (dyspnea on exertion)   • PAF  (paroxysmal atrial fibrillation) (CMS/[facility])   • Rheumatoid arthritis involving multiple sites with positive rheumatoid factor (CMS/[facility])   • Hypertensive heart disease with heart failure (CMS/[facility])   • Chronic cough        Plan      Orders Placed This Encounter   Procedures   • Ambulatory Referral to Cardiac Electrophysiology     Referral Priority:   Routine     Referral Type:   Consultation     Referral Reason:   Specialty Services Required     Requested Specialty:   Cardiac Electrophysiology     Number of Visits Requested:   1        Can use Kardia and bring me the tracing next visit     May need additional medications in future and may benefit form atrial fibrillation ablation  Will start Multaq   Requested Prescriptions     Signed Prescriptions Disp Refills   • dronedarone (MULTAQ) 400 MG tablet 60 tablet 11     Sig: Take 1 tablet by mouth 2 (Two) Times a Day With Meals.   • nitroglycerin (NITROSTAT) 0.4 MG SL tablet 25 tablet 11     Si under the tongue as needed for angina, may repeat q5mins for up three doses        I support the patient's decision to take the Covid -19 vaccine   Had both dose   No major issues   Now fully immunized      Monitor for any signs of bleeding including red or dark stools as well as easy bruisabilty. Fall precautions.   High complexity decision making         Return in about 6 weeks (around 2021).

## 2021-04-19 NOTE — TELEPHONE ENCOUNTER
Ms. Gutierrez said Dr. Guerin recommended she obtain a Kardia.  She asks that you mail her some info on this device.  Thanks.

## 2021-05-04 DIAGNOSIS — R52 PAIN: ICD-10-CM

## 2021-05-04 RX ORDER — HYDROCODONE BITARTRATE AND ACETAMINOPHEN 10; 325 MG/1; MG/1
1 TABLET ORAL EVERY 6 HOURS PRN
Qty: 90 TABLET | Refills: 0 | Status: SHIPPED | OUTPATIENT
Start: 2021-05-04 | End: 2021-06-01 | Stop reason: SDUPTHER

## 2021-06-01 ENCOUNTER — OFFICE VISIT (OUTPATIENT)
Dept: FAMILY MEDICINE CLINIC | Facility: CLINIC | Age: 81
End: 2021-06-01

## 2021-06-01 VITALS
DIASTOLIC BLOOD PRESSURE: 68 MMHG | OXYGEN SATURATION: 95 % | SYSTOLIC BLOOD PRESSURE: 138 MMHG | TEMPERATURE: 97.8 F | HEIGHT: 63 IN | RESPIRATION RATE: 16 BRPM | WEIGHT: 152 LBS | HEART RATE: 67 BPM | BODY MASS INDEX: 26.93 KG/M2

## 2021-06-01 DIAGNOSIS — R53.83 FATIGUE, UNSPECIFIED TYPE: ICD-10-CM

## 2021-06-01 DIAGNOSIS — Z51.81 THERAPEUTIC DRUG MONITORING: Primary | ICD-10-CM

## 2021-06-01 DIAGNOSIS — E78.2 MIXED HYPERLIPIDEMIA: ICD-10-CM

## 2021-06-01 DIAGNOSIS — R52 PAIN: ICD-10-CM

## 2021-06-01 PROCEDURE — 99214 OFFICE O/P EST MOD 30 MIN: CPT | Performed by: FAMILY MEDICINE

## 2021-06-01 RX ORDER — HYDROCODONE BITARTRATE AND ACETAMINOPHEN 10; 325 MG/1; MG/1
1 TABLET ORAL EVERY 6 HOURS PRN
Qty: 90 TABLET | Refills: 0 | Status: SHIPPED | OUTPATIENT
Start: 2021-06-01 | End: 2021-07-29 | Stop reason: SDUPTHER

## 2021-06-01 RX ORDER — GABAPENTIN 800 MG/1
800 TABLET ORAL 3 TIMES DAILY
Qty: 270 TABLET | Refills: 1 | Status: SHIPPED | OUTPATIENT
Start: 2021-06-01 | End: 2021-08-24 | Stop reason: SDUPTHER

## 2021-06-01 NOTE — PROGRESS NOTES
Subjective   Ariana Gutierrez is a 81 y.o. female.     81-year-old female with history of chronic pain severe degenerative scoliosis neuropathies    Pain  This is a chronic problem. The current episode started more than 1 year ago. The problem occurs daily. Associated symptoms include arthralgias. Pertinent negatives include no chest pain. The symptoms are aggravated by standing, twisting and walking. She has tried oral narcotics for the symptoms. The treatment provided moderate relief.       The following portions of the patient's history were reviewed and updated as appropriate: allergies, current medications, past family history, past medical history, past social history, past surgical history and problem list.    Review of Systems   Cardiovascular: Negative for chest pain.        History of atrial fib may be ablation candidate-encouraged to investigate   Musculoskeletal: Positive for arthralgias and back pain.        Severe degenerative scoliosis with neuropathy       Objective   Physical Exam  Vitals and nursing note reviewed.   Constitutional:       Appearance: Normal appearance.   Cardiovascular:      Rate and Rhythm: Normal rate and regular rhythm.      Pulses: Normal pulses.      Heart sounds: Normal heart sounds.   Pulmonary:      Effort: Pulmonary effort is normal.      Breath sounds: Normal breath sounds.   Musculoskeletal:      Right lower leg: Edema present.      Left lower leg: No edema.      Comments: Severe degenerative scoliosis   Skin:     General: Skin is warm and dry.      Capillary Refill: Capillary refill takes less than 2 seconds.   Neurological:      General: No focal deficit present.      Mental Status: She is alert and oriented to person, place, and time.   Psychiatric:         Mood and Affect: Mood normal.         Behavior: Behavior normal.         Thought Content: Thought content normal.         Judgment: Judgment normal.         Assessment/Plan   Diagnoses and all orders for this  visit:    1. Therapeutic drug monitoring (Primary)  -     Gabapentin, Urine -    2. Mixed hyperlipidemia  -     Comprehensive Metabolic Panel  -     Lipid Panel    3. Fatigue, unspecified type  -     CBC & Differential       Plan-encouraged to see EP cardiologist-had Covid shots-above-try support hose

## 2021-06-02 LAB
ALBUMIN SERPL-MCNC: 4.1 G/DL (ref 3.5–5.2)
ALBUMIN/GLOB SERPL: 1.3 G/DL
ALP SERPL-CCNC: 84 U/L (ref 39–117)
ALT SERPL-CCNC: 15 U/L (ref 1–33)
AST SERPL-CCNC: 19 U/L (ref 1–32)
BASOPHILS # BLD AUTO: 0.07 10*3/MM3 (ref 0–0.2)
BASOPHILS NFR BLD AUTO: 0.8 % (ref 0–1.5)
BILIRUB SERPL-MCNC: 0.4 MG/DL (ref 0–1.2)
BUN SERPL-MCNC: 20 MG/DL (ref 8–23)
BUN/CREAT SERPL: 20.2 (ref 7–25)
CALCIUM SERPL-MCNC: 9.8 MG/DL (ref 8.6–10.5)
CHLORIDE SERPL-SCNC: 102 MMOL/L (ref 98–107)
CHOLEST SERPL-MCNC: 103 MG/DL (ref 0–200)
CO2 SERPL-SCNC: 27.6 MMOL/L (ref 22–29)
CREAT SERPL-MCNC: 0.99 MG/DL (ref 0.57–1)
EOSINOPHIL # BLD AUTO: 0.41 10*3/MM3 (ref 0–0.4)
EOSINOPHIL NFR BLD AUTO: 4.8 % (ref 0.3–6.2)
ERYTHROCYTE [DISTWIDTH] IN BLOOD BY AUTOMATED COUNT: 14.1 % (ref 12.3–15.4)
GLOBULIN SER CALC-MCNC: 3.1 GM/DL
GLUCOSE SERPL-MCNC: 81 MG/DL (ref 65–99)
HCT VFR BLD AUTO: 38.1 % (ref 34–46.6)
HDLC SERPL-MCNC: 51 MG/DL (ref 40–60)
HGB BLD-MCNC: 12.1 G/DL (ref 12–15.9)
IMM GRANULOCYTES # BLD AUTO: 0.02 10*3/MM3 (ref 0–0.05)
IMM GRANULOCYTES NFR BLD AUTO: 0.2 % (ref 0–0.5)
LDLC SERPL CALC-MCNC: 39 MG/DL (ref 0–100)
LYMPHOCYTES # BLD AUTO: 1.09 10*3/MM3 (ref 0.7–3.1)
LYMPHOCYTES NFR BLD AUTO: 12.6 % (ref 19.6–45.3)
MCH RBC QN AUTO: 28 PG (ref 26.6–33)
MCHC RBC AUTO-ENTMCNC: 31.8 G/DL (ref 31.5–35.7)
MCV RBC AUTO: 88.2 FL (ref 79–97)
MONOCYTES # BLD AUTO: 0.59 10*3/MM3 (ref 0.1–0.9)
MONOCYTES NFR BLD AUTO: 6.8 % (ref 5–12)
NEUTROPHILS # BLD AUTO: 6.44 10*3/MM3 (ref 1.7–7)
NEUTROPHILS NFR BLD AUTO: 74.8 % (ref 42.7–76)
NRBC BLD AUTO-RTO: 0 /100 WBC (ref 0–0.2)
PLATELET # BLD AUTO: 276 10*3/MM3 (ref 140–450)
POTASSIUM SERPL-SCNC: 5.3 MMOL/L (ref 3.5–5.2)
PROT SERPL-MCNC: 7.2 G/DL (ref 6–8.5)
RBC # BLD AUTO: 4.32 10*6/MM3 (ref 3.77–5.28)
SODIUM SERPL-SCNC: 140 MMOL/L (ref 136–145)
TRIGL SERPL-MCNC: 54 MG/DL (ref 0–150)
VLDLC SERPL CALC-MCNC: 13 MG/DL (ref 5–40)
WBC # BLD AUTO: 8.62 10*3/MM3 (ref 3.4–10.8)

## 2021-06-03 LAB — GABAPENTIN UR-MCNC: 348.8 UG/ML

## 2021-06-04 LAB — DRUGS UR: NORMAL

## 2021-06-07 ENCOUNTER — OFFICE VISIT (OUTPATIENT)
Dept: CARDIOLOGY | Facility: CLINIC | Age: 81
End: 2021-06-07

## 2021-06-07 VITALS
DIASTOLIC BLOOD PRESSURE: 70 MMHG | WEIGHT: 149 LBS | SYSTOLIC BLOOD PRESSURE: 160 MMHG | HEIGHT: 63 IN | OXYGEN SATURATION: 98 % | BODY MASS INDEX: 26.4 KG/M2 | HEART RATE: 79 BPM

## 2021-06-07 DIAGNOSIS — J98.4 RESTRICTIVE LUNG DISEASE: Chronic | ICD-10-CM

## 2021-06-07 DIAGNOSIS — R00.2 PALPITATIONS: ICD-10-CM

## 2021-06-07 DIAGNOSIS — I48.0 PAF (PAROXYSMAL ATRIAL FIBRILLATION) (HCC): Primary | ICD-10-CM

## 2021-06-07 DIAGNOSIS — I10 ESSENTIAL HYPERTENSION: ICD-10-CM

## 2021-06-07 DIAGNOSIS — R06.09 DOE (DYSPNEA ON EXERTION): ICD-10-CM

## 2021-06-07 DIAGNOSIS — K21.9 GASTROESOPHAGEAL REFLUX DISEASE WITHOUT ESOPHAGITIS: ICD-10-CM

## 2021-06-07 PROCEDURE — 99214 OFFICE O/P EST MOD 30 MIN: CPT | Performed by: INTERNAL MEDICINE

## 2021-06-07 NOTE — PROGRESS NOTES
Ariana Gutierrez  [id number]  1940  81 y.o.  female     Referring Provider: Bill Schilling MD    Reason for Follow-up Visit:     short term follow up after recent encounter   Routine follow up.  Chronic low back pain    Had MADELIN in 1/18 no clots or vegetation  Zio patch showed SVT 72 episodes longest 53 maximum rate 181 BPM     Low risk stress test for stress induced myocardial ischemia  Cardiac workup test results as below   Cath small non dominant RCA severe stenosis    Subjective    Overall much better and less palpitations   Due to see EP July 1st Dr Edwards     Mild chronic exertional shortness of breath on exertion relieved with rest  No significant cough or wheezing    No palpitations  No associated chest pain  No significant pedal edema    No fever or chills  No significant expectoration    No hemoptysis  No presyncope or syncope    Tolerating current medications well with no untoward side effects   Compliant with prescribed medication regimen. Tries to adhere to cardiac diet.   Honey combing of lung on HRCT    No bleeding, excessive bruising, gait instability or fall risks      Had high colored urine and will get UA Dr Schilling soon       History of present illness:  Ariana Gutierrez is a 81 y.o. yo female with history of hypertension who presents today for   Chief Complaint   Patient presents with   • Atrial Fibrillation     doing great since medication change. no episodes    .    History  Past Medical History:   Diagnosis Date   • Bacterial infection    • CAD (coronary artery disease)    • Chest pain    • Chronic back pain    • Deep vein thrombosis (CMS/HCC)     S/P KNEE SURGERY 10/2017   • Diverticulosis of intestine 8/16/2016   • Gastroesophageal reflux disease without esophagitis 5/26/2017   • Hypertension    • Irritable bowel syndrome 11/2/2011   • Joint infection (CMS/HCC) 7/24/2018   • Low back pain    • Palpitations    • Paresthesia     toes   • Perforated bowel (CMS/HCC)    • Rheumatoid  arteritis (CMS/HCC)    • Scoliosis    • Spinal stenosis    • Vascular disease, peripheral (CMS/HCC)    ,   Past Surgical History:   Procedure Laterality Date   • BACK SURGERY     • BREAST BIOPSY Right 25 yrs ago   • CARDIAC CATHETERIZATION Left 11/6/2019    Procedure: Cardiac Catheterization/Vascular Study CARMEN OK;  Surgeon: Hawk Guerin MD;  Location:  [facility] CATH INVASIVE LOCATION;  Service: Cardiology   • CATARACT EXTRACTION Bilateral    • COLON RESECTION SMALL BOWEL  2016    with colostomy for 6 months, already revised.   • COLON SURGERY     • COLONOSCOPY     • LUMBAR LAMINECTOMY     • RENAL ARTERY STENT Right    • TOTAL KNEE ARTHROPLASTY Right    • TOTAL KNEE ARTHROPLASTY     • VASCULAR SURGERY      10/2017 - DR GARCIA   ,   Family History   Problem Relation Age of Onset   • Breast cancer Mother 80   • Heart disease Mother    • Coronary artery disease Mother    • Peripheral vascular disease Mother    • No Known Problems Sister    • Heart disease Brother    • No Known Problems Maternal Grandmother    • No Known Problems Maternal Grandfather    • No Known Problems Paternal Grandmother    • No Known Problems Paternal Grandfather    ,   Social History     Tobacco Use   • Smoking status: Never Smoker   • Smokeless tobacco: Never Used   Vaping Use   • Vaping Use: Never used   Substance Use Topics   • Alcohol use: No   • Drug use: No   ,     Medications  Current Outpatient Medications   Medication Sig Dispense Refill   • aspirin 81 MG chewable tablet Chew 81 mg Daily.     • atorvastatin (LIPITOR) 20 MG tablet Take 1 tablet by mouth Daily. 90 tablet 3   • Beclomethasone Diprop HFA (QVAR REDIHALER) 80 MCG/ACT inhaler Inhale 1 puff 2 (Two) Times a Day. 1 inhaler 0   • benazepril (LOTENSIN) 20 MG tablet TAKE 1 TABLET EVERY DAY 90 tablet 3   • Docusate Calcium (STOOL SOFTENER PO) Take 1 tablet by mouth Daily.     • dronedarone (MULTAQ) 400 MG tablet Take 1 tablet by mouth 2 (Two) Times a Day With Meals. 60 tablet 11   •  gabapentin (NEURONTIN) 800 MG tablet Take 1 tablet by mouth 3 (Three) Times a Day. 270 tablet 1   • HYDROcodone-acetaminophen (NORCO)  MG per tablet Take 1 tablet by mouth Every 6 (Six) Hours As Needed for Moderate Pain . 90 tablet 0   • ipratropium-albuterol (DUO-NEB) 0.5-2.5 mg/3 ml nebulizer Take 3 mL by nebulization Every 6 (Six) Hours While Awake. Diagnosis of pneumonitis J69.8 360 mL 2   • isosorbide mononitrate (IMDUR) 30 MG 24 hr tablet Take 1 tablet by mouth once daily 90 tablet 4   • metoprolol succinate XL (TOPROL-XL) 50 MG 24 hr tablet TAKE 1 TABLET EVERY DAY 90 tablet 3   • Multiple Vitamins-Minerals (PRESERVISION AREDS 2 PO) Take  by mouth.     • nitroglycerin (NITROSTAT) 0.4 MG SL tablet 1 under the tongue as needed for angina, may repeat q5mins for up three doses 25 tablet 11   • omeprazole (priLOSEC) 20 MG capsule TAKE 1 CAPSULE EVERY DAY 90 capsule 3   • polyethylene glycol (MIRALAX) powder Take 17 g by mouth Daily.     • rivaroxaban (Xarelto) 20 MG tablet Take 1 tablet by mouth Daily. 30 tablet 11   • saccharomyces boulardii (FLORASTOR) 250 MG capsule Take 1 capsule by mouth Daily. 30 capsule 2     No current facility-administered medications for this visit.       Allergies:  Patient has no known allergies.    Review of Systems  Review of Systems   Constitutional: Positive for malaise/fatigue. Negative for fever.   HENT: Negative.    Eyes: Negative.    Cardiovascular: Positive for dyspnea on exertion and palpitations. Negative for chest pain, claudication, cyanosis, irregular heartbeat, leg swelling, near-syncope, orthopnea, paroxysmal nocturnal dyspnea and syncope.   Respiratory: Positive for cough and shortness of breath.    Endocrine: Negative.    Hematologic/Lymphatic: Negative.    Skin: Negative.    Musculoskeletal: Positive for arthritis and joint pain.   Gastrointestinal: Positive for flatus. Negative for anorexia.   Genitourinary: Negative.    Neurological: Positive for weakness.  "  Psychiatric/Behavioral: Negative.        Objective     Physical Exam:  /70   Pulse 79   Ht 160 cm (63\")   Wt 67.6 kg (149 lb)   LMP  (LMP Unknown)   SpO2 98%   BMI 26.39 kg/m²     Physical Exam  Constitutional:       Appearance: Normal appearance. She is well-developed.   HENT:      Head: Normocephalic.   Eyes:      General: Lids are normal.   Neck:      Vascular: Normal carotid pulses. No carotid bruit or JVD.      Trachea: No tracheal tenderness or tracheal deviation.   Cardiovascular:      Rate and Rhythm: Regular rhythm.      Pulses: Normal pulses.      Heart sounds: Normal heart sounds, S1 normal and S2 normal.    No systolic murmur is present.     Pulmonary:      Effort: Pulmonary effort is normal.      Breath sounds: No stridor.   Abdominal:      General: There is no distension.      Palpations: Abdomen is soft.      Tenderness: There is no abdominal tenderness.   Musculoskeletal:      Cervical back: No edema.   Skin:     General: Skin is warm.   Neurological:      Mental Status: She is alert.      Cranial Nerves: No cranial nerve deficit.      Sensory: No sensory deficit.   Psychiatric:         Speech: Speech normal.         Behavior: Behavior normal.         Thought Content: Thought content normal.             Results Review:    Ariana Gutierrez  HOLTER MONITOR >48HOUR UP TO 7DAYS  Order# [id number]  Reading physician: Hawk Guerin MD Ordering physician: Hawk Guerin MD Study date: 21   Patient Information    Patient Name   Ariana Gutierrez MRN   [id number] Legal Sex   Female  (Age)   1940 (81 y.o.)   Interpretation Summary       · Average HR: 77. Min HR: 44. Max HR: 218.     · Entire report was reviewed.  Monitoring in days: ~14   · The predominant rhythm noted during the testing period was sinus rhythm.     · Rare supraventricular ectopics with an APC burden of: < 1%    · Rare premature ventricular contractions with a PVC burden of: < 1%     · No correlated arrhythmia  · No " significant pauses                  Conclusion:      Baseline rhythm is sinus.  No significant ectopy   Single 5 beat run of nonsustained ventricular tachycardia at a maximum rate of 162 bpm and an average of 131 bpm  171 runs of supraventricular tachycardia longest 2 minutes and 42 seconds at a maximum rate of 218 bpm and an average of 138 bpm  Paroxysmal atrial fibrillation with a burden of less than 1% with rates between  bpm and an average of 138 bpm              IMPRESSION:  1. Redemonstration of honeycombing, characteristic of usual interstitial  pneumonia (UIP). No new suspicious pulmonary finding.  2. Scattered coronary artery calcifications.  3. Stable hypodense liver lesion compared to 2017. Stability favors  benignity.  This report was finalized on 08/20/2020 12:31 by Dr Ary Irving MD.      Results for orders placed during the hospital encounter of 12/28/20    Adult Transthoracic Echo Complete W/ Cont if Necessary Per Protocol    Interpretation Summary  · Left ventricular ejection fraction appears to be 61 - 65%. Left ventricular systolic function is normal.  · Normal global longitudinal LV strain (GLS) = -16%.  · Left ventricular diastolic function was normal.  · No evidence of pulmonary hypertension is present.       Conclusion     No significant stenosis noted of left dominant epicardial coronary arterial tree.  95% ostial stenosis of small nondominant right coronary artery which is approximately 1.5 mm to 2 mm in diameter     Maximize medical therapy  If continues to have exertional jaw pain that is reflective of angina would consider intervention of small nondominant right coronary ostial stenosis.  Patient extremely restless and tries to either sit up or move both her legs during the procedure and therefore will require anesthesia to sedate her if this was considered in future     Hydration   Observation  Risk factor modifications  Workup for non cardiac causes of chest  pain         Results for orders placed during the hospital encounter of 19   Adult Stress Echo W/ Cont or Stress Agent if Necessary Per Protocol    Narrative · Estimated EF = 55%.  · Left ventricular systolic function is normal.  · Low risk stress test for stress induced myocardial ischemia         Ariana Gutierrez   Holter Monitor 72Hr-21Day (0296T/0298T)   Order# [id number]   Reading physician: Hawk Guerin MD Ordering physician: Hawk Guerin MD Study date: 19   Patient Information     Patient Name  Ariana Gutierrez MRN  [id number] Sex  Female  (Age)  1940 (79 y.o.)   Interpretation Summary        · Average HR: 73. Min HR: 46. Max HR: 136.     · ~14 day monitor ,entire report was reviewed  · The predominant rhythm noted during the testing period was sinus rhythm.  · Rare [3306] supraventricular ectopics with an APC burden of: < 1%  · Rare [132] premature ventricular contractions with a PVC burden of: < 1%  · 72 runs of supraventricular tachycardia longest 53 beats at a maximum rate of 181 bpm  · Single 3 beat run of nonsustained ventricular tachycardia at a rate of 200 bpm  · No correlated arrhythmia  · No significant pauses           Conclusion: Baseline rhythm is sinus.  No significant ectopy  72 runs of supraventricular tachycardia longest 53 beats at a maximum rate of 181 bpm  Single 3 beat run of nonsustained ventricular tachycardia at a rate of 200 bpm               Procedures  ____________________________________________________________________________________________________________________________________________  Health maintenance and recommendations  Similar recommendations as last visit     Offered to give patient  a copy      Questions were encouraged, asked and answered to the patient's  understanding and satisfaction. Questions if any regarding current medications and side effects, need for refills and importance of compliance to medications stressed.    Reviewed available  prior notes, consults, prior visits, laboratory findings, radiology and cardiology relevant reports. Updated chart as applicable. I have reviewed the patient's medical history in detail and updated the computerized patient record as relevant.      Updated patient regarding any new or relevant abnormalities on review of records or any new findings on physical exam. Mentioned to patient about purpose of visit and desirable health short and long term goals and objectives.    Primary to monitor CBC CMP Lipid panel and TSH as applicable    ___________________________________________________________________________________________________________________________________________        Assessment/Plan   Patient Active Problem List   Diagnosis   • Essential hypertension   • Gastroesophageal reflux disease without esophagitis   • Shortness of breath   • CAD (coronary artery disease)   • Scoliosis   • Low back pain   • Paresthesia   • Palpitations   • Chronic back pain   • Pneumonitis   • Chronic pain   • Restrictive lung disease   • Usual interstitial pneumonitis (CMS/HCC)   • BERGERON (dyspnea on exertion)   • PAF (paroxysmal atrial fibrillation) (CMS/HCC)   • Rheumatoid arthritis involving multiple sites with positive rheumatoid factor (CMS/HCC)   • Hypertensive heart disease with heart failure (CMS/HCC)   • Chronic cough        Plan      Overall much better on Multaq    Keep existing follow up appointment with Dr Jerry Zheng and bring me the tracing again next visit   Current recordings shows  sinus rhythm     Check BP and heart rates twice daily at home and bring a recording for me to review next visit  If BP >130/85 or < 100/60 persistently over 3 reading 30 mins apart call sooner      Monitor for any signs of bleeding including red or dark stools as well as easy bruisabilty. Fall precautions.     She will get repeat labs in 3 months   Her recent potassium was borderline high         Keep follow up as scheduled  or PRN sooner if any new or worsening problem.   Return in about 3 months (around 9/1/2021).

## 2021-06-08 ENCOUNTER — CLINICAL SUPPORT (OUTPATIENT)
Dept: FAMILY MEDICINE CLINIC | Facility: CLINIC | Age: 81
End: 2021-06-08

## 2021-06-08 ENCOUNTER — OFFICE VISIT (OUTPATIENT)
Dept: FAMILY MEDICINE CLINIC | Facility: CLINIC | Age: 81
End: 2021-06-08

## 2021-06-08 VITALS
DIASTOLIC BLOOD PRESSURE: 76 MMHG | WEIGHT: 151.2 LBS | HEART RATE: 83 BPM | HEIGHT: 63 IN | TEMPERATURE: 98 F | OXYGEN SATURATION: 98 % | SYSTOLIC BLOOD PRESSURE: 156 MMHG | BODY MASS INDEX: 26.79 KG/M2

## 2021-06-08 DIAGNOSIS — R31.0 GROSS HEMATURIA: Primary | ICD-10-CM

## 2021-06-08 DIAGNOSIS — N30.90 CYSTITIS: ICD-10-CM

## 2021-06-08 DIAGNOSIS — R11.2 NAUSEA AND VOMITING, INTRACTABILITY OF VOMITING NOT SPECIFIED, UNSPECIFIED VOMITING TYPE: ICD-10-CM

## 2021-06-08 LAB
BILIRUB BLD-MCNC: ABNORMAL MG/DL
CLARITY, POC: ABNORMAL
COLOR UR: ABNORMAL
GLUCOSE UR STRIP-MCNC: NEGATIVE MG/DL
KETONES UR QL: NEGATIVE
LEUKOCYTE EST, POC: ABNORMAL
NITRITE UR-MCNC: NEGATIVE MG/ML
PH UR: 6.5 [PH] (ref 5–8)
PROT UR STRIP-MCNC: ABNORMAL MG/DL
RBC # UR STRIP: ABNORMAL /UL
SP GR UR: 1.02 (ref 1–1.03)
UROBILINOGEN UR QL: NORMAL

## 2021-06-08 PROCEDURE — 99214 OFFICE O/P EST MOD 30 MIN: CPT | Performed by: NURSE PRACTITIONER

## 2021-06-08 PROCEDURE — 81003 URINALYSIS AUTO W/O SCOPE: CPT | Performed by: FAMILY MEDICINE

## 2021-06-08 RX ORDER — SULFAMETHOXAZOLE AND TRIMETHOPRIM 400; 80 MG/1; MG/1
1 TABLET ORAL 2 TIMES DAILY
Qty: 14 TABLET | Refills: 0 | Status: SHIPPED | OUTPATIENT
Start: 2021-06-08 | End: 2021-06-15

## 2021-06-08 NOTE — PROGRESS NOTES
Patient came by office today to leave a urine sample because she noticed it was a dark brown color.  After a urinalysis was done Dr. Schilling suggested that the patient see BELINDA Figueroa today because she is on eliquis and had large blood.  Culture was ordered under her office encounter with Melanie.

## 2021-06-08 NOTE — PROGRESS NOTES
CC: blood in urin    History:  Ariana Gutierrez is a 81 y.o. female who presents today for evaluation of the above problems.        Has been on Xarelto per Dr. Guerin for several months after new onset a-fib.  She will be seeing Dr. Edwards to consider an ablation.  Started noticing darkness in urine 2 days ago. No unusual back or abdominal pain.   Has had AM vomitting a couple times in the last two weeks. Mainly food or phlegm.      HPI  ROS:  Review of Systems   Constitutional: Negative for fever.   Gastrointestinal: Negative for abdominal pain.   Genitourinary: Positive for hematuria.   Musculoskeletal: Negative for back pain.       No Known Allergies  Past Medical History:   Diagnosis Date   • Bacterial infection    • CAD (coronary artery disease)    • Chest pain    • Chronic back pain    • Deep vein thrombosis (CMS/HCC)     S/P KNEE SURGERY 10/2017   • Diverticulosis of intestine 8/16/2016   • Gastroesophageal reflux disease without esophagitis 5/26/2017   • Hypertension    • Irritable bowel syndrome 11/2/2011   • Joint infection (CMS/HCC) 7/24/2018   • Low back pain    • Palpitations    • Paresthesia     toes   • Perforated bowel (CMS/HCC)    • Rheumatoid arteritis (CMS/HCC)    • Scoliosis    • Spinal stenosis    • Vascular disease, peripheral (CMS/HCC)      Past Surgical History:   Procedure Laterality Date   • BACK SURGERY     • BREAST BIOPSY Right 25 yrs ago   • CARDIAC CATHETERIZATION Left 11/6/2019    Procedure: Cardiac Catheterization/Vascular Study CARMEN OK;  Surgeon: Hawk Guerin MD;  Location: [facility] CATH INVASIVE LOCATION;  Service: Cardiology   • CATARACT EXTRACTION Bilateral    • COLON RESECTION SMALL BOWEL  2016    with colostomy for 6 months, already revised.   • COLON SURGERY     • COLONOSCOPY     • LUMBAR LAMINECTOMY     • RENAL ARTERY STENT Right    • TOTAL KNEE ARTHROPLASTY Right    • TOTAL KNEE ARTHROPLASTY     • VASCULAR SURGERY      10/2017 - DR GARCIA     Family History   Problem Relation  Age of Onset   • Breast cancer Mother 80   • Heart disease Mother    • Coronary artery disease Mother    • Peripheral vascular disease Mother    • No Known Problems Sister    • Heart disease Brother    • No Known Problems Maternal Grandmother    • No Known Problems Maternal Grandfather    • No Known Problems Paternal Grandmother    • No Known Problems Paternal Grandfather       reports that she has never smoked. She has never used smokeless tobacco. She reports that she does not drink alcohol and does not use drugs.      Current Outpatient Medications:   •  aspirin 81 MG chewable tablet, Chew 81 mg Daily., Disp: , Rfl:   •  atorvastatin (LIPITOR) 20 MG tablet, Take 1 tablet by mouth Daily., Disp: 90 tablet, Rfl: 3  •  Beclomethasone Diprop HFA (QVAR REDIHALER) 80 MCG/ACT inhaler, Inhale 1 puff 2 (Two) Times a Day., Disp: 1 inhaler, Rfl: 0  •  benazepril (LOTENSIN) 20 MG tablet, TAKE 1 TABLET EVERY DAY, Disp: 90 tablet, Rfl: 3  •  Docusate Calcium (STOOL SOFTENER PO), Take 1 tablet by mouth Daily., Disp: , Rfl:   •  dronedarone (MULTAQ) 400 MG tablet, Take 1 tablet by mouth 2 (Two) Times a Day With Meals., Disp: 60 tablet, Rfl: 11  •  gabapentin (NEURONTIN) 800 MG tablet, Take 1 tablet by mouth 3 (Three) Times a Day., Disp: 270 tablet, Rfl: 1  •  HYDROcodone-acetaminophen (NORCO)  MG per tablet, Take 1 tablet by mouth Every 6 (Six) Hours As Needed for Moderate Pain ., Disp: 90 tablet, Rfl: 0  •  ipratropium-albuterol (DUO-NEB) 0.5-2.5 mg/3 ml nebulizer, Take 3 mL by nebulization Every 6 (Six) Hours While Awake. Diagnosis of pneumonitis J69.8, Disp: 360 mL, Rfl: 2  •  isosorbide mononitrate (IMDUR) 30 MG 24 hr tablet, Take 1 tablet by mouth once daily, Disp: 90 tablet, Rfl: 4  •  metoprolol succinate XL (TOPROL-XL) 50 MG 24 hr tablet, TAKE 1 TABLET EVERY DAY, Disp: 90 tablet, Rfl: 3  •  Multiple Vitamins-Minerals (PRESERVISION AREDS 2 PO), Take  by mouth., Disp: , Rfl:   •  omeprazole (priLOSEC) 20 MG capsule,  "TAKE 1 CAPSULE EVERY DAY, Disp: 90 capsule, Rfl: 3  •  polyethylene glycol (MIRALAX) powder, Take 17 g by mouth Daily., Disp: , Rfl:   •  rivaroxaban (Xarelto) 20 MG tablet, Take 1 tablet by mouth Daily., Disp: 30 tablet, Rfl: 11  •  saccharomyces boulardii (FLORASTOR) 250 MG capsule, Take 1 capsule by mouth Daily., Disp: 30 capsule, Rfl: 2  •  nitroglycerin (NITROSTAT) 0.4 MG SL tablet, 1 under the tongue as needed for angina, may repeat q5mins for up three doses, Disp: 25 tablet, Rfl: 11  •  sulfamethoxazole-trimethoprim (Bactrim) 400-80 MG tablet, Take 1 tablet by mouth 2 (Two) Times a Day., Disp: 14 tablet, Rfl: 0    OBJECTIVE:  /76 (BP Location: Left arm, Patient Position: Sitting, Cuff Size: Adult)   Pulse 83   Temp 98 °F (36.7 °C) (Temporal)   Ht 160 cm (63\")   Wt 68.6 kg (151 lb 3.2 oz)   LMP  (LMP Unknown)   SpO2 98%   Breastfeeding No   BMI 26.78 kg/m²    Physical Exam  Vitals reviewed.   Constitutional:       Appearance: She is well-developed.   Cardiovascular:      Rate and Rhythm: Normal rate.   Pulmonary:      Effort: Pulmonary effort is normal.      Breath sounds: Rales (left upper / middle  lobe) present.   Abdominal:      General: Abdomen is flat. There is no distension.      Palpations: Abdomen is soft.      Tenderness: There is no abdominal tenderness. There is no right CVA tenderness or left CVA tenderness.   Neurological:      Mental Status: She is alert and oriented to person, place, and time.   Psychiatric:         Behavior: Behavior normal.         Assessment/Plan    Diagnoses and all orders for this visit:    1. Gross hematuria (Primary)  -     Urine Culture - Urine, Urine, Clean Catch  -     sulfamethoxazole-trimethoprim (Bactrim) 400-80 MG tablet; Take 1 tablet by mouth 2 (Two) Times a Day.  Dispense: 14 tablet; Refill: 0    2. Nausea and vomiting, intractability of vomiting not specified, unspecified vomiting type  -     CBC & Differential  -     Comprehensive Metabolic " Panel    3. Cystitis  -     sulfamethoxazole-trimethoprim (Bactrim) 400-80 MG tablet; Take 1 tablet by mouth 2 (Two) Times a Day.  Dispense: 14 tablet; Refill: 0    Intended to order pyridium also, however, based on her most recent lab values for her age/weight/kidney function, pyridium is contraindicated.     She is encouraged to increase water intake and return in 1 week.  If no change in urine will ultrasound kidneys and reach out to Dr. Guerin regarding her Xarelto.    An After Visit Summary was printed and given to the patient at discharge.  Return in about 1 week (around 6/15/2021) for Recheck.       BELINDA Figueroa 6/8/21    Electronically signed.

## 2021-06-09 LAB
ALBUMIN SERPL-MCNC: 4 G/DL (ref 3.5–5.2)
ALBUMIN/GLOB SERPL: 1.3 G/DL
ALP SERPL-CCNC: 82 U/L (ref 39–117)
ALT SERPL-CCNC: 12 U/L (ref 1–33)
AST SERPL-CCNC: 17 U/L (ref 1–32)
BASOPHILS # BLD AUTO: 0.07 10*3/MM3 (ref 0–0.2)
BASOPHILS NFR BLD AUTO: 0.8 % (ref 0–1.5)
BILIRUB SERPL-MCNC: 0.5 MG/DL (ref 0–1.2)
BUN SERPL-MCNC: 26 MG/DL (ref 8–23)
BUN/CREAT SERPL: 25.7 (ref 7–25)
CALCIUM SERPL-MCNC: 9.5 MG/DL (ref 8.6–10.5)
CHLORIDE SERPL-SCNC: 102 MMOL/L (ref 98–107)
CO2 SERPL-SCNC: 28.3 MMOL/L (ref 22–29)
CREAT SERPL-MCNC: 1.01 MG/DL (ref 0.57–1)
EOSINOPHIL # BLD AUTO: 0.29 10*3/MM3 (ref 0–0.4)
EOSINOPHIL NFR BLD AUTO: 3.4 % (ref 0.3–6.2)
ERYTHROCYTE [DISTWIDTH] IN BLOOD BY AUTOMATED COUNT: 14.2 % (ref 12.3–15.4)
GLOBULIN SER CALC-MCNC: 3.1 GM/DL
GLUCOSE SERPL-MCNC: 89 MG/DL (ref 65–99)
HCT VFR BLD AUTO: 38.3 % (ref 34–46.6)
HGB BLD-MCNC: 12 G/DL (ref 12–15.9)
IMM GRANULOCYTES # BLD AUTO: 0.02 10*3/MM3 (ref 0–0.05)
IMM GRANULOCYTES NFR BLD AUTO: 0.2 % (ref 0–0.5)
LYMPHOCYTES # BLD AUTO: 1.06 10*3/MM3 (ref 0.7–3.1)
LYMPHOCYTES NFR BLD AUTO: 12.3 % (ref 19.6–45.3)
MCH RBC QN AUTO: 28.3 PG (ref 26.6–33)
MCHC RBC AUTO-ENTMCNC: 31.3 G/DL (ref 31.5–35.7)
MCV RBC AUTO: 90.3 FL (ref 79–97)
MONOCYTES # BLD AUTO: 0.63 10*3/MM3 (ref 0.1–0.9)
MONOCYTES NFR BLD AUTO: 7.3 % (ref 5–12)
NEUTROPHILS # BLD AUTO: 6.54 10*3/MM3 (ref 1.7–7)
NEUTROPHILS NFR BLD AUTO: 76 % (ref 42.7–76)
NRBC BLD AUTO-RTO: 0 /100 WBC (ref 0–0.2)
PLATELET # BLD AUTO: 239 10*3/MM3 (ref 140–450)
POTASSIUM SERPL-SCNC: 4.6 MMOL/L (ref 3.5–5.2)
PROT SERPL-MCNC: 7.1 G/DL (ref 6–8.5)
RBC # BLD AUTO: 4.24 10*6/MM3 (ref 3.77–5.28)
SODIUM SERPL-SCNC: 140 MMOL/L (ref 136–145)
WBC # BLD AUTO: 8.61 10*3/MM3 (ref 3.4–10.8)

## 2021-06-10 LAB
BACTERIA UR CULT: NORMAL
BACTERIA UR CULT: NORMAL

## 2021-06-15 ENCOUNTER — OFFICE VISIT (OUTPATIENT)
Dept: FAMILY MEDICINE CLINIC | Facility: CLINIC | Age: 81
End: 2021-06-15

## 2021-06-15 VITALS
HEIGHT: 63 IN | TEMPERATURE: 98.4 F | SYSTOLIC BLOOD PRESSURE: 104 MMHG | BODY MASS INDEX: 26.82 KG/M2 | WEIGHT: 151.4 LBS | HEART RATE: 104 BPM | OXYGEN SATURATION: 96 % | RESPIRATION RATE: 16 BRPM | DIASTOLIC BLOOD PRESSURE: 60 MMHG

## 2021-06-15 DIAGNOSIS — R31.29 MICROSCOPIC HEMATURIA: Primary | ICD-10-CM

## 2021-06-15 LAB
BILIRUB BLD-MCNC: NEGATIVE MG/DL
CLARITY, POC: CLEAR
COLOR UR: YELLOW
GLUCOSE UR STRIP-MCNC: NEGATIVE MG/DL
KETONES UR QL: NEGATIVE
LEUKOCYTE EST, POC: NEGATIVE
NITRITE UR-MCNC: NEGATIVE MG/ML
PH UR: 6 [PH] (ref 5–8)
PROT UR STRIP-MCNC: NEGATIVE MG/DL
RBC # UR STRIP: ABNORMAL /UL
SP GR UR: 1.01 (ref 1–1.03)
UROBILINOGEN UR QL: NORMAL

## 2021-06-15 PROCEDURE — 81003 URINALYSIS AUTO W/O SCOPE: CPT | Performed by: FAMILY MEDICINE

## 2021-06-15 PROCEDURE — 99213 OFFICE O/P EST LOW 20 MIN: CPT | Performed by: FAMILY MEDICINE

## 2021-06-15 NOTE — PROGRESS NOTES
Subjective   Ariana Gutierrez is a 81 y.o. female.     Follow-up on a painful urination-presently no discomfort and feels normal      The following portions of the patient's history were reviewed and updated as appropriate: allergies, current medications, past family history, past medical history, past social history, past surgical history and problem list.    Review of Systems   Cardiovascular: Negative for chest pain and leg swelling.        On blood thinners for arrhythmia   Genitourinary: Negative for difficulty urinating and flank pain.        Large amount of microscopic hematuria   Musculoskeletal: Positive for arthralgias and back pain.       Objective   Physical Exam  Vitals and nursing note reviewed.   Constitutional:       Appearance: Normal appearance.   Neurological:      General: No focal deficit present.      Mental Status: She is alert and oriented to person, place, and time.   Psychiatric:         Mood and Affect: Mood normal.         Behavior: Behavior normal.         Assessment/Plan   Diagnoses and all orders for this visit:    1. Microscopic hematuria (Primary)  -     POC Urinalysis Dipstick, Multipro  -     Cancel: Urine Culture - Urine, Urine, Clean Catch  -     Ambulatory Referral to Urology         Continues to have painless hematuria-referred to urology for cystoscopy and further evaluation

## 2021-06-17 NOTE — PROGRESS NOTES
Chief Complaint  restrictive lung disease and Shortness of Breath    Subjective    History of Present Illness      Ariana Gutierrez presents to [facility]   Ms. Gutierrez is a pleasant 81 year old female patient of Dr. Fawad Bhat with known Scoliosis, CAD, HTN, ILD on CXR, PAF on Xarelto, Hx of DVT, positive RF,Chronic cough, restrictive lung disease pulmonary fibrosis with traction bronchiectasis. She denies any bleeding problems but does state she was recently treated with antibiotic for UTI and noted to have blood in her urine. PCP is sending her to Urologist. She continues to use prilosec. She has not had to use any inhalers or duonebs. Her last HRCT of the chest in August showed pulmonary fibrosis with some areas of honeycombing. She could possibly have rheumatoid lung and is following Rheumatology without signs of the joints she is current being followed without treatment.  Today she states she has RA and follows with Dr. Roman. She was offered by Dr. Bhat and myself referral to an academic center as well as discussed OFEV, Esbriet or novel therapy under investigation as it was felt her risk for potential progression and threat to pulmonary system function was high. She reported at last visit that she has done her own research and was aware of her options. She declined any treatment at that time. At last review her PFTs from March 2021 showed stability. She was asked to return today to have a repeat CT. This has shown some mild progression of honeycombing and traction bronchiectasis. Some trace, non-specific pericardial effusion, cardiomegaly and possible pulmonary hypertension. Her  is present today. Imaging from Aug & current reviewed with both. Questions answered. She states her cough has progressed some and is non productive. She is given hand out on bronchiectasis and discussed. She has brought in with her a hand out as well that she printed.  "She notes her daughter is a  and is well versed in medical cases and would have questions when she returned home. She states Dr. Guerin is referring her to EP, Dr. Edwards for her Afib for possible ablation.          Objective   Vital Signs:   /70   Pulse 74   Ht 160 cm (63\")   Wt 67.7 kg (149 lb 3.2 oz)   SpO2 95% Comment: ra  BMI 26.43 kg/m²     Physical Exam  Vitals reviewed.   Constitutional:       Appearance: Normal appearance.   Cardiovascular:      Rate and Rhythm: Normal rate and regular rhythm.      Heart sounds: No murmur heard.   No friction rub. No gallop.    Pulmonary:      Effort: Pulmonary effort is normal.      Breath sounds: Normal breath sounds.   Neurological:      General: No focal deficit present.      Mental Status: She is alert and oriented to person, place, and time.   Psychiatric:         Mood and Affect: Mood normal.         Behavior: Behavior normal.          Result Review :   The following data was reviewed by: BELINDA Bentley on 06/28/2021:  Common labs    Common Labsle 9/22/20 9/22/20 9/22/20 6/1/21 6/1/21 6/1/21 6/8/21 6/8/21    1227 1227 1227 1017 1017 1017 1245 1245   Glucose  96   81   89   BUN  18   20   26 (A)   Creatinine  0.90   0.99   1.01 (A)   eGFR Non African Am  61   54 (A)   53 (A)   eGFR  Am  73   66   64   Sodium  138   140   140   Potassium  4.6   5.3 (A)   4.6   Chloride  98   102   102   Calcium  9.6   9.8   9.5   Total Protein  7.1   7.2   7.1   Albumin  4.20   4.10   4.00   Total Bilirubin  0.5   0.4   0.5   Alkaline Phosphatase  93   84   82   AST (SGOT)  26   19   17   ALT (SGPT)  18   15   12   WBC 7.81   8.62   8.61    Hemoglobin 13.1   12.1   12.0    Hematocrit 39.6   38.1   38.3    Platelets 253   276   239    Total Cholesterol   129   103     Triglycerides   71   54     HDL Cholesterol   49   51     LDL Cholesterol    66   39     (A) Abnormal value       Comments are available for some flowsheets but are not being displayed.    "        Data reviewed: Radiologic studies CT 6/24/21 [facility]   My interpretation of imaging:  Slightly worsening honeycombing & traction bronchiectasis, trace pericardial effusion, cardiomegaly   My interpretation of labs: none     CT Chest Hi Resolution Diagnostic (06/24/2021 11:02)    My interpretation of the PFT: no new    Results for orders placed during the hospital encounter of 03/11/21    Pulmonary Function Test    [facility] - Pulmonary Function Test    [address]  [address]  [phone]    Patient : Ariana Gutierrez  MRN : [id number]  CSN : [id number]  Pulmonologist : Grady White MD  Date : 3/11/2021    ______________________________________________________________________    Interpretation :  1.  Spirometry is most consistent with a moderate restrictive ventilatory defect although the decrease in the patient's forced vital capacity is just into the moderate range at 68% of predicted.  2.  Lung volumes confirm a moderate restrictive ventilatory defect of mild severity.  3.  There is a mild bordering on moderate diffusion impairment which when corrected for alveolar volume is normalized.      Grady White MD      Results for orders placed in visit on 11/02/20    Pulmonary Function Test    [facility]  [facility] PULMONARY FUNCTION TEST REPORT    Flow-volume curve:    Flow-volume curve is restrictive in configuration.    Spirometry:  FVC was 1.63 L or 64% of predicted with +7% change after bronchodilator challenge.  FEV1 was 1.51 L or 80% of predicted with +8% change after bronchodilator challenge.  FVC:FEV1 ratio was 93  FEF:25-75% was 213% of predicted with -27% change after bronchodilator challenge.    Spirometry values shows restrictive pattern.    Lung volumes:  TLC was 3.53 L or 73% of predicted  RV was 1.72 L or 85% of predicted    Lung volumes are below normal limit.    Diffusing capacity:  Uncorrected for single breath was 52% of  predicted    Diffusing capacity adjusted for alveolar volume 79% of predicted.    Overall impression:    Above test results were acceptable and reproducible by ATS criteria.  From analysis of the above procedures the patient showed evidence of moderate restrictive airway dysfunction  There is no response to bronchodilator treatment.  Lung volumes are decreased supporting restrictive dysfunction  Diffusion capacity is moderately decreased when corrected for alveolar volume.    There is no prior test results available for comparison.clinical correlation is indicated.    Josie Carcamo MD,  [facility]  11/28/2020  13:04 CST      Results for orders placed in visit on 01/15/20    Pulmonary Function Test    Narrative  [facility] - Pulmonary Function Test    [address]  [place]  [address]  [phone]    Patient : Ariana Gutierrez  MRN : [id number]  YOB: 1940  :  Fawad Bhat MD  Date : 8/21/2020    ______________________________________________________________________    Interpretation :  1. Spirometry shows moderate restrictive physiology.  2. Mid flow is normal.  3. Lung volume measurements show increased expiratory reserve volume, but are otherwise all decreased suggesting restriction.  4. Diffusion capacity is reduced, but when corrected for alveolar volume it is borderline lower limits of normal.  5. Airway resistance is normal and conductance is decreased.      Fawad Bhat MD      Patient's BMI 26.43. BMI is within normal parameters. No follow-up required..    Assessment and Plan    Diagnoses and all orders for this visit:    1. Usual interstitial pneumonitis (CMS/HCC) (Primary)    2. Restrictive lung disease    3. Chronic cough    4. PAF (paroxysmal atrial fibrillation) (CMS/HCC)    5. Rheumatoid arthritis involving multiple sites with positive rheumatoid factor (CMS/HCC)    6. Bronchiectasis without acute  exacerbation (CMS/HCC)      Patient and spouse both present in exam room. Imaging reviewed with both. Hand out provided and discussed in regards to bronchiectasis. Again discussed her difficult case regarding UIP, RA, Afib. She also reports she is needing dental work done as well. Again offered referral to academic facility. She is still reluctant to begin Ofev or Esbriet as she has done her own extensive research and understands the risks. We discussed how it does not hurt to at least have a consultation with academic provider and weigh her options. She is currently not receiving treatment for her RA. Concern remains high for disease progression and has progressed some in the last 10 months. Will plan for 3 month follow up with repeat CT of the chest and PFTs to be done in office ( 6 months from last PFTs which were stable in March). They will discuss options at home with each other and family and let me know if they would like referral to academic facility/ provider.     Health maintenance:   Influenza vaccine: 09/22/20  Prevnar 13: 07/2016  Covid-19 02/02/21, 03/03/21    Follow Up   No follow-ups on file.  Patient was given instructions and counseling regarding her condition or for health maintenance advice. Please see specific information pulled into the AVS if appropriate.     Corrine Koch, APRN  6/28/2021  13:11 CDT

## 2021-06-24 ENCOUNTER — HOSPITAL ENCOUNTER (OUTPATIENT)
Dept: CT IMAGING | Facility: HOSPITAL | Age: 81
Discharge: HOME | End: 2021-06-24
Admitting: NURSE PRACTITIONER

## 2021-06-24 DIAGNOSIS — J84.112 USUAL INTERSTITIAL PNEUMONITIS (HCC): ICD-10-CM

## 2021-06-24 DIAGNOSIS — J98.4 RESTRICTIVE LUNG DISEASE: ICD-10-CM

## 2021-06-24 PROCEDURE — 71250 CT THORAX DX C-: CPT

## 2021-06-28 ENCOUNTER — OFFICE VISIT (OUTPATIENT)
Dept: PULMONOLOGY | Facility: CLINIC | Age: 81
End: 2021-06-28

## 2021-06-28 VITALS
SYSTOLIC BLOOD PRESSURE: 128 MMHG | HEART RATE: 74 BPM | HEIGHT: 63 IN | BODY MASS INDEX: 26.44 KG/M2 | WEIGHT: 149.2 LBS | DIASTOLIC BLOOD PRESSURE: 70 MMHG | OXYGEN SATURATION: 95 %

## 2021-06-28 DIAGNOSIS — R05.3 CHRONIC COUGH: Chronic | ICD-10-CM

## 2021-06-28 DIAGNOSIS — M05.79 RHEUMATOID ARTHRITIS INVOLVING MULTIPLE SITES WITH POSITIVE RHEUMATOID FACTOR (HCC): ICD-10-CM

## 2021-06-28 DIAGNOSIS — J84.112 USUAL INTERSTITIAL PNEUMONITIS (HCC): Primary | Chronic | ICD-10-CM

## 2021-06-28 DIAGNOSIS — I48.0 PAF (PAROXYSMAL ATRIAL FIBRILLATION) (HCC): ICD-10-CM

## 2021-06-28 DIAGNOSIS — J47.9 BRONCHIECTASIS WITHOUT ACUTE EXACERBATION (HCC): ICD-10-CM

## 2021-06-28 DIAGNOSIS — J98.4 RESTRICTIVE LUNG DISEASE: Chronic | ICD-10-CM

## 2021-06-28 PROCEDURE — 99214 OFFICE O/P EST MOD 30 MIN: CPT | Performed by: NURSE PRACTITIONER

## 2021-07-01 ENCOUNTER — OFFICE VISIT (OUTPATIENT)
Dept: UROLOGY | Facility: CLINIC | Age: 81
End: 2021-07-01

## 2021-07-01 VITALS — HEIGHT: 63 IN | TEMPERATURE: 96.7 F | BODY MASS INDEX: 26.4 KG/M2 | WEIGHT: 149 LBS

## 2021-07-01 DIAGNOSIS — R31.0 GROSS HEMATURIA: Primary | ICD-10-CM

## 2021-07-01 LAB
BILIRUB BLD-MCNC: NEGATIVE MG/DL
CLARITY, POC: CLEAR
COLOR UR: YELLOW
GLUCOSE UR STRIP-MCNC: NEGATIVE MG/DL
KETONES UR QL: NEGATIVE
LEUKOCYTE EST, POC: ABNORMAL
NITRITE UR-MCNC: NEGATIVE MG/ML
PH UR: 5.5 [PH] (ref 5–8)
PROT UR STRIP-MCNC: NEGATIVE MG/DL
RBC # UR STRIP: NEGATIVE /UL
SP GR UR: 1.01 (ref 1–1.03)
UROBILINOGEN UR QL: NORMAL

## 2021-07-01 PROCEDURE — 99204 OFFICE O/P NEW MOD 45 MIN: CPT | Performed by: UROLOGY

## 2021-07-01 PROCEDURE — 81003 URINALYSIS AUTO W/O SCOPE: CPT | Performed by: UROLOGY

## 2021-07-01 PROCEDURE — 88112 CYTOPATH CELL ENHANCE TECH: CPT | Performed by: UROLOGY

## 2021-07-06 LAB
CYTO UR: NORMAL
LAB AP CASE REPORT: NORMAL
PATH REPORT.FINAL DX SPEC: NORMAL
PATH REPORT.GROSS SPEC: NORMAL

## 2021-07-22 ENCOUNTER — HOSPITAL ENCOUNTER (OUTPATIENT)
Dept: CT IMAGING | Facility: HOSPITAL | Age: 81
Discharge: HOME | End: 2021-07-22
Admitting: UROLOGY

## 2021-07-22 ENCOUNTER — PROCEDURE VISIT (OUTPATIENT)
Dept: UROLOGY | Facility: CLINIC | Age: 81
End: 2021-07-22

## 2021-07-22 DIAGNOSIS — R31.0 GROSS HEMATURIA: Primary | ICD-10-CM

## 2021-07-22 DIAGNOSIS — R31.0 GROSS HEMATURIA: ICD-10-CM

## 2021-07-22 LAB
BILIRUB BLD-MCNC: NEGATIVE MG/DL
CLARITY, POC: CLEAR
COLOR UR: YELLOW
CREAT BLDA-MCNC: 1 MG/DL (ref 0.6–1.3)
GLUCOSE UR STRIP-MCNC: NEGATIVE MG/DL
KETONES UR QL: NEGATIVE
LEUKOCYTE EST, POC: NEGATIVE
NITRITE UR-MCNC: NEGATIVE MG/ML
PH UR: 5.5 [PH] (ref 5–8)
PROT UR STRIP-MCNC: NEGATIVE MG/DL
RBC # UR STRIP: ABNORMAL /UL
SP GR UR: 1 (ref 1–1.03)
UROBILINOGEN UR QL: NORMAL

## 2021-07-22 PROCEDURE — 81001 URINALYSIS AUTO W/SCOPE: CPT | Performed by: UROLOGY

## 2021-07-22 PROCEDURE — 25010000002 IOPAMIDOL 61 % SOLUTION: Performed by: UROLOGY

## 2021-07-22 PROCEDURE — 74178 CT ABD&PLV WO CNTR FLWD CNTR: CPT

## 2021-07-22 PROCEDURE — 52000 CYSTOURETHROSCOPY: CPT | Performed by: UROLOGY

## 2021-07-22 PROCEDURE — 82565 ASSAY OF CREATININE: CPT

## 2021-07-22 RX ADMIN — IOPAMIDOL 100 ML: 612 INJECTION, SOLUTION INTRAVENOUS at 12:34

## 2021-07-22 NOTE — PROGRESS NOTES
CC: I am here for the doctor to look at my bladder    CT Abdomen Pelvis With & Without Contrast (07/22/2021 12:34)  Non-gynecologic Cytology (07/01/2021 14:57)        Cystoscopy procedure note  Pre- operative diagnosis:  Hematuria    Post operative diagnosis:  Same    Procedure:  The patient was prepped and draped in a normal sterile fashion.  The urethra was anesthetized with 2% lidocaine jelly.  A flexible cystoscope was introduced per urethra.   The urethra is normal in appearance without obstruction or mass.  The bladder is without evidence of mucosal lesion.   There is no abnormality of the urothelium.  There is minimal trabeculation of the detrusor muscle.  The ureteral orifices are orthotopic in position and they efflux clear urine.    Patient tolerated the procedure well    Complications: none    Blood loss: minimal    Diagnoses and all orders for this visit:    1. Gross hematuria (Primary)  -     POC Urinalysis Dipstick, Multipro      This appears to be benign epithelial hematuria        Follow up:    Routine follow up    Giancarlo Fragoso MD  7/22/2021  14:35 CDT  '

## 2021-07-29 DIAGNOSIS — R52 PAIN: ICD-10-CM

## 2021-07-29 RX ORDER — HYDROCODONE BITARTRATE AND ACETAMINOPHEN 10; 325 MG/1; MG/1
1 TABLET ORAL EVERY 6 HOURS PRN
Qty: 90 TABLET | Refills: 0 | Status: SHIPPED | OUTPATIENT
Start: 2021-07-29 | End: 2021-09-27 | Stop reason: SDUPTHER

## 2021-08-24 ENCOUNTER — TELEPHONE (OUTPATIENT)
Dept: FAMILY MEDICINE CLINIC | Facility: CLINIC | Age: 81
End: 2021-08-24

## 2021-08-24 DIAGNOSIS — R52 PAIN: ICD-10-CM

## 2021-08-24 DIAGNOSIS — M20.40 HAMMER TOE, UNSPECIFIED LATERALITY: Primary | ICD-10-CM

## 2021-08-24 NOTE — TELEPHONE ENCOUNTER
Rx Refill Note  Requested Prescriptions     Pending Prescriptions Disp Refills   • gabapentin (NEURONTIN) 800 MG tablet 270 tablet 0     Sig: Take 1 tablet by mouth 3 (Three) Times a Day.      Last office visit with prescribing clinician: 6/15/2021      Next office visit with prescribing clinician: 9/27/2021            Arianna Vazquez Rep  08/24/21, 11:59 CDT       Drug Therapy Appt 06/01/2021  Contract & UDS/NASREEN UDS 06/01/2021

## 2021-08-25 RX ORDER — GABAPENTIN 800 MG/1
800 TABLET ORAL 3 TIMES DAILY
Qty: 270 TABLET | Refills: 0 | Status: SHIPPED | OUTPATIENT
Start: 2021-08-25 | End: 2021-12-03 | Stop reason: SDUPTHER

## 2021-08-31 ENCOUNTER — TELEPHONE (OUTPATIENT)
Dept: GASTROENTEROLOGY | Facility: CLINIC | Age: 81
End: 2021-08-31

## 2021-09-01 NOTE — TELEPHONE ENCOUNTER
Colonoscopy report [facility] 3/6/2018 reviewed and normal.  Family history is neg for CRC/CP.  Colon recall would be 3/2028 at which point she would be 88, so no recall needed due to advanced age.    Tanesha Bennett MD

## 2021-09-10 ENCOUNTER — OFFICE VISIT (OUTPATIENT)
Dept: CARDIOLOGY | Facility: CLINIC | Age: 81
End: 2021-09-10

## 2021-09-10 VITALS
HEIGHT: 63 IN | DIASTOLIC BLOOD PRESSURE: 81 MMHG | SYSTOLIC BLOOD PRESSURE: 131 MMHG | WEIGHT: 150 LBS | HEART RATE: 70 BPM | BODY MASS INDEX: 26.58 KG/M2

## 2021-09-10 DIAGNOSIS — I48.0 PAF (PAROXYSMAL ATRIAL FIBRILLATION) (HCC): Primary | ICD-10-CM

## 2021-09-10 DIAGNOSIS — I25.10 CORONARY ARTERY DISEASE INVOLVING NATIVE CORONARY ARTERY OF NATIVE HEART WITHOUT ANGINA PECTORIS: ICD-10-CM

## 2021-09-10 DIAGNOSIS — I11.0 HYPERTENSIVE HEART DISEASE WITH HEART FAILURE (HCC): ICD-10-CM

## 2021-09-10 DIAGNOSIS — I10 ESSENTIAL HYPERTENSION: ICD-10-CM

## 2021-09-10 DIAGNOSIS — K21.9 GASTROESOPHAGEAL REFLUX DISEASE WITHOUT ESOPHAGITIS: ICD-10-CM

## 2021-09-10 DIAGNOSIS — R06.09 DOE (DYSPNEA ON EXERTION): ICD-10-CM

## 2021-09-10 PROCEDURE — 99214 OFFICE O/P EST MOD 30 MIN: CPT | Performed by: INTERNAL MEDICINE

## 2021-09-10 PROCEDURE — 93000 ELECTROCARDIOGRAM COMPLETE: CPT | Performed by: INTERNAL MEDICINE

## 2021-09-10 NOTE — PROGRESS NOTES
Ariana Gutierrez  [id number]  1940  81 y.o.  female     Referring Provider: Bill Schilling MD    Reason for Follow-up Visit:     short term follow up after recent encounter   Routine follow up.  Chronic low back pain    Had MADELIN in 1/18 no clots or vegetation  Zio patch showed SVT 72 episodes longest 53 maximum rate 181 BPM     Low risk stress test for stress induced myocardial ischemia  Cardiac workup test results as below   Cath small non dominant RCA severe stenosis    Subjective    Overall much better and less palpitations   Due to see EP July 1st Dr Edwards     Mild chronic exertional shortness of breath on exertion relieved with rest  No significant cough or wheezing    No palpitations  No associated chest pain  No significant pedal edema    No fever or chills  No significant expectoration    No hemoptysis  No presyncope or syncope    Tolerating current medications well with no untoward side effects   Compliant with prescribed medication regimen. Tries to adhere to cardiac diet.   Honey combing of lung on HRCT    No bleeding, excessive bruising, gait instability or fall risks      Had high colored urine and will get UA Dr Schilling soon       History of present illness:  Ariana Gutierrez is a 81 y.o. yo female with history of hypertension who presents today for   Chief Complaint   Patient presents with   • Follow-up   .    History  Past Medical History:   Diagnosis Date   • Bacterial infection    • CAD (coronary artery disease)    • Chest pain    • Chronic back pain    • Deep vein thrombosis (CMS/HCC)     S/P KNEE SURGERY 10/2017   • Diverticulosis of intestine 8/16/2016   • Gastroesophageal reflux disease without esophagitis 5/26/2017   • Hypertension    • Irritable bowel syndrome 11/2/2011   • Joint infection (CMS/HCC) 7/24/2018   • Low back pain    • Palpitations    • Paresthesia     toes   • Perforated bowel (CMS/HCC)    • Rheumatoid arteritis (CMS/HCC)    • Scoliosis    • Spinal stenosis    •  Vascular disease, peripheral (CMS/HCC)    ,   Past Surgical History:   Procedure Laterality Date   • BACK SURGERY     • BREAST BIOPSY Right 25 yrs ago   • CARDIAC CATHETERIZATION Left 11/6/2019    Procedure: Cardiac Catheterization/Vascular Study CARMEN OK;  Surgeon: Hawk Guerin MD;  Location:  [facility];  Service: Cardiology   • CATARACT EXTRACTION Bilateral    • COLON RESECTION SMALL BOWEL  2016    with colostomy for 6 months, already revised.   • COLON SURGERY     • COLONOSCOPY     • LUMBAR LAMINECTOMY     • RENAL ARTERY STENT Right    • TOTAL KNEE ARTHROPLASTY Right    • TOTAL KNEE ARTHROPLASTY     • VASCULAR SURGERY      10/2017 - DR GARCIA   ,   Family History   Problem Relation Age of Onset   • Breast cancer Mother 80   • Heart disease Mother    • Coronary artery disease Mother    • Peripheral vascular disease Mother    • No Known Problems Sister    • Heart disease Brother    • No Known Problems Maternal Grandmother    • No Known Problems Maternal Grandfather    • No Known Problems Paternal Grandmother    • No Known Problems Paternal Grandfather    ,   Social History     Tobacco Use   • Smoking status: Never Smoker   • Smokeless tobacco: Never Used   Vaping Use   • Vaping Use: Never used   Substance Use Topics   • Alcohol use: No   • Drug use: No   ,     Medications  Current Outpatient Medications   Medication Sig Dispense Refill   • aspirin 81 MG chewable tablet Chew 81 mg Daily.     • atorvastatin (LIPITOR) 20 MG tablet Take 1 tablet by mouth Daily. 90 tablet 3   • Beclomethasone Diprop HFA (QVAR REDIHALER) 80 MCG/ACT inhaler Inhale 1 puff 2 (Two) Times a Day. 1 inhaler 0   • benazepril (LOTENSIN) 20 MG tablet TAKE 1 TABLET EVERY DAY 90 tablet 3   • Docusate Calcium (STOOL SOFTENER PO) Take 1 tablet by mouth Daily.     • dronedarone (MULTAQ) 400 MG tablet Take 1 tablet by mouth 2 (Two) Times a Day With Meals. 60 tablet 11   • gabapentin (NEURONTIN) 800 MG tablet Take 1 tablet by mouth 3  (Three) Times a Day. 270 tablet 0   • HYDROcodone-acetaminophen (NORCO)  MG per tablet Take 1 tablet by mouth Every 6 (Six) Hours As Needed for Moderate Pain . 90 tablet 0   • ipratropium-albuterol (DUO-NEB) 0.5-2.5 mg/3 ml nebulizer Take 3 mL by nebulization Every 6 (Six) Hours While Awake. Diagnosis of pneumonitis J69.8 360 mL 2   • isosorbide mononitrate (IMDUR) 30 MG 24 hr tablet Take 1 tablet by mouth once daily 90 tablet 4   • metoprolol succinate XL (TOPROL-XL) 50 MG 24 hr tablet TAKE 1 TABLET EVERY DAY (Patient taking differently: Take 25 mg by mouth Every Night.) 90 tablet 3   • Multiple Vitamins-Minerals (PRESERVISION AREDS 2 PO) Take  by mouth.     • nitroglycerin (NITROSTAT) 0.4 MG SL tablet 1 under the tongue as needed for angina, may repeat q5mins for up three doses 25 tablet 11   • omeprazole (priLOSEC) 20 MG capsule TAKE 1 CAPSULE EVERY DAY 90 capsule 3   • polyethylene glycol (MIRALAX) powder Take 17 g by mouth Daily.     • rivaroxaban (Xarelto) 20 MG tablet Take 1 tablet by mouth Daily. 30 tablet 11   • saccharomyces boulardii (FLORASTOR) 250 MG capsule Take 1 capsule by mouth Daily. 30 capsule 2     No current facility-administered medications for this visit.       Allergies:  Patient has no known allergies.    Review of Systems  Review of Systems   Constitutional: Positive for malaise/fatigue. Negative for fever.   HENT: Negative.    Eyes: Negative.    Cardiovascular: Positive for dyspnea on exertion. Negative for chest pain, claudication, cyanosis, irregular heartbeat, leg swelling, near-syncope, orthopnea, palpitations, paroxysmal nocturnal dyspnea and syncope.   Respiratory: Positive for cough and shortness of breath.    Endocrine: Negative.    Hematologic/Lymphatic: Negative.    Skin: Negative.    Musculoskeletal: Positive for arthritis and joint pain.   Gastrointestinal: Positive for flatus. Negative for anorexia.   Genitourinary: Negative.    Neurological: Positive for weakness.  "  Psychiatric/Behavioral: Negative.        Objective     Physical Exam:  /81   Pulse 70   Ht 160 cm (63\")   Wt 68 kg (150 lb)    BMI 26.57 kg/m²     Physical Exam  Constitutional:       Appearance: Normal appearance. She is well-developed.   HENT:      Head: Normocephalic.   Eyes:      General: Lids are normal.   Neck:      Vascular: Normal carotid pulses. No carotid bruit or JVD.      Trachea: No tracheal tenderness or tracheal deviation.   Cardiovascular:      Rate and Rhythm: Regular rhythm.      Pulses: Normal pulses.      Heart sounds: Normal heart sounds, S1 normal and S2 normal.    No systolic murmur is present.     Pulmonary:      Effort: Pulmonary effort is normal.      Breath sounds: No stridor.   Abdominal:      General: There is no distension.      Palpations: Abdomen is soft.      Tenderness: There is no abdominal tenderness.   Musculoskeletal:      Cervical back: No edema.   Skin:     General: Skin is warm.   Neurological:      Mental Status: She is alert.      Cranial Nerves: No cranial nerve deficit.      Sensory: No sensory deficit.   Psychiatric:         Speech: Speech normal.         Behavior: Behavior normal.         Thought Content: Thought content normal.             Results Review:    Ariana Gutierrez  HOLTER MONITOR >48HOUR UP TO 7DAYS  Order# [id number]  Reading physician: Hawk Guerin MD Ordering physician: Hawk Guerin MD Study date: 21   Patient Information    Patient Name   Ariana Gutierrez MRN   [id number] Legal Sex   Female  (Age)   1940 (81 y.o.)   Interpretation Summary       · Average HR: 77. Min HR: 44. Max HR: 218.     · Entire report was reviewed.  Monitoring in days: ~14   · The predominant rhythm noted during the testing period was sinus rhythm.     · Rare supraventricular ectopics with an APC burden of: < 1%    · Rare premature ventricular contractions with a PVC burden of: < 1%     · No correlated arrhythmia  · No significant pauses                "   Conclusion:      Baseline rhythm is sinus.  No significant ectopy   Single 5 beat run of nonsustained ventricular tachycardia at a maximum rate of 162 bpm and an average of 131 bpm  171 runs of supraventricular tachycardia longest 2 minutes and 42 seconds at a maximum rate of 218 bpm and an average of 138 bpm  Paroxysmal atrial fibrillation with a burden of less than 1% with rates between  bpm and an average of 138 bpm              IMPRESSION:  1. Redemonstration of honeycombing, characteristic of usual interstitial  pneumonia (UIP). No new suspicious pulmonary finding.  2. Scattered coronary artery calcifications.  3. Stable hypodense liver lesion compared to 2017. Stability favors  benignity.  This report was finalized on 08/20/2020 12:31 by Dr Ary Irving MD.      Results for orders placed during the hospital encounter of 12/28/20    Adult Transthoracic Echo Complete W/ Cont if Necessary Per Protocol    Interpretation Summary  · Left ventricular ejection fraction appears to be 61 - 65%. Left ventricular systolic function is normal.  · Normal global longitudinal LV strain (GLS) = -16%.  · Left ventricular diastolic function was normal.  · No evidence of pulmonary hypertension is present.       Conclusion     No significant stenosis noted of left dominant epicardial coronary arterial tree.  95% ostial stenosis of small nondominant right coronary artery which is approximately 1.5 mm to 2 mm in diameter     Maximize medical therapy  If continues to have exertional jaw pain that is reflective of angina would consider intervention of small nondominant right coronary ostial stenosis.  Patient extremely restless and tries to either sit up or move both her legs during the procedure and therefore will require anesthesia to sedate her if this was considered in future     Hydration   Observation  Risk factor modifications  Workup for non cardiac causes of chest pain         Results for orders placed during the  hospital encounter of 19   Adult Stress Echo W/ Cont or Stress Agent if Necessary Per Protocol    Narrative · Estimated EF = 55%.  · Left ventricular systolic function is normal.  · Low risk stress test for stress induced myocardial ischemia         Ariana Gutierrez   Holter Monitor 72Hr-21Day (0296T/0298T)   Order# [id number]   Reading physician: Hawk Guerin MD Ordering physician: Hawk Guerin MD Study date: 19   Patient Information     Patient Name  Ariana Gutierrez MRN  [id number] Sex  Female  (Age)  1940 (79 y.o.)   Interpretation Summary        · Average HR: 73. Min HR: 46. Max HR: 136.     · ~14 day monitor ,entire report was reviewed  · The predominant rhythm noted during the testing period was sinus rhythm.  · Rare [3306] supraventricular ectopics with an APC burden of: < 1%  · Rare [132] premature ventricular contractions with a PVC burden of: < 1%  · 72 runs of supraventricular tachycardia longest 53 beats at a maximum rate of 181 bpm  · Single 3 beat run of nonsustained ventricular tachycardia at a rate of 200 bpm  · No correlated arrhythmia  · No significant pauses           Conclusion: Baseline rhythm is sinus.  No significant ectopy  72 runs of supraventricular tachycardia longest 53 beats at a maximum rate of 181 bpm  Single 3 beat run of nonsustained ventricular tachycardia at a rate of 200 bpm                 ECG 12 Lead    Date/Time: 9/10/2021 10:17 AM  Performed by: Hawk Guerin MD  Authorized by: Hawk Guerin MD   Comparison: compared with previous ECG from 2021  Similar to previous ECG  Rhythm: sinus rhythm  Rate: normal  Conduction: conduction normal  ST Segments: ST segments normal  T Waves: T waves normal  QRS axis: normal  Other findings: early repolarization    Clinical impression: abnormal EKG          ____________________________________________________________________________________________________________________________________________  Health maintenance  and recommendations  Similar recommendations as last visit     Offered to give patient  a copy      Questions were encouraged, asked and answered to the patient's  understanding and satisfaction. Questions if any regarding current medications and side effects, need for refills and importance of compliance to medications stressed.    Reviewed available prior notes, consults, prior visits, laboratory findings, radiology and cardiology relevant reports. Updated chart as applicable. I have reviewed the patient's medical history in detail and updated the computerized patient record as relevant.      Updated patient regarding any new or relevant abnormalities on review of records or any new findings on physical exam. Mentioned to patient about purpose of visit and desirable health short and long term goals and objectives.    Primary to monitor CBC CMP Lipid panel and TSH as applicable    ___________________________________________________________________________________________________________________________________________        Assessment/Plan   Patient Active Problem List   Diagnosis   • Essential hypertension   • Gastroesophageal reflux disease without esophagitis   • Shortness of breath   • CAD (coronary artery disease)   • Scoliosis   • Low back pain   • Paresthesia   • Palpitations   • Chronic back pain   • Pneumonitis   • Chronic pain   • Restrictive lung disease   • Usual interstitial pneumonitis (CMS/[facility])   • BERGERON (dyspnea on exertion)   • PAF (paroxysmal atrial fibrillation) (CMS/[facility])   • Rheumatoid arthritis involving multiple sites with positive rheumatoid factor (CMS/[facility])   • Hypertensive heart disease with heart failure (CMS/[facility])   • Chronic cough        Plan    Orders Placed This Encounter   Procedures   • ECG 12 Lead     This order was created via procedure documentation     Order Specific Question:   Release to patient     Answer:   Immediate   • Adult Transthoracic Echo Complete w/ Color, Spectral and  Contrast if necessary per protocol     Myocardial strain to be performed during echocardiogram as long as technically feasible     Standing Status:   Future     Standing Expiration Date:   9/10/2022     Order Specific Question:   Reason for exam?     Answer:   Dyspnea     Order Specific Question:   Release to patient     Answer:   Immediate      Patient expressed understanding  Encouraged and answered all questions   Discussed with the patient and all questioned fully answered. She will call me if any problems arise.   Discussed results of prior testing with patient : echo   as well electrocardiogram from today       The current medical regimen is effective;  continue present plan and medications.   Overall much better on Multaq    Saw Dr Edwards and advised to continue same medications   Was told not a good candidate for ablation   Due to be seen in 6 months     Continue Kardia and bring me the tracing again next visit   Current recordings shows  sinus rhythm     Check BP and heart rates twice daily at home and bring a recording for me to review next visit  If BP >130/85 or < 100/60 persistently over 3 reading 30 mins apart call sooner      Monitor for any signs of bleeding including red or dark stools as well as easy bruisabilty. Fall precautions.   I support the patient's decision to take the Covid -19 vaccine   Had both dose   No major issues   Now fully immunized    Recommend booster             Return in about 6 months (around 3/10/2022).

## 2021-09-14 RX ORDER — METOPROLOL SUCCINATE 50 MG/1
TABLET, EXTENDED RELEASE ORAL
Qty: 90 TABLET | Refills: 3 | Status: SHIPPED | OUTPATIENT
Start: 2021-09-14

## 2021-09-20 RX ORDER — BENAZEPRIL HYDROCHLORIDE 20 MG/1
TABLET ORAL
Qty: 90 TABLET | Refills: 3 | Status: SHIPPED | OUTPATIENT
Start: 2021-09-20

## 2021-09-20 NOTE — TELEPHONE ENCOUNTER
Rx Refill Note  Requested Prescriptions     Pending Prescriptions Disp Refills   • benazepril (LOTENSIN) 20 MG tablet [Pharmacy Med Name: BENAZEPRIL HYDROCHLORIDE 20 MG Tablet] 90 tablet 3     Sig: TAKE 1 TABLET EVERY DAY      Last office visit with prescribing clinician: 6/15/2021      Next office visit with prescribing clinician: 9/27/2021   CPE: 09/2020    Please add Last Relevant Lab Date if appropriate: 06/08/2021    Is Refill Pharmacy correct?:Yes    Christie Alcantar MA  09/20/21, 13:52 CDT

## 2021-09-21 ENCOUNTER — TRANSCRIBE ORDERS (OUTPATIENT)
Dept: LAB | Facility: HOSPITAL | Age: 81
End: 2021-09-21

## 2021-09-27 ENCOUNTER — OFFICE VISIT (OUTPATIENT)
Dept: FAMILY MEDICINE CLINIC | Facility: CLINIC | Age: 81
End: 2021-09-27

## 2021-09-27 VITALS
RESPIRATION RATE: 18 BRPM | SYSTOLIC BLOOD PRESSURE: 122 MMHG | HEIGHT: 63 IN | TEMPERATURE: 97.8 F | OXYGEN SATURATION: 94 % | DIASTOLIC BLOOD PRESSURE: 74 MMHG | BODY MASS INDEX: 26.4 KG/M2 | HEART RATE: 70 BPM | WEIGHT: 149 LBS

## 2021-09-27 DIAGNOSIS — R41.3 MEMORY LOSS: ICD-10-CM

## 2021-09-27 DIAGNOSIS — Z00.00 MEDICARE ANNUAL WELLNESS VISIT, SUBSEQUENT: ICD-10-CM

## 2021-09-27 DIAGNOSIS — R52 PAIN: ICD-10-CM

## 2021-09-27 DIAGNOSIS — E55.9 VITAMIN D DEFICIENCY: ICD-10-CM

## 2021-09-27 DIAGNOSIS — E78.2 MIXED HYPERLIPIDEMIA: ICD-10-CM

## 2021-09-27 DIAGNOSIS — Z78.0 POST-MENOPAUSAL: ICD-10-CM

## 2021-09-27 DIAGNOSIS — Z91.81 AT MODERATE RISK FOR FALL: ICD-10-CM

## 2021-09-27 DIAGNOSIS — R73.9 HYPERGLYCEMIA: ICD-10-CM

## 2021-09-27 DIAGNOSIS — R31.29 MICROSCOPIC HEMATURIA: ICD-10-CM

## 2021-09-27 DIAGNOSIS — Z12.31 BREAST CANCER SCREENING BY MAMMOGRAM: Primary | ICD-10-CM

## 2021-09-27 DIAGNOSIS — R53.83 FATIGUE, UNSPECIFIED TYPE: ICD-10-CM

## 2021-09-27 LAB
BILIRUB BLD-MCNC: NEGATIVE MG/DL
CLARITY, POC: CLEAR
COLOR UR: YELLOW
GLUCOSE UR STRIP-MCNC: NEGATIVE MG/DL
KETONES UR QL: NEGATIVE
LEUKOCYTE EST, POC: ABNORMAL
NITRITE UR-MCNC: NEGATIVE MG/ML
PH UR: 5.5 [PH] (ref 5–8)
PROT UR STRIP-MCNC: NEGATIVE MG/DL
RBC # UR STRIP: ABNORMAL /UL
SP GR UR: 1.02 (ref 1–1.03)
UROBILINOGEN UR QL: NORMAL

## 2021-09-27 PROCEDURE — 81003 URINALYSIS AUTO W/O SCOPE: CPT | Performed by: FAMILY MEDICINE

## 2021-09-27 PROCEDURE — G0439 PPPS, SUBSEQ VISIT: HCPCS | Performed by: FAMILY MEDICINE

## 2021-09-27 RX ORDER — HYDROCODONE BITARTRATE AND ACETAMINOPHEN 10; 325 MG/1; MG/1
1 TABLET ORAL EVERY 6 HOURS PRN
Qty: 90 TABLET | Refills: 0 | Status: SHIPPED | OUTPATIENT
Start: 2021-09-27 | End: 2021-11-11 | Stop reason: SDUPTHER

## 2021-09-27 NOTE — PATIENT INSTRUCTIONS
Fall Prevention in the Home, Adult  Falls can cause injuries and can affect people from all age groups. There are many simple things that you can do to make your home safe and to help prevent falls. Ask for help when making these changes, if needed.  What actions can I take to prevent falls?  General instructions  · Use good lighting in all rooms. Replace any light bulbs that burn out.  · Turn on lights if it is dark. Use night-lights.  · Place frequently used items in easy-to-reach places. Lower the shelves around your home if necessary.  · Set up furniture so that there are clear paths around it. Avoid moving your furniture around.  · Remove throw rugs and other tripping hazards from the floor.  · Avoid walking on wet floors.  · Fix any uneven floor surfaces.  · Add color or contrast paint or tape to grab bars and handrails in your home. Place contrasting color strips on the first and last steps of stairways.  · When you use a stepladder, make sure that it is completely opened and that the sides are firmly locked. Have someone hold the ladder while you are using it. Do not climb a closed stepladder.  · Be aware of any and all pets.  What can I do in the bathroom?         · Keep the floor dry. Immediately clean up any water that spills onto the floor.  · Remove soap buildup in the tub or shower on a regular basis.  · Use non-skid mats or decals on the floor of the tub or shower.  · Attach bath mats securely with double-sided, non-slip rug tape.  · If you need to sit down while you are in the shower, use a plastic, non-slip stool.  · Install grab bars by the toilet and in the tub and shower. Do not use towel bars as grab bars.  What can I do in the bedroom?  · Make sure that a bedside light is easy to reach.  · Do not use oversized bedding that drapes onto the floor.  · Have a firm chair that has side arms to use for getting dressed.  What can I do in the kitchen?  · Clean up any spills right away.  · If you  need to reach for something above you, use a sturdy step stool that has a grab bar.  · Keep electrical cables out of the way.  · Do not use floor polish or wax that makes floors slippery. If you must use wax, make sure that it is non-skid floor wax.  What can I do in the stairways?  · Do not leave any items on the stairs.  · Make sure that you have a light switch at the top of the stairs and the bottom of the stairs. Have them installed if you do not have them.  · Make sure that there are handrails on both sides of the stairs. Fix handrails that are broken or loose. Make sure that handrails are as long as the stairways.  · Install non-slip stair treads on all stairs in your home.  · Avoid having throw rugs at the top or bottom of stairways, or secure the rugs with carpet tape to prevent them from moving.  · Choose a carpet design that does not hide the edge of steps on the stairway.  · Check any carpeting to make sure that it is firmly attached to the stairs. Fix any carpet that is loose or worn.  What can I do on the outside of my home?  · Use bright outdoor lighting.  · Regularly repair the edges of walkways and driveways and fix any cracks.  · Remove high doorway thresholds.  · Trim any shrubbery on the main path into your home.  · Regularly check that handrails are securely fastened and in good repair. Both sides of any steps should have handrails.  · Install guardrails along the edges of any raised decks or porches.  · Clear walkways of debris and clutter, including tools and rocks.  · Have leaves, snow, and ice cleared regularly.  · Use sand or salt on walkways during winter months.  · In the garage, clean up any spills right away, including grease or oil spills.  What other actions can I take?  · Wear closed-toe shoes that fit well and support your feet. Wear shoes that have rubber soles or low heels.  · Use mobility aids as needed, such as canes, walkers, scooters, and crutches.  · Review your medicines with  your health care provider. Some medicines can cause dizziness or changes in blood pressure, which increase your risk of falling.  Talk with your health care provider about other ways that you can decrease your risk of falls. This may include working with a physical therapist or  to improve your strength, balance, and endurance.  Where to find more information  · Centers for Disease Control and Prevention, [place]: https://www.cdc.gov  · National [place] on Aging: https://tb9jlrc.jamie.nih.gov  Contact a health care provider if:  · You are afraid of falling at home.  · You feel weak, drowsy, or dizzy at home.  · You fall at home.  Summary  · There are many simple things that you can do to make your home safe and to help prevent falls.  · Ways to make your home safe include removing tripping hazards and installing grab bars in the bathroom.  · Ask for help when making these changes in your home.  This information is not intended to replace advice given to you by your health care provider. Make sure you discuss any questions you have with your health care provider.  Document Revised: 2018 Document Reviewed: 2018  [employer] Patient Education ©  [employer]      Medicare Wellness  Personal Prevention Plan of Service     Date of Office Visit:    Encounter Provider:  Bill Schilling MD  Place of Service:  [facility]  Patient Name: Ariana Gutierrez  :  1940    As part of the Medicare Wellness portion of your visit today, we are providing you with this personalized preventive plan of services (PPPS). This plan is based upon recommendations of the [place] Preventive Services Task Force (USPSTF) and the Advisory Committee on Immunization Practices (ACIP).    This lists the preventive care services that should be considered, and provides dates of when you are due. Items listed as completed are up-to-date and do not require any further  intervention.    Health Maintenance   Topic Date Due   • DXA SCAN  Never done   • ANNUAL WELLNESS VISIT  09/22/2021   • INFLUENZA VACCINE  10/01/2021   • LIPID PANEL  06/01/2022   • MAMMOGRAM  12/28/2022   • COLORECTAL CANCER SCREENING  03/06/2028   • HEPATITIS C SCREENING  Completed   • COVID-19 Vaccine  Completed   • Pneumococcal Vaccine 65+  Completed   • TDAP/TD VACCINES  Discontinued   • ZOSTER VACCINE  Discontinued       No orders of the defined types were placed in this encounter.      No follow-ups on file.        Mediterranean Diet  A Mediterranean diet refers to food and lifestyle choices that are based on the traditions of countries located on the [place]. This way of eating has been shown to help prevent certain conditions and improve outcomes for people who have chronic diseases, like kidney disease and heart disease.  What are tips for following this plan?  Lifestyle  · Cook and eat meals together with your family, when possible.  · Drink enough fluid to keep your urine clear or pale yellow.  · Be physically active every day. This includes:  ? Aerobic exercise like running or swimming.  ? Leisure activities like gardening, walking, or housework.  · Get 7-8 hours of sleep each night.  · If recommended by your health care provider, drink red wine in moderation. This means 1 glass a day for nonpregnant women and 2 glasses a day for men. A glass of wine equals 5 oz (150 mL).  Reading food labels    · Check the serving size of packaged foods. For foods such as rice and pasta, the serving size refers to the amount of cooked product, not dry.  · Check the total fat in packaged foods. Avoid foods that have saturated fat or trans fats.  · Check the ingredients list for added sugars, such as corn syrup.    Shopping  · At the grocery store, buy most of your food from the areas near the walls of the store. This includes:  ? Fresh fruits and vegetables (produce).  ? Grains, beans, nuts, and seeds. Some of  these may be available in unpackaged forms or large amounts (in bulk).  ? Fresh seafood.  ? Poultry and eggs.  ? Low-fat dairy products.  · Buy whole ingredients instead of prepackaged foods.  · Buy fresh fruits and vegetables in-season from local farmers markets.  · Buy frozen fruits and vegetables in resealable bags.  · If you do not have access to quality fresh seafood, buy precooked frozen shrimp or canned fish, such as tuna, salmon, or sardines.  · Buy small amounts of raw or cooked vegetables, salads, or olives from the deli or salad bar at your store.  · Stock your pantry so you always have certain foods on hand, such as olive oil, canned tuna, canned tomatoes, rice, pasta, and beans.  Cooking  · Cook foods with extra-virgin olive oil instead of using butter or other vegetable oils.  · Have meat as a side dish, and have vegetables or grains as your main dish. This means having meat in small portions or adding small amounts of meat to foods like pasta or stew.  · Use beans or vegetables instead of meat in common dishes like chili or lasagna.  · [place] with different cooking methods. Try roasting or broiling vegetables instead of steaming or sautéeing them.  · Add frozen vegetables to soups, stews, pasta, or rice.  · Add nuts or seeds for added healthy fat at each meal. You can add these to yogurt, salads, or vegetable dishes.  · Marinate fish or vegetables using olive oil, lemon juice, garlic, and fresh herbs.  Meal planning    · Plan to eat 1 vegetarian meal one day each week. Try to work up to 2 vegetarian meals, if possible.  · Eat seafood 2 or more times a week.  · Have healthy snacks readily available, such as:  ? Vegetable sticks with hummus.  ? Greek yogurt.  ? Fruit and nut trail mix.  · Eat balanced meals throughout the week. This includes:  ? Fruit: 2-3 servings a day  ? Vegetables: 4-5 servings a day  ? Low-fat dairy: 2 servings a day  ? Fish, poultry, or lean meat: 1 serving a day  ? Beans and  legumes: 2 or more servings a week  ? Nuts and seeds: 1-2 servings a day  ? Whole grains: 6-8 servings a day  ? Extra-virgin olive oil: 3-4 servings a day  · Limit red meat and sweets to only a few servings a month    What are my food choices?  · Mediterranean diet  ? Recommended  § Grains: Whole-grain pasta. Brown rice. Bulgar wheat. Polenta. Couscous. Whole-wheat bread. Oatmeal. Quinoa.  § Vegetables: Artichokes. Beets. Broccoli. Cabbage. Carrots. Eggplant. Green beans. Chard. Kale. Spinach. Onions. Leeks. Peas. Squash. Tomatoes. Peppers. Radishes.  § Fruits: Apples. Apricots. Avocado. Berries. Bananas. Cherries. Dates. Figs. Grapes. Henri. Melon. Oranges. Peaches. Plums. Pomegranate.  § Meats and other protein foods: Beans. Almonds. Sunflower seeds. Pine nuts. Peanuts. Cod. [place]. Scallops. Shrimp. Tuna. Tilapia. Clams. Oysters. Eggs.  § Dairy: Low-fat milk. Cheese. Greek yogurt.  § Beverages: Water. Red wine. Herbal tea.  § Fats and oils: Extra virgin olive oil. Avocado oil. Grape seed oil.  § Sweets and desserts: Greek yogurt with honey. Baked apples. Poached pears. Trail mix.  § Seasoning and other foods: Basil. Cilantro. Coriander. Cumin. Mint. Parsley. Gurvinder. Rosemary. Tarragon. Garlic. Oregano. Thyme. Pepper. Balsalmic vinegar. Tahini. Hummus. Tomato sauce. Olives. Mushrooms.  ? Limit these  § Grains: Prepackaged pasta or rice dishes. Prepackaged cereal with added sugar.  § Vegetables: Deep fried potatoes (french fries).  § Fruits: Fruit canned in syrup.  § Meats and other protein foods: Beef. Pork. Lamb. Poultry with skin. Hot dogs. Heath.  § Dairy: Ice cream. Sour cream. Whole milk.  § Beverages: Juice. Sugar-sweetened soft drinks. Beer. Liquor and spirits.  § Fats and oils: Butter. Canola oil. Vegetable oil. Beef fat (tallow). Lard.  § Sweets and desserts: Cookies. Cakes. Pies. Candy.  § Seasoning and other foods: Mayonnaise. Premade sauces and marinades.  The items listed may not be a complete list.  Talk with your dietitian about what dietary choices are right for you.  Summary  · The Mediterranean diet includes both food and lifestyle choices.  · Eat a variety of fresh fruits and vegetables, beans, nuts, seeds, and whole grains.  · Limit the amount of red meat and sweets that you eat.  · Talk with your health care provider about whether it is safe for you to drink red wine in moderation. This means 1 glass a day for nonpregnant women and 2 glasses a day for men. A glass of wine equals 5 oz (150 mL).  This information is not intended to replace advice given to you by your health care provider. Make sure you discuss any questions you have with your health care provider.  Document Revised: 08/17/2017 Document Reviewed: 08/10/2017  [employer] Patient Education © 2020 [employer]

## 2021-09-27 NOTE — PROGRESS NOTES
The ABCs of the Annual Wellness Visit  Subsequent Medicare Wellness Visit    Chief Complaint   Patient presents with   • Medicare Wellness-subsequent     Fasting.  Mammos handled by GYN  Drug monitoring appt      Subjective    History of Present Illness:  Ariana Gutierrez is a 81 y.o. female who presents for a Subsequent Medicare Wellness Visit.    The following portions of the patient's history were reviewed and   updated as appropriate: allergies, current medications, past family history, past medical history, past social history, past surgical history and problem list.    Compared to one year ago, the patient feels her physical   health is worse.    Compared to one year ago, the patient feels her mental   health is worse.    Recent Hospitalizations:  She was not admitted to the hospital during the last year.       Current Medical Providers:  Patient Care Team:  Bill Schilling MD as PCP - General (Family Medicine)  Hawk Guerin MD as Cardiologist (Cardiology)  Janee Hall MD as Consulting Physician (Infectious Diseases)  Fawad Bhat MD as Consulting Physician (Pulmonary Disease)  Corrine Koch APRN as Nurse Practitioner (Nurse Practitioner)    Outpatient Medications Prior to Visit   Medication Sig Dispense Refill   • atorvastatin (LIPITOR) 20 MG tablet Take 1 tablet by mouth Daily. 90 tablet 3   • Beclomethasone Diprop HFA (QVAR REDIHALER) 80 MCG/ACT inhaler Inhale 1 puff 2 (Two) Times a Day. 1 inhaler 0   • benazepril (LOTENSIN) 20 MG tablet TAKE 1 TABLET EVERY DAY 90 tablet 3   • Docusate Calcium (STOOL SOFTENER PO) Take 1 tablet by mouth Daily.     • dronedarone (MULTAQ) 400 MG tablet Take 1 tablet by mouth 2 (Two) Times a Day With Meals. 60 tablet 11   • gabapentin (NEURONTIN) 800 MG tablet Take 1 tablet by mouth 3 (Three) Times a Day. 270 tablet 0   • ipratropium-albuterol (DUO-NEB) 0.5-2.5 mg/3 ml nebulizer Take 3 mL by nebulization Every 6 (Six) Hours While Awake.  Diagnosis of pneumonitis J69.8 360 mL 2   • isosorbide mononitrate (IMDUR) 30 MG 24 hr tablet Take 1 tablet by mouth once daily 90 tablet 4   • metoprolol succinate XL (TOPROL-XL) 50 MG 24 hr tablet TAKE 1 TABLET EVERY DAY 90 tablet 3   • Multiple Vitamins-Minerals (PRESERVISION AREDS 2 PO) Take  by mouth.     • nitroglycerin (NITROSTAT) 0.4 MG SL tablet 1 under the tongue as needed for angina, may repeat q5mins for up three doses 25 tablet 11   • omeprazole (priLOSEC) 20 MG capsule TAKE 1 CAPSULE EVERY DAY 90 capsule 3   • polyethylene glycol (MIRALAX) powder Take 17 g by mouth Daily.     • rivaroxaban (Xarelto) 20 MG tablet Take 1 tablet by mouth Daily. 30 tablet 11   • saccharomyces boulardii (FLORASTOR) 250 MG capsule Take 1 capsule by mouth Daily. 30 capsule 2   • HYDROcodone-acetaminophen (NORCO)  MG per tablet Take 1 tablet by mouth Every 6 (Six) Hours As Needed for Moderate Pain . 90 tablet 0   • aspirin 81 MG chewable tablet Chew 81 mg Daily.       No facility-administered medications prior to visit.       Opioid medication/s are on active medication list.  and I have evaluated her active treatment plan and pain score trends (see table).  Vitals:    09/27/21 0948   PainSc:   8     I have reviewed the chart for potential of high risk medication and harmful drug interactions in the elderly.            Aspirin is on active medication list. Aspirin use is indicated based on review of current medical condition/s. Pros and cons of this therapy have been discussed today. Benefits of this medication outweigh potential harm.  Patient has been encouraged to continue taking this medication.  .      Patient Active Problem List   Diagnosis   • Essential hypertension   • Gastroesophageal reflux disease without esophagitis   • Shortness of breath   • CAD (coronary artery disease)   • Scoliosis   • Low back pain   • Paresthesia   • Palpitations   • Chronic back pain   • Pneumonitis   • Chronic pain   • Restrictive  "lung disease   • Usual interstitial pneumonitis (CMS/HCC)   • BERGERON (dyspnea on exertion)   • PAF (paroxysmal atrial fibrillation) (CMS/HCC)   • Rheumatoid arthritis involving multiple sites with positive rheumatoid factor (CMS/HCC)   • Hypertensive heart disease with heart failure (CMS/HCC)   • Chronic cough     Advance Care Planning  Advance Directive is not on file.  ACP discussion was declined by the patient. Patient does not have an advance directive, declines further assistance.    Review of Systems   Respiratory: Positive for shortness of breath.         Possible pulmonary fibrosis-seen pulmonologist for test shortly   Cardiovascular: Negative for chest pain and leg swelling.   Gastrointestinal: Positive for constipation.        Cologuard good for another year   Musculoskeletal: Positive for arthralgias and back pain.   Neurological:        Radiculopathy on the right-lumbar   All other systems reviewed and are negative.       Objective    Vitals:    09/27/21 0948   BP: 122/74   BP Location: Left arm   Patient Position: Sitting   Cuff Size: Adult   Pulse: 70   Resp: 18   Temp: 97.8 °F (36.6 °C)   TempSrc: Temporal   SpO2: 94%   Weight: 67.6 kg (149 lb)   Height: 160 cm (63\")   PainSc:   8     BMI Readings from Last 1 Encounters:   09/27/21 26.39 kg/m²   BMI is above normal parameters. Recommendations include: educational material and nutrition counseling    Does the patient have evidence of cognitive impairment? No    Physical Exam  Vitals and nursing note reviewed.   Constitutional:       Appearance: Normal appearance.   HENT:      Right Ear: Tympanic membrane and ear canal normal.      Left Ear: Tympanic membrane and ear canal normal.      Ears:      Comments: Minimal wax bilateral-ear kit  Eyes:      Extraocular Movements: Extraocular movements intact.      Pupils: Pupils are equal, round, and reactive to light.   Neck:      Vascular: No carotid bruit.   Cardiovascular:      Rate and Rhythm: Normal rate and " regular rhythm.      Pulses: Normal pulses.      Heart sounds: Normal heart sounds.   Pulmonary:      Effort: Pulmonary effort is normal.      Breath sounds: Wheezing and rhonchi present.   Abdominal:      General: Abdomen is flat. There is no distension.      Palpations: Abdomen is soft. There is no mass.   Genitourinary:     Comments: Breast and gynecologic exam by another provider  Musculoskeletal:      Right lower leg: No edema.      Left lower leg: No edema.      Comments: Severe scoliosis thoracolumbar spine   Lymphadenopathy:      Cervical: No cervical adenopathy.   Skin:     General: Skin is warm and dry.      Capillary Refill: Capillary refill takes less than 2 seconds.   Neurological:      General: No focal deficit present.      Mental Status: She is alert and oriented to person, place, and time.   Psychiatric:         Mood and Affect: Mood normal.         Behavior: Behavior normal.         Thought Content: Thought content normal.         Judgment: Judgment normal.       CONTROLLED SUBSTANCE TRACKING 2/12/2019 8/5/2019 11/11/2019 5/11/2020 8/17/2020 6/1/2021 9/27/2021   Last Pro 2/12/2019 8/5/2019 11/11/2019 5/11/2020 8/17/2020 6/1/2021 9/27/2021   Report Number [id number] -   Last UDS 3/14/2018 8/5/2019 8/16/2019 8/5/2019 8/5/2019 6/1/2021 6/1/2021   Last Controlled Substance Agreement 3/16/2018 8/5/2019 8/16/2019 8/5/2019 8/17/2020 6/1/2021 6/1/2021               HEALTH RISK ASSESSMENT    Smoking Status:  Social History     Tobacco Use   Smoking Status Never Smoker   Smokeless Tobacco Never Used     Alcohol Consumption:  Social History     Substance and Sexual Activity   Alcohol Use No     Fall Risk Screen:    STEADI Fall Risk Assessment was completed, and patient is at MODERATE risk for falls. Assessment completed on:9/27/2021    Depression Screening:  PHQ-2/PHQ-9 Depression Screening 9/27/2021   Little interest or pleasure in doing things 0   Feeling down,  depressed, or hopeless 0   Trouble falling or staying asleep, or sleeping too much -   Feeling tired or having little energy -   Poor appetite or overeating -   Feeling bad about yourself - or that you are a failure or have let yourself or your family down -   Trouble concentrating on things, such as reading the newspaper or watching television -   Moving or speaking so slowly that other people could have noticed. Or the opposite - being so fidgety or restless that you have been moving around a lot more than usual -   Thoughts that you would be better off dead, or of hurting yourself in some way -   Total Score 0   If you checked off any problems, how difficult have these problems made it for you to do your work, take care of things at home, or get along with other people? -       Health Habits and Functional and Cognitive Screening:  Functional & Cognitive Status 9/27/2021   Do you have difficulty preparing food and eating? No   Do you have difficulty bathing yourself, getting dressed or grooming yourself? No   Do you have difficulty using the toilet? No   Do you have difficulty moving around from place to place? Yes   Do you have trouble with steps or getting out of a bed or a chair? Yes   Current Diet Well Balanced Diet   Dental Exam Up to date   Eye Exam Up to date   Exercise (times per week) 3 times per week   Current Exercises Include Walking   Current Exercise Activities Include -   Do you need help using the phone?  No   Are you deaf or do you have serious difficulty hearing?  No   Do you need help with transportation? Yes   Do you need help shopping? No   Do you need help preparing meals?  No   Do you need help with housework?  No   Do you need help with laundry? No   Do you need help taking your medications? No   Do you need help managing money? No   Do you ever drive or ride in a car without wearing a seat belt? No   Have you felt unusual stress, anger or loneliness in the last month? No   Who do you live  with? Spouse   If you need help, do you have trouble finding someone available to you? No   Have you been bothered in the last four weeks by sexual problems? No   Do you have difficulty concentrating, remembering or making decisions? No       Age-appropriate Screening Schedule:  Refer to the list below for future screening recommendations based on patient's age, sex and/or medical conditions. Orders for these recommended tests are listed in the plan section. The patient has been provided with a written plan.    Health Maintenance   Topic Date Due   • DXA SCAN  Never done   • INFLUENZA VACCINE  10/01/2021   • LIPID PANEL  06/01/2022   • MAMMOGRAM  12/28/2022   • TDAP/TD VACCINES  Discontinued   • ZOSTER VACCINE  Discontinued              Assessment/Plan   CMS Preventative Services Quick Reference  Risk Factors Identified During Encounter  Fall Risk-High or Moderate  The above risks/problems have been discussed with the patient.  Follow up actions/plans if indicated are seen below in the Assessment/Plan Section.  Pertinent information has been shared with the patient in the After Visit Summary.    Diagnoses and all orders for this visit:    1. Breast cancer screening by mammogram (Primary)  -     Cancel: Mammo Screening Digital Tomosynthesis Bilateral With CAD; Future    2. Post-menopausal  -     Cancel: DEXA Bone Density Axial; Future    3. Fatigue, unspecified type  -     TSH  -     T4, free  -     CBC & Differential    4. Mixed hyperlipidemia  -     Comprehensive Metabolic Panel  -     Lipid Panel    5. Vitamin D deficiency  -     Vitamin D 25 Hydroxy    6. Microscopic hematuria  -     POC Urinalysis Dipstick, Multipro    7. Hyperglycemia  -     Hemoglobin A1c    8. Memory loss  -     Vitamin B12  -     Folate    9. Medicare annual wellness visit, subsequent    10. At moderate risk for fall    11. Pain  -     HYDROcodone-acetaminophen (NORCO)  MG per tablet; Take 1 tablet by mouth Every 6 (Six) Hours As  Needed for Moderate Pain .  Dispense: 90 tablet; Refill: 0        Follow Up:   Return in about 6 months (around 3/27/2022).     An After Visit Summary and PPPS were made available to the patient.        I spent 30 minutes caring for Ariana on this date of service. This time includes time spent by me in the following activities:preparing for the visit, reviewing tests, obtaining and/or reviewing a separately obtained history, performing a medically appropriate examination and/or evaluation , counseling and educating the patient/family/caregiver and ordering medications, tests, or procedures       Plan above-follow-up with other specialist-Covid booster when approved

## 2021-09-27 NOTE — PROGRESS NOTES
"Chief Complaint  Usual interstitial pneumonitis (pft done today) and Restrictive lung disease    Subjective    History of Present Illness     Ariana Gutierrez presents to [facility] PULMONARY [facility]ICAL [facility]ARE MEDICINE   Ms. Gutierrez is a pleasant 81 year old female patient of Dr. Fawad Bhat with known Scoliosis, CAD, HTN, ILD on CXR, PAF on Xarelto, Hx of DVT, positive RF,Chronic cough, restrictive lung disease pulmonary fibrosis with traction bronchiectasis. Her last HRCT of the chest in August showed pulmonary fibrosis with some areas of honeycombing. She could possibly have rheumatoid lung and is following Rheumatology, Dr. Roman. She reports again today that she does not currently want referral to an academic center nor Ofev, Esbriet, or novel therapy under investigation. She has her 98 year old mother living with her and does not want to travel at this time. She states she has seen Dr. Edwards for her Atrial fib and was told due to her lungs, she would not be a candidate for ablation. Her  states patient is coughing more and patient agrees. She noted at last visit she felt her cough had progressed. She states it is occasionally productive. She has not been using mucinex and has not had an order for a flutter valve.          Objective   Vital Signs:   /68   Pulse 72   Ht 158.8 cm (62.5\")   Wt 69 kg (152 lb 3.2 oz)   SpO2 94%   BMI 27.39 kg/m²     Physical Exam  Vitals reviewed.   Constitutional:       Appearance: Normal appearance.   Cardiovascular:      Rate and Rhythm: Normal rate and regular rhythm.   Pulmonary:      Effort: Pulmonary effort is normal.      Breath sounds: Normal breath sounds. Rales: fine crackles on inspiration.   Neurological:      General: No focal deficit present.      Mental Status: She is alert and oriented to person, place, and time.   Psychiatric:         Mood and Affect: Mood normal.         Behavior: Behavior normal.          Result Review :  The " following data was reviewed by: BELINDA Bentley on 2021:    My interpretation of imaging:  No new  My interpretation of labs: none   CT Chest Hi Resolution Diagnostic (2021 11:02)    PFT Values        Some values may be hidden. Unless noted otherwise, only the newest values recorded on each date are displayed.         Old Values PFT Results 21   No data to display.      Pre Drug PFT Results 21   FVC 68   FEV1 75   FEF 25-75% 117   FEV1/FVC 84.41      Post Drug PFT Results 21   No data to display.      Other Tests PFT Results 21   TLC 73   RV 8   DLCO 65   D/VAsb 105           My interpretation of the PFT: as below     Results for orders placed in visit on 21    Pulmonary Function Test    Narrative  Pulmonary Function Test  Performed by: Miranda Martin, RRT  Authorized by: Corrine Koch APRN    Pre Drug % Predicted  FVC: 68%  FEV1: 75%  FEF 25-75%: 117%  FEV1/FVC: 84.41%  T%  RV: 8%  DLCO: 65%  D/VAsb: 105%    Interpretation  Spirometry  Spirometry shows moderate restriction.  Lung Volume Measurements  Measurements show reduced lung volumes consistent with restriction.  Diffusion Capacity  The patient's diffusion capacity is moderately reduced.  Diffusion capacity is normal when corrected for alveolar volume.      Results for orders placed during the hospital encounter of 21    Pulmonary Function Test    Narrative  [facility] - Pulmonary Function Test    [address] [place] [address]  [id number]  [phone]    Patient : Ariana Gutierrez  MRN : [id number]  CSN : [id number]  Pulmonologist : Grady White MD  Date : 3/11/2021    ______________________________________________________________________    Interpretation :  1.  Spirometry is most consistent with a moderate restrictive ventilatory defect although the decrease in the patient's forced vital capacity is just into the moderate range at 68% of predicted.  2.  Lung volumes  confirm a moderate restrictive ventilatory defect of mild severity.  3.  There is a mild bordering on moderate diffusion impairment which when corrected for alveolar volume is normalized.      Grady White MD      Results for orders placed in visit on 11/02/20    Pulmonary Function Test    Narrative  [facility] PULMON[facility] REPORT    Flow-volume curve:    Flow-volume curve is restrictive in configuration.    Spirometry:  FVC was 1.63 L or 64% of predicted with +7% change after bronchodilator challenge.  FEV1 was 1.51 L or 80% of predicted with +8% change after bronchodilator challenge.  FVC:FEV1 ratio was 93  FEF:25-75% was 213% of predicted with -27% change after bronchodilator challenge.    Spirometry values shows restrictive pattern.    Lung volumes:  TLC was 3.53 L or 73% of predicted  RV was 1.72 L or 85% of predicted    Lung volumes are below normal limit.    Diffusing capacity:  Uncorrected for single breath was 52% of predicted    Diffusing capacity adjusted for alveolar volume 79% of predicted.    Overall impression:    Above test results were acceptable and reproducible by ATS criteria.  From analysis of the above procedures the patient showed evidence of moderate restrictive airway dysfunction  There is no response to bronchodilator treatment.  Lung volumes are decreased supporting restrictive dysfunction  Diffusion capacity is moderately decreased when corrected for alveolar volume.    There is no prior test results available for comparison.clinical correlation is indicated.    Josie Carcamo MD,  [facility]  11/28/2020  13:04 CST      Patient's BMI 27.39. BMI is within normal parameters. No follow-up required..    Assessment and Plan   Diagnoses and all orders for this visit:    1. Chronic cough (Primary)  -     Pulmonary Function Test  -     OSCILLATING POSITIVE EXIRATORY PRESSURE (OPEP); Future    2. Usual interstitial  pneumonitis (CMS/HCC)    3. Restrictive lung disease    4. Rheumatoid arthritis involving multiple sites with positive rheumatoid factor (CMS/HCC)    5. Bronchiectasis without acute exacerbation (HCC)  -     OSCILLATING POSITIVE EXIRATORY PRESSURE (OPEP); Future      Reviewed PFT results and discussed pretty stable. FEV1 went from 83 to 75 and still showing moderate restriction. She still declines academic referral or antifibrotic treatment.   Continue current treatment.   Begin Mucinex 600-1200 mg twice daily with full glass of water.   If after a week or so can try the flutter valve. (she wanted to try mucinex first)  Can try flutter valve at any time.   Follow up in 3 months with repeat CT of the chest. Will make her next visit with Dr. Bhat unless he does not have availability then can make with me.     Health maintenance:   Influenza vaccine: Sept 2020   Pneumovax 23: Nov 2011   Prevnar 13: July 2016  Covid-19 Moderna Feb/ March 2021     Follow Up   Return in about 3 months (around 12/29/2021) for with Dr. Bhat if not can stay with me , CT.  Patient was given instructions and counseling regarding her condition or for health maintenance advice. Please see specific information pulled into the AVS if appropriate.     Corrine Koch, APRN  9/29/2021  13:35 CDT

## 2021-09-28 LAB
25(OH)D3+25(OH)D2 SERPL-MCNC: 48.4 NG/ML (ref 30–100)
ALBUMIN SERPL-MCNC: 4 G/DL (ref 3.5–5.2)
ALBUMIN/GLOB SERPL: 1.3 G/DL
ALP SERPL-CCNC: 86 U/L (ref 39–117)
ALT SERPL-CCNC: 14 U/L (ref 1–33)
AST SERPL-CCNC: 16 U/L (ref 1–32)
BASOPHILS # BLD AUTO: 0.05 10*3/MM3 (ref 0–0.2)
BASOPHILS NFR BLD AUTO: 0.6 % (ref 0–1.5)
BILIRUB SERPL-MCNC: 0.4 MG/DL (ref 0–1.2)
BUN SERPL-MCNC: 21 MG/DL (ref 8–23)
BUN/CREAT SERPL: 20.8 (ref 7–25)
CALCIUM SERPL-MCNC: 9.4 MG/DL (ref 8.6–10.5)
CHLORIDE SERPL-SCNC: 102 MMOL/L (ref 98–107)
CHOLEST SERPL-MCNC: 113 MG/DL (ref 0–200)
CO2 SERPL-SCNC: 28.6 MMOL/L (ref 22–29)
CREAT SERPL-MCNC: 1.01 MG/DL (ref 0.57–1)
EOSINOPHIL # BLD AUTO: 0.28 10*3/MM3 (ref 0–0.4)
EOSINOPHIL NFR BLD AUTO: 3.3 % (ref 0.3–6.2)
ERYTHROCYTE [DISTWIDTH] IN BLOOD BY AUTOMATED COUNT: 14 % (ref 12.3–15.4)
FOLATE SERPL-MCNC: 9.43 NG/ML (ref 4.78–24.2)
GLOBULIN SER CALC-MCNC: 3.2 GM/DL
GLUCOSE SERPL-MCNC: 74 MG/DL (ref 65–99)
HBA1C MFR BLD: 5.5 % (ref 4.8–5.6)
HCT VFR BLD AUTO: 38.3 % (ref 34–46.6)
HDLC SERPL-MCNC: 42 MG/DL (ref 40–60)
HGB BLD-MCNC: 12.1 G/DL (ref 12–15.9)
IMM GRANULOCYTES # BLD AUTO: 0.04 10*3/MM3 (ref 0–0.05)
IMM GRANULOCYTES NFR BLD AUTO: 0.5 % (ref 0–0.5)
LDLC SERPL CALC-MCNC: 52 MG/DL (ref 0–100)
LYMPHOCYTES # BLD AUTO: 0.92 10*3/MM3 (ref 0.7–3.1)
LYMPHOCYTES NFR BLD AUTO: 10.9 % (ref 19.6–45.3)
MCH RBC QN AUTO: 28.5 PG (ref 26.6–33)
MCHC RBC AUTO-ENTMCNC: 31.6 G/DL (ref 31.5–35.7)
MCV RBC AUTO: 90.1 FL (ref 79–97)
MONOCYTES # BLD AUTO: 0.64 10*3/MM3 (ref 0.1–0.9)
MONOCYTES NFR BLD AUTO: 7.6 % (ref 5–12)
NEUTROPHILS # BLD AUTO: 6.52 10*3/MM3 (ref 1.7–7)
NEUTROPHILS NFR BLD AUTO: 77.1 % (ref 42.7–76)
NRBC BLD AUTO-RTO: 0 /100 WBC (ref 0–0.2)
PLATELET # BLD AUTO: 283 10*3/MM3 (ref 140–450)
POTASSIUM SERPL-SCNC: 4.5 MMOL/L (ref 3.5–5.2)
PROT SERPL-MCNC: 7.2 G/DL (ref 6–8.5)
RBC # BLD AUTO: 4.25 10*6/MM3 (ref 3.77–5.28)
SODIUM SERPL-SCNC: 141 MMOL/L (ref 136–145)
T4 FREE SERPL-MCNC: 1 NG/DL (ref 0.93–1.7)
TRIGL SERPL-MCNC: 99 MG/DL (ref 0–150)
TSH SERPL DL<=0.005 MIU/L-ACNC: 3.68 UIU/ML (ref 0.27–4.2)
VIT B12 SERPL-MCNC: 751 PG/ML (ref 211–946)
VLDLC SERPL CALC-MCNC: 19 MG/DL (ref 5–40)
WBC # BLD AUTO: 8.45 10*3/MM3 (ref 3.4–10.8)

## 2021-09-29 ENCOUNTER — OFFICE VISIT (OUTPATIENT)
Dept: PULMONOLOGY | Facility: CLINIC | Age: 81
End: 2021-09-29

## 2021-09-29 VITALS
HEART RATE: 72 BPM | WEIGHT: 152.2 LBS | SYSTOLIC BLOOD PRESSURE: 136 MMHG | OXYGEN SATURATION: 94 % | BODY MASS INDEX: 26.97 KG/M2 | HEIGHT: 63 IN | DIASTOLIC BLOOD PRESSURE: 68 MMHG

## 2021-09-29 DIAGNOSIS — J47.9 BRONCHIECTASIS WITHOUT ACUTE EXACERBATION (HCC): ICD-10-CM

## 2021-09-29 DIAGNOSIS — M05.79 RHEUMATOID ARTHRITIS INVOLVING MULTIPLE SITES WITH POSITIVE RHEUMATOID FACTOR (HCC): ICD-10-CM

## 2021-09-29 DIAGNOSIS — J84.112 USUAL INTERSTITIAL PNEUMONITIS (HCC): Chronic | ICD-10-CM

## 2021-09-29 DIAGNOSIS — R05.3 CHRONIC COUGH: Primary | ICD-10-CM

## 2021-09-29 DIAGNOSIS — J98.4 RESTRICTIVE LUNG DISEASE: Chronic | ICD-10-CM

## 2021-09-29 PROCEDURE — 94010 BREATHING CAPACITY TEST: CPT | Performed by: NURSE PRACTITIONER

## 2021-09-29 PROCEDURE — 94729 DIFFUSING CAPACITY: CPT | Performed by: NURSE PRACTITIONER

## 2021-09-29 PROCEDURE — 94727 GAS DIL/WSHOT DETER LNG VOL: CPT | Performed by: NURSE PRACTITIONER

## 2021-09-29 PROCEDURE — 99214 OFFICE O/P EST MOD 30 MIN: CPT | Performed by: NURSE PRACTITIONER

## 2021-09-29 NOTE — PROCEDURES
Pulmonary Function Test  Performed by: Miranda Martin, RRT  Authorized by: Corrine Koch APRN      Pre Drug % Predicted    FVC: 68%   FEV1: 75%   FEF 25-75%: 117%   FEV1/FVC: 84.41%   T%   RV: 8%   DLCO: 65%   D/VAsb: 105%    Interpretation   Spirometry   Spirometry shows moderate restriction.   Lung Volume Measurements  Measurements show reduced lung volumes consistent with restriction.   Diffusion Capacity  The patient's diffusion capacity is moderately reduced.  Diffusion capacity is normal when corrected for alveolar volume.

## 2021-09-29 NOTE — PATIENT INSTRUCTIONS
Begin Mucinex 600-1200 mg twice daily with full glass of water.   If after a week or so can try the flutter valve.   Can try flutter valve at any time.   Follow up in 3 months with repeat CT of the chest.

## 2021-09-30 ENCOUNTER — TELEPHONE (OUTPATIENT)
Dept: OBSTETRICS AND GYNECOLOGY | Facility: CLINIC | Age: 81
End: 2021-09-30

## 2021-09-30 DIAGNOSIS — Z12.31 BREAST CANCER SCREENING BY MAMMOGRAM: Primary | ICD-10-CM

## 2021-10-06 NOTE — PROGRESS NOTES
[facility] - PODIATRY    Today's Date: 10/12/21    Patient Name: Ariana Gutierrez  MRN: [id number]  CSN: [id number]  PCP: Bill Schilling MD  Referring Provider: Melanie Buchanan APRN    SUBJECTIVE     Chief Complaint   Patient presents with   • Establish Care     pcp09/27/2021 hammer toe- pt states biggest problem is neropothy in right foot. states its due to previous knees surgery that got bad infection, started in 2018. hammer toe 2nd toe left foot. right foot rolls inward when walking due to spine issues. would like to start having nails cut regularly due to perphrial vascular disease - pt pain, cant walk barefoot, hurts at night- pt presents with 2nd toe left foot hammer toe     HPI: Ariana Gutierrez, a 81 y.o.female, comes to clinic as a(n) new patient complaining of painful toenails and complaining of right foot pain/neuropathy. Patient has h/o CAD, back pain, DVT, GERD, HTN, IBS, paresthesia, perforated bowel, rheumatoid arteritis, scoliosis, spinal stenosis, peripheral vascular disease. Patient is non-diabetic. Relates that in 2018 she had a revisional knee replacement that had a post-op infection and relates nerve damage from the procedure.  Relates chronic numbness, tingling, and burning to the right lower extremity that roughly starts at her medial ankle and towards the plantar aspect of her foot.  She is seen several specialist about this which have attempted CMO, nerve conduction test, and nerve medication with only minimal improvement.  Relates that her toenails are long, thick, and discolored.  She has difficulty caring for them herself.  Takes Xarelto.  Also relates a somewhat problematic left second hammertoe.  Admits pain at 4/10 level and described as burning, numbness and tingling. Relates previous treatment(s) including foot care by podiatry, CMO, gabapentin. Denies any constitutional symptoms. No other pedal complaints at this time.    Past Medical History:    Diagnosis Date   • Bacterial infection    • CAD (coronary artery disease)    • Chest pain    • Chronic back pain    • Deep vein thrombosis (HCC)     S/P KNEE SURGERY 10/2017   • Diverticulosis of intestine 8/16/2016   • Gastroesophageal reflux disease without esophagitis 5/26/2017   • Hypertension    • Irritable bowel syndrome 11/2/2011   • Joint infection (HCC) 7/24/2018   • Low back pain    • Palpitations    • Paresthesia     toes   • Perforated bowel (HCC)    • Rheumatoid arteritis (HCC)    • Scoliosis    • Spinal stenosis    • Vascular disease, peripheral (HCC)      Past Surgical History:   Procedure Laterality Date   • BACK SURGERY     • BREAST BIOPSY Right 25 yrs ago   • CARDIAC CATHETERIZATION Left 11/6/2019    Procedure: Cardiac Catheterization/Vascular Study CARMEN OK;  Surgeon: Hawk Guerin MD;  Location: [facility] INVASIVE LOCATION;  Service: Cardiology   • CATARACT EXTRACTION Bilateral    • COLON RESECTION SMALL BOWEL  2016    with colostomy for 6 months, already revised.   • COLON SURGERY     • COLONOSCOPY     • LUMBAR LAMINECTOMY     • RENAL ARTERY STENT Right    • TOTAL KNEE ARTHROPLASTY Right    • TOTAL KNEE ARTHROPLASTY     • VASCULAR SURGERY      10/2017 - DR GARCIA     Family History   Problem Relation Age of Onset   • Breast cancer Mother 80   • Heart disease Mother    • Coronary artery disease Mother    • Peripheral vascular disease Mother    • No Known Problems Sister    • Heart disease Brother    • No Known Problems Maternal Grandmother    • No Known Problems Maternal Grandfather    • No Known Problems Paternal Grandmother    • No Known Problems Paternal Grandfather      Social History     Socioeconomic History   • Marital status:    Tobacco Use   • Smoking status: Never Smoker   • Smokeless tobacco: Never Used   Vaping Use   • Vaping Use: Never used   Substance and Sexual Activity   • Alcohol use: No   • Drug use: No   • Sexual activity: Never     No Known Allergies  Current  Outpatient Medications   Medication Sig Dispense Refill   • atorvastatin (LIPITOR) 20 MG tablet Take 1 tablet by mouth Daily. 90 tablet 3   • Beclomethasone Diprop HFA (QVAR REDIHALER) 80 MCG/ACT inhaler Inhale 1 puff 2 (Two) Times a Day. 1 inhaler 0   • benazepril (LOTENSIN) 20 MG tablet TAKE 1 TABLET EVERY DAY 90 tablet 3   • Docusate Calcium (STOOL SOFTENER PO) Take 1 tablet by mouth Daily.     • dronedarone (MULTAQ) 400 MG tablet Take 1 tablet by mouth 2 (Two) Times a Day With Meals. 60 tablet 11   • gabapentin (NEURONTIN) 800 MG tablet Take 1 tablet by mouth 3 (Three) Times a Day. 270 tablet 0   • HYDROcodone-acetaminophen (NORCO)  MG per tablet Take 1 tablet by mouth Every 6 (Six) Hours As Needed for Moderate Pain . 90 tablet 0   • ipratropium-albuterol (DUO-NEB) 0.5-2.5 mg/3 ml nebulizer Take 3 mL by nebulization Every 6 (Six) Hours While Awake. Diagnosis of pneumonitis J69.8 360 mL 2   • isosorbide mononitrate (IMDUR) 30 MG 24 hr tablet Take 1 tablet by mouth once daily 90 tablet 4   • metoprolol succinate XL (TOPROL-XL) 50 MG 24 hr tablet TAKE 1 TABLET EVERY DAY 90 tablet 3   • Multiple Vitamins-Minerals (PRESERVISION AREDS 2 PO) Take  by mouth.     • nitroglycerin (NITROSTAT) 0.4 MG SL tablet 1 under the tongue as needed for angina, may repeat q5mins for up three doses 25 tablet 11   • omeprazole (priLOSEC) 20 MG capsule TAKE 1 CAPSULE EVERY DAY 90 capsule 3   • polyethylene glycol (MIRALAX) powder Take 17 g by mouth Daily.     • rivaroxaban (Xarelto) 20 MG tablet Take 1 tablet by mouth Daily. 30 tablet 11   • saccharomyces boulardii (FLORASTOR) 250 MG capsule Take 1 capsule by mouth Daily. 30 capsule 2     No current facility-administered medications for this visit.     Review of Systems   Constitutional: Negative for chills and fever.   HENT: Negative for congestion.    Respiratory: Negative for shortness of breath.    Cardiovascular: Negative for chest pain and leg swelling.   Gastrointestinal:  Negative for constipation, diarrhea, nausea and vomiting.   Musculoskeletal: Positive for arthralgias.        Foot pain   Skin: Negative for wound.   Neurological: Positive for numbness.       OBJECTIVE     Vitals:    10/12/21 1313   BP: 136/66   Pulse: 83   SpO2: 93%       PHYSICAL EXAM  GEN:   Accompanied by none.     Foot/Ankle Exam:       General:   Appearance: appears stated age and healthy    Orientation: AAOx3    Affect: appropriate    Gait: unimpaired    Assistance: independent    Shoe Gear:  Casual shoes    VASCULAR      Right Foot Vascularity   Dorsalis pedis:  2+  Posterior tibial:  2+  Skin Temperature: warm    Edema Grading:  Trace  CFT:  3  Pedal Hair Growth:  Present  Varicosities: spider veins       Left Foot Vascularity   Dorsalis pedis:  2+  Posterior tibial:  2+  Skin Temperature: warm    Edema Grading:  Trace  CFT:  3  Pedal Hair Growth:  Present  Varicosities: spider veins        NEUROLOGIC     Right Foot Neurologic   Light touch sensation:  Diminished  Vibratory sensation:  Normal  Hot/Cold sensation: normal    Protective Sensation using Liberty-Elena Monofilament:  9     Left Foot Neurologic   Normal sensation    Light touch sensation:  Normal  Vibratory sensation:  Normal  Hot/cold sensation: normal    Protective Sensation using Liberty-Elena Monofilament:  10     MUSCULOSKELETAL      Right Foot Musculoskeletal   Ecchymosis:  None  Tenderness: toenails    Tenderness comment:  Medial ankle and tarsal tunnel  Arch:  Normal (Mild pronation on weight bearing)     Left Foot Musculoskeletal   Ecchymosis:  None  Tenderness: toenails    Arch:  Normal (Mild pronation on weight bearing)  Hammertoe:  Second toe (Semirigid and abuts the first toe.)     MUSCLE STRENGTH     Right Foot Muscle Strength   Foot dorsiflexion:  4+  Foot plantar flexion:  5  Foot inversion:  5  Foot eversion:  5     Left Foot Muscle Strength   Foot dorsiflexion:  5  Foot plantar flexion:  5  Foot inversion:  5  Foot  eversion:  5     RANGE OF MOTION      Right Foot Range of Motion   Foot and ankle ROM within normal limits       Left Foot Range of Motion   Foot and ankle ROM within normal limits       DERMATOLOGIC     Right Foot Dermatologic   Skin: skin intact    Nails: onychomycosis, abnormally thick, subungual debris and dystrophic nails       Left Foot Dermatologic   Skin: skin intact    Nails: onychomycosis, abnormally thick, subungual debris and dystrophic nails        RADIOLOGY/NUCLEAR:  Pulmonary Function Test    Result Date: 2021  Narrative: Corrine Koch APRN     2021 11:16 AM Pulmonary Function Test Performed by: Miranda Martin, RRT Authorized by: Corrine Koch APRN  Pre Drug % Predicted  FVC: 68%  FEV1: 75%  FEF 25-75%: 117%  FEV1/FVC: 84.41%  T%  RV: 8%  DLCO: 65%  D/VAsb: 105% Interpretation Spirometry Spirometry shows moderate restriction. Lung Volume Measurements Measurements show reduced lung volumes consistent with restriction. Diffusion Capacity The patient's diffusion capacity is moderately reduced.  Diffusion capacity is normal when corrected for alveolar volume.     XR Ankle 3+ View Right    Result Date: 10/12/2021  Narrative: EXAMINATION: XR ANKLE 3+ VW RIGHT-  10/12/2021 3:47 PM CDT  HISTORY: Foot pain and ankle pain  COMPARISON: None  FINDINGS: Frontal, lateral and oblique radiographs of the right ankle were provided for review.  There is no evidence of acute fracture or dislocation. The soft tissues are normal in appearance. Probable mild subtalar joint space loss.  Superimposed Achilles enthesophyte noted.       Impression:  1.  Some mild subtalar arthritis suspected. No acute findings.   This report was finalized on 10/12/2021 15:48 by Dr. Raudel Velásquez MD.    XR Foot 3+ View Right    Result Date: 10/12/2021  Narrative: RIGHT FOOT 3 views 10/12/2021 2:21 PM CDT  HISTORY: foot pain; M79.671-Pain in right foot  COMPARISON: None  FINDINGS: Frontal, lateral and  oblique radiographs of the right foot were provided for review.  Findings suspicious for mild pes planus. Plantar calcaneal spur noted and some mild degenerative change in the posterior subtalar joint suspected. I don't see any evidence of an acute fracture deformity.       Impression:  1.  Question mild pes planus and mild subtalar arthrosis. No acute fracture. This report was finalized on 10/12/2021 15:47 by Dr. Raudel Velásquez MD.      LABORATORY/CULTURE RESULTS:      PATHOLOGY RESULTS:       ASSESSMENT/PLAN     Diagnoses and all orders for this visit:    1. Onychomycosis (Primary)    2. Anticoagulant long-term use    3. Other polyneuropathy    4. Hammer toe of left foot    5. Foot pain, right  -     XR Foot 3+ View Right; Future  -     XR Ankle 3+ View Right; Future    6. Tarsal tunnel syndrome, right      Comprehensive lower extremity examination and evaluation was performed.  Discussed findings and treatment plan including risks, benefits, and treatment options with patient in detail. Patient agreed with treatment plan.  After verbal consent obtained, nail(s) x10 debrided of length and thickness with nail nipper without incidence  Patient may maintain nails and calluses at home utilizing emery board or pumice stone between visits as needed   X-rays to evaluate foot structure.  Patient's right lower extremity nerve pain very well could be fully contributed to previous knee surgery with complications.  On exam, similar clinical presentation is consistent with tarsal tunnel syndrome.  Patient will bring results of her EMG to her next appointment.  Patient relates that her hammertoe is mostly asymptomatic.  No treatment at this time.  An After Visit Summary was printed and given to the patient at discharge, including (if requested) any available informative/educational handouts regarding diagnosis, treatment, or medications. All questions were answered to patient/family satisfaction. Should symptoms fail to  improve or worsen they agree to call or return to clinic or to go to the Emergency Department. Discussed the importance of following up with any needed screening tests/labs/specialist appointments and any requested follow-up recommended by me today. Importance of maintaining follow-up discussed and patient accepts that missed appointments can delay diagnosis and potentially lead to worsening of conditions.  Return in about 3 months (around 1/12/2022) for Follow-up with Dr. Marcelo., or sooner if acute issues arise.    Lab Frequency Next Occurrence   Adult Transthoracic Echo Complete w/ Color, Spectral and Contrast if necessary per protocol Once 01/17/2022   OSCILLATING POSITIVE EXIRATORY PRESSURE (OPEP) Once 10/04/2021   CT Chest Without Contrast Diagnostic Once 12/30/2021   Mammo Screening Bilateral With CAD Once 10/05/2021       This document has been electronically signed by Jeffry Marcelo DPM on October 12, 2021 16:48 CDT

## 2021-10-12 ENCOUNTER — PATIENT ROUNDING (BHMG ONLY) (OUTPATIENT)
Dept: PODIATRY | Facility: CLINIC | Age: 81
End: 2021-10-12

## 2021-10-12 ENCOUNTER — OFFICE VISIT (OUTPATIENT)
Dept: PODIATRY | Facility: CLINIC | Age: 81
End: 2021-10-12

## 2021-10-12 ENCOUNTER — HOSPITAL ENCOUNTER (OUTPATIENT)
Dept: GENERAL RADIOLOGY | Facility: HOSPITAL | Age: 81
Discharge: HOME | End: 2021-10-12

## 2021-10-12 VITALS
HEIGHT: 63 IN | BODY MASS INDEX: 26.44 KG/M2 | DIASTOLIC BLOOD PRESSURE: 66 MMHG | WEIGHT: 149.2 LBS | HEART RATE: 83 BPM | OXYGEN SATURATION: 93 % | SYSTOLIC BLOOD PRESSURE: 136 MMHG

## 2021-10-12 DIAGNOSIS — M79.671 FOOT PAIN, RIGHT: ICD-10-CM

## 2021-10-12 DIAGNOSIS — G57.51 TARSAL TUNNEL SYNDROME, RIGHT: ICD-10-CM

## 2021-10-12 DIAGNOSIS — G62.89 OTHER POLYNEUROPATHY: ICD-10-CM

## 2021-10-12 DIAGNOSIS — M20.42 HAMMER TOE OF LEFT FOOT: ICD-10-CM

## 2021-10-12 DIAGNOSIS — Z79.01 ANTICOAGULANT LONG-TERM USE: ICD-10-CM

## 2021-10-12 DIAGNOSIS — B35.1 ONYCHOMYCOSIS: Primary | ICD-10-CM

## 2021-10-12 PROCEDURE — 73630 X-RAY EXAM OF FOOT: CPT

## 2021-10-12 PROCEDURE — 99203 OFFICE O/P NEW LOW 30 MIN: CPT | Performed by: PODIATRIST

## 2021-10-12 PROCEDURE — 73610 X-RAY EXAM OF ANKLE: CPT

## 2021-10-12 PROCEDURE — 11721 DEBRIDE NAIL 6 OR MORE: CPT | Performed by: PODIATRIST

## 2021-10-12 NOTE — PROGRESS NOTES
October 12, 2021    Hello, may I speak with Ariana Gutierrez?    My name is Carla      I am  with [facility] PODI[facility] POD[facility]  [address], [address]  [place] [address] [phone].    Before we get started may I verify your date of birth? 1940    I am calling to officially welcome you to our practice and ask about your recent visit. Is this a good time to talk? yes    Tell me about your visit with us. What things went well?  Visit went well.       We're always looking for ways to make our patients' experiences even better. Do you have recommendations on ways we may improve?  no    Overall were you satisfied with your first visit to our practice? yes       I appreciate you taking the time to speak with me today. Is there anything else I can do for you? no      Thank you, and have a great day.

## 2021-10-14 ENCOUNTER — FLU SHOT (OUTPATIENT)
Dept: FAMILY MEDICINE CLINIC | Facility: CLINIC | Age: 81
End: 2021-10-14

## 2021-10-14 DIAGNOSIS — Z23 NEED FOR INFLUENZA VACCINATION: Primary | ICD-10-CM

## 2021-10-14 PROCEDURE — 90662 IIV NO PRSV INCREASED AG IM: CPT | Performed by: FAMILY MEDICINE

## 2021-10-14 PROCEDURE — G0008 ADMIN INFLUENZA VIRUS VAC: HCPCS | Performed by: FAMILY MEDICINE

## 2021-10-15 ENCOUNTER — TELEPHONE (OUTPATIENT)
Dept: PODIATRY | Facility: CLINIC | Age: 81
End: 2021-10-15

## 2021-10-15 NOTE — TELEPHONE ENCOUNTER
Spoke with pt about xray results   1.  Question mild pes planus and mild subtalar arthrosis. No acute  Fracture.  Pt stated understanding and said she had got her inserts that were recomended

## 2021-10-27 RX ORDER — ATORVASTATIN CALCIUM 20 MG/1
TABLET, FILM COATED ORAL
Qty: 90 TABLET | Refills: 3 | Status: SHIPPED | OUTPATIENT
Start: 2021-10-27

## 2021-10-27 NOTE — TELEPHONE ENCOUNTER
Rx Refill Note  Requested Prescriptions     Pending Prescriptions Disp Refills   • atorvastatin (LIPITOR) 20 MG tablet [Pharmacy Med Name: Atorvastatin Calcium 20 MG Oral Tablet] 90 tablet 0     Sig: Take 1 tablet by mouth once daily      Last office visit with prescribing clinician: 9/27/2021      Next office visit with prescribing clinician: 3/28/2022            Christie Tuttle MA  10/27/21, 13:27 CDT

## 2021-11-11 DIAGNOSIS — R52 PAIN: ICD-10-CM

## 2021-11-11 RX ORDER — HYDROCODONE BITARTRATE AND ACETAMINOPHEN 10; 325 MG/1; MG/1
1 TABLET ORAL EVERY 6 HOURS PRN
Qty: 90 TABLET | Refills: 0 | Status: SHIPPED | OUTPATIENT
Start: 2021-11-11 | End: 2022-02-15 | Stop reason: SDUPTHER

## 2021-11-11 NOTE — TELEPHONE ENCOUNTER
Rx Refill Note  Requested Prescriptions     Pending Prescriptions Disp Refills   • HYDROcodone-acetaminophen (NORCO)  MG per tablet 90 tablet 0     Sig: Take 1 tablet by mouth Every 6 (Six) Hours As Needed for Moderate Pain .      Last office visit with prescribing clinician: 9/27/2021      Next office visit with prescribing clinician: 3/28/2022     Drug Therapy Appt 09/27/2021  Contract & UDS/NASREEN UDS 06/01/2021           Christie Alcantar MA  11/11/21, 12:31 CST    Patient was advised that she will need to call and set up an appointment before requesting her next months refill.

## 2021-11-18 RX ORDER — OMEPRAZOLE 20 MG/1
20 CAPSULE, DELAYED RELEASE ORAL DAILY
Qty: 90 CAPSULE | Refills: 3 | Status: SHIPPED | OUTPATIENT
Start: 2021-11-18

## 2021-11-18 NOTE — TELEPHONE ENCOUNTER
Rx Refill Note  Requested Prescriptions      No prescriptions requested or ordered in this encounter      Last office visit with prescribing clinician: 9/27/2021      Next office visit with prescribing clinician: 3/28/2022            Arianna Vazquez Rep  11/18/21, 09:20 CST

## 2021-12-03 DIAGNOSIS — R52 PAIN: ICD-10-CM

## 2021-12-03 RX ORDER — ISOSORBIDE MONONITRATE 30 MG/1
TABLET, EXTENDED RELEASE ORAL
Qty: 90 TABLET | Refills: 4 | Status: SHIPPED | OUTPATIENT
Start: 2021-12-03

## 2021-12-03 RX ORDER — GABAPENTIN 800 MG/1
800 TABLET ORAL 3 TIMES DAILY
Qty: 270 TABLET | Refills: 0 | Status: SHIPPED | OUTPATIENT
Start: 2021-12-03 | End: 2022-04-26 | Stop reason: SDUPTHER

## 2021-12-03 NOTE — TELEPHONE ENCOUNTER
Rx Refill Note  Requested Prescriptions     Pending Prescriptions Disp Refills   • gabapentin (NEURONTIN) 800 MG tablet 270 tablet 0     Sig: Take 1 tablet by mouth 3 (Three) Times a Day.      Last office visit with prescribing clinician: 9/27/2021      Next office visit with prescribing clinician: 3/28/2022            Arianna Vazquez Rep  12/03/21, 10:38 CST     Drug Therapy Appt 09/27/2021  Contract & UDS/NASREEN UDS 06/01/2021    Pit will call to schedule pain med 3 mos checkup-still has meds for now.

## 2021-12-09 ENCOUNTER — OFFICE VISIT (OUTPATIENT)
Dept: OBSTETRICS AND GYNECOLOGY | Facility: CLINIC | Age: 81
End: 2021-12-09

## 2021-12-09 VITALS
DIASTOLIC BLOOD PRESSURE: 60 MMHG | WEIGHT: 149 LBS | HEIGHT: 63 IN | SYSTOLIC BLOOD PRESSURE: 128 MMHG | BODY MASS INDEX: 26.4 KG/M2

## 2021-12-09 DIAGNOSIS — G89.29 CHRONIC BACK PAIN, UNSPECIFIED BACK LOCATION, UNSPECIFIED BACK PAIN LATERALITY: ICD-10-CM

## 2021-12-09 DIAGNOSIS — K59.09 CHRONIC CONSTIPATION: ICD-10-CM

## 2021-12-09 DIAGNOSIS — M54.9 CHRONIC BACK PAIN, UNSPECIFIED BACK LOCATION, UNSPECIFIED BACK PAIN LATERALITY: ICD-10-CM

## 2021-12-09 DIAGNOSIS — Z12.31 BREAST CANCER SCREENING BY MAMMOGRAM: ICD-10-CM

## 2021-12-09 DIAGNOSIS — J84.10 PULMONARY FIBROSIS: ICD-10-CM

## 2021-12-09 DIAGNOSIS — I48.91 ATRIAL FIBRILLATION, UNSPECIFIED TYPE: ICD-10-CM

## 2021-12-09 DIAGNOSIS — Z01.419 ENCOUNTER FOR GYNECOLOGICAL EXAMINATION WITHOUT ABNORMAL FINDING: Primary | ICD-10-CM

## 2021-12-09 DIAGNOSIS — R39.11 URINARY HESITANCY: ICD-10-CM

## 2021-12-09 PROCEDURE — G0101 CA SCREEN;PELVIC/BREAST EXAM: HCPCS | Performed by: OBSTETRICS & GYNECOLOGY

## 2021-12-11 ENCOUNTER — APPOINTMENT (OUTPATIENT)
Dept: CT IMAGING | Age: 81
End: 2021-12-11
Payer: MEDICARE

## 2021-12-11 ENCOUNTER — APPOINTMENT (OUTPATIENT)
Dept: GENERAL RADIOLOGY | Age: 81
End: 2021-12-11
Payer: MEDICARE

## 2021-12-11 ENCOUNTER — HOSPITAL ENCOUNTER (EMERGENCY)
Age: 81
Discharge: HOME | End: 2021-12-11
Attending: EMERGENCY MEDICINE
Payer: MEDICARE

## 2021-12-11 VITALS
OXYGEN SATURATION: 94 % | BODY MASS INDEX: 26.4 KG/M2 | SYSTOLIC BLOOD PRESSURE: 114 MMHG | HEART RATE: 75 BPM | DIASTOLIC BLOOD PRESSURE: 60 MMHG | WEIGHT: 149 LBS | RESPIRATION RATE: 20 BRPM | TEMPERATURE: 97.9 F | HEIGHT: 63 IN

## 2021-12-11 DIAGNOSIS — S01.01XA LACERATION OF SCALP, INITIAL ENCOUNTER: ICD-10-CM

## 2021-12-11 DIAGNOSIS — T14.90XA BLUNT TRAUMA: Primary | ICD-10-CM

## 2021-12-11 DIAGNOSIS — S20.229A CONTUSION OF BACK WALL OF THORAX, UNSPECIFIED LOCATION, INITIAL ENCOUNTER: ICD-10-CM

## 2021-12-11 DIAGNOSIS — Z79.01 ON CONTINUOUS ORAL ANTICOAGULATION: ICD-10-CM

## 2021-12-11 DIAGNOSIS — S82.841A BIMALLEOLAR FRACTURE, RIGHT, CLOSED, INITIAL ENCOUNTER: ICD-10-CM

## 2021-12-11 PROBLEM — T07.XXXA MULTIPLE TRAUMA: Status: ACTIVE | Noted: 2021-12-11

## 2021-12-11 LAB
ALBUMIN SERPL-MCNC: 3.2 G/DL (ref 3.5–5.2)
ALP BLD-CCNC: 80 U/L (ref 35–104)
ALT SERPL-CCNC: 11 U/L (ref 5–33)
ANION GAP SERPL CALCULATED.3IONS-SCNC: 11 MMOL/L (ref 7–19)
ANISOCYTOSIS: ABNORMAL
APTT: 35.1 SEC (ref 26–36.2)
AST SERPL-CCNC: 21 U/L (ref 5–32)
BACTERIA: NEGATIVE /HPF
BASOPHILS ABSOLUTE: 0.1 K/UL (ref 0–0.2)
BASOPHILS RELATIVE PERCENT: 0.3 % (ref 0–1)
BILIRUB SERPL-MCNC: 0.3 MG/DL (ref 0.2–1.2)
BILIRUBIN URINE: NEGATIVE
BLOOD, URINE: ABNORMAL
BUN BLDV-MCNC: 19 MG/DL (ref 8–23)
CALCIUM SERPL-MCNC: 9.1 MG/DL (ref 8.8–10.2)
CHLORIDE BLD-SCNC: 103 MMOL/L (ref 98–111)
CLARITY: CLEAR
CO2: 23 MMOL/L (ref 22–29)
COLOR: YELLOW
CREAT SERPL-MCNC: 1 MG/DL (ref 0.5–0.9)
CRYSTALS, UA: ABNORMAL /HPF
EOSINOPHILS ABSOLUTE: 0.1 K/UL (ref 0–0.6)
EOSINOPHILS RELATIVE PERCENT: 0.3 % (ref 0–5)
EPITHELIAL CELLS, UA: 1 /HPF (ref 0–5)
GFR AFRICAN AMERICAN: >59
GFR NON-AFRICAN AMERICAN: 53
GLUCOSE BLD-MCNC: 124 MG/DL (ref 74–109)
GLUCOSE URINE: NEGATIVE MG/DL
HCT VFR BLD CALC: 40.3 % (ref 37–47)
HEMOGLOBIN: 11.4 G/DL (ref 12–16)
HYALINE CASTS: 3 /HPF (ref 0–8)
IMMATURE GRANULOCYTES #: 0.1 K/UL
INR BLD: 3.18 (ref 0.88–1.18)
KETONES, URINE: NEGATIVE MG/DL
LEUKOCYTE ESTERASE, URINE: ABNORMAL
LYMPHOCYTES ABSOLUTE: 0.8 K/UL (ref 1.1–4.5)
LYMPHOCYTES RELATIVE PERCENT: 4.3 % (ref 20–40)
MACROCYTES: ABNORMAL
MCH RBC QN AUTO: 27.3 PG (ref 27–31)
MCHC RBC AUTO-ENTMCNC: 28.3 G/DL (ref 33–37)
MCV RBC AUTO: 96.4 FL (ref 81–99)
MONOCYTES ABSOLUTE: 1 K/UL (ref 0–0.9)
MONOCYTES RELATIVE PERCENT: 5.3 % (ref 0–10)
NEUTROPHILS ABSOLUTE: 16.2 K/UL (ref 1.5–7.5)
NEUTROPHILS RELATIVE PERCENT: 89.3 % (ref 50–65)
NITRITE, URINE: NEGATIVE
PDW BLD-RTO: 15.2 % (ref 11.5–14.5)
PH UA: 5.5 (ref 5–8)
PLATELET # BLD: 231 K/UL (ref 130–400)
PMV BLD AUTO: 10.5 FL (ref 9.4–12.3)
POTASSIUM REFLEX MAGNESIUM: 4.4 MMOL/L (ref 3.5–5)
PROTEIN UA: NEGATIVE MG/DL
PROTHROMBIN TIME: 32 SEC (ref 12–14.6)
RBC # BLD: 4.18 M/UL (ref 4.2–5.4)
RBC UA: 30 /HPF (ref 0–4)
SODIUM BLD-SCNC: 137 MMOL/L (ref 136–145)
SPECIFIC GRAVITY UA: 1.03 (ref 1–1.03)
TOTAL PROTEIN: 7.2 G/DL (ref 6.6–8.7)
UROBILINOGEN, URINE: 1 E.U./DL
WBC # BLD: 18.2 K/UL (ref 4.8–10.8)
WBC UA: 6 /HPF (ref 0–5)

## 2021-12-11 PROCEDURE — 12001 RPR S/N/AX/GEN/TRNK 2.5CM/<: CPT

## 2021-12-11 PROCEDURE — 73110 X-RAY EXAM OF WRIST: CPT

## 2021-12-11 PROCEDURE — 81001 URINALYSIS AUTO W/SCOPE: CPT

## 2021-12-11 PROCEDURE — 6370000000 HC RX 637 (ALT 250 FOR IP): Performed by: PHYSICIAN ASSISTANT

## 2021-12-11 PROCEDURE — 70450 CT HEAD/BRAIN W/O DYE: CPT

## 2021-12-11 PROCEDURE — 72125 CT NECK SPINE W/O DYE: CPT

## 2021-12-11 PROCEDURE — 73610 X-RAY EXAM OF ANKLE: CPT

## 2021-12-11 PROCEDURE — 36415 COLL VENOUS BLD VENIPUNCTURE: CPT

## 2021-12-11 PROCEDURE — 85610 PROTHROMBIN TIME: CPT

## 2021-12-11 PROCEDURE — 85025 COMPLETE CBC W/AUTO DIFF WBC: CPT

## 2021-12-11 PROCEDURE — 80053 COMPREHEN METABOLIC PANEL: CPT

## 2021-12-11 PROCEDURE — 90471 IMMUNIZATION ADMIN: CPT | Performed by: PHYSICIAN ASSISTANT

## 2021-12-11 PROCEDURE — 96375 TX/PRO/DX INJ NEW DRUG ADDON: CPT

## 2021-12-11 PROCEDURE — 29515 APPLICATION SHORT LEG SPLINT: CPT

## 2021-12-11 PROCEDURE — 74177 CT ABD & PELVIS W/CONTRAST: CPT

## 2021-12-11 PROCEDURE — 99283 EMERGENCY DEPT VISIT LOW MDM: CPT

## 2021-12-11 PROCEDURE — 71260 CT THORAX DX C+: CPT

## 2021-12-11 PROCEDURE — 96374 THER/PROPH/DIAG INJ IV PUSH: CPT

## 2021-12-11 PROCEDURE — 2500000003 HC RX 250 WO HCPCS: Performed by: PHYSICIAN ASSISTANT

## 2021-12-11 PROCEDURE — 6360000002 HC RX W HCPCS: Performed by: PHYSICIAN ASSISTANT

## 2021-12-11 PROCEDURE — 90715 TDAP VACCINE 7 YRS/> IM: CPT | Performed by: PHYSICIAN ASSISTANT

## 2021-12-11 PROCEDURE — 85730 THROMBOPLASTIN TIME PARTIAL: CPT

## 2021-12-11 PROCEDURE — 1210000000 HC MED SURG R&B

## 2021-12-11 RX ORDER — ONDANSETRON 2 MG/ML
4 INJECTION INTRAMUSCULAR; INTRAVENOUS ONCE
Status: COMPLETED | OUTPATIENT
Start: 2021-12-11 | End: 2021-12-11

## 2021-12-11 RX ORDER — LIDOCAINE HYDROCHLORIDE AND EPINEPHRINE 10; 10 MG/ML; UG/ML
20 INJECTION, SOLUTION INFILTRATION; PERINEURAL ONCE
Status: COMPLETED | OUTPATIENT
Start: 2021-12-11 | End: 2021-12-11

## 2021-12-11 RX ORDER — MORPHINE SULFATE 4 MG/ML
4 INJECTION, SOLUTION INTRAMUSCULAR; INTRAVENOUS ONCE
Status: COMPLETED | OUTPATIENT
Start: 2021-12-11 | End: 2021-12-11

## 2021-12-11 RX ADMIN — LIDOCAINE HYDROCHLORIDE AND EPINEPHRINE 20 ML: 10; 10 INJECTION, SOLUTION INFILTRATION; PERINEURAL at 00:48

## 2021-12-11 RX ADMIN — ONDANSETRON 4 MG: 2 INJECTION INTRAMUSCULAR; INTRAVENOUS at 03:19

## 2021-12-11 RX ADMIN — TETANUS TOXOID, REDUCED DIPHTHERIA TOXOID AND ACELLULAR PERTUSSIS VACCINE, ADSORBED 0.5 ML: 5; 2.5; 8; 8; 2.5 SUSPENSION INTRAMUSCULAR at 00:50

## 2021-12-11 RX ADMIN — Medication 3 ML: at 00:48

## 2021-12-11 RX ADMIN — MORPHINE SULFATE 4 MG: 4 INJECTION INTRAVENOUS at 03:19

## 2021-12-11 ASSESSMENT — ENCOUNTER SYMPTOMS
EYE DISCHARGE: 0
ABDOMINAL PAIN: 0
COUGH: 0
ABDOMINAL DISTENTION: 0
PHOTOPHOBIA: 0
EYE PAIN: 0
NAUSEA: 0
SORE THROAT: 0
BACK PAIN: 0
SHORTNESS OF BREATH: 0
RHINORRHEA: 0
APNEA: 0
COLOR CHANGE: 0

## 2021-12-11 ASSESSMENT — PAIN SCALES - GENERAL
PAINLEVEL_OUTOF10: 8
PAINLEVEL_OUTOF10: 5
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
## 2021-12-11 NOTE — ED NOTES
Radha Nichols, NP for Hospitalist stated that pt was c/o right wrist pain.  Gave verbal for x-ray of right wrist.      Michael Roldan RN  12/11/21 0495
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
## 2021-12-11 NOTE — PROGRESS NOTES
Pt was taken into her mothers room to be with her. Pt's mother's blood pressure has been slowly declining. Pt wanted to see her mom and her nurse took her in her stretcher to be with her mom. This  was present to provide support.      Electronically signed by [employer] on 12/11/2021 at 2:22 AM

## 2021-12-14 ENCOUNTER — OFFICE VISIT (OUTPATIENT)
Dept: FAMILY MEDICINE CLINIC | Facility: CLINIC | Age: 81
End: 2021-12-14

## 2021-12-14 VITALS
RESPIRATION RATE: 18 BRPM | BODY MASS INDEX: 26.4 KG/M2 | DIASTOLIC BLOOD PRESSURE: 78 MMHG | OXYGEN SATURATION: 97 % | HEIGHT: 63 IN | SYSTOLIC BLOOD PRESSURE: 108 MMHG | TEMPERATURE: 97.4 F | WEIGHT: 149 LBS | HEART RATE: 95 BPM

## 2021-12-14 DIAGNOSIS — R10.84 GENERALIZED ABDOMINAL PAIN: Primary | ICD-10-CM

## 2021-12-14 DIAGNOSIS — R07.9 CHEST PAIN, UNSPECIFIED TYPE: ICD-10-CM

## 2021-12-14 PROCEDURE — 99214 OFFICE O/P EST MOD 30 MIN: CPT | Performed by: FAMILY MEDICINE
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
## 2021-12-16 DIAGNOSIS — E61.1 LOW IRON: Primary | ICD-10-CM

## 2021-12-16 LAB
ALBUMIN SERPL-MCNC: 3.4 G/DL (ref 3.5–5.2)
ALBUMIN/GLOB SERPL: 1.1 G/DL
ALP SERPL-CCNC: 67 U/L (ref 39–117)
ALT SERPL-CCNC: 14 U/L (ref 1–33)
AST SERPL-CCNC: 20 U/L (ref 1–32)
BASOPHILS # BLD AUTO: 0.05 10*3/MM3 (ref 0–0.2)
BASOPHILS NFR BLD AUTO: 0.6 % (ref 0–1.5)
BILIRUB SERPL-MCNC: 0.7 MG/DL (ref 0–1.2)
BUN SERPL-MCNC: 24 MG/DL (ref 8–23)
BUN/CREAT SERPL: 25 (ref 7–25)
CALCIUM SERPL-MCNC: 8.5 MG/DL (ref 8.6–10.5)
CHLORIDE SERPL-SCNC: 106 MMOL/L (ref 98–107)
CO2 SERPL-SCNC: 25.9 MMOL/L (ref 22–29)
CREAT SERPL-MCNC: 0.96 MG/DL (ref 0.57–1)
EOSINOPHIL # BLD AUTO: 0.21 10*3/MM3 (ref 0–0.4)
EOSINOPHIL NFR BLD AUTO: 2.4 % (ref 0.3–6.2)
ERYTHROCYTE [DISTWIDTH] IN BLOOD BY AUTOMATED COUNT: 14.1 % (ref 12.3–15.4)
GLOBULIN SER CALC-MCNC: 3.1 GM/DL
GLUCOSE SERPL-MCNC: 131 MG/DL (ref 65–99)
HCT VFR BLD AUTO: 29.5 % (ref 34–46.6)
HGB BLD-MCNC: 9.3 G/DL (ref 12–15.9)
IMM GRANULOCYTES # BLD AUTO: 0.03 10*3/MM3 (ref 0–0.05)
IMM GRANULOCYTES NFR BLD AUTO: 0.3 % (ref 0–0.5)
LYMPHOCYTES # BLD AUTO: 1.01 10*3/MM3 (ref 0.7–3.1)
LYMPHOCYTES NFR BLD AUTO: 11.5 % (ref 19.6–45.3)
MCH RBC QN AUTO: 27.6 PG (ref 26.6–33)
MCHC RBC AUTO-ENTMCNC: 31.5 G/DL (ref 31.5–35.7)
MCV RBC AUTO: 87.5 FL (ref 79–97)
MONOCYTES # BLD AUTO: 0.9 10*3/MM3 (ref 0.1–0.9)
MONOCYTES NFR BLD AUTO: 10.2 % (ref 5–12)
NEUTROPHILS # BLD AUTO: 6.59 10*3/MM3 (ref 1.7–7)
NEUTROPHILS NFR BLD AUTO: 75 % (ref 42.7–76)
NRBC BLD AUTO-RTO: 0 /100 WBC (ref 0–0.2)
PLATELET # BLD AUTO: 284 10*3/MM3 (ref 140–450)
POTASSIUM SERPL-SCNC: 4 MMOL/L (ref 3.5–5.2)
PROT SERPL-MCNC: 6.5 G/DL (ref 6–8.5)
RBC # BLD AUTO: 3.37 10*6/MM3 (ref 3.77–5.28)
SODIUM SERPL-SCNC: 140 MMOL/L (ref 136–145)
WBC # BLD AUTO: 8.79 10*3/MM3 (ref 3.4–10.8)

## 2021-12-20 ENCOUNTER — APPOINTMENT (OUTPATIENT)
Dept: CT IMAGING | Facility: HOSPITAL | Age: 81
End: 2021-12-20

## 2021-12-21 ENCOUNTER — HOSPITAL ENCOUNTER (EMERGENCY)
Dept: HOSPITAL 102 - EXPTROY | Age: 81
Discharge: HOME | End: 2021-12-21
Payer: MEDICARE

## 2021-12-21 VITALS
SYSTOLIC BLOOD PRESSURE: 152 MMHG | OXYGEN SATURATION: 100 % | DIASTOLIC BLOOD PRESSURE: 74 MMHG | HEART RATE: 82 BPM | TEMPERATURE: 98.6 F | RESPIRATION RATE: 16 BRPM

## 2021-12-21 DIAGNOSIS — S01.01XD: Primary | ICD-10-CM
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
## 2021-12-21 PROCEDURE — 99211 OFF/OP EST MAY X REQ PHY/QHP: CPT

## 2021-12-21 PROCEDURE — G0463 HOSPITAL OUTPT CLINIC VISIT: HCPCS

## 2021-12-30 ENCOUNTER — OFFICE VISIT (OUTPATIENT)
Dept: PULMONOLOGY | Facility: CLINIC | Age: 81
End: 2021-12-30

## 2021-12-30 ENCOUNTER — APPOINTMENT (OUTPATIENT)
Dept: MAMMOGRAPHY | Facility: HOSPITAL | Age: 81
End: 2021-12-30

## 2021-12-30 VITALS
DIASTOLIC BLOOD PRESSURE: 70 MMHG | OXYGEN SATURATION: 93 % | SYSTOLIC BLOOD PRESSURE: 134 MMHG | HEART RATE: 72 BPM | HEIGHT: 63 IN | BODY MASS INDEX: 26.58 KG/M2 | WEIGHT: 150 LBS

## 2021-12-30 DIAGNOSIS — R05.3 CHRONIC COUGH: ICD-10-CM

## 2021-12-30 DIAGNOSIS — J84.112 USUAL INTERSTITIAL PNEUMONITIS (HCC): Primary | ICD-10-CM

## 2021-12-30 DIAGNOSIS — M05.79 RHEUMATOID ARTHRITIS INVOLVING MULTIPLE SITES WITH POSITIVE RHEUMATOID FACTOR (HCC): ICD-10-CM

## 2021-12-30 DIAGNOSIS — J98.4 RESTRICTIVE LUNG DISEASE: ICD-10-CM

## 2021-12-30 PROCEDURE — 99214 OFFICE O/P EST MOD 30 MIN: CPT | Performed by: INTERNAL MEDICINE
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
## 2022-01-12 ENCOUNTER — OFFICE VISIT (OUTPATIENT)
Dept: PODIATRY | Facility: CLINIC | Age: 82
End: 2022-01-12

## 2022-01-12 VITALS — HEIGHT: 63 IN | BODY MASS INDEX: 26.58 KG/M2 | WEIGHT: 150 LBS

## 2022-01-12 DIAGNOSIS — Z79.01 ANTICOAGULANT LONG-TERM USE: ICD-10-CM

## 2022-01-12 DIAGNOSIS — G62.89 OTHER POLYNEUROPATHY: ICD-10-CM

## 2022-01-12 DIAGNOSIS — M20.42 HAMMER TOE OF LEFT FOOT: ICD-10-CM

## 2022-01-12 DIAGNOSIS — B35.1 ONYCHOMYCOSIS: Primary | ICD-10-CM

## 2022-01-12 PROCEDURE — 11721 DEBRIDE NAIL 6 OR MORE: CPT | Performed by: PODIATRIST

## 2022-01-12 PROCEDURE — 99212 OFFICE O/P EST SF 10 MIN: CPT | Performed by: PODIATRIST

## 2022-01-28 ENCOUNTER — HOSPITAL ENCOUNTER (OUTPATIENT)
Dept: CARDIOLOGY | Facility: HOSPITAL | Age: 82
Discharge: HOME | End: 2022-01-28
Admitting: INTERNAL MEDICINE

## 2022-01-28 DIAGNOSIS — R06.09 DOE (DYSPNEA ON EXERTION): ICD-10-CM

## 2022-01-28 PROCEDURE — 93356 MYOCRD STRAIN IMG SPCKL TRCK: CPT | Performed by: INTERNAL MEDICINE

## 2022-01-28 PROCEDURE — 93356 MYOCRD STRAIN IMG SPCKL TRCK: CPT

## 2022-01-28 PROCEDURE — 93306 TTE W/DOPPLER COMPLETE: CPT

## 2022-01-28 PROCEDURE — 93306 TTE W/DOPPLER COMPLETE: CPT | Performed by: INTERNAL MEDICINE

## 2022-01-30 LAB
BH CV ECHO MEAS - ACS: 1.5 CM
BH CV ECHO MEAS - AO MAX PG (FULL): 6 MMHG
BH CV ECHO MEAS - AO MAX PG: 9.5 MMHG
BH CV ECHO MEAS - AO MEAN PG (FULL): 3 MMHG
BH CV ECHO MEAS - AO MEAN PG: 5 MMHG
BH CV ECHO MEAS - AO ROOT AREA (BSA CORRECTED): 1.5
BH CV ECHO MEAS - AO ROOT AREA: 5.3 CM^2
BH CV ECHO MEAS - AO ROOT DIAM: 2.6 CM
BH CV ECHO MEAS - AO V2 MAX: 154 CM/SEC
BH CV ECHO MEAS - AO V2 MEAN: 107 CM/SEC
BH CV ECHO MEAS - AO V2 VTI: 38.6 CM
BH CV ECHO MEAS - AVA(I,A): 2.2 CM^2
BH CV ECHO MEAS - AVA(I,D): 2.2 CM^2
BH CV ECHO MEAS - AVA(V,A): 2.1 CM^2
BH CV ECHO MEAS - AVA(V,D): 2.1 CM^2
BH CV ECHO MEAS - BSA(HAYCOCK): 1.7 M^2
BH CV ECHO MEAS - BSA: 1.7 M^2
BH CV ECHO MEAS - BZI_BMI: 27.4 KILOGRAMS/M^2
BH CV ECHO MEAS - BZI_METRIC_HEIGHT: 157.5 CM
BH CV ECHO MEAS - BZI_METRIC_WEIGHT: 68 KG
BH CV ECHO MEAS - EDV(CUBED): 48.6 ML
BH CV ECHO MEAS - EDV(MOD-SP2): 70.3 ML
BH CV ECHO MEAS - EDV(MOD-SP4): 79.4 ML
BH CV ECHO MEAS - EDV(TEICH): 56.3 ML
BH CV ECHO MEAS - EF(CUBED): 74.7 %
BH CV ECHO MEAS - EF(MOD-SP2): 70.1 %
BH CV ECHO MEAS - EF(MOD-SP4): 68.3 %
BH CV ECHO MEAS - EF(TEICH): 67.4 %
BH CV ECHO MEAS - ESV(CUBED): 12.3 ML
BH CV ECHO MEAS - ESV(MOD-SP2): 21 ML
BH CV ECHO MEAS - ESV(MOD-SP4): 25.2 ML
BH CV ECHO MEAS - ESV(TEICH): 18.3 ML
BH CV ECHO MEAS - FS: 36.7 %
BH CV ECHO MEAS - IVS/LVPW: 1.2
BH CV ECHO MEAS - IVSD: 0.95 CM
BH CV ECHO MEAS - LA DIMENSION: 3.1 CM
BH CV ECHO MEAS - LA/AO: 1.2
BH CV ECHO MEAS - LAT PEAK E' VEL: 6.9 CM/SEC
BH CV ECHO MEAS - LV DIASTOLIC VOL/BSA (35-75): 46.9 ML/M^2
BH CV ECHO MEAS - LV MASS(C)D: 88.3 GRAMS
BH CV ECHO MEAS - LV MASS(C)DI: 52.2 GRAMS/M^2
BH CV ECHO MEAS - LV MAX PG: 3.5 MMHG
BH CV ECHO MEAS - LV MEAN PG: 2 MMHG
BH CV ECHO MEAS - LV SYSTOLIC VOL/BSA (12-30): 14.9 ML/M^2
BH CV ECHO MEAS - LV V1 MAX: 93.1 CM/SEC
BH CV ECHO MEAS - LV V1 MEAN: 66.3 CM/SEC
BH CV ECHO MEAS - LV V1 VTI: 24.2 CM
BH CV ECHO MEAS - LVIDD: 3.7 CM
BH CV ECHO MEAS - LVIDS: 2.3 CM
BH CV ECHO MEAS - LVLD AP2: 7.4 CM
BH CV ECHO MEAS - LVLD AP4: 7.4 CM
BH CV ECHO MEAS - LVLS AP2: 5.9 CM
BH CV ECHO MEAS - LVLS AP4: 6.3 CM
BH CV ECHO MEAS - LVOT AREA (M): 3.5 CM^2
BH CV ECHO MEAS - LVOT AREA: 3.5 CM^2
BH CV ECHO MEAS - LVOT DIAM: 2.1 CM
BH CV ECHO MEAS - LVPWD: 0.76 CM
BH CV ECHO MEAS - MED PEAK E' VEL: 4.46 CM/SEC
BH CV ECHO MEAS - MR MAX PG: 86 MMHG
BH CV ECHO MEAS - MR MAX VEL: 463 CM/SEC
BH CV ECHO MEAS - MV A MAX VEL: 101 CM/SEC
BH CV ECHO MEAS - MV DEC SLOPE: 327 CM/SEC^2
BH CV ECHO MEAS - MV DEC TIME: 0.28 SEC
BH CV ECHO MEAS - MV E MAX VEL: 79.1 CM/SEC
BH CV ECHO MEAS - MV E/A: 0.78
BH CV ECHO MEAS - MV MAX PG: 4.4 MMHG
BH CV ECHO MEAS - MV MEAN PG: 2 MMHG
BH CV ECHO MEAS - MV P1/2T MAX VEL: 100 CM/SEC
BH CV ECHO MEAS - MV P1/2T: 89.6 MSEC
BH CV ECHO MEAS - MV V2 MAX: 105 CM/SEC
BH CV ECHO MEAS - MV V2 MEAN: 66.2 CM/SEC
BH CV ECHO MEAS - MV V2 VTI: 37 CM
BH CV ECHO MEAS - MVA P1/2T LCG: 2.2 CM^2
BH CV ECHO MEAS - MVA(P1/2T): 2.5 CM^2
BH CV ECHO MEAS - MVA(VTI): 2.3 CM^2
BH CV ECHO MEAS - RAP SYSTOLE: 10 MMHG
BH CV ECHO MEAS - RVDD: 3.3 CM
BH CV ECHO MEAS - RVSP: 36.8 MMHG
BH CV ECHO MEAS - SI(AO): 121.2 ML/M^2
BH CV ECHO MEAS - SI(CUBED): 21.5 ML/M^2
BH CV ECHO MEAS - SI(LVOT): 49.6 ML/M^2
BH CV ECHO MEAS - SI(MOD-SP2): 29.1 ML/M^2
BH CV ECHO MEAS - SI(MOD-SP4): 32 ML/M^2
BH CV ECHO MEAS - SI(TEICH): 22.4 ML/M^2
BH CV ECHO MEAS - SV(AO): 204.9 ML
BH CV ECHO MEAS - SV(CUBED): 36.3 ML
BH CV ECHO MEAS - SV(LVOT): 83.8 ML
BH CV ECHO MEAS - SV(MOD-SP2): 49.3 ML
BH CV ECHO MEAS - SV(MOD-SP4): 54.2 ML
BH CV ECHO MEAS - SV(TEICH): 37.9 ML
BH CV ECHO MEAS - TR MAX VEL: 259 CM/SEC
BH CV ECHO MEASUREMENTS AVERAGE E/E' RATIO: 13.93
LEFT ATRIUM VOLUME INDEX: 32.7 ML/M2
LEFT ATRIUM VOLUME: 55.2 CM3
MAXIMAL PREDICTED HEART RATE: 141 BPM
STRESS TARGET HR: 120 BPM

## 2022-02-15 ENCOUNTER — TELEPHONE (OUTPATIENT)
Dept: OBSTETRICS AND GYNECOLOGY | Facility: CLINIC | Age: 82
End: 2022-02-15

## 2022-02-15 ENCOUNTER — OFFICE VISIT (OUTPATIENT)
Dept: FAMILY MEDICINE CLINIC | Facility: CLINIC | Age: 82
End: 2022-02-15

## 2022-02-15 VITALS
TEMPERATURE: 97.8 F | DIASTOLIC BLOOD PRESSURE: 70 MMHG | RESPIRATION RATE: 18 BRPM | HEIGHT: 63 IN | WEIGHT: 149 LBS | SYSTOLIC BLOOD PRESSURE: 138 MMHG | BODY MASS INDEX: 26.4 KG/M2

## 2022-02-15 DIAGNOSIS — R52 PAIN: ICD-10-CM

## 2022-02-15 PROCEDURE — 99213 OFFICE O/P EST LOW 20 MIN: CPT | Performed by: FAMILY MEDICINE

## 2022-02-15 RX ORDER — HYDROCODONE BITARTRATE AND ACETAMINOPHEN 10; 325 MG/1; MG/1
1 TABLET ORAL EVERY 6 HOURS PRN
Qty: 90 TABLET | Refills: 0 | Status: SHIPPED | OUTPATIENT
Start: 2022-02-15 | End: 2022-03-25 | Stop reason: SDUPTHER

## 2022-02-15 NOTE — TELEPHONE ENCOUNTER
----- Message from Ronit Schroeder MD sent at 2/13/2022 11:18 AM CST -----  Patient reported that she had an upcoming mammogram scheduled, when she was in the office last time.  We have not, however, received a copy of a mammogram since that visit.  Can you check with the patient and see if she had it done?  Or whether we might need to send a new order if she was unable to make it to that appointment?  Thank you

## 2022-02-15 NOTE — PROGRESS NOTES
Subjective   Ariana Gutierrez is a 81 y.o. female.     81-year-old female with history of chronic back pain disc disease severe scoliosis and recent fracture of right ankle      The following portions of the patient's history were reviewed and updated as appropriate: allergies, current medications, past family history, past medical history, past social history, past surgical history and problem list.    Review of Systems   Respiratory: Positive for shortness of breath.         Probable pulmonary rheumatoid, i.e. pulmonary fibrosis   Cardiovascular: Negative for chest pain and leg swelling.   Musculoskeletal: Positive for arthralgias and back pain.        History of rheumatoid arthritis       Objective   Physical Exam  Vitals and nursing note reviewed.   Constitutional:       Appearance: Normal appearance.   Cardiovascular:      Rate and Rhythm: Normal rate and regular rhythm.      Pulses: Normal pulses.      Heart sounds: Normal heart sounds.   Pulmonary:      Effort: Pulmonary effort is normal.      Breath sounds: Normal breath sounds.   Musculoskeletal:         General: Deformity present.      Comments: Walking boot right ankle--severe thoracolumbar lumbar scoliosis of spine-degenerative   Skin:     General: Skin is warm and dry.   Neurological:      General: No focal deficit present.      Mental Status: She is alert and oriented to person, place, and time.   Psychiatric:         Mood and Affect: Mood normal.         Behavior: Behavior normal.         Thought Content: Thought content normal.         Judgment: Judgment normal.       CONTROLLED SUBSTANCE TRACKING 8/5/2019 11/11/2019 5/11/2020 8/17/2020 6/1/2021 9/27/2021 2/15/2022   Last Pro 8/5/2019 11/11/2019 5/11/2020 8/17/2020 6/1/2021 9/27/2021 2/15/2022   Report Number [id number] - - -   Last UDS 8/5/2019 8/16/2019 8/5/2019 8/5/2019 6/1/2021 6/1/2021 6/1/2021   Last Controlled Substance Agreement 8/5/2019 8/16/2019 8/5/2019  8/17/2020 6/1/2021 6/1/2021 6/1/2021       Assessment/Plan   Diagnoses and all orders for this visit:    1. Pain  -     HYDROcodone-acetaminophen (NORCO)  MG per tablet; Take 1 tablet by mouth Every 6 (Six) Hours As Needed for Moderate Pain .  Dispense: 90 tablet; Refill: 0         Get yearly to move either to [place] or [place]
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
## 2022-03-08 ENCOUNTER — HOSPITAL ENCOUNTER (OUTPATIENT)
Dept: VASCULAR LAB | Age: 82
Discharge: HOME | End: 2022-03-08
Payer: MEDICARE

## 2022-03-08 ENCOUNTER — OFFICE VISIT (OUTPATIENT)
Dept: VASCULAR SURGERY | Age: 82
End: 2022-03-08
Payer: MEDICARE

## 2022-03-08 VITALS
OXYGEN SATURATION: 96 % | HEIGHT: 63 IN | SYSTOLIC BLOOD PRESSURE: 153 MMHG | WEIGHT: 150 LBS | BODY MASS INDEX: 26.58 KG/M2 | TEMPERATURE: 97.8 F | DIASTOLIC BLOOD PRESSURE: 75 MMHG | HEART RATE: 69 BPM

## 2022-03-08 DIAGNOSIS — I73.9 PVD (PERIPHERAL VASCULAR DISEASE) (HCC): ICD-10-CM

## 2022-03-08 DIAGNOSIS — I73.9 PVD (PERIPHERAL VASCULAR DISEASE) (HCC): Primary | ICD-10-CM

## 2022-03-08 PROCEDURE — G8417 CALC BMI ABV UP PARAM F/U: HCPCS | Performed by: PHYSICIAN ASSISTANT

## 2022-03-08 PROCEDURE — 93922 UPR/L XTREMITY ART 2 LEVELS: CPT

## 2022-03-08 PROCEDURE — 1090F PRES/ABSN URINE INCON ASSESS: CPT | Performed by: PHYSICIAN ASSISTANT

## 2022-03-08 PROCEDURE — 4040F PNEUMOC VAC/ADMIN/RCVD: CPT | Performed by: PHYSICIAN ASSISTANT

## 2022-03-08 PROCEDURE — G8400 PT W/DXA NO RESULTS DOC: HCPCS | Performed by: PHYSICIAN ASSISTANT

## 2022-03-08 PROCEDURE — 1123F ACP DISCUSS/DSCN MKR DOCD: CPT | Performed by: PHYSICIAN ASSISTANT

## 2022-03-08 PROCEDURE — 99214 OFFICE O/P EST MOD 30 MIN: CPT | Performed by: PHYSICIAN ASSISTANT

## 2022-03-08 PROCEDURE — G8427 DOCREV CUR MEDS BY ELIG CLIN: HCPCS | Performed by: PHYSICIAN ASSISTANT

## 2022-03-08 PROCEDURE — 1036F TOBACCO NON-USER: CPT | Performed by: PHYSICIAN ASSISTANT

## 2022-03-08 PROCEDURE — G8484 FLU IMMUNIZE NO ADMIN: HCPCS | Performed by: PHYSICIAN ASSISTANT

## 2022-03-08 PROCEDURE — 93926 LOWER EXTREMITY STUDY: CPT

## 2022-03-08 RX ORDER — DRONEDARONE 400 MG/1
TABLET, FILM COATED ORAL
COMMUNITY
Start: 2022-03-01

## 2022-03-08 RX ORDER — RIVAROXABAN 20 MG/1
TABLET, FILM COATED ORAL
COMMUNITY
Start: 2021-12-11

## 2022-03-08 NOTE — PROGRESS NOTES
Patient Care Team:  Israel Shepard MD as PCP - General  Clarke Bermudez as Audiologist (Audiology)  Destiny Casarez MD as Consulting Physician (Vascular Surgery)      History and Physical  Mrs. Laura Romero is a 65-year-old female who has a past medical history that includes A. fib, GERD, hypertension, pulmonary fibrosis, RA, and PVD. Today we are following up for her PVD. Currently she is on Xarelto 20 mg and Lipitor 20 mg daily. She reports neuropathy in her right lower extremity. Also has a right fractured ankle for which she has been wearing a boot. She denies any lifestyle limiting claudication, ischemic rest pain or nonhealing wounds on her lower extremities. She does not smoke.     Gerson Score is a [de-identified] y.o. female with the following history reviewed and recorded in [facility]:  Patient Active Problem List    Diagnosis Date Noted    Multiple trauma 12/11/2021    PVD (peripheral vascular disease) ([facility] [address] 04/26/2018    Personal history of DVT (deep vein thrombosis) 10/18/2017    Atheromatous plaque 06/04/2017    Iron deficiency anemia 06/01/2017    Gastroesophageal reflux disease without esophagitis 05/31/2017    Constipation due to opioid therapy 05/31/2017    Popliteal artery thrombosis, right ([address]     History of diverticulosis 08/16/2016    Small intestinal bacterial overgrowth 12/18/2014    Irritable bowel syndrome with constipation 11/06/2014    Bloating 11/06/2014    Gas pain 11/06/2014     Updating deleted diagnoses      Spinal stenosis 02/17/2014    IBS (irritable bowel syndrome) 11/02/2011    HTN (hypertension) 11/02/2011     Current Outpatient Medications   Medication Sig Dispense Refill    MULTAQ 400 MG TABS TAKE 1 TABLET BY MOUTH TWICE DAILY WITH MEALS      atorvastatin (LIPITOR) 20 MG tablet Monitored by PCP      metoprolol succinate (TOPROL XL) 50 MG extended release tablet       nitroGLYCERIN (NITROSTAT) 0.4 MG SL tablet Place 0.4 mg under the tongue every 5 minutes as fibrosis ([facility] [address]     Rheumatoid arteritis ([facility] [address]     Schatzki's ring     Scoliosis     Scoliosis      Past Surgical History:   Procedure Laterality Date    ARTERIOGRAM Right 5/30/2017    RIGHT LOWER EXTREMITY ANGIOGRAM; REPAIR OF RIGHT POPLITEAL ARTERY WITH INTERPOSITIONAL LEFT GREATER SAPHENOUS VEIN GRAFT; REPAIR OF RIGHT POPLITEAL VEIN performed by Oleg Benitez MD at [address] ARTERIOGRAM Right 5/30/2017    ARTERIOGRAM possible thrombectomy right lower extremity, possible bypass performed by Michael Lanier MD at [address]      BG Biopsy    COLONOSCOPY  11/29/2011        COLONOSCOPY N/A 3/6/2018    Dr Velez Feeling surgical changes, melanosis coli-Mucosa    COLOSTOMY      EYE SURGERY      cataract both eyes    JOINT REPLACEMENT      RTK    KIDNEY STONE SURGERY      OTHER SURGICAL HISTORY  5/2015    perforated bowel surgery    OTHER SURGICAL HISTORY  09/2018    Ablation on back     OVARIAN CYST REMOVAL      UT INCIS/DRAIN THIGH/KNEE ABSCESS,DEEP Left 10/6/2017    KNEE INCISION AND DRAINAGE performed by Michael Lanier MD at [facility] Right 5/30/2017    KNEE HARDWARE REMOVAL AND INSERTION OF ANTIBIOTIC BEAD  performed by Michael Lanier MD at [facility] ENDOSCOPY  ? [facility]     Family History   Problem Relation Age of Onset    Cancer Mother     Colon Polyps Neg Hx     Liver Cancer Neg Hx     Liver Disease Neg Hx     Stomach Cancer Neg Hx     Rectal Cancer Neg Hx     Esophageal Cancer Neg Hx     Colon Cancer Neg Hx      Social History     Tobacco Use    Smoking status: Never Smoker    Smokeless tobacco: Never Used   Substance Use Topics    Alcohol use: No       Review of Systems    Constitutional  no significant activity change, appetite change, or unexpected weight change. No fever or chills. No diaphoresis or significant fatigue.   HENT  no significant rhinorrhea or epistaxis. No tinnitus or significant hearing loss. Eyes  no sudden vision change or amaurosis. Respiratory  no significant shortness of breath, wheezing, or stridor. No apnea, cough, or chest tightness associated with shortness of breath. Cardiovascular  no chest pain, syncope, or significant dizziness. No palpitations. Slight swelling right ankle. (see HPI)  Gastrointestinal  no abdominal swelling or pain. No blood in stool. No severe constipation, diarrhea, nausea, or vomiting. Genitourinary  No difficulty urinating, dysuria, frequency, or urgency. No flank pain or hematuria. Musculoskeletal  no back pain, gait disturbance, or myalgia. Right ankle pain  Skin  no color change, rash, pallor, or new wound. Neurologic  no dizziness, facial asymmetry, or light headedness. No seizures. No speech difficulty or lateralizing weakness. Hematologic  no easy bruising or excessive bleeding. Psychiatric  no severe anxiety or nervousness. No confusion. All other review of systems are negative. Physical Exam    BP (!) 153/75   Pulse 69   Temp 97.8 °F (36.6 °C) (Temporal)   Ht 5' 3\" (1.6 m)   Wt 150 lb (68 kg)   SpO2 96%   BMI 26.57 kg/m²     Constitutional  well developed, well nourished. No diaphoresis or acute distress. HENT  head normocephalic. Right external ear canal appears normal.  Left external ear canal appears normal.  Septum appears midline. Eyes  conjunctiva normal.  EOMS normal.  No exudate. No icterus. Neck- ROM appears normal, no tracheal deviation. Cardiovascular  Regular rate and rhythm. Heart sounds are normal.  No murmur, rub, or gallop. Carotid pulses are 2+ to palpation bilaterally without bruit. Extremities - Radial and ulnar pulses are 2+ to palpation bilaterally. DP and PT pulses are palpable. No cyanosis, clubbing, or significant edema. No signs atheroembolic event. Pulmonary  effort appears normal.  No respiratory distress. Lungs - Breath sounds normal. No wheezes or rales. GI - Abdomen  soft, non tender, bowel sounds X 4 quadrants. No guarding or rebound tenderness. No distension or palpable mass. Genitourinary  deferred. Musculoskeletal  ROM appears normal.  No significant edema. Neurologic  alert and oriented X 3. Physiologic. Skin  warm, dry, and intact. No rash, erythema, or pallor. Psychiatric  mood, affect, and behavior appear normal.  Judgment and thought processes appear normal.    Risk factors for atherosclerosis of all vascular beds have been reviewed with the patient including:  Family history, tobacco abuse in all forms, elevated cholesterol, hyperlipidemia, and diabetes. Rightlower extremity arterial scan:       Impression        There is a developing stenosis in the mid popliteal artery on he right.        Signature        ----------------------------------------------------------------    Electronically signed by Germán Calvillo(Interpreting    physician) on 03/08/2022 12:23 PM    ----------------------------------------------------------------       Velocities are measured in cm/s ; Diameters are measured in mm       LE Duplex Measurements       +---------------++-----+-----+----+-----------++---+-----+---+-------------+   !               ! ! Right!     !Left!           !!   !     !   !             !   +---------------++-----+-----+----+-----------++---+-----+---+-------------+   ! Location       !!PSV  !Ratio! EDV ! Wave Desc. !!PSV! Ratio! EDV! Wave Desc.   !   +---------------++-----+-----+----+-----------++---+-----+---+-------------+   ! Common Femoral !!137  !     !    !           !!   !     !   !             !   +---------------++-----+-----+----+-----------++---+-----+---+-------------+   ! Prox PFA       !!66.4 !     !    !           !!   !     !   !             !   +---------------++-----+-----+----+-----------++---+-----+---+-------------+   ! Prox SFA       !!93.8 !     !    !           !!   !     !   !             !   +---------------++-----+-----+----+-----------++---+-----+---+-------------+   ! Mid SFA        !!136  !1.45 !13. 4!           !!   !     !   !             !   +---------------++-----+-----+----+-----------++---+-----+---+-------------+   ! Dist SFA       !!163  !1.2  !17. 3!           !!   !     !   !             !   +---------------++-----+-----+----+-----------++---+-----+---+-------------+   ! Prox Popliteal !!113  !0.69 !    !           !!   !     !   !             !   +---------------++-----+-----+----+-----------++---+-----+---+-------------+   ! Mid Popliteal  !!611  !2.5  !39. 3!           !!   !     !   !             !   +---------------++-----+-----+----+-----------++---+-----+---+-------------+   ! Dist Popliteal !!89.7 !0.32 !    !           !!   !     !   !             !   +---------------++-----+-----+----+-----------++---+-----+---+-------------+   ! Mid PTA        !!59.7 !0.67 !    !           !!   !     !   !             !   +---------------++-----+-----+----+-----------++---+-----+---+-------------+   ! Mid WINDY        !!122  !1.36 !18. 9!           !!   !     !   !             !   +---------------++-----+-----+----+-----------++---+-----+---+-------------+   ! Mid Peroneal   !!68   !0.76 !    !           !!   !     !   !             !   +---------------++-----+-----+----+-----------++---+-----+---+-------------+     Right LINDA 0.99, Left LINDA 1.09. Individual films reviewed: Yes. Test results were reviewed and compared to previous study with a developing stenosis noted in the mid popliteal artery. Results were discussed with the patient. Options were discussed with the patient including continued medical management versus proceeding with angiography and potential intervention for PVD.  Risks have been discussed with the patient including but not limited to MI, death, CVA, bleed, nerve injury, infection, contrast nephropathy, possible need for dialysis, and need for further surgery. At this time the patient declined angiogram.       Assessment      1. PVD (peripheral vascular disease) ([facility] [address]          Plan      Xarelto per cardiology  Recommend she continue Lipitor 20 mg daily  Recommend no smoking  Recommend low-fat low-cholesterol diet in moderation  Recommend she walk is much as possible. We will follow-up in 6 months with a repeat right A-scan with ABIs. or sooner if claudication worsens/develops, develops new wound or IRP. ([place] is planning on moving it to [place] in the near future to be closer to her daughter. We will plan on scheduling a 6-month follow-up in case she is still in the area. I explained to her that she did need to get a vascular surgeon lined up fairly quickly once she got moved continue to follow her PVD.   She verbalized understanding)

## 2022-03-24 ENCOUNTER — OFFICE VISIT (OUTPATIENT)
Dept: CARDIOLOGY | Facility: CLINIC | Age: 82
End: 2022-03-24

## 2022-03-24 VITALS
HEART RATE: 64 BPM | BODY MASS INDEX: 25.61 KG/M2 | SYSTOLIC BLOOD PRESSURE: 150 MMHG | WEIGHT: 150 LBS | HEIGHT: 64 IN | DIASTOLIC BLOOD PRESSURE: 78 MMHG

## 2022-03-24 DIAGNOSIS — R06.09 DOE (DYSPNEA ON EXERTION): ICD-10-CM

## 2022-03-24 DIAGNOSIS — I10 ESSENTIAL HYPERTENSION: ICD-10-CM

## 2022-03-24 DIAGNOSIS — I48.0 PAF (PAROXYSMAL ATRIAL FIBRILLATION): ICD-10-CM

## 2022-03-24 DIAGNOSIS — R06.02 SHORTNESS OF BREATH: Primary | ICD-10-CM

## 2022-03-24 DIAGNOSIS — I25.10 CORONARY ARTERY DISEASE INVOLVING NATIVE CORONARY ARTERY OF NATIVE HEART WITHOUT ANGINA PECTORIS: ICD-10-CM

## 2022-03-24 PROCEDURE — 99214 OFFICE O/P EST MOD 30 MIN: CPT | Performed by: INTERNAL MEDICINE

## 2022-03-24 PROCEDURE — 93000 ELECTROCARDIOGRAM COMPLETE: CPT | Performed by: INTERNAL MEDICINE
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
## 2022-03-25 DIAGNOSIS — R52 PAIN: ICD-10-CM

## 2022-03-25 RX ORDER — HYDROCODONE BITARTRATE AND ACETAMINOPHEN 10; 325 MG/1; MG/1
1 TABLET ORAL EVERY 6 HOURS PRN
Qty: 90 TABLET | Refills: 0 | Status: SHIPPED | OUTPATIENT
Start: 2022-03-25 | End: 2022-04-26 | Stop reason: SDUPTHER

## 2022-03-25 NOTE — TELEPHONE ENCOUNTER
Rx Refill Note  Requested Prescriptions     Pending Prescriptions Disp Refills   • HYDROcodone-acetaminophen (NORCO)  MG per tablet 90 tablet 0     Sig: Take 1 tablet by mouth Every 6 (Six) Hours As Needed for Moderate Pain .      Last office visit with prescribing clinician: 2/15/2022      Next office visit with prescribing clinician: 5/16/2022            Arianna Vazquez Rep  03/25/22, 11:08 CDT       Drug Therapy Appt 02/15/2022  Contract & UDS/NASREEN UDS 06/01/2021

## 2022-04-04 ENCOUNTER — HOSPITAL ENCOUNTER (OUTPATIENT)
Dept: CT IMAGING | Facility: HOSPITAL | Age: 82
Discharge: HOME | End: 2022-04-04
Admitting: INTERNAL MEDICINE

## 2022-04-04 DIAGNOSIS — J84.112 USUAL INTERSTITIAL PNEUMONITIS: ICD-10-CM

## 2022-04-04 DIAGNOSIS — J98.4 RESTRICTIVE LUNG DISEASE: ICD-10-CM

## 2022-04-04 DIAGNOSIS — M05.79 RHEUMATOID ARTHRITIS INVOLVING MULTIPLE SITES WITH POSITIVE RHEUMATOID FACTOR: ICD-10-CM

## 2022-04-04 PROCEDURE — 71250 CT THORAX DX C-: CPT

## 2022-04-05 NOTE — PROGRESS NOTES
Please call the patient regarding her abnormal result. Scarring is still there, no change from the last scan last year.  Keep follow up to discuss further

## 2022-04-06 NOTE — PROGRESS NOTES
[facility] - PODIATRY    Today's Date: 04/07/22    Patient Name: Ariana Gutierrez  MRN: [id number]  CSN: [id number]  PCP: Bill Schilling MD  Referring Provider: No ref. provider found    SUBJECTIVE     Chief Complaint   Patient presents with   • Follow-up     Bill Schilling MD pcp12/14/2021 3 MO FOLLOW UP nail care- pt states feet doing ok, came out of boot from the broken ankle last week (dr garcia pt)- pt denies pain- pt presents with long thick nails     HPI: Ariana Gutierrez, a 82 y.o.female, comes to clinic as a(n) estalished patient complaining of painful toenails. Patient has h/o CAD, back pain, DVT, GERD, HTN, IBS, paresthesia, perforated bowel, rheumatoid arteritis, scoliosis, spinal stenosis, peripheral vascular disease. Patient is non-diabetic. Relates that in 2018 she had a revisional knee replacement that had a post-op infection and relates nerve damage from the procedure.  Relates chronic numbness, tingling, and burning to the right lower extremity that roughly starts at her medial ankle and towards the plantar aspect of her foot.  She is seen several specialist about this which have attempted CMO, nerve conduction test, and nerve medication with only minimal improvement.  Relates that her toenails are long, thick, and discolored.  She has difficulty caring for them herself.  Takes Xarelto. Notes that her ankle is now healed and she is out of the CAM boot.  Denies pain. Relates previous treatment(s) including foot care by podiatry, CMO, gabapentin. Denies any constitutional symptoms. No other pedal complaints at this time.    Past Medical History:   Diagnosis Date   • Atrial fibrillation (HCC)    • Bacterial infection    • CAD (coronary artery disease)    • Chest pain    • Chronic back pain    • Deep vein thrombosis (HCC)     S/P KNEE SURGERY 10/2017   • Diverticulosis of intestine 8/16/2016   • Gastroesophageal reflux disease without esophagitis 5/26/2017   •  Hypertension    • Irritable bowel syndrome 11/2/2011   • Joint infection (HCC) 7/24/2018   • Low back pain    • Palpitations    • Paresthesia     toes   • Perforated bowel (HCC)    • Pulmonary fibrosis (HCC)    • Rheumatoid arteritis (HCC)    • Scoliosis    • Spinal stenosis    • Vascular disease, peripheral (HCC)      Past Surgical History:   Procedure Laterality Date   • BACK SURGERY     • BREAST BIOPSY Right 25 yrs ago   • CARDIAC CATHETERIZATION Left 11/6/2019    Procedure: Cardiac Catheterization/Vascular Study CARMEN OK;  Surgeon: Hawk Guerin MD;  Location: [facility] CATH INVASIVE LOCATION;  Service: Cardiology   • CATARACT EXTRACTION Bilateral    • COLON RESECTION SMALL BOWEL  2016    with colostomy for 6 months, already revised.   • COLON SURGERY     • COLONOSCOPY     • LUMBAR LAMINECTOMY     • RENAL ARTERY STENT Right    • TOTAL KNEE ARTHROPLASTY Right    • TOTAL KNEE ARTHROPLASTY     • VASCULAR SURGERY      10/2017 - DR GARCIA     Family History   Problem Relation Age of Onset   • Breast cancer Mother 80   • Heart disease Mother    • Coronary artery disease Mother    • Peripheral vascular disease Mother    • No Known Problems Sister    • Heart disease Brother    • No Known Problems Maternal Grandmother    • No Known Problems Maternal Grandfather    • No Known Problems Paternal Grandmother    • No Known Problems Paternal Grandfather      Social History     Socioeconomic History   • Marital status:    Tobacco Use   • Smoking status: Never Smoker   • Smokeless tobacco: Never Used   Vaping Use   • Vaping Use: Never used   Substance and Sexual Activity   • Alcohol use: No   • Drug use: No   • Sexual activity: Not Currently     Partners: Male     No Known Allergies  Current Outpatient Medications   Medication Sig Dispense Refill   • atorvastatin (LIPITOR) 20 MG tablet Take 1 tablet by mouth once daily 90 tablet 3   • benazepril (LOTENSIN) 20 MG tablet TAKE 1 TABLET EVERY DAY 90 tablet 3   • Docusate Calcium  (STOOL SOFTENER PO) Take 1 tablet by mouth Daily.     • dronedarone (MULTAQ) 400 MG tablet Take 1 tablet by mouth 2 (Two) Times a Day With Meals. 60 tablet 11   • gabapentin (NEURONTIN) 800 MG tablet Take 1 tablet by mouth 3 (Three) Times a Day. 270 tablet 0   • HYDROcodone-acetaminophen (NORCO)  MG per tablet Take 1 tablet by mouth Every 6 (Six) Hours As Needed for Moderate Pain . 90 tablet 0   • isosorbide mononitrate (IMDUR) 30 MG 24 hr tablet Take 1 tablet by mouth once daily 90 tablet 4   • metoprolol succinate XL (TOPROL-XL) 50 MG 24 hr tablet TAKE 1 TABLET EVERY DAY 90 tablet 3   • Multiple Vitamins-Minerals (PRESERVISION AREDS 2 PO) Take  by mouth.     • nitroglycerin (NITROSTAT) 0.4 MG SL tablet 1 under the tongue as needed for angina, may repeat q5mins for up three doses 25 tablet 11   • omeprazole (priLOSEC) 20 MG capsule Take 1 capsule by mouth Daily. 90 capsule 3   • polyethylene glycol (MIRALAX) powder Take 17 g by mouth Daily.     • rivaroxaban (Xarelto) 20 MG tablet Take 1 tablet by mouth Daily. 30 tablet 11   • saccharomyces boulardii (FLORASTOR) 250 MG capsule Take 1 capsule by mouth Daily. 30 capsule 2     No current facility-administered medications for this visit.     Review of Systems   Constitutional: Negative for chills and fever.   HENT: Negative for congestion.    Respiratory: Negative for shortness of breath.    Cardiovascular: Negative for chest pain and leg swelling.   Gastrointestinal: Negative for constipation, diarrhea, nausea and vomiting.   Musculoskeletal: Positive for arthralgias.        Foot pain   Skin: Negative for wound.   Neurological: Positive for numbness.       OBJECTIVE     Vitals:    04/07/22 1413   BP: 136/58   Pulse: 74   SpO2: 97%       PHYSICAL EXAM  GEN:   Accompanied by none.     Foot/Ankle Exam:       General:   Appearance: appears stated age and healthy    Orientation: AAOx3    Affect: appropriate    Shoe Gear:  Casual shoes    VASCULAR      Right Foot  Vascularity   Dorsalis pedis:  2+  Posterior tibial:  2+  Skin Temperature: warm    Edema Gradin+, pitting and non-pitting  CFT:  3  Pedal Hair Growth:  Present  Varicosities: spider veins       Left Foot Vascularity   Dorsalis pedis:  2+  Posterior tibial:  2+  Skin Temperature: warm    Edema Grading:  Trace  CFT:  3  Pedal Hair Growth:  Present  Varicosities: spider veins        NEUROLOGIC     Right Foot Neurologic   Light touch sensation:  Diminished  Vibratory sensation:  Normal  Hot/Cold sensation: normal    Protective Sensation using [place] Monofilament:  9     Left Foot Neurologic   Normal sensation    Light touch sensation:  Normal  Vibratory sensation:  Normal  Hot/cold sensation: normal    Protective Sensation using [place] Monofilament:  10     MUSCULOSKELETAL      Right Foot Musculoskeletal   Ecchymosis:  None  Tenderness: toenails    Arch:  Normal (Mild pronation on weight bearing)     Left Foot Musculoskeletal   Ecchymosis:  None  Tenderness: toenails    Arch:  Normal (Mild pronation on weight bearing)  Hammertoe:  Second toe (Semirigid and abuts the first toe.)     MUSCLE STRENGTH     Right Foot Muscle Strength   Foot dorsiflexion:  4+  Foot plantar flexion:  5  Foot inversion:  5  Foot eversion:  5     Left Foot Muscle Strength   Foot dorsiflexion:  5  Foot plantar flexion:  5  Foot inversion:  5  Foot eversion:  5     RANGE OF MOTION      Right Foot Range of Motion   Foot and ankle ROM within normal limits       Left Foot Range of Motion   Foot and ankle ROM within normal limits       DERMATOLOGIC     Right Foot Dermatologic   Skin: skin intact    Nails: onychomycosis, abnormally thick, subungual debris and dystrophic nails       Left Foot Dermatologic   Skin: skin intact    Nails: onychomycosis, abnormally thick, subungual debris and dystrophic nails        RADIOLOGY/NUCLEAR:  CT Chest Hi Resolution Diagnostic    Result Date: 2022  Narrative: EXAM: CT CHEST HI RESOLUTION  DIAGNOSTIC-  INDICATION: Pulmonary fibrosis, rheumatoid arthritis  COMPARISON: 6/24/2021, 8/20/2020  DLP: 467 mGy cm. Automated exposure control was also utilized to decrease patient radiation dose.  FINDINGS:  Redemonstrated peripheral interstitial reticulation with bronchiectasis and honeycombing. Findings are greatest in the subpleural lower lobes. Findings have not significantly changed compared to 6/24/2021 but have slightly increased when compared to more remote study from 8/20/2020.  Central airways are clear. No consolidation. No pleural effusion or pleural nodule. Small focus of nodular scarring at the RIGHT lung base is unchanged. No new or enlarging thoracic lymph nodes.  Main pulmonary artery is stable in caliber. Thoracic aorta is nonaneurysmal and contains atherosclerotic calcification. Fluid in the superior pericardial recess is noted. Moderate coronary calcification. No pericardial effusion.  No acute chest wall soft tissue abnormality. Small low-density liver lesions are too small to characterize but likely cysts. Exophytic RIGHT upper pole renal cyst. No aggressive bone lesion. Subacute healing sternal body fracture, new from prior exam.      Impression:  Redemonstrated interstitial fibrotic lung disease, UIP pattern. No significant change when compared to a study from 6/24/2021. However, there is slight progression of disease when compared to more remote study from 8/20/2020. This report was finalized on 04/04/2022 15:56 by Dr. Daljit Gruber MD.      LABORATORY/CULTURE RESULTS:      PATHOLOGY RESULTS:       ASSESSMENT/PLAN     Diagnoses and all orders for this visit:    1. Onychomycosis (Primary)    2. Anticoagulant long-term use    3. Other polyneuropathy      Comprehensive lower extremity examination and evaluation was performed.  Discussed findings and treatment plan including risks, benefits, and treatment options with patient in detail. Patient agreed with treatment plan.  After verbal  consent obtained, nail(s) x10 debrided of length and thickness with nail nipper without incidence  Patient may maintain nails and calluses at home utilizing emery board or pumice stone between visits as needed   Patient relates that her sabi is mostly asymptomatic.  No treatment at this time.  Continue f/u with Dr. Pacheco for ankle fracture.  An After Visit Summary was printed and given to the patient at discharge, including (if requested) any available informative/educational handouts regarding diagnosis, treatment, or medications. All questions were answered to patient/family satisfaction. Should symptoms fail to improve or worsen they agree to call or return to clinic or to go to the Emergency Department. Discussed the importance of following up with any needed screening tests/labs/specialist appointments and any requested follow-up recommended by me today. Importance of maintaining follow-up discussed and patient accepts that missed appointments can delay diagnosis and potentially lead to worsening of conditions.  Return in about 3 months (around 7/7/2022) for Follow-up with Podiatry Provider., or sooner if acute issues arise.    Lab Frequency Next Occurrence   Adult Transthoracic Echo Complete w/ Color, Spectral and Contrast if necessary per protocol Once 01/17/2022   OSCILLATING POSITIVE EXIRATORY PRESSURE (OPEP) Once 10/04/2021   CT Chest Without Contrast Diagnostic Once 12/30/2021   Mammo Screening Bilateral With CAD Once 10/05/2021       This document has been electronically signed by Jeffry Marcelo DPM on April 7, 2022 14:32 CDT

## 2022-04-07 ENCOUNTER — OFFICE VISIT (OUTPATIENT)
Dept: PODIATRY | Facility: CLINIC | Age: 82
End: 2022-04-07

## 2022-04-07 VITALS
SYSTOLIC BLOOD PRESSURE: 136 MMHG | HEIGHT: 64 IN | OXYGEN SATURATION: 97 % | BODY MASS INDEX: 25.1 KG/M2 | HEART RATE: 74 BPM | DIASTOLIC BLOOD PRESSURE: 58 MMHG | WEIGHT: 147 LBS

## 2022-04-07 DIAGNOSIS — Z79.01 ANTICOAGULANT LONG-TERM USE: ICD-10-CM

## 2022-04-07 DIAGNOSIS — G62.89 OTHER POLYNEUROPATHY: ICD-10-CM

## 2022-04-07 DIAGNOSIS — B35.1 ONYCHOMYCOSIS: Primary | ICD-10-CM

## 2022-04-07 PROCEDURE — 11721 DEBRIDE NAIL 6 OR MORE: CPT | Performed by: PODIATRIST

## 2022-04-12 ENCOUNTER — OFFICE VISIT (OUTPATIENT)
Dept: PULMONOLOGY | Facility: CLINIC | Age: 82
End: 2022-04-12

## 2022-04-12 ENCOUNTER — PROCEDURE VISIT (OUTPATIENT)
Dept: PULMONOLOGY | Facility: CLINIC | Age: 82
End: 2022-04-12

## 2022-04-12 VITALS
HEART RATE: 76 BPM | WEIGHT: 149 LBS | DIASTOLIC BLOOD PRESSURE: 80 MMHG | OXYGEN SATURATION: 97 % | BODY MASS INDEX: 25.44 KG/M2 | HEIGHT: 64 IN | SYSTOLIC BLOOD PRESSURE: 136 MMHG

## 2022-04-12 DIAGNOSIS — J98.4 RESTRICTIVE LUNG DISEASE: ICD-10-CM

## 2022-04-12 DIAGNOSIS — M05.79 RHEUMATOID ARTHRITIS INVOLVING MULTIPLE SITES WITH POSITIVE RHEUMATOID FACTOR: ICD-10-CM

## 2022-04-12 DIAGNOSIS — Z20.822 ENCOUNTER FOR PREOPERATIVE SCREENING LABORATORY TESTING FOR COVID-19 VIRUS: Primary | ICD-10-CM

## 2022-04-12 DIAGNOSIS — J98.4 RESTRICTIVE LUNG DISEASE: Primary | ICD-10-CM

## 2022-04-12 DIAGNOSIS — R05.3 CHRONIC COUGH: ICD-10-CM

## 2022-04-12 DIAGNOSIS — J84.112 USUAL INTERSTITIAL PNEUMONITIS: ICD-10-CM

## 2022-04-12 DIAGNOSIS — I48.0 PAF (PAROXYSMAL ATRIAL FIBRILLATION): ICD-10-CM

## 2022-04-12 DIAGNOSIS — Z01.812 ENCOUNTER FOR PREOPERATIVE SCREENING LABORATORY TESTING FOR COVID-19 VIRUS: Primary | ICD-10-CM

## 2022-04-12 PROCEDURE — 94010 BREATHING CAPACITY TEST: CPT | Performed by: INTERNAL MEDICINE

## 2022-04-12 PROCEDURE — 99214 OFFICE O/P EST MOD 30 MIN: CPT | Performed by: INTERNAL MEDICINE

## 2022-04-12 PROCEDURE — 94729 DIFFUSING CAPACITY: CPT | Performed by: INTERNAL MEDICINE

## 2022-04-12 NOTE — PROCEDURES
Pulmonary Function Test  Performed by: Miranda Martin, RRT  Authorized by: Fawad Bhat MD      Pre Drug % Predicted    FVC: 50%   FEV1: 61%   FEF 25-75%: 134%   FEV1/FVC: 91%   DLCO: 63%   D/VAsb: 91%    Interpretation   Spirometry   Spirometry shows moderate restriction.   Review of FVL curve   Patient's effort is normal.   Diffusion Capacity  The patient's diffusion capacity is mildly reduced.  Diffusion capacity is normal when corrected for alveolar volume.   Overall comments: SVC, ERV reduced.

## 2022-04-12 NOTE — PROGRESS NOTES
"Background:  Pt with dyspnea, scoliosis, mild restrictive physiology 2019, CAD htn. ILD on cxr.   Chief Complaint  usual interstitial pneumonitis    Subjective    History of Present Illness       Ariana Gutierrez presents to [facility] PULMONARY [facility]ARE MEDICINE.  She is coughing some more, would love to get rid of multaq and xarelto.  She is scheduled for evaluation with Dr. Uriarte in early May for consideration of IPF versus rheumatoid lung and any potential therapies aside from the available antifibrotics..  She and her  may be moving to [facility] near [place] later this year.  Pt has been very reluctant about the notion of antifibrotic medications due to adverse effects.  She has positive RF,  positive ccp, negative ANSHUL. She has seen Dr. Roman who feels she has a significant amount of osteoarthritis but not active rheumatoid disease.  She has not noticed a significant decline in dyspnea over the past few weeks.  No fevers.  She has not had Covid.     Tobacco Use: Low Risk    • Smoking Tobacco Use: Never Smoker   • Smokeless Tobacco Use: Never Used      Objective     Vital Signs:   /80   Pulse 76   Ht 162.6 cm (64\")   Wt 67.6 kg (149 lb)   SpO2 97% Comment: RA  BMI 25.58 kg/m²   Physical Exam  Constitutional:       General: She is not in acute distress.     Appearance: She is well-developed. She is not ill-appearing or toxic-appearing.   HENT:      Head: Atraumatic.   Eyes:      General: No scleral icterus.     Conjunctiva/sclera: Conjunctivae normal.   Cardiovascular:      Rate and Rhythm: Normal rate and regular rhythm.      Heart sounds: S1 normal and S2 normal.   Pulmonary:      Effort: Pulmonary effort is normal.      Breath sounds: Rales present.   Abdominal:      General: There is no distension.   Musculoskeletal:         General: No swelling or deformity.      Cervical back: Neck supple.      Right lower leg: No edema.      Left lower leg: No " edema.   Skin:     Coloration: Skin is not pale.      Findings: No rash.   Neurological:      Mental Status: She is alert.   Psychiatric:         Mood and Affect: Mood normal.         Behavior: Behavior normal.        Result Review  Data Reviewed:{ Labs  Result Review  Imaging  Media :23}     CT Chest Hi Resolution Diagnostic (04/04/2022 14:31)  Redemonstrated interstitial fibrotic lung disease, UIP pattern. No  significant change when compared to a study from 6/24/2021. However,  there is slight progression of disease when compared to more remote  study from 8/20/2020.  This report was finalized on 04/04/2022 15:56 by Dr. Daljit Gruber MD.  My interpretation of radiograph: reticular pattern, upper and lower lobe honeycombing, traction bronchiectasis c/w uip     PFT Values        Some values may be hidden. Unless noted otherwise, only the newest values recorded on each date are displayed.         Old Values PFT Results 9/29/21 4/12/22   No data to display.      Pre Drug PFT Results 9/29/21 4/12/22   FVC 68 50   FEV1 75 61   FEF 25-75% 117 134   FEV1/FVC 84.41 91      Post Drug PFT Results 9/29/21 4/12/22   No data to display.      Other Tests PFT Results 9/29/21 4/12/22   TLC 73    RV 8    DLCO 65 63   D/VAsb 105 91                      Assessment and Plan  {CC Problem List  Visit Diagnosis  ROS  Review (Popup)  Health Maintenance  Quality  BestPractice  Medications  SmartSets  SnapShot Encounters  Media :23}   Diagnoses and all orders for this visit:    1. Encounter for preoperative screening laboratory testing for COVID-19 virus (Primary)  -     Cancel: COVID PRE-OP / PRE-PROCEDURE SCREENING ORDER (NO ISOLATION) - Swab, Nasal Cavity; Future  -     COVID PRE-OP / PRE-PROCEDURE SCREENING ORDER (NO ISOLATION) - Swab, Nasal Cavity; Future    2. Chronic cough  -     Pulmonary Function Test    3. Restrictive lung disease    4. Rheumatoid arthritis involving multiple sites with positive rheumatoid factor  (HCC)    5. PAF (paroxysmal atrial fibrillation) (HCC)    6. Usual interstitial pneumonitis (HCC)    I am concerned about her declining PFTs and worsening ct scan.  She does have chronic symptoms with cough.  I have discussed her case on multiple occasions with Dr. Roman. Fibrotic lung disease predominating, I am not sure how much antiinflammatory medications may help.  We have contemplated trying CellCept perhaps in combination with OFEV, but the patient is extremely hesitant about OFEV.  Because of this we have referred her to Dr. Uriarte who kindly will be seeing her next month.  Her help is greatly appreciated in advance.  She is not taking amiodarone.  She is not taking methotrexate.  She is Covid vaccinated.  For now we will continue as we are doing.  Ultimately we may need to refer her to pulmonary and rheumatology at [facility] once she is up in the [place] area.    Follow Up {Instructions Charge Capture  Follow-up Communications :23}   Return in about 2 months (around 6/27/2022).  Patient was given instructions and counseling regarding her condition or for health maintenance advice. Please see specific information pulled into the AVS if appropriate.    Electronically signed by Fawad Bhat MD, 4/12/2022, 18:05 CDT

## 2022-04-26 DIAGNOSIS — R52 PAIN: ICD-10-CM

## 2022-04-26 RX ORDER — HYDROCODONE BITARTRATE AND ACETAMINOPHEN 10; 325 MG/1; MG/1
1 TABLET ORAL EVERY 6 HOURS PRN
Qty: 90 TABLET | Refills: 0 | Status: SHIPPED | OUTPATIENT
Start: 2022-04-26 | End: 2022-06-24 | Stop reason: SDUPTHER

## 2022-04-26 RX ORDER — GABAPENTIN 800 MG/1
800 TABLET ORAL 3 TIMES DAILY
Qty: 270 TABLET | Refills: 0 | Status: SHIPPED | OUTPATIENT
Start: 2022-04-26 | End: 2022-06-15

## 2022-04-26 NOTE — TELEPHONE ENCOUNTER
Drug Therapy Appt 02/15/2022  Contract & UDS/NASREEN UDS 06/01/2021      Rx Refill Note  Requested Prescriptions     Pending Prescriptions Disp Refills   • HYDROcodone-acetaminophen (NORCO)  MG per tablet 90 tablet 0     Sig: Take 1 tablet by mouth Every 6 (Six) Hours As Needed for Moderate Pain .   • gabapentin (NEURONTIN) 800 MG tablet 270 tablet 0     Sig: Take 1 tablet by mouth 3 (Three) Times a Day.      Last office visit with prescribing clinician: 2/15/2022      Next office visit with prescribing clinician: 5/16/2022            Arianna Vazquez Rep  04/26/22, 08:45 CDT

## 2022-05-05 RX ORDER — DRONEDARONE 400 MG/1
TABLET, FILM COATED ORAL
Qty: 60 TABLET | Refills: 0 | OUTPATIENT
Start: 2022-05-05

## 2022-05-16 ENCOUNTER — OFFICE VISIT (OUTPATIENT)
Dept: FAMILY MEDICINE CLINIC | Facility: CLINIC | Age: 82
End: 2022-05-16

## 2022-05-16 VITALS
SYSTOLIC BLOOD PRESSURE: 142 MMHG | HEART RATE: 85 BPM | TEMPERATURE: 98.4 F | BODY MASS INDEX: 26.36 KG/M2 | DIASTOLIC BLOOD PRESSURE: 80 MMHG | OXYGEN SATURATION: 97 % | WEIGHT: 148.8 LBS | RESPIRATION RATE: 16 BRPM | HEIGHT: 63 IN

## 2022-05-16 DIAGNOSIS — G47.30 SLEEP APNEA, UNSPECIFIED TYPE: ICD-10-CM

## 2022-05-16 DIAGNOSIS — R53.83 FATIGUE, UNSPECIFIED TYPE: Primary | ICD-10-CM

## 2022-05-16 DIAGNOSIS — E61.1 IRON DEFICIENCY: ICD-10-CM

## 2022-05-16 DIAGNOSIS — Z51.81 THERAPEUTIC DRUG MONITORING: ICD-10-CM

## 2022-05-16 DIAGNOSIS — E78.2 MIXED HYPERLIPIDEMIA: ICD-10-CM

## 2022-05-16 PROCEDURE — 99214 OFFICE O/P EST MOD 30 MIN: CPT | Performed by: FAMILY MEDICINE

## 2022-05-16 NOTE — PROGRESS NOTES
Subjective   Ariana Gutierrez is a 82 y.o. female.     82-year-old female with rheumatoid arthritis, pulmonary fibrosis and intractable back pain      The following portions of the patient's history were reviewed and updated as appropriate: allergies, current medications, past family history, past medical history, past social history, past surgical history and problem list.    Review of Systems   Respiratory: Positive for shortness of breath.         Possible rheumatoid arthritis involving pulmonary function-Davey test pending   Cardiovascular: Negative for chest pain and leg swelling.   Musculoskeletal: Positive for arthralgias and back pain.        Degenerative scoliosis of spine       Objective   Physical Exam  Vitals and nursing note reviewed.   Constitutional:       Appearance: Normal appearance.   HENT:      Ears:      Comments: Wax right ear  Eyes:      Extraocular Movements: Extraocular movements intact.      Pupils: Pupils are equal, round, and reactive to light.   Cardiovascular:      Rate and Rhythm: Normal rate and regular rhythm.   Pulmonary:      Effort: Pulmonary effort is normal.      Breath sounds: Rales present.   Musculoskeletal:      Right lower leg: No edema.      Left lower leg: No edema.   Skin:     General: Skin is warm and dry.   Neurological:      General: No focal deficit present.      Mental Status: She is alert and oriented to person, place, and time.   Psychiatric:         Mood and Affect: Mood normal.         Behavior: Behavior normal.         Thought Content: Thought content normal.         Judgment: Judgment normal.       CONTROLLED SUBSTANCE TRACKING 11/11/2019 5/11/2020 8/17/2020 6/1/2021 9/27/2021 2/15/2022 5/16/2022   Last Pro 11/11/2019 5/11/2020 8/17/2020 6/1/2021 9/27/2021 2/15/2022 5/16/2022   Report Number [id number] - - - -   Last UDS 8/16/2019 8/5/2019 8/5/2019 6/1/2021 6/1/2021 6/1/2021 5/16/2022   Last Controlled Substance Agreement 8/16/2019  8/5/2019 8/17/2020 6/1/2021 6/1/2021 6/1/2021 5/16/2022       Assessment & Plan   Diagnoses and all orders for this visit:    1. Fatigue, unspecified type (Primary)  -     CBC & Differential  -     TSH    2. Mixed hyperlipidemia  -     Comprehensive Metabolic Panel  -     Lipid Panel    3. Iron deficiency  -     Iron Profile    4. Therapeutic drug monitoring  -     Gabapentin, Urine -  -     ToxASSURE Select 13 (MW) - Urine, Clean Catch    5. Sleep apnea, unspecified type  -     Polysomnography 4 or More Parameters; Future    Plan above-follow through with specialist

## 2022-05-17 ENCOUNTER — OFFICE VISIT (OUTPATIENT)
Dept: OTOLARYNGOLOGY | Facility: CLINIC | Age: 82
End: 2022-05-17

## 2022-05-17 VITALS
HEIGHT: 63 IN | SYSTOLIC BLOOD PRESSURE: 118 MMHG | WEIGHT: 149 LBS | DIASTOLIC BLOOD PRESSURE: 60 MMHG | RESPIRATION RATE: 16 BRPM | HEART RATE: 79 BPM | BODY MASS INDEX: 26.4 KG/M2 | TEMPERATURE: 98.4 F

## 2022-05-17 DIAGNOSIS — H91.91 HEARING LOSS OF RIGHT EAR, UNSPECIFIED HEARING LOSS TYPE: Primary | ICD-10-CM

## 2022-05-17 DIAGNOSIS — H61.21 IMPACTED CERUMEN OF RIGHT EAR: ICD-10-CM

## 2022-05-17 DIAGNOSIS — Z96.22 S/P MYRINGOTOMY WITH INSERTION OF TUBE: ICD-10-CM

## 2022-05-17 DIAGNOSIS — E61.1 IRON DEFICIENCY: Primary | ICD-10-CM

## 2022-05-17 LAB
ALBUMIN SERPL-MCNC: 3.4 G/DL (ref 3.5–5.2)
ALBUMIN/GLOB SERPL: 0.9 G/DL
ALP SERPL-CCNC: 84 U/L (ref 39–117)
ALT SERPL-CCNC: 12 U/L (ref 1–33)
AST SERPL-CCNC: 18 U/L (ref 1–32)
BASOPHILS # BLD AUTO: 0.04 10*3/MM3 (ref 0–0.2)
BASOPHILS NFR BLD AUTO: 0.5 % (ref 0–1.5)
BILIRUB SERPL-MCNC: 0.5 MG/DL (ref 0–1.2)
BUN SERPL-MCNC: 31 MG/DL (ref 8–23)
BUN/CREAT SERPL: 19.1 (ref 7–25)
CALCIUM SERPL-MCNC: 9.3 MG/DL (ref 8.6–10.5)
CHLORIDE SERPL-SCNC: 102 MMOL/L (ref 98–107)
CHOLEST SERPL-MCNC: 104 MG/DL (ref 0–200)
CO2 SERPL-SCNC: 28.3 MMOL/L (ref 22–29)
CREAT SERPL-MCNC: 1.62 MG/DL (ref 0.57–1)
EGFRCR SERPLBLD CKD-EPI 2021: 32 ML/MIN/1.73
EOSINOPHIL # BLD AUTO: 0.29 10*3/MM3 (ref 0–0.4)
EOSINOPHIL NFR BLD AUTO: 3.3 % (ref 0.3–6.2)
ERYTHROCYTE [DISTWIDTH] IN BLOOD BY AUTOMATED COUNT: 14.7 % (ref 12.3–15.4)
GLOBULIN SER CALC-MCNC: 4 GM/DL
GLUCOSE SERPL-MCNC: 94 MG/DL (ref 65–99)
HCT VFR BLD AUTO: 35.2 % (ref 34–46.6)
HDLC SERPL-MCNC: 37 MG/DL (ref 40–60)
HGB BLD-MCNC: 10.6 G/DL (ref 12–15.9)
IMM GRANULOCYTES # BLD AUTO: 0.04 10*3/MM3 (ref 0–0.05)
IMM GRANULOCYTES NFR BLD AUTO: 0.5 % (ref 0–0.5)
IRON SATN MFR SERPL: 11 % (ref 20–50)
IRON SERPL-MCNC: 33 MCG/DL (ref 37–145)
LDLC SERPL CALC-MCNC: 50 MG/DL (ref 0–100)
LYMPHOCYTES # BLD AUTO: 0.95 10*3/MM3 (ref 0.7–3.1)
LYMPHOCYTES NFR BLD AUTO: 10.8 % (ref 19.6–45.3)
MCH RBC QN AUTO: 27 PG (ref 26.6–33)
MCHC RBC AUTO-ENTMCNC: 30.1 G/DL (ref 31.5–35.7)
MCV RBC AUTO: 89.8 FL (ref 79–97)
MONOCYTES # BLD AUTO: 0.63 10*3/MM3 (ref 0.1–0.9)
MONOCYTES NFR BLD AUTO: 7.2 % (ref 5–12)
NEUTROPHILS # BLD AUTO: 6.83 10*3/MM3 (ref 1.7–7)
NEUTROPHILS NFR BLD AUTO: 77.7 % (ref 42.7–76)
NRBC BLD AUTO-RTO: 0 /100 WBC (ref 0–0.2)
PLATELET # BLD AUTO: 306 10*3/MM3 (ref 140–450)
POTASSIUM SERPL-SCNC: 5.1 MMOL/L (ref 3.5–5.2)
PROT SERPL-MCNC: 7.4 G/DL (ref 6–8.5)
RBC # BLD AUTO: 3.92 10*6/MM3 (ref 3.77–5.28)
SODIUM SERPL-SCNC: 138 MMOL/L (ref 136–145)
TIBC SERPL-MCNC: 302 MCG/DL
TRIGL SERPL-MCNC: 85 MG/DL (ref 0–150)
TSH SERPL DL<=0.005 MIU/L-ACNC: 1.94 UIU/ML (ref 0.27–4.2)
UIBC SERPL-MCNC: 269 MCG/DL (ref 112–346)
VLDLC SERPL CALC-MCNC: 17 MG/DL (ref 5–40)
WBC # BLD AUTO: 8.78 10*3/MM3 (ref 3.4–10.8)

## 2022-05-17 PROCEDURE — 99213 OFFICE O/P EST LOW 20 MIN: CPT | Performed by: NURSE PRACTITIONER

## 2022-05-17 PROCEDURE — 69210 REMOVE IMPACTED EAR WAX UNI: CPT | Performed by: NURSE PRACTITIONER

## 2022-05-17 NOTE — PATIENT INSTRUCTIONS
CONTACT INFORMATION:  The main office phone number is [phone]. For emergencies after hours and on weekends, this number will convert over to our answering service and the on call provider will answer. Please try to keep non emergent phone calls/ questions to office hours 9am-5pm Monday through Friday.      [facility]  As an alternative, you can sign up and use the Epic MyChart system for more direct and quicker access for non emergent questions/ problems.  [employer] allows you to send messages to your doctor, view your test results, renew your prescriptions, schedule appointments, and more. To sign up, go to [employer] and click on the Sign Up Now link in the New User? box. Enter your [facility] Activation Code exactly as it appears below along with the last four digits of your Social Security Number and your Date of Birth () to complete the sign-up process. If you do not sign up before the expiration date, you must request a new code.     [facility] Activation Code: Activation code not generated  Current [facility] Status: Active     If you have questions, you can email [email or web address] or call [phone] to talk to our [facility] staff. Remember, [facility] is NOT to be used for urgent needs. For medical emergencies, dial 911.     IF YOU SMOKE OR USE TOBACCO PLEASE READ THE FOLLOWING:  Why is smoking bad for me?  Smoking increases the risk of heart disease, lung disease, vascular disease, stroke, and cancer. If you smoke, STOP!        IF YOU SMOKE OR USE TOBACCO PLEASE READ THE FOLLOWING:  Why is smoking bad for me?  Smoking increases the risk of heart disease, lung disease, vascular disease, stroke, and cancer. If you smoke, STOP!     For more information:  Quit Now [place]  -QUIT-NOW  [email or web address]-[email or web address] HEARING LOSS       Hearing loss is the third most common health condition in the [place] affecting more than 1 of every 10 people.  This means that  over 28 million Americans suffer from hearing loss.  The condition varies with age: 1 out of every 25 children, 1 out of every 14 aged 14-44 years old, 1 out of every 7 adults aged 45-65 year old, and 1 out of every 3 adults over the age of 65 years old.    The ear works by receiving sound vibrations, amplifying the sound, and then converting the mechanical vibration into an electrical signal that is sent to the hearing center of the brain.  The ear has three basic parts, each with very specific functions:  The External Ear   This is made up of the parts of the ear you can see (the auricle), and the ear canal.  These structures function to funnel the sound vibrations down into the ear so that they can be processed.  The Middle Ear  The external ear and the middle ear are  by the eardrum (tympanic membrane).  The middle ear is an air-filled space that houses the middle ear bones (ossicles).  Sound waves hit the eardrum and cause it to vibrate.  The vibrations are then transferred to the ear bones that amplify the sound and transfer it to the inner ear (cochlea).  The Inner Ear  The inner ear is a snail-shaped bone that contains a fluid-filled membrane inside another fluid-filled membrane.  It acts like a battery to transform the mechanical vibrations into and electrical signal.  It does this with very delicate hair cells that rest on top of the inner ear membranes.  The electrical signal is then shipped out of the inner ear to the brain where it is processed and understood as sound.    Disease in any one of these parts of the ear can cause hearing loss, but can do so in two different ways.  When there is a problem with the process of bringing the sound to the inner ear (i.e. problems in the external or middle ear) it is called a conductive hearing loss.  Problems in the inner ear or nerves of hearing care called sensorineural hearing losses.  Often times, however, there is a combination of the types of  hearing losses or a mixed hearing loss.  Conductive Hearing Loss  Impairment of the transfer of sound into the inner ear because of problems with the external ear, the eardrum or the middle ear can cause conductive hearing loss.  Conductive losses can often be lessened or cured with medication or surgery, depending on the etiology.  Causes of Conductive Hearing Losses:  Wax occlusion of the ear canal  Eardrum perforations  Stiffened or scarred eardrums  Fluid in the middle earspace (otitis media with effusion)  Middle ear infections (acute otitis media)  Scarring or fixation of the ear bones (tympanosclerosis, otosclerosis)  Dislocation of the ear bones  Cholesteatoma: this is a “skin cyst” that develops due to severely retracted eardrums or from skin growth into the middle ear from a chronic eardrum perforation.  Over time the cyst can grow and erode the bones of hearing.  Middle ear tumors or masses  Sensorineural Hearing Loss  Deficits in hearing due to problems in the inner ear or nerves of hearing are termed sensorineural losses.  These are usually due to damage to the microscopic hair cells in the cochlea, or due to loss of the nerve cells in the hearing nerve.  For the most part, this type of hearing loss cannot be repaired with medication or surgery, except in rare instances.  Often times, however, it can be helped with hearing aids and rarely with cochlear implantation.  This type of loss can also be associated with tinnitus (ringing of the ears) and vertigo (dizziness).  Causes of Sensorineural Hearing Losses:  Damage from noise exposure  Aging:  this is often a slow, progressive loss of the high frequencies  Infection (labrynthitis)  Secondary loss from the effects of meningitis  Genetic/familial related hearing loss  Autoimmune hearing loss (a rheumatoid-like process)  Temporal bond fracture (severe fracture of the bone around or through the inner ear)  Medication induced damage (chemotherapy, high dose  IV antibiotics, diuretics)  Inner ear and skull base tumors (acoustic neuroma, memingioma)        Specific Causes    Ear Infections and Effusion in Children  Any time the middle ear is filled with fluid, as in an active infection or in middle ear effusion, there can be a conductive hearing.  Ear infections are the most common cause of infant hearing loss and are a very treatable type of hearing loss.  Usually the middle ear is filled with air, which allows the bones of hearing to freely move.  When there is fluid in this space it slows the vibration and is literally like trying to hear underwater.  Most of the time this infection or fluid can be treated with medication.  If this does not happen, often myringotomy tube placement can allow for drainage of fluid and allow the middle ear space to remain ventilated.  Congenital Hearing Loss  Hearing loss is the most common birth defect, affecting 3 in every 1000 children born in the [place].  If untreated, this can lead to significant problems in speech, language, and learning.  Recently, most hospitals have early infant screening that can identify newborns with hearing problems.  With accurate diagnosis, these children can often be treated with hearing aids and other forms of treatment that can allow them to develop normal speech and learning.  Noise Induced Hearing Loss  According to the National Institutes of Health, approximately 10 million Americans have hearing losses that are a least partially attributable to loud noise exposure.  Exposures that can cause permanent damage vary, but short exposures of very loud quality (gunfire) can be as bad as longer exposures at lower levels.  Most conversations are at about 65-70 decibels.  Levels over 85dB, with exposures of up to 8 hours a day, will produce permanent hearing loss after many years of exposure.  A stereo headset turned up full blast (about 110 dB) can cause a significant hearing loss in less than a half  an hour.  These losses are due to damage of the hair cells of the inner ear from the excessive vibrational acoustic trauma of the loud noise.  Since this loss is usually nonreversible, hearing protection is essential.  Foam earplugs can provide 15-30dB of noise protection.  Age Related Hearing Loss (Presbyacusis)  Age related hearing loss is another very common form of hearing loss in the elderly.  It is usually a slowly progressive, high frequency sensorineural loss that in nonreversible.  Not all elderly people will have hearing loss as this is very much related to the genetic makeup of the individual.  This can cause the patient to withdraw from social situations, or cause them to seem to have a mood change because of the frustration of not being able to hear a conversation or having to ask people to repeat things.  Most of the time, this can be well treated by amplifying sounds with a hearing aid.    Hearing loss can have a large impact in the way we function on a day to day basis with our environment.  We are fortunate that most hearing loss can be treated by hearing aids or medical and surgical treatments.  If you think you may be suffering from hearing loss, an evaluation by our doctor can help identify the cause and the specific treatment that can help improve your hearing.

## 2022-05-17 NOTE — PROGRESS NOTES
YOB: 1940  Location: [facility]  Location Address: [address] [place] [address], [employer] [address], [address] [place] [address]  Location Phone: [phone]    Chief Complaint   Patient presents with   • Ear Problem     Cerumen. Cannot hear out of right ear.       History of Present Illness  Ariana Gutierrez is a 82 y.o. female.  Ariana Gutierrez is here for evaluation of ENT complaints. The patient has had problems with cerumen accumulation and hearing loss  The symptoms are localized to the right side. The patient has had mild to moderate symptoms. The symptoms have been present for the last several weeks   Patient states she has had difficulty hearing out of her right ear for the past several weeks. She has a history of pe tube placement by Dr. Lees in the past. She was seen at urgent care and they attempted to clean cerumen out of the right ear, but told her the canal began to bleed and that they could not wash the ear due to her history of pe tube   She is here for evaluation      Past Medical History:   Diagnosis Date   • Atrial fibrillation (HCC)    • Bacterial infection    • CAD (coronary artery disease)    • Chest pain    • Chronic back pain    • Deep vein thrombosis (HCC)     S/P KNEE SURGERY 10/2017   • Diverticulosis of intestine 2016   • Gastroesophageal reflux disease without esophagitis 2017   • Hypertension    • Irritable bowel syndrome 2011   • Joint infection (HCC) 2018   • Low back pain    • Palpitations    • Paresthesia     toes   • Perforated bowel (HCC)    • Pulmonary fibrosis (HCC)    • Rheumatoid arteritis (HCC)    • Scoliosis    • Spinal stenosis    • Vascular disease, peripheral (HCC)        Past Surgical History:   Procedure Laterality Date   • BACK SURGERY     • BREAST BIOPSY Right 25 yrs ago   • CARDIAC CATHETERIZATION Left 2019    Procedure: Cardiac Catheterization/Vascular Study CARMEN OK;  Surgeon: Hawk Guerin MD;  Location: [facility] INVASIVE LOCATION;   Service: Cardiology   • CATARACT EXTRACTION Bilateral    • COLON RESECTION SMALL BOWEL  2016    with colostomy for 6 months, already revised.   • COLON SURGERY     • COLONOSCOPY     • LUMBAR LAMINECTOMY     • RENAL ARTERY STENT Right    • TOTAL KNEE ARTHROPLASTY Right    • TOTAL KNEE ARTHROPLASTY     • VASCULAR SURGERY      10/2017 - DR GARCIA       Outpatient Medications Marked as Taking for the 5/17/22 encounter (Office Visit) with Shira Austin APRN   Medication Sig Dispense Refill   • atorvastatin (LIPITOR) 20 MG tablet Take 1 tablet by mouth once daily 90 tablet 3   • benazepril (LOTENSIN) 20 MG tablet TAKE 1 TABLET EVERY DAY 90 tablet 3   • Docusate Calcium (STOOL SOFTENER PO) Take 1 tablet by mouth Daily.     • dronedarone (MULTAQ) 400 MG tablet Take 1 tablet by mouth 2 (Two) Times a Day With Meals. 60 tablet 11   • Ferrous Sulfate (IRON PO) Take  by mouth.     • gabapentin (NEURONTIN) 800 MG tablet Take 1 tablet by mouth 3 (Three) Times a Day. 270 tablet 0   • HYDROcodone-acetaminophen (NORCO)  MG per tablet Take 1 tablet by mouth Every 6 (Six) Hours As Needed for Moderate Pain . 90 tablet 0   • isosorbide mononitrate (IMDUR) 30 MG 24 hr tablet Take 1 tablet by mouth once daily 90 tablet 4   • metoprolol succinate XL (TOPROL-XL) 50 MG 24 hr tablet TAKE 1 TABLET EVERY DAY 90 tablet 3   • Multiple Vitamins-Minerals (PRESERVISION AREDS 2 PO) Take  by mouth.     • nitroglycerin (NITROSTAT) 0.4 MG SL tablet 1 under the tongue as needed for angina, may repeat q5mins for up three doses 25 tablet 11   • omeprazole (priLOSEC) 20 MG capsule Take 1 capsule by mouth Daily. 90 capsule 3   • rivaroxaban (Xarelto) 20 MG tablet Take 1 tablet by mouth Daily. 30 tablet 11   • saccharomyces boulardii (FLORASTOR) 250 MG capsule Take 1 capsule by mouth Daily. 30 capsule 2       Patient has no known allergies.    Family History   Problem Relation Age of Onset   • Breast cancer Mother 80   • Heart disease Mother    •  Coronary artery disease Mother    • Peripheral vascular disease Mother    • No Known Problems Sister    • Heart disease Brother    • No Known Problems Maternal Grandmother    • No Known Problems Maternal Grandfather    • No Known Problems Paternal Grandmother    • No Known Problems Paternal Grandfather        Social History     Socioeconomic History   • Marital status:    Tobacco Use   • Smoking status: Never Smoker   • Smokeless tobacco: Never Used   Vaping Use   • Vaping Use: Never used   Substance and Sexual Activity   • Alcohol use: No   • Drug use: No   • Sexual activity: Not Currently     Partners: Male       Review of Systems   Constitutional: Negative.    HENT: Positive for ear pain and hearing loss. Negative for ear discharge.    Eyes: Negative.    Respiratory: Negative.    Cardiovascular: Negative.    Endocrine: Negative.    Genitourinary: Negative.    Musculoskeletal: Negative.    Allergic/Immunologic: Negative.    Neurological: Negative.        Vitals:    05/17/22 1529   BP: 118/60   Pulse: 79   Resp: 16   Temp: 98.4 °F (36.9 °C)       Body mass index is 26.39 kg/m².    Objective     Physical Exam  Vitals reviewed.   Constitutional:       Appearance: She is normal weight.   HENT:      Head: Normocephalic.      Right Ear: External ear normal.      Left Ear: Hearing, tympanic membrane, ear canal and external ear normal.      Ears:      Comments: Impacted cerumen right    Musculoskeletal:      Cervical back: Full passive range of motion without pain and normal range of motion.   Neurological:      Mental Status: She is alert.     Ear Microscopy with Right Cerumen Removal    Date/Time: 5/17/2022 4:26 PM  Performed by: Shira Austin APRN  Authorized by: Shira Austin APRN     Ear examination was performed utilizing binocular microscopy.  Right auricle:   normal:   Right ear canal:   impacted cerumen.   Left auricle:   normal:   Left ear canal:   normal:   Left tympanic membrane:   normal:      Procedure:    Cerumen was removed from the right ear with a curette and a forceps. cerumen and extruded tube removed from canal    Post-procedure details:     Inspection after the procedure revealed macerated skin and an intact TM.    No medication was applied to the ear canal.    The procedure was tolerated well, no immediate complications.  Comments:      Patient will use ciprodex that she has at home due to having macerated skin in the canal              Assessment & Plan   Diagnoses and all orders for this visit:    1. Hearing loss of right ear, unspecified hearing loss type (Primary)    2. S/P myringotomy with insertion of tube    3. Impacted cerumen of right ear    Other orders  -     $ Binocular Microscopy    use ciprodex drops to the right ear   Return in 3 months for recheck     * Surgery not found *  Orders Placed This Encounter   Procedures   • $ Binocular Microscopy     This order was created via procedure documentation     Order Specific Question:   Release to patient     Answer:   Immediate     Return in about 3 months (around 8/17/2022) for Recheck.       Patient Instructions   CONTACT INFORMATION:  The main office phone number is [phone]. For emergencies after hours and on weekends, this number will convert over to our answering service and the on call provider will answer. Please try to keep non emergent phone calls/ questions to office hours 9am-5pm Monday through Friday.      [email or web address]  As an alternative, you can sign up and use the Epic MyChart system for more direct and quicker access for non emergent questions/ problems.  [employer] allows you to send messages to your doctor, view your test results, renew your prescriptions, schedule appointments, and more. To sign up, go to mii and click on the Sign Up Now link in the New User? box. Enter your [email or web address] Activation Code exactly as it appears below along with the last four digits of your Social Security  Number and your Date of Birth () to complete the sign-up process. If you do not sign up before the expiration date, you must request a new code.     [employer] Activation Code: Activation code not generated  Current [employer] Status: Active     If you have questions, you can email [email or web address] or call [phone] to talk to our [employer] staff. Remember, [email or web address] is NOT to be used for urgent needs. For medical emergencies, dial 911.     IF YOU SMOKE OR USE TOBACCO PLEASE READ THE FOLLOWING:  Why is smoking bad for me?  Smoking increases the risk of heart disease, lung disease, vascular disease, stroke, and cancer. If you smoke, STOP!        IF YOU SMOKE OR USE TOBACCO PLEASE READ THE FOLLOWING:  Why is smoking bad for me?  Smoking increases the risk of heart disease, lung disease, vascular disease, stroke, and cancer. If you smoke, STOP!     For more information:  Quit Now [place]  -QUIT-NOW  https://[place].quitlogix.org/en-[place]/ HEARING LOSS       Hearing loss is the third most common health condition in the [place] affecting more than 1 of every 10 people.  This means that over 28 million Americans suffer from hearing loss.  The condition varies with age: 1 out of every 25 children, 1 out of every 14 aged 14-44 years old, 1 out of every 7 adults aged 45-65 year old, and 1 out of every 3 adults over the age of 65 years old.    The ear works by receiving sound vibrations, amplifying the sound, and then converting the mechanical vibration into an electrical signal that is sent to the hearing center of the brain.  The ear has three basic parts, each with very specific functions:  The External Ear   This is made up of the parts of the ear you can see (the auricle), and the ear canal.  These structures function to funnel the sound vibrations down into the ear so that they can be processed.  The Middle Ear  The external ear and the middle ear are  by the eardrum (tympanic membrane).  The  middle ear is an air-filled space that houses the middle ear bones (ossicles).  Sound waves hit the eardrum and cause it to vibrate.  The vibrations are then transferred to the ear bones that amplify the sound and transfer it to the inner ear (cochlea).  The Inner Ear  The inner ear is a snail-shaped bone that contains a fluid-filled membrane inside another fluid-filled membrane.  It acts like a battery to transform the mechanical vibrations into and electrical signal.  It does this with very delicate hair cells that rest on top of the inner ear membranes.  The electrical signal is then shipped out of the inner ear to the brain where it is processed and understood as sound.    Disease in any one of these parts of the ear can cause hearing loss, but can do so in two different ways.  When there is a problem with the process of bringing the sound to the inner ear (i.e. problems in the external or middle ear) it is called a conductive hearing loss.  Problems in the inner ear or nerves of hearing care called sensorineural hearing losses.  Often times, however, there is a combination of the types of hearing losses or a mixed hearing loss.  Conductive Hearing Loss  Impairment of the transfer of sound into the inner ear because of problems with the external ear, the eardrum or the middle ear can cause conductive hearing loss.  Conductive losses can often be lessened or cured with medication or surgery, depending on the etiology.  Causes of Conductive Hearing Losses:  • Wax occlusion of the ear canal  • Eardrum perforations  • Stiffened or scarred eardrums  • Fluid in the middle earspace (otitis media with effusion)  • Middle ear infections (acute otitis media)  • Scarring or fixation of the ear bones (tympanosclerosis, otosclerosis)  • Dislocation of the ear bones  • Cholesteatoma: this is a “skin cyst” that develops due to severely retracted eardrums or from skin growth into the middle ear from a chronic eardrum  perforation.  Over time the cyst can grow and erode the bones of hearing.  • Middle ear tumors or masses  Sensorineural Hearing Loss  Deficits in hearing due to problems in the inner ear or nerves of hearing are termed sensorineural losses.  These are usually due to damage to the microscopic hair cells in the cochlea, or due to loss of the nerve cells in the hearing nerve.  For the most part, this type of hearing loss cannot be repaired with medication or surgery, except in rare instances.  Often times, however, it can be helped with hearing aids and rarely with cochlear implantation.  This type of loss can also be associated with tinnitus (ringing of the ears) and vertigo (dizziness).  Causes of Sensorineural Hearing Losses:  • Damage from noise exposure  • Aging:  this is often a slow, progressive loss of the high frequencies  • Infection (labrynthitis)  • Secondary loss from the effects of meningitis  • Genetic/familial related hearing loss  • Autoimmune hearing loss (a rheumatoid-like process)  • Temporal bond fracture (severe fracture of the bone around or through the inner ear)  • Medication induced damage (chemotherapy, high dose IV antibiotics, diuretics)  • Inner ear and skull base tumors (acoustic neuroma, memingioma)        Specific Causes    Ear Infections and Effusion in Children  Any time the middle ear is filled with fluid, as in an active infection or in middle ear effusion, there can be a conductive hearing.  Ear infections are the most common cause of infant hearing loss and are a very treatable type of hearing loss.  Usually the middle ear is filled with air, which allows the bones of hearing to freely move.  When there is fluid in this space it slows the vibration and is literally like trying to hear underwater.  Most of the time this infection or fluid can be treated with medication.  If this does not happen, often myringotomy tube placement can allow for drainage of fluid and allow the middle  ear space to remain ventilated.  Congenital Hearing Loss  Hearing loss is the most common birth defect, affecting 3 in every 1000 children born in the [place].  If untreated, this can lead to significant problems in speech, language, and learning.  Recently, most hospitals have early infant screening that can identify newborns with hearing problems.  With accurate diagnosis, these children can often be treated with hearing aids and other forms of treatment that can allow them to develop normal speech and learning.  Noise Induced Hearing Loss  According to the National Institutes of Health, approximately 10 million Americans have hearing losses that are a least partially attributable to loud noise exposure.  Exposures that can cause permanent damage vary, but short exposures of very loud quality (gunfire) can be as bad as longer exposures at lower levels.  Most conversations are at about 65-70 decibels.  Levels over 85dB, with exposures of up to 8 hours a day, will produce permanent hearing loss after many years of exposure.  A stereo headset turned up full blast (about 110 dB) can cause a significant hearing loss in less than a half an hour.  These losses are due to damage of the hair cells of the inner ear from the excessive vibrational acoustic trauma of the loud noise.  Since this loss is usually nonreversible, hearing protection is essential.  Foam earplugs can provide 15-30dB of noise protection.  Age Related Hearing Loss (Presbyacusis)  Age related hearing loss is another very common form of hearing loss in the elderly.  It is usually a slowly progressive, high frequency sensorineural loss that in nonreversible.  Not all elderly people will have hearing loss as this is very much related to the genetic makeup of the individual.  This can cause the patient to withdraw from social situations, or cause them to seem to have a mood change because of the frustration of not being able to hear a conversation or  having to ask people to repeat things.  Most of the time, this can be well treated by amplifying sounds with a hearing aid.    Hearing loss can have a large impact in the way we function on a day to day basis with our environment.  We are fortunate that most hearing loss can be treated by hearing aids or medical and surgical treatments.  If you think you may be suffering from hearing loss, an evaluation by our doctor can help identify the cause and the specific treatment that can help improve your hearing.

## 2022-05-22 LAB
DRUGS UR: NORMAL
GABAPENTIN UR-MCNC: 461.6 UG/ML

## 2022-06-14 DIAGNOSIS — R52 PAIN: ICD-10-CM

## 2022-06-14 RX ORDER — HYDROCODONE BITARTRATE AND ACETAMINOPHEN 10; 325 MG/1; MG/1
1 TABLET ORAL EVERY 6 HOURS PRN
Qty: 90 TABLET | Refills: 0 | OUTPATIENT
Start: 2022-06-14

## 2022-06-14 NOTE — TELEPHONE ENCOUNTER
Caller: Ariana Gutierrez    Relationship: Self    Best call back number: [phone]    Requested Prescriptions:   Requested Prescriptions     Pending Prescriptions Disp Refills   • HYDROcodone-acetaminophen (NORCO)  MG per tablet 90 tablet 0     Sig: Take 1 tablet by mouth Every 6 (Six) Hours As Needed for Moderate Pain .        Pharmacy where request should be sent: [facility] [address] [place] [address] [address] [facility] [phone] [facility] [phone] FX         Does the patient have less than a 3 day supply:  [] Yes  [x] No    Arianna Escobar Rep   06/14/22 09:36 CDT

## 2022-06-15 DIAGNOSIS — R52 PAIN: ICD-10-CM

## 2022-06-15 RX ORDER — GABAPENTIN 800 MG/1
TABLET ORAL
Qty: 270 TABLET | Refills: 0 | Status: SHIPPED | OUTPATIENT
Start: 2022-06-15

## 2022-06-15 NOTE — TELEPHONE ENCOUNTER
Drug Therapy Appt 05/16/2022  Contract & UDS/NASREEN UDS 05/16/2022    Rx Refill Note  Requested Prescriptions     Pending Prescriptions Disp Refills   • gabapentin (NEURONTIN) 800 MG tablet [Pharmacy Med Name: GABAPENTIN 800 MG Tablet] 270 tablet      Sig: TAKE 1 TABLET THREE TIMES DAILY      Last office visit with prescribing clinician: 5/16/2022      Next office visit with prescribing clinician: 10/28/2022            Christie Tuttle MA  06/15/22, 09:12 CDT

## 2022-06-23 ENCOUNTER — HOSPITAL ENCOUNTER (OUTPATIENT)
Dept: MAMMOGRAPHY | Facility: HOSPITAL | Age: 82
Discharge: HOME | End: 2022-06-23
Admitting: OBSTETRICS & GYNECOLOGY

## 2022-06-23 ENCOUNTER — OFFICE VISIT (OUTPATIENT)
Dept: PULMONOLOGY | Facility: CLINIC | Age: 82
End: 2022-06-23

## 2022-06-23 ENCOUNTER — TELEPHONE (OUTPATIENT)
Dept: CARDIOLOGY | Facility: CLINIC | Age: 82
End: 2022-06-23

## 2022-06-23 VITALS
DIASTOLIC BLOOD PRESSURE: 74 MMHG | BODY MASS INDEX: 27.11 KG/M2 | HEART RATE: 90 BPM | OXYGEN SATURATION: 95 % | SYSTOLIC BLOOD PRESSURE: 142 MMHG | WEIGHT: 153 LBS | HEIGHT: 63 IN

## 2022-06-23 DIAGNOSIS — J98.4 RESTRICTIVE LUNG DISEASE: ICD-10-CM

## 2022-06-23 DIAGNOSIS — J47.9 BRONCHIECTASIS WITHOUT ACUTE EXACERBATION: ICD-10-CM

## 2022-06-23 DIAGNOSIS — I48.0 PAF (PAROXYSMAL ATRIAL FIBRILLATION): ICD-10-CM

## 2022-06-23 DIAGNOSIS — R05.3 CHRONIC COUGH: ICD-10-CM

## 2022-06-23 DIAGNOSIS — Z12.31 BREAST CANCER SCREENING BY MAMMOGRAM: ICD-10-CM

## 2022-06-23 DIAGNOSIS — J84.112 USUAL INTERSTITIAL PNEUMONITIS: Primary | ICD-10-CM

## 2022-06-23 DIAGNOSIS — M05.79 RHEUMATOID ARTHRITIS INVOLVING MULTIPLE SITES WITH POSITIVE RHEUMATOID FACTOR: ICD-10-CM

## 2022-06-23 PROCEDURE — 77067 SCR MAMMO BI INCL CAD: CPT

## 2022-06-23 PROCEDURE — 77063 BREAST TOMOSYNTHESIS BI: CPT

## 2022-06-23 PROCEDURE — 99213 OFFICE O/P EST LOW 20 MIN: CPT | Performed by: INTERNAL MEDICINE

## 2022-06-23 NOTE — TELEPHONE ENCOUNTER
PATIENT CAME TO THE OFFICE TO GET SAMPLES OF XARELTO 20MG ( 6 BOTTLES ) AND MULTAQ 400- (5 PACKAGES )

## 2022-06-23 NOTE — PROGRESS NOTES
Background:  Pt with dyspnea, scoliosis, mild restrictive physiology 2019, CAD htn. ILD on cxr.   Chief Complaint  Cough    Subjective    History of Present Illness       Ariana Gutierrez presents to [facility].  She has moved to [place] but is continuing to come back to see me and the doctors at [facility] to try to establish whether her uip is ipf or rheumatoid.  She is out of breath more quickly than before. She reports heart problems and hard to tell whether cardiac or pulmonary issues are causing this.  She is concerned about the side effects of the medicines. She had an ankle injury and her mother  in the [place].     Tobacco Use: Low Risk    • Smoking Tobacco Use: Never Smoker   • Smokeless Tobacco Use: Never Used      Current Outpatient Medications   Medication Instructions   • atorvastatin (LIPITOR) 20 MG tablet Take 1 tablet by mouth once daily   • benazepril (LOTENSIN) 20 MG tablet TAKE 1 TABLET EVERY DAY   • Docusate Calcium (STOOL SOFTENER PO) 1 tablet, Oral, Daily   • dronedarone (MULTAQ) 400 mg, Oral, 2 Times Daily With Meals   • Ferrous Sulfate (IRON PO) Oral   • gabapentin (NEURONTIN) 800 MG tablet TAKE 1 TABLET THREE TIMES DAILY   • HYDROcodone-acetaminophen (NORCO)  MG per tablet 1 tablet, Oral, Every 6 Hours PRN   • isosorbide mononitrate (IMDUR) 30 MG 24 hr tablet Take 1 tablet by mouth once daily   • metoprolol succinate XL (TOPROL-XL) 50 MG 24 hr tablet TAKE 1 TABLET EVERY DAY   • Multiple Vitamins-Minerals (PRESERVISION AREDS 2 PO) Oral   • nitroglycerin (NITROSTAT) 0.4 MG SL tablet 1 under the tongue as needed for angina, may repeat q5mins for up three doses   • omeprazole (PRILOSEC) 20 mg, Oral, Daily   • polyethylene glycol (MIRALAX) 17 g, Oral, Daily   • rivaroxaban (XARELTO) 20 mg, Oral, Daily   • saccharomyces boulardii (FLORASTOR) 250 mg, Oral, Daily      Objective     Vital Signs:   /74   Pulse 90   Ht 160 cm  "(63\")   Wt 69.4 kg (153 lb)   SpO2 95% Comment: RA  BMI 27.10 kg/m²   Physical Exam  Constitutional:       General: She is not in acute distress.     Appearance: She is well-developed. She is not ill-appearing or toxic-appearing.   HENT:      Head: Atraumatic.   Eyes:      General: No scleral icterus.     Conjunctiva/sclera: Conjunctivae normal.   Cardiovascular:      Rate and Rhythm: Normal rate and regular rhythm.      Heart sounds: S1 normal and S2 normal.   Pulmonary:      Effort: Pulmonary effort is normal.      Breath sounds: Rales present.   Abdominal:      General: There is no distension.   Musculoskeletal:         General: No deformity.      Cervical back: Neck supple.   Skin:     Coloration: Skin is not pale.      Findings: No rash.   Neurological:      Mental Status: She is alert.        Result Review  Data Reviewed:{ Labs  Result Review  Imaging  Media :23}       PFT Values        Some values may be hidden. Unless noted otherwise, only the newest values recorded on each date are displayed.         Old Values PFT Results 9/29/21 4/12/22   No data to display.      Pre Drug PFT Results 9/29/21 4/12/22   FVC 68 50   FEV1 75 61   FEF 25-75% 117 134   FEV1/FVC 84.41 91      Post Drug PFT Results 9/29/21 4/12/22   No data to display.      Other Tests PFT Results 9/29/21 4/12/22   TLC 73    RV 8    DLCO 65 63   D/VAsb 105 91                      Assessment and Plan  {CC Problem List  Visit Diagnosis  ROS  Review (Popup)  Health Maintenance  Quality  BestPractice  Medications  SmartSets  SnapShot Encounters  Media :23}   Diagnoses and all orders for this visit:    1. Usual interstitial pneumonitis (HCC) (Primary)    2. Restrictive lung disease    3. Chronic cough    4. Rheumatoid arthritis involving multiple sites with positive rheumatoid factor (HCC)    5. PAF (paroxysmal atrial fibrillation) (HCC)    6. Bronchiectasis without acute exacerbation (HCC)    await [facility] rheumatology opinion " regarding RA/rheum lung or not  We discussed treatment options for ipf and pf with progression; she is afraid of both drugs.  She has a nebulizer but no meds.  Unsure whether bronchodilator would be helpful or not.  Trial of yupelri of which we had samples today; she will call if it is helpful    Follow Up {Instructions Charge Capture  Follow-up Communications :23}   No follow-ups on file.  Patient was given instructions and counseling regarding her condition or for health maintenance advice. Please see specific information pulled into the AVS if appropriate.    Electronically signed by Fawad Bhat MD, 6/23/2022, 16:40 CDT

## 2022-06-24 ENCOUNTER — OFFICE VISIT (OUTPATIENT)
Dept: FAMILY MEDICINE CLINIC | Facility: CLINIC | Age: 82
End: 2022-06-24

## 2022-06-24 VITALS
HEART RATE: 95 BPM | BODY MASS INDEX: 27 KG/M2 | SYSTOLIC BLOOD PRESSURE: 144 MMHG | DIASTOLIC BLOOD PRESSURE: 66 MMHG | WEIGHT: 152.4 LBS | RESPIRATION RATE: 18 BRPM | HEIGHT: 63 IN | TEMPERATURE: 97.5 F | OXYGEN SATURATION: 90 %

## 2022-06-24 DIAGNOSIS — D64.9 ANEMIA, UNSPECIFIED TYPE: Primary | ICD-10-CM

## 2022-06-24 DIAGNOSIS — R52 PAIN: ICD-10-CM

## 2022-06-24 PROCEDURE — 99214 OFFICE O/P EST MOD 30 MIN: CPT | Performed by: FAMILY MEDICINE

## 2022-06-24 RX ORDER — HYDROCODONE BITARTRATE AND ACETAMINOPHEN 10; 325 MG/1; MG/1
1 TABLET ORAL EVERY 6 HOURS PRN
Qty: 90 TABLET | Refills: 0 | Status: SHIPPED | OUTPATIENT
Start: 2022-06-24

## 2022-06-24 NOTE — PROGRESS NOTES
"Ariana Gutierrez    1940    Chief Complaint   Patient presents with   • Foot Swelling     Bilateral   • Drug therapy monitoring     Pro today       Vitals:    06/24/22 1419   BP: 144/66   BP Location: Left arm   Patient Position: Sitting   Cuff Size: Adult   Pulse: 95   Resp: 18   Temp: 97.5 °F (36.4 °C)   TempSrc: Temporal   SpO2: 90%   Weight: 69.1 kg (152 lb 6.4 oz)   Height: 160 cm (63\")   PainSc:   3       82-year-old female with lower extremity edema-history of iron deficiency and malabsorption of iron      Review of Systems   Respiratory: Positive for shortness of breath.         Respiratory condition rheumatoid arthritis versus idiopathic pulmonary fibrosis versus COPD   Cardiovascular: Negative for chest pain and leg swelling.   Gastrointestinal:        Last colonoscopy 2018   Musculoskeletal: Positive for arthralgias and back pain.        Chronic scoliosis with radiculopathy on right   Hematological:        Anemia-possible progressive renal disease       Past Medical History:   Diagnosis Date   • Atrial fibrillation (HCC)    • Bacterial infection    • CAD (coronary artery disease)    • Chest pain    • Chronic back pain    • Deep vein thrombosis (HCC)     S/P KNEE SURGERY 10/2017   • Diverticulosis of intestine 8/16/2016   • Gastroesophageal reflux disease without esophagitis 5/26/2017   • Hypertension    • Irritable bowel syndrome 11/2/2011   • Joint infection (HCC) 7/24/2018   • Low back pain    • Palpitations    • Paresthesia     toes   • Perforated bowel (HCC)    • Pulmonary fibrosis (HCC)    • Rheumatoid arteritis (HCC)    • Scoliosis    • Spinal stenosis    • Vascular disease, peripheral (HCC)          Current Outpatient Medications:   •  atorvastatin (LIPITOR) 20 MG tablet, Take 1 tablet by mouth once daily, Disp: 90 tablet, Rfl: 3  •  benazepril (LOTENSIN) 20 MG tablet, TAKE 1 TABLET EVERY DAY, Disp: 90 tablet, Rfl: 3  •  Docusate Calcium (STOOL SOFTENER PO), Take 1 tablet by mouth Daily., " Disp: , Rfl:   •  dronedarone (MULTAQ) 400 MG tablet, Take 1 tablet by mouth 2 (Two) Times a Day With Meals., Disp: 60 tablet, Rfl: 11  •  Ferrous Sulfate (IRON PO), Take  by mouth., Disp: , Rfl:   •  gabapentin (NEURONTIN) 800 MG tablet, TAKE 1 TABLET THREE TIMES DAILY, Disp: 270 tablet, Rfl: 0  •  HYDROcodone-acetaminophen (NORCO)  MG per tablet, Take 1 tablet by mouth Every 6 (Six) Hours As Needed for Moderate Pain ., Disp: 90 tablet, Rfl: 0  •  isosorbide mononitrate (IMDUR) 30 MG 24 hr tablet, Take 1 tablet by mouth once daily, Disp: 90 tablet, Rfl: 4  •  metoprolol succinate XL (TOPROL-XL) 50 MG 24 hr tablet, TAKE 1 TABLET EVERY DAY, Disp: 90 tablet, Rfl: 3  •  Multiple Vitamins-Minerals (PRESERVISION AREDS 2 PO), Take  by mouth., Disp: , Rfl:   •  nitroglycerin (NITROSTAT) 0.4 MG SL tablet, 1 under the tongue as needed for angina, may repeat q5mins for up three doses, Disp: 25 tablet, Rfl: 11  •  omeprazole (priLOSEC) 20 MG capsule, Take 1 capsule by mouth Daily., Disp: 90 capsule, Rfl: 3  •  polyethylene glycol (MIRALAX) powder, Take 17 g by mouth Daily., Disp: , Rfl:   •  rivaroxaban (Xarelto) 20 MG tablet, Take 1 tablet by mouth Daily., Disp: 30 tablet, Rfl: 11  •  saccharomyces boulardii (FLORASTOR) 250 MG capsule, Take 1 capsule by mouth Daily., Disp: 30 capsule, Rfl: 2    No Known Allergies    Patient Active Problem List   Diagnosis   • Essential hypertension   • Gastroesophageal reflux disease without esophagitis   • Shortness of breath   • CAD (coronary artery disease)   • Scoliosis   • Low back pain   • Paresthesia   • Palpitations   • Chronic back pain   • Pneumonitis   • Chronic pain   • Restrictive lung disease   • Usual interstitial pneumonitis (HCC)   • BERGERON (dyspnea on exertion)   • PAF (paroxysmal atrial fibrillation) (HCC)   • Rheumatoid arthritis involving multiple sites with positive rheumatoid factor (HCC)   • Hypertensive heart disease with heart failure (HCC)   • Chronic cough        Social History     Socioeconomic History   • Marital status:    Tobacco Use   • Smoking status: Never Smoker   • Smokeless tobacco: Never Used   Vaping Use   • Vaping Use: Never used   Substance and Sexual Activity   • Alcohol use: No   • Drug use: No   • Sexual activity: Not Currently     Partners: Male       Past Surgical History:   Procedure Laterality Date   • BACK SURGERY     • BREAST BIOPSY Right 25 yrs ago   • CARDIAC CATHETERIZATION Left 11/6/2019    Procedure: Cardiac Catheterization/Vascular Study CARMEN OK;  Surgeon: Hawk Guerin MD;  Location: [facility] CATH INVASIVE LOCATION;  Service: Cardiology   • CATARACT EXTRACTION Bilateral    • COLON RESECTION SMALL BOWEL  2016    with colostomy for 6 months, already revised.   • COLON SURGERY     • COLONOSCOPY     • LUMBAR LAMINECTOMY     • RENAL ARTERY STENT Right    • TOTAL KNEE ARTHROPLASTY Right    • TOTAL KNEE ARTHROPLASTY     • VASCULAR SURGERY      10/2017 - DR GARCIA       Physical Exam  Vitals and nursing note reviewed.   Eyes:      Extraocular Movements: Extraocular movements intact.      Pupils: Pupils are equal, round, and reactive to light.   Cardiovascular:      Rate and Rhythm: Normal rate and regular rhythm.   Pulmonary:      Effort: Pulmonary effort is normal.      Breath sounds: Rales present.   Musculoskeletal:      Right lower leg: Edema present.      Left lower leg: Edema present.   Skin:     General: Skin is warm and dry.      Coloration: Skin is pale.   Neurological:      General: No focal deficit present.      Mental Status: She is alert and oriented to person, place, and time.   Psychiatric:         Mood and Affect: Mood normal.         Behavior: Behavior normal.         Thought Content: Thought content normal.         Judgment: Judgment normal.         Vitals:    06/24/22 1419   BP: 144/66   BP Location: Left arm   Patient Position: Sitting   Cuff Size: Adult   Pulse: 95   Resp: 18   Temp: 97.5 °F (36.4 °C)   TempSrc: Temporal  "  SpO2: 90%   Weight: 69.1 kg (152 lb 6.4 oz)   Height: 160 cm (63\")     BMI is >= 25 and <30. (Overweight) The following options were offered after discussion;: none (medical contraindication)      Diagnoses and all orders for this visit:    1. Anemia, unspecified type (Primary)  -     Vitamin B12  -     Folate    2. Pain  -     HYDROcodone-acetaminophen (NORCO)  MG per tablet; Take 1 tablet by mouth Every 6 (Six) Hours As Needed for Moderate Pain .  Dispense: 90 tablet; Refill: 0        Problems Addressed this Visit    None     Visit Diagnoses     Anemia, unspecified type    -  Primary    Relevant Orders    Vitamin B12    Folate    Pain        Relevant Medications    HYDROcodone-acetaminophen (NORCO)  MG per tablet      Diagnoses       Codes Comments    Anemia, unspecified type    -  Primary ICD-10-CM: D64.9  ICD-9-CM: 285.9     Pain     ICD-10-CM: R52  ICD-9-CM: 780.96           Health Maintenance:  Immunization History   Administered Date(s) Administered   • COVID-19 (MODERNA) 1st, 2nd, 3rd Dose Only 02/02/2021, 03/03/2021, 10/27/2021, 04/20/2022   • Fluad Quad 65+ 10/09/2019, 09/22/2020   • Fluzone High Dose =>65 Years ([employer] ONLY) 09/15/2017, 10/15/2018   • Fluzone High-Dose 65+yrs 10/14/2021   • Pneumococcal Conjugate 13-Valent (PCV13) 07/26/2016   • Pneumococcal Polysaccharide (PPSV23) 11/02/2011   • Tdap 12/11/2021                    Plan above- moved to [place] get  And will send information              Electronically signed by Bill Schilling MD 06/24/2022  "

## 2022-06-25 LAB
BASOPHILS # BLD AUTO: 0.08 10*3/MM3 (ref 0–0.2)
BASOPHILS NFR BLD AUTO: 0.8 % (ref 0–1.5)
BUN SERPL-MCNC: 45 MG/DL (ref 8–23)
BUN/CREAT SERPL: 16 (ref 7–25)
CALCIUM SERPL-MCNC: 8.3 MG/DL (ref 8.6–10.5)
CHLORIDE SERPL-SCNC: 107 MMOL/L (ref 98–107)
CO2 SERPL-SCNC: 24.4 MMOL/L (ref 22–29)
CREAT SERPL-MCNC: 2.82 MG/DL (ref 0.57–1)
EGFRCR SERPLBLD CKD-EPI 2021: 16.4 ML/MIN/1.73
EOSINOPHIL # BLD AUTO: 0.46 10*3/MM3 (ref 0–0.4)
EOSINOPHIL NFR BLD AUTO: 4.6 % (ref 0.3–6.2)
ERYTHROCYTE [DISTWIDTH] IN BLOOD BY AUTOMATED COUNT: 14.6 % (ref 12.3–15.4)
FOLATE SERPL-MCNC: 14.9 NG/ML (ref 4.78–24.2)
GLUCOSE SERPL-MCNC: 94 MG/DL (ref 65–99)
HCT VFR BLD AUTO: 25.7 % (ref 34–46.6)
HGB BLD-MCNC: 7.9 G/DL (ref 12–15.9)
IMM GRANULOCYTES # BLD AUTO: 0.06 10*3/MM3 (ref 0–0.05)
IMM GRANULOCYTES NFR BLD AUTO: 0.6 % (ref 0–0.5)
IRON SERPL-MCNC: 16 MCG/DL (ref 37–145)
LYMPHOCYTES # BLD AUTO: 1 10*3/MM3 (ref 0.7–3.1)
LYMPHOCYTES NFR BLD AUTO: 9.9 % (ref 19.6–45.3)
MCH RBC QN AUTO: 26.8 PG (ref 26.6–33)
MCHC RBC AUTO-ENTMCNC: 30.7 G/DL (ref 31.5–35.7)
MCV RBC AUTO: 87.1 FL (ref 79–97)
MONOCYTES # BLD AUTO: 0.67 10*3/MM3 (ref 0.1–0.9)
MONOCYTES NFR BLD AUTO: 6.6 % (ref 5–12)
NEUTROPHILS # BLD AUTO: 7.82 10*3/MM3 (ref 1.7–7)
NEUTROPHILS NFR BLD AUTO: 77.5 % (ref 42.7–76)
NRBC BLD AUTO-RTO: 0 /100 WBC (ref 0–0.2)
PLATELET # BLD AUTO: 289 10*3/MM3 (ref 140–450)
POTASSIUM SERPL-SCNC: 4.7 MMOL/L (ref 3.5–5.2)
RBC # BLD AUTO: 2.95 10*6/MM3 (ref 3.77–5.28)
SODIUM SERPL-SCNC: 141 MMOL/L (ref 136–145)
VIT B12 SERPL-MCNC: 527 PG/ML (ref 211–946)
WBC # BLD AUTO: 10.09 10*3/MM3 (ref 3.4–10.8)

## 2023-04-10 ENCOUNTER — HOSPITAL ENCOUNTER (OUTPATIENT)
Age: 83
Discharge: HOME | End: 2023-04-10
Payer: COMMERCIAL

## 2023-04-10 DIAGNOSIS — Z01.818: ICD-10-CM

## 2023-04-10 DIAGNOSIS — N19: Primary | ICD-10-CM

## 2023-04-10 LAB
ANION GAP: 3 MMOL/L (ref 8–16)
BLOOD UREA NITROGEN: 42 MG/DL (ref 7–17)
BUN SERPL-MCNC: 42 MG/DL (ref 7–17)
CALCIUM: 8.8 MG/DL (ref 8.4–10.2)
CARBON DIOXIDE: 31 MMOL/L (ref 22–30)
CHLORIDE SPEC-MCNC: 103 MMOL/L (ref 98–107)
CHLORIDE: 103 MMOL/L (ref 98–107)
DELTA APTT PPP: 27.7 SECONDS (ref 22.3–36.8)
ESTIMATED GLOMERULAR FILT RATE: 25
GFRSERPLBLD MDRD-ARVRAT: 25 ML/MIN/{1.73_M2}
GLUCOSE SERPL-MCNC: 102 MG/DL (ref 65–110)
GLUCOSE: 102 MG/DL (ref 65–110)
INR: 1.4
PARTIAL THROMBOPLASTIN TIME: 27.7 SECONDS (ref 22.3–36.8)
POTASSIUM: 4.9 MMOL/L (ref 3.4–5)
PROTHROMBIN TIME: 16.9 SECONDS (ref 11.1–14.7)
SODIUM: 137 MMOL/L (ref 137–145)

## 2023-04-10 PROCEDURE — 85730 THROMBOPLASTIN TIME PARTIAL: CPT

## 2023-04-10 PROCEDURE — 80048 BASIC METABOLIC PNL TOTAL CA: CPT

## 2023-04-10 PROCEDURE — 85610 PROTHROMBIN TIME: CPT

## 2023-04-10 PROCEDURE — 36415 COLL VENOUS BLD VENIPUNCTURE: CPT

## 2023-04-12 ENCOUNTER — HOSPITAL ENCOUNTER (OUTPATIENT)
Age: 83
Discharge: HOME | End: 2023-04-12
Payer: COMMERCIAL

## 2023-04-12 VITALS
HEART RATE: 72 BPM | RESPIRATION RATE: 16 BRPM | SYSTOLIC BLOOD PRESSURE: 118 MMHG | DIASTOLIC BLOOD PRESSURE: 53 MMHG | TEMPERATURE: 97.52 F | OXYGEN SATURATION: 95 %

## 2023-04-12 VITALS
SYSTOLIC BLOOD PRESSURE: 111 MMHG | RESPIRATION RATE: 16 BRPM | OXYGEN SATURATION: 94 % | HEART RATE: 63 BPM | DIASTOLIC BLOOD PRESSURE: 56 MMHG

## 2023-04-12 VITALS — BODY MASS INDEX: 24.8 KG/M2

## 2023-04-12 VITALS
HEART RATE: 70 BPM | OXYGEN SATURATION: 90 % | DIASTOLIC BLOOD PRESSURE: 54 MMHG | SYSTOLIC BLOOD PRESSURE: 116 MMHG | RESPIRATION RATE: 16 BRPM

## 2023-04-12 VITALS — RESPIRATION RATE: 16 BRPM | HEART RATE: 63 BPM | SYSTOLIC BLOOD PRESSURE: 117 MMHG | DIASTOLIC BLOOD PRESSURE: 67 MMHG

## 2023-04-12 DIAGNOSIS — E78.5: ICD-10-CM

## 2023-04-12 DIAGNOSIS — I12.9: ICD-10-CM

## 2023-04-12 DIAGNOSIS — K21.9: ICD-10-CM

## 2023-04-12 DIAGNOSIS — Z79.01: ICD-10-CM

## 2023-04-12 DIAGNOSIS — M06.9: ICD-10-CM

## 2023-04-12 DIAGNOSIS — Z79.82: ICD-10-CM

## 2023-04-12 DIAGNOSIS — I48.91: ICD-10-CM

## 2023-04-12 DIAGNOSIS — N18.9: ICD-10-CM

## 2023-04-12 DIAGNOSIS — K02.9: Primary | ICD-10-CM

## 2023-04-12 LAB
DELTA APTT PPP: 27.5 SECONDS (ref 22.3–36.8)
INR: 1.5
PARTIAL THROMBOPLASTIN TIME: 27.5 SECONDS (ref 22.3–36.8)
PROTHROMBIN TIME: 17.3 SECONDS (ref 11.1–14.7)

## 2023-04-12 PROCEDURE — 36415 COLL VENOUS BLD VENIPUNCTURE: CPT

## 2023-04-12 PROCEDURE — 85610 PROTHROMBIN TIME: CPT

## 2023-04-12 PROCEDURE — 85730 THROMBOPLASTIN TIME PARTIAL: CPT

## (undated) DEVICE — BAND FLX MSTR STD STR 6IN

## (undated) DEVICE — MEDIA CONTRAST INJ VISIPAQUE 150ML 320MG

## (undated) DEVICE — FAN SPRAY KIT: Brand: PULSAVAC®

## (undated) DEVICE — CONMED GOLDLINE ELECTROSURGICAL HANDPIECE, HAND CONTROLLED WITH BLADE ELECTRODE, BUTTON SWITCH, SAFETY HOLSTER AND 10 FT (3 M) CABLE: Brand: CONMED GOLDLINE

## (undated) DEVICE — GLOVE SURG SZ 75 L12IN FNGR THK94MIL TRNSLUC YEL LTX

## (undated) DEVICE — BANDAGE GZ W45INXL4 1 10YD FLUF RL 6 PLY DERMACEA

## (undated) DEVICE — BANDAGE COMPR W6XL80IN COT E 1 LAYR CLP CLSR PREM GRD CONTEX

## (undated) DEVICE — SPONGE LAP ST XRAY 18X18

## (undated) DEVICE — SOLUTION IV IRRIG POUR BRL 0.9% SODIUM CHL 2F7124

## (undated) DEVICE — VITAL SIGNS™ ADULT FACE MASK WITH ADJUSTABLE AIR CUSHION - SIZE 5: Brand: VITAL SIGNS™

## (undated) DEVICE — SUTURE ABSORBABLE MONOFILAMENT 1-0 OS8 14 IN STRATAFIX SPRL SXPD2B202

## (undated) DEVICE — RADIFOCUS GLIDEWIRE: Brand: GLIDEWIRE

## (undated) DEVICE — Device

## (undated) DEVICE — RADIFOCUS OPTITORQUE ANGIOGRAPHIC CATHETER: Brand: OPTITORQUE

## (undated) DEVICE — ANGIOGRAPHY PK

## (undated) DEVICE — GW STARTER FXD CORE J .035 3X260CM 3MM

## (undated) DEVICE — 3M™ WARMING BLANKET, UPPER BODY, 10 PER CASE, 42268: Brand: BAIR HUGGER™

## (undated) DEVICE — CONVERTORS STOCKINETTE: Brand: CONVERTORS

## (undated) DEVICE — SUTURE PROL SZ 6-0 L30IN NONABSORBABLE BLU L9.3MM BV-1 3/8 M8709

## (undated) DEVICE — CATHETER ANGIO 5FR L70CM GWIRE 0.035IN 1CM SPC OMNI FLSH 21

## (undated) DEVICE — GLOVE SURG SZ 7 L12IN FNGR THK94MIL TRNSLUC YEL LTX HYDRGEL

## (undated) DEVICE — 1010 S-DRAPE TOWEL DRAPE 10/BX: Brand: STERI-DRAPE™

## (undated) DEVICE — SUP ARMBRD ART/LINE BLU

## (undated) DEVICE — SUTURE VCRL SZ 0 L27IN ABSRB UD L36MM CT-1 1/2 CIR J260H

## (undated) DEVICE — SURGICAL PROCEDURE PACK LOWER EXTREMITY LOURDES HOSP

## (undated) DEVICE — CATH F5 INF JR 5 100CM: Brand: INFINITI

## (undated) DEVICE — GLIDESHEATH SLENDER STAINLESS STEEL KIT: Brand: GLIDESHEATH SLENDER

## (undated) DEVICE — MEDI-VAC NON-CONDUCTIVE SUCTION TUBING 6MM X 6.1M (20 FT.) L: Brand: CARDINAL HEALTH

## (undated) DEVICE — SUTURE VCRL SZ 2-0 L36IN ABSRB UD L36MM CT-1 1/2 CIR J945H

## (undated) DEVICE — GW STARTER FXD CORE J .035 3X150CM 3MM

## (undated) DEVICE — POOLE SUCTION INSTRUMENT WITH REMOVABLE SHEATH: Brand: POOLE

## (undated) DEVICE — SOLUTION IV 500ML 0.9% SOD CHL PH 5 INJ USP VIAFLX PLAS

## (undated) DEVICE — GLOVE SURG SZ 75 L12IN FNGR THK87MIL DK GRN LTX FREE ISOLEX

## (undated) DEVICE — GLOVE SURG SZ 85 L12IN FNGR THK13MIL BRN LTX SYN POLYMER W

## (undated) DEVICE — DISCONTINUED USE 127221 SUTURE SILK PRMHND ETHLN BR BLK REV 2-0 18 685H

## (undated) DEVICE — MODEL AT P65, P/N 701554-001KIT CONTENTS: HAND CONTROLLER, 3-WAY HIGH-PRESSURE STOPCOCK WITH ROTATING END AND PREMIUM HIGH-PRESSURE TUBING: Brand: ANGIOTOUCH® KIT

## (undated) DEVICE — RADIFOCUS GLIDECATH: Brand: GLIDECATH

## (undated) DEVICE — ABDOMINAL PAD: Brand: DERMACEA

## (undated) DEVICE — Z DISCONTINUED USE 2429233 DRESSING FOAM W10XL10CM 5 LAYR SELF ADH VERSATILE SAFETAC

## (undated) DEVICE — JACKSON-PRATT 100CC BULB RESERVOIR: Brand: CARDINAL HEALTH

## (undated) DEVICE — SUTURE ABSRB BRAID COAT UD CP NO 2 27IN VCRL J195H

## (undated) DEVICE — CANNULA PERF L1.3IN TIP L2MM S STL POLYUR TB ARTOTMY BLB

## (undated) DEVICE — TOWEL,OR,DSP,ST,BLUE,DLX,4/PK,20PK/CS: Brand: MEDLINE

## (undated) DEVICE — GLOVE SURG SZ 85 L12IN FNGR THK87MIL DK GRN LTX FREE ISOLEX

## (undated) DEVICE — SUTURE ETHLN SZ 2-0 L30IN NONABSORBABLE BLK L36MM FSLX 3/8 1674H

## (undated) DEVICE — WRAP AROUND LENS-STERLIE

## (undated) DEVICE — C-ARM: Brand: UNBRANDED

## (undated) DEVICE — 3M™ IOBAN™ 2 ANTIMICROBIAL INCISE DRAPE 6651EZ: Brand: IOBAN™ 2

## (undated) DEVICE — COVER US PRB W15XL120CM W/ GEL RUBBERBAND TAPE STRP FLD GEN

## (undated) DEVICE — GAUZE,SPONGE,FLUFF,6"X6.75",STRL,10/TRAY: Brand: MEDLINE

## (undated) DEVICE — Device: Brand: POWER-FLO®

## (undated) DEVICE — GLIDESHEATH BASIC HYDROPHILIC COATED INTRODUCER SHEATH: Brand: GLIDESHEATH

## (undated) DEVICE — ASTOUND STANDARD SURGICAL GOWN, XL: Brand: CONVERTORS

## (undated) DEVICE — ENDO KIT,LOURDES HOSPITAL: Brand: MEDLINE INDUSTRIES, INC.

## (undated) DEVICE — DISCONTINUED NO SUB IDED TG GLOVE SURG SENSICARE ALOE LT LF PF ST GRN SZ 8

## (undated) DEVICE — SUTURE VCRL SZ 0 L36IN ABSRB UD L36MM CT-1 1/2 CIR J946H

## (undated) DEVICE — JP PERF DRN SIL FLT 7MM FULL: Brand: CARDINAL HEALTH

## (undated) DEVICE — FOGARTY ARTERIAL EMBOLECTOMY CATHETER 4F 40CM: Brand: FOGARTY

## (undated) DEVICE — TR BAND RADIAL ARTERY COMPRESSION DEVICE: Brand: TR BAND

## (undated) DEVICE — CATH F5 INF JL 3.5 100CM: Brand: INFINITI

## (undated) DEVICE — SOLUTION IV 1000ML 0.9% SOD CHL PH 5 INJ USP VIAFLX PLAS

## (undated) DEVICE — FOGARTY ARTERIAL EMBOLECTOMY CATHETER 3F 80CM: Brand: FOGARTY

## (undated) DEVICE — SUTURE VCRL SZ 3-0 L18IN ABSRB UD L26MM SH 1/2 CIR J864D

## (undated) DEVICE — DRILL: Brand: OASYS

## (undated) DEVICE — CATH F5 INF AR MOD 100CM: Brand: INFINITI

## (undated) DEVICE — SUTURE ETHLN SZ 3-0 L18IN NONABSORBABLE BLK FS-1 L24MM 3/8 663H

## (undated) DEVICE — SUTURE VCRL 0 L18IN ABSRB VLT POLYGLACTIN 910 BRAID COAT J906G

## (undated) DEVICE — ELECTRD PAD DEFIB A/

## (undated) DEVICE — UNIVERSAL PACK: Brand: CONVERTORS

## (undated) DEVICE — MODEL BT2000 P/N 700287-012KIT CONTENTS: MANIFOLD WITH SALINE AND CONTRAST PORTS, SALINE TUBING WITH SPIKE AND HAND SYRINGE, TRANSDUCER: Brand: BT2000 AUTOMATED MANIFOLD KIT

## (undated) DEVICE — HIGH CAPACITY FAN SPRAY TIP WITH SHIELD: Brand: PULSAVAC®

## (undated) DEVICE — DRAPE,EXTREMITY,89X128,STERILE: Brand: MEDLINE

## (undated) DEVICE — SOLUTION IV 250ML 0.9% SOD CHL PH 5 INJ USP VIAFLX PLAS

## (undated) DEVICE — PK CATH CARD 30 CA/4

## (undated) DEVICE — SOLIDIFIER LIQUI LOC PLUS 2000CC

## (undated) DEVICE — 3M™ COBAN™ NL STERILE NON-LATEX SELF-ADHERENT WRAP, 2086S, 6 IN X 5 YD (15 CM X 4,5 M), 12 ROLLS/CASE: Brand: 3M™ COBAN™

## (undated) DEVICE — GLOVE SURG SZ 8 L12IN FNGR THK13MIL BRN LTX SYN POLYMER W

## (undated) DEVICE — DRAPE 33X23IN INCISE ANTIMICROB IOBAN 2

## (undated) DEVICE — CATH F5 INF MP A2 100CM 2SH: Brand: INFINITI

## (undated) DEVICE — GLOVE SURG SZ 8 L12IN FNGR THK94MIL TRNSLUC YEL LTX HYDRGEL

## (undated) DEVICE — CATHETER KIT 5 FR 21 GAX7 CM MICROINTRODUCER GUIDEWIRE STIFF

## (undated) DEVICE — SURGICAL PROCEDURE PACK VASC LOURDES HOSP

## (undated) DEVICE — ZIMMER® STERILE DISPOSABLE TOURNIQUET CUFF WITH PLC, DUAL PORT, SINGLE BLADDER, 34 IN. (86 CM)

## (undated) DEVICE — DRESSING PETRO W3XL3IN OIL EMUL N ADH GZ KNIT IMPREG CELOS

## (undated) DEVICE — FORCEPS BX 240CM 2.4MM L NDL RAD JAW 4 M00513334

## (undated) DEVICE — SOL IRR NACL 0.9PCT BT 1000ML